# Patient Record
Sex: MALE | Race: WHITE | Employment: OTHER | URBAN - METROPOLITAN AREA
[De-identification: names, ages, dates, MRNs, and addresses within clinical notes are randomized per-mention and may not be internally consistent; named-entity substitution may affect disease eponyms.]

---

## 2017-02-14 ENCOUNTER — HOME CARE VISIT (OUTPATIENT)
Dept: HOME HEALTH SERVICES | Facility: HOME HEALTH | Age: 77
End: 2017-02-14

## 2017-07-06 ENCOUNTER — OFFICE VISIT (OUTPATIENT)
Dept: FAMILY MEDICINE CLINIC | Age: 77
End: 2017-07-06

## 2017-07-06 VITALS
DIASTOLIC BLOOD PRESSURE: 58 MMHG | BODY MASS INDEX: 34.88 KG/M2 | OXYGEN SATURATION: 97 % | HEART RATE: 80 BPM | TEMPERATURE: 98.2 F | SYSTOLIC BLOOD PRESSURE: 120 MMHG | RESPIRATION RATE: 16 BRPM | WEIGHT: 295.4 LBS | HEIGHT: 77 IN

## 2017-07-06 DIAGNOSIS — R29.6 RECURRENT FALLS: ICD-10-CM

## 2017-07-06 DIAGNOSIS — I48.0 PAROXYSMAL ATRIAL FIBRILLATION (HCC): ICD-10-CM

## 2017-07-06 DIAGNOSIS — I42.9 CARDIOMYOPATHY, UNSPECIFIED TYPE (HCC): ICD-10-CM

## 2017-07-06 DIAGNOSIS — G60.9 IDIOPATHIC PERIPHERAL NEUROPATHY: ICD-10-CM

## 2017-07-06 DIAGNOSIS — R26.89 IMPAIRED GAIT AND MOBILITY: ICD-10-CM

## 2017-07-06 DIAGNOSIS — I49.5 SICK SINUS SYNDROME (HCC): Primary | ICD-10-CM

## 2017-07-06 LAB
INR BLD: 2.5
PT POC: 29.8 SECONDS
VALID INTERNAL CONTROL?: YES

## 2017-07-06 RX ORDER — DIPHENHYDRAMINE HCL 25 MG
50 CAPSULE ORAL
COMMUNITY
End: 2018-11-26

## 2017-07-06 RX ORDER — METOPROLOL SUCCINATE 25 MG/1
25 TABLET, EXTENDED RELEASE ORAL DAILY
Qty: 90 TAB | Refills: 0 | Status: SHIPPED | OUTPATIENT
Start: 2017-07-06 | End: 2017-09-29 | Stop reason: SDUPTHER

## 2017-07-06 RX ORDER — RANITIDINE 150 MG/1
150 TABLET, FILM COATED ORAL DAILY
COMMUNITY
End: 2018-02-06

## 2017-07-06 RX ORDER — FUROSEMIDE 40 MG/1
40 TABLET ORAL 2 TIMES DAILY
COMMUNITY
End: 2018-02-06

## 2017-07-06 RX ORDER — TAMSULOSIN HYDROCHLORIDE 0.4 MG/1
0.4 CAPSULE ORAL DAILY
COMMUNITY
End: 2018-02-06

## 2017-07-06 RX ORDER — GLUCOSAMINE/CHONDR SU A SOD 167-133 MG
500 CAPSULE ORAL EVERY OTHER DAY
COMMUNITY
End: 2017-08-04 | Stop reason: ALTCHOICE

## 2017-07-06 RX ORDER — FUROSEMIDE 20 MG/1
20 TABLET ORAL
COMMUNITY
End: 2017-07-19 | Stop reason: SDUPTHER

## 2017-07-06 RX ORDER — WARFARIN 4 MG/1
4 TABLET ORAL DAILY
COMMUNITY
End: 2017-07-19 | Stop reason: SDUPTHER

## 2017-07-06 RX ORDER — LEVOTHYROXINE SODIUM 25 UG/1
TABLET ORAL
COMMUNITY
End: 2018-02-05

## 2017-07-06 NOTE — PROGRESS NOTES
1. Have you been to the ER, urgent care clinic since your last visit? Hospitalized since your last visit? No    2. Have you seen or consulted any other health care providers outside of the [address] [place] [address] since your last visit? Include any pap smears or colon screening.  Patient was going to [facility]

## 2017-07-06 NOTE — PROGRESS NOTES
History of Present Illness  Charles Novoa is a 64 y.o. male who presents today for management of    Chief Complaint   Patient presents with    Medication Refill    Cardiomyopathy    Allergic Rhinitis    Irregular Heart Beat       Patient was last seen by Dr. Leesa Crespo one year ago. He has bene going to [place], but he wants to go back to [facility]. He has history of sick sinus syndrome, s/p PPM 7/4/2011. He takes Coumadin 4mg daily for paroxysmal atrial fibrillation. His INR today is 2.5. No bleeding or dark stools. He has fallen 3 times in the last 2 weeks. His left knee often gives out or locks up causing him to lose his balance. He has pain on both feet. He takes gabapentin which provides partial relief. He reports of developing allergic rash while eating a bowl of ramen. The second time occurred while eating a bowl of cereal.    Past Medical History  Past Medical History:   Diagnosis Date    Asthma     BPH (benign prostatic hyperplasia)     Herniated disc, cervical     Knee pain     Pacemaker 2011    [facility]    PAF (paroxysmal atrial fibrillation) ([facility])     During Pacer interogation     SSS (sick sinus syndrome) ([facility])     S/P Dual chamber [facility] Pacemaker        Surgical History  Past Surgical History:   Procedure Laterality Date    HX HERNIA REPAIR Bilateral 1989    HX HIP REPLACEMENT  2006    right    HX KNEE REPLACEMENT Bilateral 1992/1993/2006/2008/2011/2012/2013    HX PACEMAKER  2011        Current Medications  Current Outpatient Prescriptions   Medication Sig    warfarin (COUMADIN) 4 mg tablet Take 4 mg by mouth daily.  levothyroxine (SYNTHROID) 25 mcg tablet Take  by mouth Daily (before breakfast).  raNITIdine (ZANTAC) 150 mg tablet Take 150 mg by mouth daily.  tamsulosin (FLOMAX) 0.4 mg capsule Take 0.4 mg by mouth daily.  furosemide (LASIX) 40 mg tablet Take  by mouth daily.  furosemide (LASIX) 20 mg tablet Take 20 mg by mouth nightly.     diphenhydrAMINE (BENADRYL) 25 mg capsule Take 50 mg by mouth nightly as needed.  nicotinic acid (NIACIN) 500 mg tablet Take 500 mg by mouth every other day.  zinc 50 mg tab tablet Take 25 mg by mouth daily.  metoprolol succinate (TOPROL-XL) 25 mg XL tablet Take 1 Tab by mouth daily.  cyanocobalamin 1,000 mcg tablet Take 1,000 mcg by mouth daily.  gabapentin (NEURONTIN) 600 mg tablet Take 1 Tab by mouth three (3) times daily.  loratadine (CLARITIN) 10 mg tablet Take 1 Tab by mouth daily. No current facility-administered medications for this visit. Allergies/Drug Reactions  Allergies   Allergen Reactions    Sulfa (Sulfonamide Antibiotics) Hives and Rash        Family History  No family history on file. Social History  Social History     Social History    Marital status:      Spouse name: N/A    Number of children: N/A    Years of education: N/A     Occupational History    Not on file. Social History Main Topics    Smoking status: Current Some Day Smoker     Types: Cigars    Smokeless tobacco: Never Used      Comment: rare cigar smoker    Alcohol use 0.0 oz/week     0 Standard drinks or equivalent per week      Comment: rarely drinks    Drug use: No    Sexual activity: Not Currently     Partners: Female     Other Topics Concern    Not on file     Social History Narrative       Health Maintenance   Topic Date Due    DTaP/Tdap/Td series (1 - Tdap) 08/28/1959    GLAUCOMA SCREENING Q2Y  08/28/2003    Pneumococcal 65+ High/Highest Risk (1 of 2 - PCV13) 08/28/2003    MEDICARE YEARLY EXAM  02/08/2017    INFLUENZA AGE 9 TO ADULT  08/01/2017    ZOSTER VACCINE AGE 60>  Completed       There is no immunization history on file for this patient.     Review of Systems  General ROS: negative for - chills, fatigue or fever  Respiratory ROS: no cough, shortness of breath, or wheezing  Cardiovascular ROS: no chest pain or dyspnea on exertion  Gastrointestinal ROS: no abdominal pain, change in bowel habits, or black or bloody stools  Genito-Urinary ROS: no dysuria, trouble voiding, or hematuria  Musculoskeletal ROS: positive for - foot pain  Neurological ROS: positive for - impaired coordination/balance    No exam data present    Physical Exam  Vital signs:   Vitals:    07/06/17 1523   BP: 120/58   Pulse: 80   Resp: 16   Temp: 98.2 °F (36.8 °C)   TempSrc: Oral   SpO2: 97%   Weight: 295 lb 6.4 oz (134 kg)   Height: 6' 5\" (1.956 m)       General: alert, oriented, not in distress, wheelchair-bound  Chest/Lungs: clear breath sounds, no wheezing or crackles  Heart: normal rate, regular rhythm, no murmur  Abdomen: soft, non-distended, non-tender, normal bowel sounds, no organomegaly, no masses  Extremities: no focal deformities, pitting bipedal edema gr 2  Skin: no active skin lesions    Assessment/Plan:    1. Sick sinus syndrome (HCC)  - AMB POC EKG ROUTINE W/ 12 LEADS, INTER & REP  - AMB POC PT/INR  - refilled metoprolol succinate (TOPROL-XL) 25 mg XL tablet; Take 1 Tab by mouth daily. Dispense: 90 Tab; Refill: 0    2. Cardiomyopathy, unspecified type - no evidence of fluid overload  - metoprolol succinate (TOPROL-XL) 25 mg XL tablet; Take 1 Tab by mouth daily. Dispense: 90 Tab; Refill: 0  - continue furosemide  - will need new referral for cardiology    3. Paroxysmal atrial fibrillation (HCC)  - INR therapeutic  - continue coumadin 4mg daily  - metoprolol succinate (TOPROL-XL) 25 mg XL tablet; Take 1 Tab by mouth daily. Dispense: 90 Tab; Refill: 0    4. Idiopathic peripheral neuropathy  - cont gabapentin    5. Recurrent falls  - REFERRAL TO PHYSICAL THERAPY    6. Impaired gait and mobility  - REFERRAL TO PHYSICAL THERAPY      Follow-up Disposition:  Return in about 4 weeks (around 8/3/2017) for Dr. Lisa Calero. I have discussed the diagnosis with the patient and the intended plan as seen in the above orders. The patient has received an after-visit summary and questions were answered concerning future plans. I have discussed medication side effects and warnings with the patient as well. I have reviewed the plan of care with the patient, accepted their input and they are in agreement with the treatment goals.        Gabyb Wang MD  July 6, 2017

## 2017-07-06 NOTE — MR AVS SNAPSHOT
Visit Information Date & Time Provider Department Dept. Phone Encounter #  
 7/6/2017  3:30 PM Ira Fajardo, [address] [facility] [phone] [id number] Follow-up Instructions Return in about 6 days (around 7/12/2017) for Dr. Rosendo Heimlich. Your Appointments 7/12/2017  1:45 PM  
Follow Up with Kalyn Camarena MD  
[facility] Appt Note: PHYTEL- CHOL  
 [address] [place]  
  
   
 [address] [facility] [address] Upcoming Health Maintenance Date Due DTaP/Tdap/Td series (1 - Tdap) 8/28/1959 GLAUCOMA SCREENING Q2Y 8/28/2003 Pneumococcal 65+ High/Highest Risk (1 of 2 - PCV13) 8/28/2003 MEDICARE YEARLY EXAM 2/8/2017 INFLUENZA AGE 9 TO ADULT 8/1/2017 Allergies as of 7/6/2017  Review Complete On: 7/6/2017 By: Ira Fajardo MD  
  
 Severity Noted Reaction Type Reactions Sulfa (Sulfonamide Antibiotics)  12/03/2015    Hives, Rash Current Immunizations  Never Reviewed No immunizations on file. Not reviewed this visit You Were Diagnosed With   
  
 Codes Comments [facility]    -  Primary ICD-10-CM: I49.5 ICD-9-CM: 427.81 Cardiomyopathy, unspecified type     ICD-10-CM: I42.9 ICD-9-CM: 425.4 Paroxysmal atrial fibrillation (HCC)     ICD-10-CM: I48.0 ICD-9-CM: 427.31 Idiopathic peripheral neuropathy     ICD-10-CM: G60.9 ICD-9-CM: 356.9 Recurrent falls     ICD-10-CM: R29.6 ICD-9-CM: V15.88 Impaired gait and mobility     ICD-10-CM: R26.89 
ICD-9-CM: 781. 2 Vitals BP Pulse Temp Resp Height(growth percentile) Weight(growth percentile) 120/58 (BP 1 Location: Left arm, BP Patient Position: Sitting) 80 98.2 °F (36.8 °C) (Oral) 16 6' 5\" (1.956 m) 295 lb 6.4 oz (134 kg) SpO2 BMI Smoking Status 97% 35.03 kg/m2 Current Some Day Smoker Vitals History BMI and BSA Data Body Mass Index Body Surface Area 35.03 kg/m 2 2.7 m 2 Preferred Pharmacy Pharmacy Name Phone [facility] [address], [place] [phone] Your Updated Medication List  
  
   
This list is accurate as of: 7/6/17  4:12 PM.  Always use your most recent med list.  
  
  
  
  
 BENADRYL 25 mg capsule Generic drug:  diphenhydrAMINE Take 50 mg by mouth nightly as needed. cyanocobalamin 1,000 mcg tablet Take 1,000 mcg by mouth daily. * furosemide 40 mg tablet Commonly known as:  LASIX Take  by mouth daily. * furosemide 20 mg tablet Commonly known as:  LASIX Take 20 mg by mouth nightly.  
  
 gabapentin 600 mg tablet Commonly known as:  NEURONTIN Take 1 Tab by mouth three (3) times daily. levothyroxine 25 mcg tablet Commonly known as:  SYNTHROID Take  by mouth Daily (before breakfast). loratadine 10 mg tablet Commonly known as:  Maryam Case Take 1 Tab by mouth daily. metoprolol succinate 25 mg XL tablet Commonly known as:  TOPROL-XL Take 1 Tab by mouth daily. nicotinic acid 500 mg tablet Commonly known as:  NIACIN Take 500 mg by mouth every other day. raNITIdine 150 mg tablet Commonly known as:  ZANTAC Take 150 mg by mouth daily. tamsulosin 0.4 mg capsule Commonly known as:  FLOMAX Take 0.4 mg by mouth daily. warfarin 4 mg tablet Commonly known as:  COUMADIN Take 4 mg by mouth daily. zinc 50 mg Tab tablet Take 25 mg by mouth daily. * Notice: This list has 2 medication(s) that are the same as other medications prescribed for you. Read the directions carefully, and ask your doctor or other care provider to review them with you. Prescriptions Sent to Pharmacy Refills  
 metoprolol succinate (TOPROL-XL) 25 mg XL tablet 0 Sig: Take 1 Tab by mouth daily.   
 Class: Normal  
 Pharmacy: [id number], [address] Ph #: [phone] Route: Oral  
  
We Performed the Following AMB POC EKG ROUTINE W/ 12 LEADS, INTER & REP [68338 CPT(R)] AMB POC PT/INR [39416 CPT(R)] REFERRAL TO CARDIOLOGY [AVI05 Custom] Comments:  
 76/M with history of cardiomyopathy, sick sinus syndrome, s/p PPM, paroxysmal atrial fibrillation on warfarin REFERRAL TO PHYSICAL THERAPY [TQE46 Custom] Comments:  
 76/M with impaired balance, recurrent falls, history of bilateral total knee replacements Follow-up Instructions Return in about 6 days (around 7/12/2017) for Dr. Meghna Davis. Referral Information Referral ID Referred By Referred To  
  
 [id number] Crys Otero Not Available Visits Status Start Date End Date 1 New Request 7/6/17 7/6/18 If your referral has a status of pending review or denied, additional information will be sent to support the outcome of this decision. Referral ID Referred By Referred To  
 [id number] Jackie Robles MD  
   [address] [place] [address] [address] [facility] Phone: [phone] Fax: [phone] Visits Status Start Date End Date 1 New Request 7/6/17 7/6/18 If your referral has a status of pending review or denied, additional information will be sent to support the outcome of this decision. Introducing [facility]! Shala Lehman introduces Fluxome patient portal. Now you can access parts of your medical record, email your doctor's office, and request medication refills online. 1. In your internet browser, go to [email or web address] 2. Click on the First Time User? Click Here link in the Sign In box. You will see the New Member Sign Up page. 3. Enter your Fluxome Access Code exactly as it appears below. You will not need to use this code after youve completed the sign-up process.  If you do not sign up before the expiration date, you must request a new code. · HeyLets Access Code: [email or web address] Expires: 10/4/2017  3:56 PM 
 
4. Enter the last four digits of your Social Security Number (xxxx) and Date of Birth (mm/dd/yyyy) as indicated and click Submit. You will be taken to the next sign-up page. 5. Create a HeyLets ID. This will be your HeyLets login ID and cannot be changed, so think of one that is secure and easy to remember. 6. Create a HeyLets password. You can change your password at any time. 7. Enter your Password Reset Question and Answer. This can be used at a later time if you forget your password. 8. Enter your e-mail address. You will receive e-mail notification when new information is available in [address] 9. Click Sign Up. You can now view and download portions of your medical record. 10. Click the Download Summary menu link to download a portable copy of your medical information. If you have questions, please visit the Frequently Asked Questions section of the HeyLets website. Remember, HeyLets is NOT to be used for urgent needs. For medical emergencies, dial 911. Now available from your iPhone and Android! Please provide this summary of care documentation to your next provider. Your primary care clinician is listed as Hebert Zapata. If you have any questions after today's visit, please call [phone].

## 2017-07-12 ENCOUNTER — OFFICE VISIT (OUTPATIENT)
Dept: FAMILY MEDICINE CLINIC | Age: 77
End: 2017-07-12

## 2017-07-12 ENCOUNTER — HOSPITAL ENCOUNTER (OUTPATIENT)
Dept: LAB | Age: 77
Discharge: HOME | End: 2017-07-12

## 2017-07-12 VITALS
RESPIRATION RATE: 18 BRPM | HEIGHT: 77 IN | WEIGHT: 296 LBS | HEART RATE: 76 BPM | OXYGEN SATURATION: 96 % | TEMPERATURE: 97.9 F | DIASTOLIC BLOOD PRESSURE: 56 MMHG | SYSTOLIC BLOOD PRESSURE: 112 MMHG | BODY MASS INDEX: 34.95 KG/M2

## 2017-07-12 DIAGNOSIS — N40.0 BENIGN PROSTATIC HYPERPLASIA WITHOUT LOWER URINARY TRACT SYMPTOMS, UNSPECIFIED MORPHOLOGY: ICD-10-CM

## 2017-07-12 DIAGNOSIS — I42.9 CARDIOMYOPATHY, UNSPECIFIED TYPE (HCC): ICD-10-CM

## 2017-07-12 DIAGNOSIS — G62.9 PERIPHERAL POLYNEUROPATHY: Primary | ICD-10-CM

## 2017-07-12 DIAGNOSIS — M79.89 LEG SWELLING: ICD-10-CM

## 2017-07-12 DIAGNOSIS — R73.01 IFG (IMPAIRED FASTING GLUCOSE): ICD-10-CM

## 2017-07-12 DIAGNOSIS — D47.2 MONOCLONAL GAMMOPATHY: ICD-10-CM

## 2017-07-12 DIAGNOSIS — E78.00 HYPERCHOLESTEREMIA: ICD-10-CM

## 2017-07-12 DIAGNOSIS — E03.4 HYPOTHYROIDISM DUE TO ACQUIRED ATROPHY OF THYROID: ICD-10-CM

## 2017-07-12 DIAGNOSIS — H25.013 CORTICAL AGE-RELATED CATARACT OF BOTH EYES: ICD-10-CM

## 2017-07-12 PROCEDURE — 99001 SPECIMEN HANDLING PT-LAB: CPT | Performed by: INTERNAL MEDICINE

## 2017-07-12 NOTE — PROGRESS NOTES
1. Have you been to the ER, urgent care clinic since your last visit? Hospitalized since your last visit? No    2. Have you seen or consulted any other health care providers outside of the [address] [place] [address] since your last visit? Include any pap smears or colon screening.  Dr. Antonio Haji

## 2017-07-12 NOTE — PROGRESS NOTES
Shayne Ward is a 64 y.o.  male and presents with     Chief Complaint   Patient presents with    Coagulation disorder     PT/INR - 29.2 / 2.4    Irregular Heart Beat    Knee Pain    Leg Swelling     Pt was going to [facility]. Pt decided to coem back. Pt has bilateral knee pain. Pt has gained wt. Pt uses a walker. Pt is taking Neurontin three  Times daily. Past Medical History:   Diagnosis Date    Asthma     BPH (benign prostatic hyperplasia)     Herniated disc, cervical     Knee pain     Pacemaker 2011    [facility]    PAF (paroxysmal atrial fibrillation) ([facility])     During Pacer interogation     SSS (sick sinus syndrome) ([facility])     S/P Dual chamber [facility] Pacemaker     Past Surgical History:   Procedure Laterality Date    HX HERNIA REPAIR Bilateral 1989    HX HIP REPLACEMENT  2006    right    HX KNEE REPLACEMENT Bilateral 1992/1993/2006/2008/2011/2012/2013    HX PACEMAKER  2011     Current Outpatient Prescriptions   Medication Sig    warfarin (COUMADIN) 4 mg tablet Take 4 mg by mouth daily.  levothyroxine (SYNTHROID) 25 mcg tablet Take  by mouth Daily (before breakfast).  raNITIdine (ZANTAC) 150 mg tablet Take 150 mg by mouth daily.  tamsulosin (FLOMAX) 0.4 mg capsule Take 0.4 mg by mouth daily.  furosemide (LASIX) 40 mg tablet Take  by mouth daily.  furosemide (LASIX) 20 mg tablet Take 20 mg by mouth nightly.  diphenhydrAMINE (BENADRYL) 25 mg capsule Take 50 mg by mouth nightly as needed.  nicotinic acid (NIACIN) 500 mg tablet Take 500 mg by mouth every other day.  zinc 50 mg tab tablet Take 25 mg by mouth daily.  metoprolol succinate (TOPROL-XL) 25 mg XL tablet Take 1 Tab by mouth daily.  cyanocobalamin 1,000 mcg tablet Take 1,000 mcg by mouth daily.  gabapentin (NEURONTIN) 600 mg tablet Take 1 Tab by mouth three (3) times daily.  loratadine (CLARITIN) 10 mg tablet Take 1 Tab by mouth daily.      No current facility-administered medications for this visit. Health Maintenance   Topic Date Due    GLAUCOMA SCREENING Q2Y  08/28/2003    Pneumococcal 65+ High/Highest Risk (2 of 2 - PPSV23) 12/07/2016    MEDICARE YEARLY EXAM  02/08/2017    INFLUENZA AGE 9 TO ADULT  08/01/2017    DTaP/Tdap/Td series (2 - Td) 07/16/2024    ZOSTER VACCINE AGE 60>  Completed       There is no immunization history on file for this patient. No LMP for male patient. Allergies and Intolerances: Allergies   Allergen Reactions    Sulfa (Sulfonamide Antibiotics) Hives and Rash       Family History:   History reviewed. No pertinent family history. Social History:   He  reports that he has been smoking Cigars. He has never used smokeless tobacco.  He  reports that he drinks alcohol.             Review of Systems:   General: negative for - chills, fatigue, fever, weight change  Psych: negative for - anxiety, depression, irritability or mood swings  ENT: negative for - headaches, hearing change, nasal congestion, oral lesions, sneezing or sore throat  Heme/ Lymph: negative for - bleeding problems, bruising, pallor or swollen lymph nodes  Endo: negative for - hot flashes, polydipsia/polyuria or temperature intolerance  Resp: negative for - cough, shortness of breath or wheezing  CV: negative for - chest pain, edema or palpitations  GI: negative for - abdominal pain, change in bowel habits, constipation, diarrhea or nausea/vomiting  : negative for - dysuria, hematuria, incontinence, pelvic pain or vulvar/vaginal symptoms  MSK: negative for - joint pain, joint swelling or muscle pain  Neuro: negative for - confusion, headaches, seizures or weakness  Derm: negative for - dry skin, hair changes, rash or skin lesion changes          Physical:   Vitals:   Vitals:    07/12/17 1342   BP: 112/56   Pulse: 76   Resp: 18   Temp: 97.9 °F (36.6 °C)   TempSrc: Oral   SpO2: 96%   Weight: 296 lb (134.3 kg)   Height: 6' 5\" (1.956 m)           Exam:   HEENT- atraumatic,normocephalic, awake, oriented, well nourished  Neck - supple,no enlarged lymph nodes, no JVD, no thyromegaly  Chest- CTA, no rhonchi, no crackles  Heart- rrr, no murmurs / gallop/rub  Abdomen- soft,BS+,NT, no hepatosplenomegaly  Ext - no c/c/edema , + 2 pitting swelling  Neuro- no focal deficits. Power 5/5 all extremities  Skin - warm,dry, no obvious rashes. Review of Data:   LABS:   Lab Results   Component Value Date/Time    WBC 4.8 05/24/2016 05:45 PM    HGB 12.0 05/24/2016 05:45 PM    HCT 36.4 05/24/2016 05:45 PM    PLATELET 717 25/24/8834 05:45 PM     Lab Results   Component Value Date/Time    Sodium 139 05/24/2016 05:45 PM    Potassium 4.3 05/24/2016 05:45 PM    Chloride 107 05/24/2016 05:45 PM    CO2 24 05/24/2016 05:45 PM    Glucose 129 05/24/2016 05:45 PM    BUN 25 05/24/2016 05:45 PM    Creatinine 1.44 05/24/2016 05:45 PM     No results found for: CHOL, CHOLX, CHLST, CHOLV, HDL, LDL, LDLC, DLDLP, TGLX, TRIGL, TRIGP  No results found for: GPT        Impression / Plan:        ICD-10-CM ICD-9-CM    1. Peripheral polyneuropathy (HCC) G62.9 356.9    2. Cardiomy[facility], unspecified [facility] ([facility] I42.9 425.4 LIPID PANEL      METABOLIC PANEL, COMPREHENSIVE      CBC WITH AUTOMATED DIFF      REFERRAL TO CARDIOLOGY   3. Monoclonal gammopathy D47.2 273.1    4. Benign prostatic hyperplasia without lower urinary tract symptoms, unspecified morphology N40.0 600.00    5. Leg swelling M79.89 729.81    6. IFG (impaired fasting glucose) R73.01 790.21 HEMOGLOBIN A1C WITH EAG      MICROALBUMIN, UR, RAND W/ MICROALBUMIN/CREA RATIO   7. Hypothyroidism due to acquired atrophy of thyroid E03.4 244.8 TSH 3RD GENERATION     246.8    8. Hypercholesteremia E78.00 272.0 LIPID PANEL   9. Cortical age-related cataract of both eyes H25.013 366.15 REFERRAL TO OPHTHALMOLOGY     Par Afib - INR therapeutic. Allergic  reaction , rash - asked pt to keep diary. Leg swelling - asked pt to reduce the dose of Neurontin to twice daily.     Explained to patient risk benefits of the medications. Advised patient to stop meds if having any side effects. Pt verbalized understanding of the instructions. I have discussed the diagnosis with the patient and the intended plan as seen in the above orders. The patient has received an after-visit summary and questions were answered concerning future plans. I have discussed medication side effects and warnings with the patient as well. I have reviewed the plan of care with the patient, accepted their input and they are in agreement with the treatment goals. Reviewed plan of care. Patient has provided input and agrees with goals.     Follow-up Disposition: Not on Sami Escalante MD

## 2017-07-12 NOTE — MR AVS SNAPSHOT
Visit Information Date & Time Provider Department Dept. Phone Encounter #  
 7/12/2017  1:45 PM Maurisio Lay, [address] [id number] Follow-up Instructions Return in about 1 month (around 8/12/2017). Upcoming Health Maintenance Date Due  
 GLAUCOMA SCREENING Q2Y 8/28/2003 Pneumococcal 65+ High/Highest Risk (2 of 2 - PPSV23) 12/7/2016 MEDICARE YEARLY EXAM 2/8/2017 INFLUENZA AGE 9 TO ADULT 8/1/2017 DTaP/Tdap/Td series (2 - Td) 7/16/2024 Allergies as of 7/12/2017  Review Complete On: 7/12/2017 By: Maurisio Lay MD  
  
 Severity Noted Reaction Type Reactions Sulfa (Sulfonamide Antibiotics)  12/03/2015    Hives, Rash Current Immunizations  Never Reviewed No immunizations on file. Not reviewed this visit You Were Diagnosed With   
  
 Codes Comments Peripheral polyneuropathy (HCC)    -  Primary ICD-10-CM: G62.9 ICD-9-CM: 356.9 [facility]     ICD-10-CM: I42.9 ICD-9-CM: 425.4 Monoclonal gammopathy     ICD-10-CM: D47.2 ICD-9-CM: 273.1 Benign prostatic hyperplasia without lower urinary tract symptoms, unspecified morphology     ICD-10-CM: N40.0 ICD-9-CM: 600.00 Leg swelling     ICD-10-CM: M79.89 ICD-9-CM: 729.81 IFG (impaired fasting glucose)     ICD-10-CM: R73.01 
ICD-9-CM: 790.21 Hypothyroidism due to acquired atrophy of thyroid     ICD-10-CM: E03.4 ICD-9-CM: 244.8, 246.8 Hypercholesteremia     ICD-10-CM: E78.00 ICD-9-CM: 272.0 Cortical age-related cataract of both eyes     ICD-10-CM: H25.013 
ICD-9-CM: 366.15 Vitals BP Pulse Temp Resp Height(growth percentile) Weight(growth percentile) 112/56 (BP 1 Location: Left arm, BP Patient Position: Sitting) 76 97.9 °F (36.6 °C) (Oral) 18 6' 5\" (1.956 m) 296 lb (134.3 kg) SpO2 BMI Smoking Status 96% 35.1 kg/m2 Current Some Day Smoker Vitals History BMI and BSA Data Body Mass Index Body Surface Area  
 35.1 kg/m 2 2.7 m 2 Preferred Pharmacy Pharmacy Name Phone Rex Rae [address] [place] [phone] [address] [phone] Your Updated Medication List  
  
   
This list is accurate as of: 7/12/17  2:13 PM.  Always use your most recent med list.  
  
  
  
  
 BENADRYL 25 mg capsule Generic drug:  diphenhydrAMINE Take 50 mg by mouth nightly as needed. cyanocobalamin 1,000 mcg tablet Take 1,000 mcg by mouth daily. * furosemide 40 mg tablet Commonly known as:  LASIX Take  by mouth daily. * furosemide 20 mg tablet Commonly known as:  LASIX Take 20 mg by mouth nightly.  
  
 gabapentin 600 mg tablet Commonly known as:  NEURONTIN Take 1 Tab by mouth three (3) times daily. levothyroxine 25 mcg tablet Commonly known as:  SYNTHROID Take  by mouth Daily (before breakfast). loratadine 10 mg tablet Commonly known as:  Carmelita Flint Take 1 Tab by mouth daily. metoprolol succinate 25 mg XL tablet Commonly known as:  TOPROL-XL Take 1 Tab by mouth daily. nicotinic acid 500 mg tablet Commonly known as:  NIACIN Take 500 mg by mouth every other day. raNITIdine 150 mg tablet Commonly known as:  ZANTAC Take 150 mg by mouth daily. tamsulosin 0.4 mg capsule Commonly known as:  FLOMAX Take 0.4 mg by mouth daily. warfarin 4 mg tablet Commonly known as:  COUMADIN Take 4 mg by mouth daily. zinc 50 mg Tab tablet Take 25 mg by mouth daily. * Notice: This list has 2 medication(s) that are the same as other medications prescribed for you. Read the directions carefully, and ask your doctor or other care provider to review them with you. We Performed the Following REFERRAL TO CARDIOLOGY [XIN65 Custom] REFERRAL TO OPHTHALMOLOGY [REF57 Custom] Comments:  
 Please evaluate patient for cataract Follow-up Instructions Return in about 1 month (around 8/12/2017). To-Do List   
 07/12/2017 Lab:  CBC WITH AUTOMATED DIFF   
  
 07/12/2017 Lab:  HEMOGLOBIN A1C WITH EAG   
  
 07/12/2017 Lab:  LIPID PANEL   
  
 07/12/2017 Lab:  METABOLIC PANEL, COMPREHENSIVE   
  
 07/12/2017 Lab:  MICROALBUMIN, UR, RAND W/ MICROALBUMIN/CREA RATIO   
  
 07/12/2017 Lab:  TSH 3RD GENERATION   
  
 07/18/2017 1:30 PM  
  Appointment with Walter Piedra PT at [facility] PT Reagan Kowalski [address] IM ([phone]) Referral Information Referral ID Referred By Referred To  
  
 [id number] [facility], [address] [place] [address] [address] [facility] [address] [address] Phone: [phone] Fax: [phone] Visits Status Start Date End Date 1 Closed 7/12/17 7/12/18 If your referral has a status of pending review or denied, additional information will be sent to support the outcome of this decision. Referral ID Referred By Referred To  
 [id number] Chase Abimael IRVIN Not Available Visits Status Start Date End Date 1 New Request 7/12/17 7/12/18 If your referral has a status of pending review or denied, additional information will be sent to support the outcome of this decision. Introducing [facility] [place] [facility]! Surya Bonilla introduces Silverado patient portal. Now you can access parts of your medical record, email your doctor's office, and request medication refills online. 1. In your internet browser, go to [email or web address] 2. Click on the First Time User? Click Here link in the Sign In box. You will see the New Member Sign Up page. 3. Enter your Silverado Access Code exactly as it appears below. You will not need to use this code after youve completed the sign-up process. If you do not sign up before the expiration date, you must request a new code. · Silverado Access Code: [email or web address] Expires: 10/4/2017  3:56 PM 
 
 4. Enter the last four digits of your Social Security Number (xxxx) and Date of Birth (mm/dd/yyyy) as indicated and click Submit. You will be taken to the next sign-up page. 5. Create a AquaBlok ID. This will be your AquaBlok login ID and cannot be changed, so think of one that is secure and easy to remember. 6. Create a AquaBlok password. You can change your password at any time. 7. Enter your Password Reset Question and Answer. This can be used at a later time if you forget your password. 8. Enter your e-mail address. You will receive e-mail notification when new information is available in [address] 9. Click Sign Up. You can now view and download portions of your medical record. 10. Click the Download Summary menu link to download a portable copy of your medical information. If you have questions, please visit the Frequently Asked Questions section of the AquaBlok website. Remember, AquaBlok is NOT to be used for urgent needs. For medical emergencies, dial 911. Now available from your iPhone and Android! Please provide this summary of care documentation to your next provider. Your primary care clinician is listed as [id number]. If you have any questions after today's visit, please call [phone].

## 2017-07-13 ENCOUNTER — TELEPHONE (OUTPATIENT)
Dept: CARDIOLOGY CLINIC | Age: 77
End: 2017-07-13

## 2017-07-13 LAB
ALBUMIN SERPL-MCNC: 4.1 G/DL (ref 3.5–4.8)
ALBUMIN/GLOB SERPL: 1.1 {RATIO} (ref 1.2–2.2)
ALP SERPL-CCNC: 58 IU/L (ref 39–117)
ALT SERPL-CCNC: 17 IU/L (ref 0–44)
AST SERPL-CCNC: 19 IU/L (ref 0–40)
BASOPHILS # BLD AUTO: 0 X10E3/UL (ref 0–0.2)
BASOPHILS NFR BLD AUTO: 0 %
BILIRUB SERPL-MCNC: 0.3 MG/DL (ref 0–1.2)
BUN SERPL-MCNC: 20 MG/DL (ref 8–27)
BUN/CREAT SERPL: 24 (ref 10–24)
CALCIUM SERPL-MCNC: 9.1 MG/DL (ref 8.6–10.2)
CHLORIDE SERPL-SCNC: 96 MMOL/L (ref 96–106)
CHOLEST SERPL-MCNC: 157 MG/DL (ref 100–199)
CO2 SERPL-SCNC: 27 MMOL/L (ref 18–29)
CREAT SERPL-MCNC: 0.82 MG/DL (ref 0.76–1.27)
CREAT UR-MCNC: NORMAL MG/DL
EOSINOPHIL # BLD AUTO: 0.2 X10E3/UL (ref 0–0.4)
EOSINOPHIL NFR BLD AUTO: 3 %
ERYTHROCYTE [DISTWIDTH] IN BLOOD BY AUTOMATED COUNT: 14.8 % (ref 12.3–15.4)
EST. AVERAGE GLUCOSE BLD GHB EST-MCNC: 166 MG/DL
GLOBULIN SER CALC-MCNC: 3.6 G/DL (ref 1.5–4.5)
GLUCOSE SERPL-MCNC: 140 MG/DL (ref 65–99)
HBA1C MFR BLD: 7.4 % (ref 4.8–5.6)
HCT VFR BLD AUTO: 37.9 % (ref 37.5–51)
HDLC SERPL-MCNC: 25 MG/DL
HGB BLD-MCNC: 12.4 G/DL (ref 12.6–17.7)
IMM GRANULOCYTES # BLD: 0 X10E3/UL (ref 0–0.1)
IMM GRANULOCYTES NFR BLD: 0 %
LDLC SERPL CALC-MCNC: 55 MG/DL (ref 0–99)
LYMPHOCYTES # BLD AUTO: 1.6 X10E3/UL (ref 0.7–3.1)
LYMPHOCYTES NFR BLD AUTO: 34 %
MCH RBC QN AUTO: 31.5 PG (ref 26.6–33)
MCHC RBC AUTO-ENTMCNC: 32.7 G/DL (ref 31.5–35.7)
MCV RBC AUTO: 96 FL (ref 79–97)
MICROALBUMIN UR-MCNC: NORMAL
MONOCYTES # BLD AUTO: 0.5 X10E3/UL (ref 0.1–0.9)
MONOCYTES NFR BLD AUTO: 11 %
NEUTROPHILS # BLD AUTO: 2.5 X10E3/UL (ref 1.4–7)
NEUTROPHILS NFR BLD AUTO: 52 %
PLATELET # BLD AUTO: 181 X10E3/UL (ref 150–379)
POTASSIUM SERPL-SCNC: 4.5 MMOL/L (ref 3.5–5.2)
PROT SERPL-MCNC: 7.7 G/DL (ref 6–8.5)
RBC # BLD AUTO: 3.94 X10E6/UL (ref 4.14–5.8)
SODIUM SERPL-SCNC: 138 MMOL/L (ref 134–144)
TRIGL SERPL-MCNC: 387 MG/DL (ref 0–149)
TSH SERPL DL<=0.005 MIU/L-ACNC: 0.82 UIU/ML (ref 0.45–4.5)
VLDLC SERPL CALC-MCNC: 77 MG/DL (ref 5–40)
WBC # BLD AUTO: 4.9 X10E3/UL (ref 3.4–10.8)

## 2017-07-15 LAB
ALBUMIN/CREAT UR: <4.5 MG/G CREAT (ref 0–30)
CREAT UR-MCNC: 67.4 MG/DL
MICROALBUMIN UR-MCNC: <3 UG/ML

## 2017-07-18 ENCOUNTER — APPOINTMENT (OUTPATIENT)
Dept: PHYSICAL THERAPY | Age: 77
End: 2017-07-18

## 2017-07-19 NOTE — TELEPHONE ENCOUNTER
Patient recently returned to [facility] after being with [facility]. List was given to [facility] when here at last appointment. Patient forgot to mention need for refills. He only has two(2) tablets left and needs them sent to [facility].

## 2017-07-21 RX ORDER — WARFARIN 4 MG/1
4 TABLET ORAL DAILY
Qty: 30 TAB | Refills: 3 | Status: SHIPPED | OUTPATIENT
Start: 2017-07-21 | End: 2017-09-22 | Stop reason: SDUPTHER

## 2017-07-21 RX ORDER — FUROSEMIDE 20 MG/1
20 TABLET ORAL
Qty: 30 TAB | Refills: 3 | Status: SHIPPED | OUTPATIENT
Start: 2017-07-21 | End: 2017-07-31 | Stop reason: DRUGHIGH

## 2017-07-27 ENCOUNTER — APPOINTMENT (OUTPATIENT)
Dept: PHYSICAL THERAPY | Age: 77
End: 2017-07-27

## 2017-07-31 ENCOUNTER — OFFICE VISIT (OUTPATIENT)
Dept: FAMILY MEDICINE CLINIC | Age: 77
End: 2017-07-31

## 2017-07-31 VITALS
HEART RATE: 69 BPM | DIASTOLIC BLOOD PRESSURE: 52 MMHG | HEIGHT: 77 IN | RESPIRATION RATE: 18 BRPM | TEMPERATURE: 96.9 F | WEIGHT: 294.4 LBS | SYSTOLIC BLOOD PRESSURE: 103 MMHG | BODY MASS INDEX: 34.76 KG/M2

## 2017-07-31 DIAGNOSIS — M79.89 LEG SWELLING: ICD-10-CM

## 2017-07-31 DIAGNOSIS — R06.09 DOE (DYSPNEA ON EXERTION): ICD-10-CM

## 2017-07-31 DIAGNOSIS — D68.9 COAGULATION DISORDER (HCC): Primary | ICD-10-CM

## 2017-07-31 LAB
INR BLD: 2.5
PT POC: 29.4 SECONDS
VALID INTERNAL CONTROL?: YES

## 2017-07-31 RX ORDER — GABAPENTIN 600 MG/1
TABLET ORAL 2 TIMES DAILY
COMMUNITY
End: 2017-09-07 | Stop reason: SDUPTHER

## 2017-07-31 RX ORDER — FUROSEMIDE 40 MG/1
TABLET ORAL
Qty: 180 TAB | Refills: 1 | Status: SHIPPED | OUTPATIENT
Start: 2017-07-31 | End: 2017-08-04 | Stop reason: ALTCHOICE

## 2017-07-31 NOTE — PROGRESS NOTES
1. Have you been to the ER, urgent care clinic since your last visit? Hospitalized since your last visit? No    2. Have you seen or consulted any other health care providers outside of the [address] [place] [address] since your last visit? Include any pap smears or colon screening.  No

## 2017-07-31 NOTE — MR AVS SNAPSHOT
Visit Information Date & Time Provider Department Dept. Phone Encounter #  
 7/31/2017  2:00 PM Audrey CONNORS Baldemar Gomes, [address] [id number] Follow-up Instructions Return for medicare wellness vusut,keep appt. Follow-up and Disposition History Your Appointments 8/4/2017  3:00 PM  
Follow Up with Jasmeet Allison MD  
Cardio Specialist at [address]) Appt Note: dx Cardiomyopathy, unspecified type ([facility] [address]  
  
   
 [address] 400 [facility] 8/10/2017  8:00 AM  
Medicare Physical with Audrey Gomes MD  
[facility] [address]) Appt Note: Return in about 1 month (around 8/12/2017). For Medicare Wellness; pt r/s 08/17/17 rb 07/27/17  
 [address]  
  
   
 [address] Upcoming Health Maintenance Date Due  
 GLAUCOMA SCREENING Q2Y 8/28/2003 Pneumococcal 65+ High/Highest Risk (2 of 2 - PPSV23) 12/7/2016 MEDICARE YEARLY EXAM 2/8/2017 INFLUENZA AGE 9 TO ADULT 8/1/2017 DTaP/Tdap/Td series (2 - Td) 7/16/2024 Allergies as of 7/31/2017  Review Complete On: 7/31/2017 By: [id number] MD  
  
 Severity Noted Reaction Type Reactions Sulfa (Sulfonamide Antibiotics)  12/03/2015    Hives, Rash Current Immunizations  Never Reviewed No immunizations on file. Not reviewed this visit You Were Diagnosed With   
  
 Codes Comments Coagulation disorder ([facility]    -  Primary ICD-10-CM: S06.4 ICD-9-CM: 286.9 CROWELL (dyspnea on exertion)     ICD-10-CM: R06.09 
ICD-9-CM: 786.09 Leg swelling     ICD-10-CM: M79.89 ICD-9-CM: 729.81 Vitals BP Pulse Temp Resp Height(growth percentile) Weight(growth percentile)  103/52 69 96.9 °F (36.1 °C) (Oral) 18 6' 5\" (1.956 m) 294 lb 6.4 oz (133.5 kg) BMI Smoking Status 34.91 kg/m2 Current Some Day Smoker Vitals History BMI and BSA Data Body Mass Index Body Surface Area 34.91 kg/m 2 2.69 m 2 Preferred Pharmacy Pharmacy Name Phone [facility] [address], Lazara Gomes [phone] Your Updated Medication List  
  
   
This list is accurate as of: 7/31/17  3:11 PM.  Always use your most recent med list.  
  
  
  
  
 BENADRYL 25 mg capsule Generic drug:  diphenhydrAMINE Take 50 mg by mouth nightly as needed. cyanocobalamin 1,000 mcg tablet Take 1,000 mcg by mouth daily. * furosemide 40 mg tablet Commonly known as:  LASIX Take  by mouth daily. * furosemide 40 mg tablet Commonly known as:  LASIX One po bid  
  
 levothyroxine 25 mcg tablet Commonly known as:  SYNTHROID Take  by mouth Daily (before breakfast). loratadine 10 mg tablet Commonly known as:  Maryam Case Take 1 Tab by mouth daily. metoprolol succinate 25 mg XL tablet Commonly known as:  TOPROL-XL Take 1 Tab by mouth daily. NEURONTIN 600 mg tablet Generic drug:  gabapentin Take  by mouth two (2) times a day. nicotinic acid 500 mg tablet Commonly known as:  NIACIN Take 500 mg by mouth every other day. raNITIdine 150 mg tablet Commonly known as:  ZANTAC Take 150 mg by mouth daily. tamsulosin 0.4 mg capsule Commonly known as:  FLOMAX Take 0.4 mg by mouth daily. warfarin 4 mg tablet Commonly known as:  COUMADIN Take 1 Tab by mouth daily. zinc 50 mg Tab tablet Take 25 mg by mouth daily. * Notice: This list has 2 medication(s) that are the same as other medications prescribed for you. Read the directions carefully, and ask your doctor or other care provider to review them with you. Prescriptions Sent to Pharmacy Refills  
 furosemide (LASIX) 40 mg tablet 1 Sig: One po bid Class: Normal  
 Pharmacy: [address], [address] Ph #: [phone] We Performed the Following AMB POC PT/INR [58677 CPT(R)] Follow-up Instructions Return for medicare wellness vusut,keep appt. To-Do List   
 07/31/2017 ECHO:  2D ECHO COMPLETE ADULT (TTE) W OR WO CONTR   
  
 08/07/2017 2:00 PM  
  Appointment with Boo Wilks PT at [facility] PT [address] ([phone]) Introducing [facility] SERVICES! Kyleigh Gomez introduces Resource Interactive patient portal. Now you can access parts of your medical record, email your doctor's office, and request medication refills online. 1. In your internet browser, go to [email or web address] 2. Click on the First Time User? Click Here link in the Sign In box. You will see the New Member Sign Up page. 3. Enter your Resource Interactive Access Code exactly as it appears below. You will not need to use this code after youve completed the sign-up process. If you do not sign up before the expiration date, you must request a new code. · Resource Interactive Access Code: [email or web address] Expires: 10/4/2017  3:56 PM 
 
4. Enter the last four digits of your Social Security Number (xxxx) and Date of Birth (mm/dd/yyyy) as indicated and click Submit. You will be taken to the next sign-up page. 5. Create a Resource Interactive ID. This will be your Resource Interactive login ID and cannot be changed, so think of one that is secure and easy to remember. 6. Create a Resource Interactive password. You can change your password at any time. 7. Enter your Password Reset Question and Answer. This can be used at a later time if you forget your password. 8. Enter your e-mail address. You will receive e-mail notification when new information is available in [address] 9. Click Sign Up. You can now view and download portions of your medical record. 10. Click the Download Summary menu link to download a portable copy of your medical information. If you have questions, please visit the Frequently Asked Questions section of the [employer] website. Remember, Bespoke is NOT to be used for urgent needs. For medical emergencies, dial 911. Now available from your iPhone and Android! Please provide this summary of care documentation to your next provider. Your primary care clinician is listed as Jj Hughes. If you have any questions after today's visit, please call [phone].

## 2017-08-04 ENCOUNTER — OFFICE VISIT (OUTPATIENT)
Dept: CARDIOLOGY CLINIC | Age: 77
End: 2017-08-04

## 2017-08-04 VITALS
WEIGHT: 292 LBS | SYSTOLIC BLOOD PRESSURE: 127 MMHG | DIASTOLIC BLOOD PRESSURE: 64 MMHG | OXYGEN SATURATION: 98 % | HEART RATE: 75 BPM | BODY MASS INDEX: 34.48 KG/M2 | HEIGHT: 77 IN

## 2017-08-04 DIAGNOSIS — E78.00 PURE HYPERCHOLESTEROLEMIA: ICD-10-CM

## 2017-08-04 DIAGNOSIS — I48.0 PAF (PAROXYSMAL ATRIAL FIBRILLATION) (HCC): Primary | ICD-10-CM

## 2017-08-04 DIAGNOSIS — I49.5 SICK SINUS SYNDROME (HCC): ICD-10-CM

## 2017-08-04 RX ORDER — TERBINAFINE HYDROCHLORIDE 250 MG/1
250 TABLET ORAL DAILY
COMMUNITY
Start: 2017-07-27 | End: 2018-02-06

## 2017-08-04 RX ORDER — FUROSEMIDE 20 MG/1
20 TABLET ORAL
COMMUNITY
Start: 2017-07-23 | End: 2018-02-06

## 2017-08-04 NOTE — MR AVS SNAPSHOT
Visit Information Date & Time Provider Department Dept. Phone Encounter #  
 8/4/2017  3:00 PM Jasmeet Chamorro MD [address] [place] [address] Specialist at [facility] [address] [phone] [id number] Follow-up Instructions Return in about 1 month (around 9/4/2017). Your Appointments 8/10/2017  8:00 AM  
Medicare Physical with Ryder Bowen MD  
[facility] Appt Note: Return in about 1 month (around 8/12/2017). For Medicare Wellness; pt r/s 08/17/17 rb 07/27/17  
 [address] [facility] [address] [facility]  
  
   
 [address] [facility] [address] [place] [address]  
  
    
 8/31/2017  1:30 PM  
Follow Up with Taj Dickerson MD  
Cardio Specialist at [facility]) Appt Note: follow up 1 month after testing [address] [address] [address] [place] [address] Upcoming Health Maintenance Date Due  
 GLAUCOMA SCREENING Q2Y 8/28/2003 Pneumococcal 65+ High/Highest Risk (2 of 2 - PPSV23) 12/7/2016 MEDICARE YEARLY EXAM 2/8/2017 INFLUENZA AGE 9 TO ADULT 8/1/2017 DTaP/Tdap/Td series (2 - Td) 7/16/2024 Allergies as of 8/4/2017  Review Complete On: 8/4/2017 By: Rosa Armendariz LPN Severity Noted Reaction Type Reactions Sulfa (Sulfonamide Antibiotics)  12/03/2015    Hives, Rash Current Immunizations  Never Reviewed No immunizations on file. Not reviewed this visit Vitals BP Pulse Height(growth percentile) Weight(growth percentile) SpO2 BMI  
 127/64 75 6' 5\" (1.956 m) 292 lb (132.5 kg) 98% 34.63 kg/m2 Smoking Status Current Some Day Smoker BMI and BSA Data Body Mass Index Body Surface Area  
 34.63 kg/m 2 2.68 m 2 Preferred Pharmacy Pharmacy Name Phone [facility] [address], [address] [place] [address] [phone] Your Updated Medication List  
  
   
This list is accurate as of: 8/4/17  3:36 PM.  Always use your most recent med list.  
  
  
  
  
 BENADRYL 25 mg capsule Generic drug:  diphenhydrAMINE Take 50 mg by mouth nightly as needed. cyanocobalamin 1,000 mcg tablet Take 1,000 mcg by mouth daily. * furosemide 40 mg tablet Commonly known as:  LASIX Take 40 mg by mouth daily. Along with 20mg every night * furosemide 20 mg tablet Commonly known as:  LASIX Take 20 mg by mouth nightly. Along with 40mg in the morning  
  
 levothyroxine 25 mcg tablet Commonly known as:  SYNTHROID Take  by mouth Daily (before breakfast). loratadine 10 mg tablet Commonly known as:  Gayla Buddle Take 1 Tab by mouth daily. metoprolol succinate 25 mg XL tablet Commonly known as:  TOPROL-XL Take 1 Tab by mouth daily. NEURONTIN 600 mg tablet Generic drug:  gabapentin Take  by mouth two (2) times a day. raNITIdine 150 mg tablet Commonly known as:  ZANTAC Take 150 mg by mouth daily. tamsulosin 0.4 mg capsule Commonly known as:  FLOMAX Take 0.4 mg by mouth daily. terbinafine HCl 250 mg tablet Commonly known as:  LAMISIL Take 250 mg by mouth daily. warfarin 4 mg tablet Commonly known as:  COUMADIN Take 1 Tab by mouth daily. zinc 50 mg Tab tablet Take 25 mg by mouth daily. * Notice: This list has 2 medication(s) that are the same as other medications prescribed for you. Read the directions carefully, and ask your doctor or other care provider to review them with you. Follow-up Instructions Return in about 1 month (around 9/4/2017). To-Do List   
 08/07/2017 2:00 PM  
  Appointment with Molly Duron PT at [facility] PT Reagan [address] IM ([phone])  
  
 08/15/2017 1:00 PM  
  Appointment with [facility] [address] [facility] CV SERV at [facility] NON-INVASIVE [address] [place] [address] ([phone]) Patient needs to bring a current list of all medications  No preparation is required for this study  This study requires patient to bring a written physicians order or the MD office may fax the order to Central Scheduling at [phone]. This exam is performed in the [address] at [facility] in the echo department. Please have the patient arrive 15 minutes prior to their schedule appointment time and report to Patient Registration at the main entrance of the hospital.     AGE LIMIT for this procedure at [facility] is 25years old. All patients under 25years of age should be referred to [facility]. Patient Instructions Increase lasix to 80mg every am and 40mg every pm for 3-4 days, then resume normal dose Introducing [facility] SERVICES! [facility] introduces InteRNA Technologies patient portal. Now you can access parts of your medical record, email your doctor's office, and request medication refills online. 1. In your internet browser, go to [email or web address] 2. Click on the First Time User? Click Here link in the Sign In box. You will see the New Member Sign Up page. 3. Enter your InteRNA Technologies Access Code exactly as it appears below. You will not need to use this code after youve completed the sign-up process. If you do not sign up before the expiration date, you must request a new code. · [employer] Access Code: [email or web address] Expires: 10/4/2017  3:56 PM 
 
4. Enter the last four digits of your Social Security Number (xxxx) and Date of Birth (mm/dd/yyyy) as indicated and click Submit. You will be taken to the next sign-up page. 5. Create a mPowat ID. This will be your Inte[employer] Technologies login ID and cannot be changed, so think of one that is secure and easy to remember. 6. Create a Inte[employer] Technologies password. You can change your password at any time. 7. Enter your Password Reset Question and Answer. This can be used at a later time if you forget your password. 8. Enter your e-mail address. You will receive e-mail notification when new information is available in [address] 9. Click Sign Up. You can now view and download portions of your medical record. 10. Click the Download Summary menu link to download a portable copy of your medical information. If you have questions, please visit the Frequently Asked Questions section of the [employer] website. Remember, [employer] is NOT to be used for urgent needs. For medical emergencies, dial 911. Now available from your iPhone and Android! Please provide this summary of care documentation to your next provider. Your primary care clinician is listed as Rayray Syed. If you have any questions after today's visit, please call [phone].

## 2017-08-04 NOTE — PROGRESS NOTES
1. Have you been to the ER, urgent care clinic since your last visit? Hospitalized since your last visit? No    2. Have you seen or consulted any other health care providers outside of the [address] [place] [address] since your last visit? Include any pap smears or colon screening.  No

## 2017-08-04 NOTE — PROGRESS NOTES
Cardiovascular Specialists    Mr. Shana Roche is a 64 y.o.male with a history of SSS s/p pacemaker placement in 2011, Paroxysmal a.fib, BPH, degenerative joint disease of multiple joints, status post multiple orthopedic surgeries. Mr. Shana Roche is here today for follow up appointment. I saw him last time one year ago. After that he actually saw a cardiologist at the local [facility] cardiology practice. He has decided to transfer his care again to me. Since last visit he does not remember having any echocardiogram or cardiac testing. He continues to be on medications. For the last couple weeks he's been having lower extremity swelling, saw he saw PCP and his Lasix dose was increased from 40 mg in the morning and 20 mg in the evening to 40 mg twice daily. He says it has increased his urine output, however he does not have any improvement in the swelling. He denies any PND. He denies any palpitations, presyncope or syncope, or chest pain or chest tightness. Denies any nausea, vomiting, abdominal pain, fever, chills, sputum production. No hematuria or other bleeding complaints    Past Medical History:   Diagnosis Date    Asthma     BPH (benign prostatic hyperplasia)     Herniated disc, cervical     Knee pain     Pacemaker 2011    [facility]    PAF [facility]oxysmal [facility] fibrillation[facility])     During Pacer interogation     SSS (sick sinus syndrome) (HCC)     S/P Dual chamber [employer] Pacemaker         Past Surgical History:   Procedure Laterality Date    HX HERNIA REPAIR Bilateral 1989    HX HIP REPLACEMENT  2006    right    HX KNEE REPLACEMENT Bilateral 1992/1993/2006/2008/2011/2012/2013    HX PACEMAKER  2011       Current Outpatient Prescriptions   Medication Sig    furosemide (LASIX) 20 mg tablet Take 20 mg by mouth nightly. Along with 40mg in the morning    terbinafine HCl (LAMISIL) 250 mg tablet Take 250 mg by mouth daily.     gabapentin (NEURONTIN) 600 mg tablet Take  by mouth two (2) times a day.  warfarin (COUMADIN) 4 mg tablet Take 1 Tab by mouth daily.  levothyroxine (SYNTHROID) 25 mcg tablet Take  by mouth Daily (before breakfast).  raNITIdine (ZANTAC) 150 mg tablet Take 150 mg by mouth daily.  tamsulosin (FLOMAX) 0.4 mg capsule Take 0.4 mg by mouth daily.  furosemide (LASIX) 40 mg tablet Take 40 mg by mouth daily. Along with 20mg every night    diphenhydrAMINE (BENADRYL) 25 mg capsule Take 50 mg by mouth nightly as needed.  zinc 50 mg tab tablet Take 25 mg by mouth daily.  metoprolol succinate (TOPROL-XL) 25 mg XL tablet Take 1 Tab by mouth daily.  cyanocobalamin 1,000 mcg tablet Take 1,000 mcg by mouth daily.  loratadine (CLARITIN) 10 mg tablet Take 1 Tab by mouth daily. No current facility-administered medications for this visit. Allergies and Sensitivities:  Allergies   Allergen Reactions    Sulfa (Sulfonamide Antibiotics) Hives and Rash       Family History:  No family history on file. Social History:  Social History   Substance Use Topics    Smoking status: Current Some Day Smoker     Types: Cigars    Smokeless tobacco: Never Used      Comment: rare cigar smoker    Alcohol use 0.0 oz/week     0 Standard drinks or equivalent per week      Comment: rarely drinks     He  reports that he has been smoking Cigars. He has never used smokeless tobacco.  He  reports that he drinks alcohol. Review of Systems:  Cardiac symptoms as noted above in HPI. All others negative. Denies blurring vision, photophobia, neck pain, hemoptysis, chronic cough, nausea, vomiting, hematuria, burning micturition, BRBPR, chronic headaches.     Physical Exam:  BP Readings from Last 3 Encounters:   08/04/17 127/64   07/31/17 103/52   07/12/17 112/56         Pulse Readings from Last 3 Encounters:   08/04/17 75   07/31/17 69   07/12/17 76          Wt Readings from Last 3 Encounters:   08/04/17 292 lb (132.5 kg)   07/31/17 294 lb 6.4 oz (133.5 kg)   07/12/17 296 lb (134.3 kg)       Constitutional: Oriented to person, place, and time. HENT: Head: Normocephalic and atraumatic. Neck: No JVD present. Carotid bruit is not appreciated. Cardiovascular: Regular rhythm. No murmur, gallop or rubs appreciated  Lung: Breath sounds normal. No respiratory distress. No ronchi or rales appreciated  Abdominal: No tenderness. No rebound and no guarding. Musculoskeletal: There is Trace/+1 ankle edema. No cynosis    Review of Data  LABS:   Lab Results   Component Value Date/Time    Sodium 138 07/12/2017 02:18 AM    Potassium 4.5 07/12/2017 02:18 AM    Chloride 96 07/12/2017 02:18 AM    CO2 27 07/12/2017 02:18 AM    Glucose 140 07/12/2017 02:18 AM    BUN 20 07/12/2017 02:18 AM    Creatinine 0.82 07/12/2017 02:18 AM     Lipids Latest Ref Rng & Units 7/12/2017   Chol, Total 100 - 199 mg/dL 157   HDL >39 mg/dL 25(L)   LDL 0 - 99 mg/dL 55   Trig 0 - 149 mg/dL 387(H)     Lab Results   Component Value Date/Time    ALT (SGPT) 17 07/12/2017 02:18 AM     Lab Results   Component Value Date/Time    Hemoglobin A1c 7.4 07/12/2017 02:18 AM       EKG  (12/15) Electronic paced rhythm. Underlying P weve can be seen  (05/16) Electronic V paced rhythm. Underlying a.flutter    ECHO (04/2015) at [facility]  Left ventricular ejection fraction is estimated at 55%. Grade 2 diastolic dysfunction. Severely dilated left atrium. No significant valvular stenosis or regurgitation by doppler analysis. IMPRESSION & PLAN:    Mr. Thom Richardson is a 64 y.o. male with SSS s/p PPM, PAF, HTN. Paroxysmal atrial fibrillation/atrial flutter:  He was originally diagnosed with paroxysmal atrial fibrillation in April, 2014. Appears sinus on exam.   Currently on coumadin. Goal INR 2-3. No bleeding problem. Continue Toprol XL. Sick sinus syndrome:  Mr. Thom Richardson had a pacemaker placement in 2011. This is a dual chamber pacemaker. Interrogation per EP clinic protocol.      HTN: Continue toprol XL.    Edema: On lasix 40 mg BID. Not improving much  Will increase lasix 80 in am and 40 in pm for 3 days and then reduce back to maintenance dose. He was advice to take banana. Repeat echo as he has a.fib and edema . Importance of diet and exercise was discussed with patient. This plan was discussed with patient who is in agreement. Thank you for allowing me to participate in patient care. Please feel free to call me if you have any question or concern. Janessa Mayer MD  Please note: This document has been produced using voice recognition software. Unrecognized errors in transcription may be present.

## 2017-08-17 ENCOUNTER — TELEPHONE (OUTPATIENT)
Dept: CARDIOLOGY CLINIC | Age: 77
End: 2017-08-17

## 2017-08-23 ENCOUNTER — HOSPITAL ENCOUNTER (OUTPATIENT)
Dept: NON INVASIVE DIAGNOSTICS | Age: 77
Discharge: HOME | End: 2017-08-23
Attending: INTERNAL MEDICINE
Payer: MEDICARE

## 2017-08-23 DIAGNOSIS — R06.09 DOE (DYSPNEA ON EXERTION): ICD-10-CM

## 2017-08-23 PROCEDURE — 93306 TTE W/DOPPLER COMPLETE: CPT

## 2017-08-31 ENCOUNTER — OFFICE VISIT (OUTPATIENT)
Dept: CARDIOLOGY CLINIC | Age: 77
End: 2017-08-31

## 2017-08-31 ENCOUNTER — ANTI-COAG VISIT (OUTPATIENT)
Dept: CARDIOLOGY CLINIC | Age: 77
End: 2017-08-31

## 2017-08-31 VITALS
DIASTOLIC BLOOD PRESSURE: 47 MMHG | HEART RATE: 67 BPM | SYSTOLIC BLOOD PRESSURE: 98 MMHG | OXYGEN SATURATION: 98 % | BODY MASS INDEX: 34.24 KG/M2 | HEIGHT: 77 IN | WEIGHT: 290 LBS

## 2017-08-31 DIAGNOSIS — Z95.0 PACEMAKER: ICD-10-CM

## 2017-08-31 DIAGNOSIS — I48.91 ATRIAL FIBRILLATION, UNSPECIFIED TYPE (HCC): Primary | ICD-10-CM

## 2017-08-31 DIAGNOSIS — R60.9 EDEMA, UNSPECIFIED TYPE: ICD-10-CM

## 2017-08-31 DIAGNOSIS — I10 ESSENTIAL HYPERTENSION WITH GOAL BLOOD PRESSURE LESS THAN 140/90: ICD-10-CM

## 2017-08-31 DIAGNOSIS — I49.5 SSS (SICK SINUS SYNDROME) (HCC): ICD-10-CM

## 2017-08-31 DIAGNOSIS — E66.01 MORBID OBESITY, UNSPECIFIED OBESITY TYPE (HCC): Primary | ICD-10-CM

## 2017-08-31 DIAGNOSIS — I48.0 PAF (PAROXYSMAL ATRIAL FIBRILLATION) (HCC): ICD-10-CM

## 2017-08-31 LAB
INR BLD: 1.9
PT POC: NORMAL SECONDS
VALID INTERNAL CONTROL?: YES

## 2017-08-31 NOTE — MR AVS SNAPSHOT
Visit Information Date & Time Provider Department Dept. Phone Encounter #  
 8/31/2017  2:50 PM Pt Inr [facility] Cardio Specialist at [place] [address] [id number] Your Appointments 8/31/2017  2:50 PM  
ANTICOAG NURSE with Pt Inr [facility] Card[facility]ist at [facility] Appt Note: inr  
 [address]  
  
   
 [address]  
  
    
 9/7/2017  8:00 AM  
Medicare Physical with Patricia Pa MD  
[facility] Appt Note: Return in about 1 month (around 8/12/2017). For Medicare Wellness; pt r/s 08/17/17 rb 07/27/17; pt r/s from 08/10/2017  
 [id number] [place] [address] [address] [place]  
  
   
 [address] Upcoming Health Maintenance Date Due  
 GLAUCOMA SCREENING Q2Y 8/28/2003 Pneumococcal 65+ High/Highest Risk (2 of 2 - PPSV23) 12/7/2016 MEDICARE YEARLY EXAM 2/8/2017 INFLUENZA AGE 9 TO ADULT 8/1/2017 DTaP/Tdap/Td series (2 - Td) 7/16/2024 Allergies as of 8/31/2017  Review Complete On: 8/31/2017 By: Mena Covarrubias RN Severity Noted Reaction Type Reactions Sulfa (Sulfonamide Antibiotics)  12/03/2015    Hives, Rash Current Immunizations  Never Reviewed No immunizations on file. Not reviewed this visit You Were Diagnosed With   
  
 Codes Comments At[facility], unspecified [facility]    -  Primary ICD-10-CM: I48.91 
ICD-9-CM: 427.31 Vitals Smoking Status Current Some Day Smoker Preferred Pharmacy Pharmacy Name Phone [facility] [address], [place] [phone] Your Updated Medication List  
  
   
This list is accurate as of: 8/31/17  2:29 PM.  Always use your most recent med list.  
  
  
  
  
 BENADRYL 25 mg capsule Generic drug:  diphenhydrAMINE Take 50 mg by mouth nightly as needed. cyanocobalamin 1,000 mcg tablet Take 1,000 mcg by mouth daily. * furosemide 40 mg tablet Commonly known as:  LASIX Take 40 mg by mouth daily. Along with 20mg every night * furosemide 20 mg tablet Commonly known as:  LASIX Take 20 mg by mouth nightly. Along with 40mg in the morning  
  
 levothyroxine 25 mcg tablet Commonly known as:  SYNTHROID Take  by mouth Daily (before breakfast). loratadine 10 mg tablet Commonly known as:  Edgar Judit Take 1 Tab by mouth daily. metoprolol succinate 25 mg XL tablet Commonly known as:  TOPROL-XL Take 1 Tab by mouth daily. NEURONTIN 600 mg tablet Generic drug:  gabapentin Take  by mouth two (2) times a day. raNITIdine 150 mg tablet Commonly known as:  ZANTAC Take 150 mg by mouth daily. tamsulosin 0.4 mg capsule Commonly known as:  FLOMAX Take 0.4 mg by mouth daily. terbinafine HCl 250 mg tablet Commonly known as:  LAMISIL Take 250 mg by mouth daily. warfarin 4 mg tablet Commonly known as:  COUMADIN Take 1 Tab by mouth daily. zinc 50 mg Tab tablet Take 25 mg by mouth daily. * Notice: This list has 2 medication(s) that are the same as other medications prescribed for you. Read the directions carefully, and ask your doctor or other care provider to review them with you. Description Verbal order and read back per Dr. Eloise Curiel Take 6mg x 1 then continue 4mg daily August 2017 Details Sun Mon Tue Wed Thu Fri Sat  
    1  
  
  
  
   2  
  
  
  
   3  
  
  
  
   4  
  
  
  
   5  
  
  
  
  
  6  
  
  
  
   7  
  
  
  
   8  
  
  
  
   9  
  
  
  
   10  
  
  
  
   11  
  
  
  
   12  
  
  
  
  
  13  
  
  
  
   14  
  
  
  
   15  
  
  
  
   16  
  
  
  
   17  
  
  
  
   18  
  
  
  
   19  
  
  
  
  
  20  
  
  
  
   21  
  
  
  
   22 23  
  
  
  
   24  
  
  
  
   25  
  
  
  
   26  
  
  
  
  
  27  
  
  
  
   28  
  
  
  
   29  
  
  
  
   30  
  
  
  
   31  
  
6 mg See details Date Details 08/31 This INR check INR: 1.9! How to take your warfarin dose To take:  6 mg Take one and a half of the 4 mg tablets. September 2017 Details Jonathan Nine Tue Wed Thu Fri Sat 1  
  
4 mg 2  
  
4 mg 3  
  
4 mg 4  
  
4 mg 5  
  
4 mg 6  
  
4 mg 7  
  
4 mg 8  
  
4 mg 9  
  
4 mg  
  
  
  10  
  
4 mg  
  
   11  
  
4 mg  
  
   12  
  
4 mg  
  
   13  
  
4 mg  
  
   14  
  
4 mg  
  
   15  
  
4 mg  
  
   16  
  
4 mg  
  
  
  17  
  
4 mg  
  
   18  
  
4 mg  
  
   19  
  
4 mg  
  
   20  
  
4 mg  
  
   21  
  
4 mg  
  
   22  
  
4 mg  
  
   23  
  
4 mg  
  
  
  24  
  
4 mg  
  
   25  
  
4 mg  
  
   26  
  
  
  
   27  
  
  
  
   28  
  
  
  
   29  
  
  
  
   30  
  
  
  
  
 Date Details No additional details Date of next INR:  9/25/2017 How to take your warfarin dose To take:  4 mg Take one of the 4 mg tablets. We Performed the Following AMB POC PT/INR [98319 CPT(R)] Introducing [facility] SERVICES! Sean Brand introduces [employer] patient portal. Now you can access parts of your medical record, email your doctor's office, and request medication refills online. 1. In your internet browser, go to [email or web address]"[employer][email or web address]/"[employer] 2. Click on the First Time User? Click Here link in the Sign In box. You will see the New Member Sign Up page. 3. Enter your [employer] Access Code exactly as it appears below. You will not need to use this code after youve completed the sign-up process. If you do not sign up before the expiration date, you must request a new code. · [employer] Access Code: [email or web address] Expires: 10/4/2017  3:56 PM 
 
 4. Enter the last four digits of your Social Security Number (xxxx) and Date of Birth (mm/dd/yyyy) as indicated and click Submit. You will be taken to the next sign-up page. 5. Create a [employer] ID. This will be your [employer] login ID and cannot be changed, so think of one that is secure and easy to remember. 6. Create a [employer] password. You can change your password at any time. 7. Enter your Password Reset Question and Answer. This can be used at a later time if you forget your password. 8. Enter your e-mail address. You will receive e-mail notification when new information is available in [address] 9. Click Sign Up. You can now view and download portions of your medical record. 10. Click the Download Summary menu link to download a portable copy of your medical information. If you have questions, please visit the Frequently Asked Questions section of the [employer] website. Remember, [employer] is NOT to be used for urgent needs. For medical emergencies, dial 911. Now available from your iPhone and Android! Please provide this summary of care documentation to your next provider. Your primary care clinician is listed as McGrann Pace. If you have any questions after today's visit, please call [phone].

## 2017-08-31 NOTE — PROGRESS NOTES
Cardiovascular Specialists    Mr. Reddy Stout is a 76 y.o.male with a history of SSS s/p pacemaker placement in 2011, Paroxysmal a.fib, BPH, degenerative joint disease of multiple joints, status post multiple orthopedic surgeries. Mr. Reddy Stout is here today for follow up appointment. Since last visit he has lost some weight. His swelling his better, however it's not gone. He denies any chest pain or chest tightness. He denies PND. He takes his medications regularly. Denies any nausea, vomiting, abdominal pain, fever, chills, sputum production. No hematuria or other bleeding complaints    Past Medical History:   Diagnosis Date    Asthma     BPH (benign prostatic hyperplasia)     Herniated disc, cervical     Knee pain     Pacemaker 2011    [facility]    PA[facility]oxysmal [facility] fibrillation[facility] ([facility])     During Pacer interogation     SSS (sick sinus syndrome) ([facility])     S/P Dual chamber [employer] Pacemaker         Past Surgical History:   Procedure Laterality Date    HX HERNIA REPAIR Bilateral 1989    HX HIP REPLACEMENT  2006    right    HX KNEE REPLACEMENT Bilateral 1992/1993/2006/2008/2011/2012/2013    HX PACEMAKER  2011       Current Outpatient Prescriptions   Medication Sig    terbinafine HCl (LAMISIL) 250 mg tablet Take 250 mg by mouth daily.  gabapentin (NEURONTIN) 600 mg tablet Take  by mouth two (2) times a day.  warfarin (COUMADIN) 4 mg tablet Take 1 Tab by mouth daily.  levothyroxine (SYNTHROID) 25 mcg tablet Take  by mouth Daily (before breakfast).  raNITIdine (ZANTAC) 150 mg tablet Take 150 mg by mouth daily.  tamsulosin (FLOMAX) 0.4 mg capsule Take 0.4 mg by mouth daily.  furosemide (LASIX) 40 mg tablet Take 40 mg by mouth daily. Along with 20mg every night    diphenhydrAMINE (BENADRYL) 25 mg capsule Take 50 mg by mouth nightly as needed.  zinc 50 mg tab tablet Take 25 mg by mouth daily.     metoprolol succinate (TOPROL-XL) 25 mg XL tablet Take 1 Tab by mouth daily.  cyanocobalamin 1,000 mcg tablet Take 1,000 mcg by mouth daily.  loratadine (CLARITIN) 10 mg tablet Take 1 Tab by mouth daily.  furosemide (LASIX) 20 mg tablet Take 20 mg by mouth nightly. Along with 40mg in the morning     No current facility-administered medications for this visit. Allergies and Sensitivities:  Allergies   Allergen Reactions    Sulfa (Sulfonamide Antibiotics) Hives and Rash       Family History:  No family history on file. Social History:  Social History   Substance Use Topics    Smoking status: Current Some Day Smoker     Types: Cigars    Smokeless tobacco: Never Used      Comment: rare cigar smoker    Alcohol use 0.0 oz/week     0 Standard drinks or equivalent per week      Comment: rarely drinks     He  reports that he has been smoking Cigars. He has never used smokeless tobacco.  He  reports that he drinks alcohol. Review of Systems:  Cardiac symptoms as noted above in HPI. All others negative. Denies blurring vision, photophobia, neck pain, hemoptysis, chronic cough, nausea, vomiting, hematuria, burning micturition, BRBPR, chronic headaches. Physical Exam:  BP Readings from Last 3 Encounters:   08/31/17 98/47   08/04/17 127/64   07/31/17 103/52         Pulse Readings from Last 3 Encounters:   08/31/17 67   08/04/17 75   07/31/17 69          Wt Readings from Last 3 Encounters:   08/31/17 290 lb (131.5 kg)   08/04/17 292 lb (132.5 kg)   07/31/17 294 lb 6.4 oz (133.5 kg)       Constitutional: Oriented to person, place, and time. HENT: Head: Normocephalic and atraumatic. Neck: No JVD present. Carotid bruit is not appreciated. Cardiovascular: Regular rhythm. No murmur, gallop or rubs appreciated  Lung: Breath sounds normal. No respiratory distress. No ronchi or rales appreciated  Abdominal: No tenderness. No rebound and no guarding. Musculoskeletal: There is Trace ankle edema.  No cynosis    Review of Data  LABS: Lab Results   Component Value Date/Time    Sodium 138 07/12/2017 02:18 AM    Potassium 4.5 07/12/2017 02:18 AM    Chloride 96 07/12/2017 02:18 AM    CO2 27 07/12/2017 02:18 AM    Glucose 140 07/12/2017 02:18 AM    BUN 20 07/12/2017 02:18 AM    Creatinine 0.82 07/12/2017 02:18 AM     Lipids Latest Ref Rng & Units 7/12/2017   Chol, Total 100 - 199 mg/dL 157   HDL >39 mg/dL 25(L)   LDL 0 - 99 mg/dL 55   Trig 0 - 149 mg/dL 387(H)     Lab Results   Component Value Date/Time    ALT (SGPT) 17 07/12/2017 02:18 AM     Lab Results   Component Value Date/Time    Hemoglobin A1c 7.4 07/12/2017 02:18 AM       EKG  (12/15) Electronic paced rhythm. Underlying P weve can be seen  (05/16) Electronic V paced rhythm. Underlying a.flutter    ECHO (08/17)  Left ventricle: Systolic function was normal. Ejection fraction was estimated   in the range of 55 % to 60 %. There was  hypokinesis of the apical wall(s). Doppler parameters were consistent with   abnormal left ventricular relaxation (grade  1 diastolic dysfunction). LAE 41 ml/m2 E/e' 16  Ventricular septum: There was \"bounce\" motion. Left atrium: The atrium was dilated. Right atrium: The atrium was dilated. Mitral valve: There was mild annular calcification. There was mild to   moderate regurgitation. Tricuspid valve: There was mild to moderate regurgitation. Pulmonic valve: There was mild regurgitation. IMPRESSION & PLAN:    Mr. Tomeka Hillman is a 76 y.o. male with SSS s/p PPM, PAF, HTN. Paroxysmal atrial fibrillation/atrial flutter:    He was originally diagnosed with paroxysmal atrial fibrillation in April, 2014. Appears sinus on exam.   Currently on coumadin. Goal INR 2-3. No bleeding problem. Continue Toprol XL. Sick sinus syndrome:  Mr. Tomeka Hillman had a pacemaker placement in 2011. This is a dual chamber pacemaker. Interrogation per EP clinic protocol. HTN: Continue toprol XL. Edema: On lasix 40 mg BID.  Edema better compare to last time  Will increase lasix 80 in am and 40 in pm for 2/3 days and then reduce back to maintenance dose. He was advice to take banana. EF normal on echo 08/17. Importance of diet and exercise was discussed with patient. This plan was discussed with patient who is in agreement. Thank you for allowing me to participate in patient care. Please feel free to call me if you have any question or concern. Miles Baumgarten, MD  Please note: This document has been produced using voice recognition software. Unrecognized errors in transcription may be present.

## 2017-08-31 NOTE — PROGRESS NOTES
The INR is below the therapeutic range. Please make the following adjustments to Coumadin dosing: Verbal order and read back per Dr. Emily Cates  Take 6mg x 1 then continue 4mg daily . Repeat the INR in 3-4 weeks if not able to get home machine.

## 2017-08-31 NOTE — MR AVS SNAPSHOT
Visit Information Date & Time Provider Department Dept. Phone Encounter #  
 8/31/2017  1:30 PM Jasmeet Gomez MD [facility] Specialist at [facility]/[phone] Follow-up Instructions Return in about 9 months (around 5/31/2018). Your Appointments 8/31/2017  2:50 PM  
ANTICOAG NURSE with Pt Inr Norf [facility] Specialist at [facility]/[address] [place] [address] Road) Appt Note: inr  
 [address]  
  
   
 [address]  
  
    
 9/7/2017  8:00 AM  
Medicare Physical with Derrek Holstein, MD  
[facility] [address]) Appt Note: Return in about 1 month (around 8/12/2017). For Medicare Wellness; pt r/s 08/17/17 rb 07/27/17; pt r/s from 08/10/2017  
 [address] Upcoming Health Maintenance Date Due  
 GLAUCOMA SCREENING Q2Y 8/28/2003 Pneumococcal 65+ High/Highest Risk (2 of 2 - PPSV23) 12/7/2016 MEDICARE YEARLY EXAM 2/8/2017 INFLUENZA AGE 9 TO ADULT 8/1/2017 DTaP/Tdap/Td series (2 - Td) 7/16/2024 Allergies as of 8/31/2017  Review Complete On: 8/31/2017 By: Jesi Camara RN Severity Noted Reaction Type Reactions Sulfa (Sulfonamide Antibiotics)  12/03/2015    Hives, Rash Current Immunizations  Never Reviewed No immunizations on file. Not reviewed this visit Vitals BP Pulse Height(growth percentile) Weight(growth percentile) SpO2 BMI  
 98/47 67 6' 5\" (1.956 m) 290 lb (131.5 kg) 98% 34.39 kg/m2 Smoking Status Current Some Day Smoker BMI and BSA Data Body Mass Index Body Surface Area  
 34.39 kg/m 2 2.67 m 2 Preferred Pharmacy Pharmacy Name Phone [facility] [address], [address] [place], [place] [address] [phone] Your Updated Medication List  
  
   
This list is accurate as of: 8/31/17  2:10 PM.  Always use your most recent med list.  
  
  
  
  
 BENADRYL 25 mg capsule Generic drug:  diphenhydrAMINE Take 50 mg by mouth nightly as needed. cyanocobalamin 1,000 mcg tablet Take 1,000 mcg by mouth daily. * furosemide 40 mg tablet Commonly known as:  LASIX Take 40 mg by mouth daily. Along with 20mg every night * furosemide 20 mg tablet Commonly known as:  LASIX Take 20 mg by mouth nightly. Along with 40mg in the morning  
  
 levothyroxine 25 mcg tablet Commonly known as:  SYNTHROID Take  by mouth Daily (before breakfast). loratadine 10 mg tablet Commonly known as:  Alveta Anne Take 1 Tab by mouth daily. metoprolol succinate 25 mg XL tablet Commonly known as:  TOPROL-XL Take 1 Tab by mouth daily. NEURONTIN 600 mg tablet Generic drug:  gabapentin Take  by mouth two (2) times a day. raNITIdine 150 mg tablet Commonly known as:  ZANTAC Take 150 mg by mouth daily. tamsulosin 0.4 mg capsule Commonly known as:  FLOMAX Take 0.4 mg by mouth daily. terbinafine HCl 250 mg tablet Commonly known as:  LAMISIL Take 250 mg by mouth daily. warfarin 4 mg tablet Commonly known as:  COUMADIN Take 1 Tab by mouth daily. zinc 50 mg Tab tablet Take 25 mg by mouth daily. * Notice: This list has 2 medication(s) that are the same as other medications prescribed for you. Read the directions carefully, and ask your doctor or other care provider to review them with you. Follow-up Instructions Return in about 9 months (around 5/31/2018). Patient Instructions Schedule Device Check Will try to get home monitor for INR Introducing [facility]! [facility] introduces [employer] patient portal. Now you can access parts of your medical record, email your doctor's office, and request medication refills online. 1. In your internet browser, go to [email or web address] 2. Click on the First Time User? Click Here link in the Sign In box. You will see the New Member Sign Up page. 3. Enter your NexImmune Access Code exactly as it appears below. You will not need to use this code after youve completed the sign-up process. If you do not sign up before the expiration date, you must request a new code. · [employer] Access Code: [email or web address] Expires: 10/4/2017  3:56 PM 
 
4. Enter the last four digits of your Social Security Number (xxxx) and Date of Birth (mm/dd/yyyy) as indicated and click Submit. You will be taken to the next sign-up page. 5. Create a Napera Networkst ID. This will be your NexImmune login ID and cannot be changed, so think of one that is secure and easy to remember. 6. Create a Nex[employer]e password. You can change your password at any time. 7. Enter your Password Reset Question and Answer. This can be used at a later time if you forget your password. 8. Enter your e-mail address. You will receive e-mail notification when new information is available in [address] 9. Click Sign Up. You can now view and download portions of your medical record. 10. Click the Download Summary menu link to download a portable copy of your medical information. If you have questions, please visit the Frequently Asked Questions section of the Nex[employer]e website. Remember, NexImmune is NOT to be used for urgent needs. For medical emergencies, dial 911. Now available from your iPhone and Android! Please provide this summary of care documentation to your next provider. Your primary care clinician is listed as Natividad Cabral. If you have any questions after today's visit, please call [phone].

## 2017-09-07 ENCOUNTER — OFFICE VISIT (OUTPATIENT)
Dept: FAMILY MEDICINE CLINIC | Age: 77
End: 2017-09-07

## 2017-09-07 ENCOUNTER — HOSPITAL ENCOUNTER (OUTPATIENT)
Dept: LAB | Age: 77
Discharge: HOME | End: 2017-09-07

## 2017-09-07 VITALS
HEIGHT: 77 IN | BODY MASS INDEX: 33.93 KG/M2 | RESPIRATION RATE: 97 BRPM | OXYGEN SATURATION: 97 % | SYSTOLIC BLOOD PRESSURE: 126 MMHG | DIASTOLIC BLOOD PRESSURE: 56 MMHG | HEART RATE: 60 BPM | WEIGHT: 287.4 LBS | TEMPERATURE: 96.2 F

## 2017-09-07 DIAGNOSIS — Z12.11 COLON CANCER SCREENING: ICD-10-CM

## 2017-09-07 DIAGNOSIS — Z13.6 ENCOUNTER FOR ABDOMINAL AORTIC ANEURYSM (AAA) SCREENING: ICD-10-CM

## 2017-09-07 DIAGNOSIS — D68.9 COAGULATION DISORDER (HCC): ICD-10-CM

## 2017-09-07 DIAGNOSIS — G62.9 NEUROPATHY: ICD-10-CM

## 2017-09-07 DIAGNOSIS — Z12.5 PROSTATE CANCER SCREENING: ICD-10-CM

## 2017-09-07 DIAGNOSIS — Z00.00 MEDICARE ANNUAL WELLNESS VISIT, SUBSEQUENT: Primary | ICD-10-CM

## 2017-09-07 DIAGNOSIS — Z71.89 ADVANCE CARE PLANNING: ICD-10-CM

## 2017-09-07 DIAGNOSIS — Z23 ENCOUNTER FOR IMMUNIZATION: ICD-10-CM

## 2017-09-07 LAB
INR BLD: 1.9
PT POC: 23 SECONDS
VALID INTERNAL CONTROL?: YES

## 2017-09-07 PROCEDURE — 99001 SPECIMEN HANDLING PT-LAB: CPT | Performed by: INTERNAL MEDICINE

## 2017-09-07 RX ORDER — GABAPENTIN 600 MG/1
TABLET ORAL
Qty: 120 TAB | Refills: 3 | Status: SHIPPED | OUTPATIENT
Start: 2017-09-07 | End: 2018-01-31 | Stop reason: SDUPTHER

## 2017-09-07 NOTE — PATIENT INSTRUCTIONS

## 2017-09-07 NOTE — PROGRESS NOTES
1. Have you been to the ER, urgent care clinic since your last visit? Hospitalized since your last visit? No    2. Have you seen or consulted any other health care providers outside of the [address] [place] [address] since your last visit? Include any pap smears or colon screening.  No

## 2017-09-07 NOTE — MR AVS SNAPSHOT
Visit Information Date & Time Provider Department Dept. Phone Encounter #  
 9/7/2017  8:00 AM [address], [address] [facility] [phone] [id number] Follow-up Instructions Return in about 6 weeks (around 10/19/2017), or if symptoms worsen or fail to improve. Upcoming Health Maintenance Date Due  
 GLAUCOMA SCREENING Q2Y 8/28/2003 MEDICARE YEARLY EXAM 9/8/2018 DTaP/Tdap/Td series (2 - Td) 7/16/2024 Allergies as of 9/7/2017  Review Complete On: 9/7/2017 By: Charo Duke LPN Severity Noted Reaction Type Reactions Sulfa (Sulfonamide Antibiotics)  12/03/2015    Hives, Rash Current Immunizations  Never Reviewed Name Date Influenza High Dose Vaccine PF  Incomplete Pneumococcal Polysaccharide (PPSV-23)  Incomplete Not reviewed this visit You Were Diagnosed With   
  
 Codes Comments Medicare annual wellness visit, subsequent    -  Primary ICD-10-CM: Z00.00 ICD-9-CM: V70.0 Encounter for immunization     ICD-10-CM: T49 ICD-9-CM: V03.89 Colon cancer screening     ICD-10-CM: Z12.11 ICD-9-CM: V76.51 Advance care planning     ICD-10-CM: Z71.89 ICD-9-CM: V65.49 Neuropathy ([facility] [address]     ICD-10-CM: G62.9 ICD-9-CM: 355.9 Encounter for abdominal aortic aneurysm (AAA) screening     ICD-10-CM: Z13.6 ICD-9-CM: V81.2 Vitals BP Pulse Temp Resp Height(growth percentile) Weight(growth percentile) 126/56 (BP 1 Location: Left arm, BP Patient Position: Sitting) 60 96.2 °F (35.7 °C) (Oral) (!) 97 6' 5\" (1.956 m) 287 lb 6.4 oz (130.4 kg) SpO2 BMI Smoking Status 97% 34.08 kg/m2 Current Some Day Smoker Vitals History BMI and BSA Data Body Mass Index Body Surface Area 34.08 kg/m 2 2.66 m 2 Preferred Pharmacy Pharmacy Name Phone [address] [phone] Your Updated Medication List  
  
   
 This list is accurate as of: 17  8:47 AM.  Always use your most recent med list.  
  
  
  
  
 BENADRYL 25 mg capsule Generic drug:  diphenhydrAMINE Take 50 mg by mouth nightly as needed. cyanocobalamin 1,000 mcg tablet Take 1,000 mcg by mouth daily. * furosemide 40 mg tablet Commonly known as:  LASIX Take 40 mg by mouth daily. Along with 20mg every night * furosemide 20 mg tablet Commonly known as:  LASIX Take 20 mg by mouth nightly. Along with 40mg in the morning  
  
 gabapentin 600 mg tablet Commonly known as:  NEURONTIN One po in am , one at noon and 2 at hs  
  
 levothyroxine 25 mcg tablet Commonly known as:  SYNTHROID Take  by mouth Daily (before breakfast). loratadine 10 mg tablet Commonly known as:  Ronold Yoakum Take 1 Tab by mouth daily. metoprolol succinate 25 mg XL tablet Commonly known as:  TOPROL-XL Take 1 Tab by mouth daily. raNITIdine 150 mg tablet Commonly known as:  ZANTAC Take 150 mg by mouth daily. tamsulosin 0.4 mg capsule Commonly known as:  FLOMAX Take 0.4 mg by mouth daily. terbinafine HCl 250 mg tablet Commonly known as:  LAMISIL Take 250 mg by mouth daily. varicella zoster vacine live 19,400 unit/0.65 mL Susr injection Commonly known as:  ZOSTAVAX  
1 Vial by SubCUTAneous route once for 1 dose. warfarin 4 mg tablet Commonly known as:  COUMADIN Take 1 Tab by mouth daily. zinc 50 mg Tab tablet Take 25 mg by mouth daily. * Notice: This list has 2 medication(s) that are the same as other medications prescribed for you. Read the directions carefully, and ask your doctor or other care provider to review them with you. Prescriptions Printed Refills  
 varicella zoster vacine live (ZOSTAVAX) 19,400 unit/0.65 mL susr injection 0 Si Vial by SubCUTAneous route once for 1 dose. Class: Print Route: SubCUTAneous Prescriptions Sent to Pharmacy Refills  
 gabapentin (NEURONTIN) 600 mg tablet 3 Sig: One po in am , one at noon and 2 at hs  
 Class: Normal  
 Pharmacy: [address] [place] [address], [address]  #: [phone] We Performed the Following ADMIN INFLUENZA VIRUS VAC [ [facility]] ADMIN PNEUMOCOCCAL VACCINE [ [facility]] ADVANCE CARE PLANNING FIRST 30 MINS [87114 CPT(R)] INFLUENZA VIRUS VACCINE, HIGH DOSE SEASONAL, PRESERVATIVE FREE [54662 CPT(R)] PNEUMOCOCCAL POLYSACCHARIDE VACCINE, 23-VALENT, ADULT OR IMMUNOSUPPRESSED PT DOSE, [48477 CPT(R)] REFERRAL TO GASTROENTEROLOGY [USI70 Custom] Follow-up Instructions Return in about 6 weeks (around 10/19/2017), or if symptoms worsen or fail to improve. To-Do List   
 09/10/2017 Imaging:  US EXAM SCREENING AAA Referral Information Referral ID Referred By Referred To  
  
 [id number] [facility] M Not Available Visits Status Start Date End Date 1 New Request 9/7/17 9/7/18 If your referral has a status of pending review or denied, additional information will be sent to support the outcome of this decision. Patient Instructions Medicare Wellness Visit, Male The best way to live healthy is to have a healthy lifestyle by eating a well-balanced diet, exercising regularly, limiting alcohol and stopping smoking. Regular physical exams and screening tests are another way to keep healthy. Preventive exams provided by your health care provider can find health problems before they become diseases or illnesses. Preventive services including immunizations, screening tests, monitoring and exams can help you take care of your own health. All people over age 72 should have a pneumovax  and and a prevnar shot to prevent pneumonia. These are once in a lifetime unless you and your provider decide differently.  
 
All people over 65 should have a yearly flu shot and a tetanus vaccine every 10 years. Screening for diabetes mellitus with a blood sugar test should be done every year. Glaucoma is a disease of the eye due to increased ocular pressure that can lead to blindness and it should be done every year by an eye professional. 
 
Cardiovascular screening tests that check for elevated lipids (fatty part of blood) which can lead to heart disease and strokes should be done every 5 years. Colorectal screening that evaluates for blood or polyps in your colon should be done yearly as a stool test or every five years as a flexible sigmoidoscope or every 10 years as a colonoscopy up to age 76. Men up to age 76 may need a screening blood test for prostate cancer at certain intervals, depending on their personal and family history. This decision is between the patient and his provider. If you have been a smoker or had family history of abdominal aortic aneurysms, you and your provider may decide to schedule an ultrasound test of your aorta. Hepatitis C screening is also recommended for anyone born between [birth year] through [address]. A shingles vaccine is also recommended once in a lifetime after age 61. Your Medicare Wellness Exam is recommended annually. Here is a list of your current Health Maintenance items with a due date: 
Health Maintenance Due Topic Date Due  Glaucoma Screening   08/28/2003 Introducing [facility]! Roxann Rubio introduces [employer] patient portal. Now you can access parts of your medical record, email your doctor's office, and request medication refills online. 1. In your internet browser, go to [email or web address] 2. Click on the First Time User? Click Here link in the Sign In box. You will see the New Member Sign Up page. 3. Enter your inDinero Access Code exactly as it appears below. You will not need to use this code after youve completed the sign-up process.  If you do not sign up before the expiration date, you must request a new code. · BubbleLife [employer] Access Code: [email or web address] Expires: 10/4/2017  3:56 PM 
 
4. Enter the last four digits of your Social Security Number (xxxx) and Date of Birth (mm/dd/yyyy) as indicated and click Submit. You will be taken to the next sign-up page. 5. Create a BubbleLife Media ID. This will be your [employer] login ID and cannot be changed, so think of one that is secure and easy to remember. 6. Create a Bubble[employer] Media password. You can change your password at any time. 7. Enter your Password Reset Question and Answer. This can be used at a later time if you forget your password. 8. Enter your e-mail address. You will receive e-mail notification when new information is available in [address] 9. Click Sign Up. You can now view and download portions of your medical record. 10. Click the Download Summary menu link to download a portable copy of your medical information. If you have questions, please visit the Frequently Asked Questions section of the [employer] website. Remember, [employer] is NOT to be used for urgent needs. For medical emergencies, dial 911. Now available from your iPhone and Android! Please provide this summary of care documentation to your next provider. Your primary care clinician is listed as [id number]. If you have any questions after today's visit, please call [phone].

## 2017-09-07 NOTE — ACP (ADVANCE CARE PLANNING)
Advance Care Planning (ACP) Provider Note - Comprehensive     Date of ACP Conversation: 09/07/17  Persons included in Conversation:  patient  Length of ACP Conversation in minutes:  16 minutes    Authorized Decision Maker (if patient is incapable of making informed decisions): This person is:  son          General ACP for ALL Patients with Decision Making Capacity:   Understanding of the healthcare agent role was assessed and information provided  Exploration of values, goals, and preferences if recovery is not expected, even with continued medical treatment in the event of: Imminent death  Severe, permanent brain injury    Review of Existing Advance Directive:  does not have one    For Serious or Chronic Illness:  Understanding of medical condition    Understanding of CPR, goals and expected outcomes, benefits and burdens discussed.     Interventions Provided:  Recommended completion of Advance Directive form after review of ACP materials and conversation with prospective healthcare agent

## 2017-09-07 NOTE — PROGRESS NOTES
This is a Subsequent Medicare Annual Wellness Exam (AWV) (Performed 12 months after IPPE or effective date of Medicare Part B enrollment, Once in a lifetime)    I have reviewed the patient's medical history in detail and updated the computerized patient record. History     Past Medical History:   Diagnosis Date    Asthma     BPH (benign prostatic hyperplasia)     Herniated disc, cervical     Knee pain     Pacemaker 2011    [employer]    PAF (paroxysmal atrial fibrillation) (HCC)     During Pacer interogation     SSS (sick sinus syndrome) (HCC)     S/P Dual chamber [employer] Pacemaker      Past Surgical History:   Procedure Laterality Date    HX HERNIA REPAIR Bilateral 1989    HX HIP REPLACEMENT  2006    right    HX KNEE REPLACEMENT Bilateral 1992/1993/2006/2008/2011/2012/2013    HX PACEMAKER  2011     Current Outpatient Prescriptions   Medication Sig Dispense Refill    gabapentin (NEURONTIN) 600 mg tablet One po in am , one at noon and 2 at hs 120 Tab 3    terbinafine HCl (LAMISIL) 250 mg tablet Take 250 mg by mouth daily.  warfarin (COUMADIN) 4 mg tablet Take 1 Tab by mouth daily. 30 Tab 3    raNITIdine (ZANTAC) 150 mg tablet Take 150 mg by mouth daily.  tamsulosin (FLOMAX) 0.4 mg capsule Take 0.4 mg by mouth daily.  furosemide (LASIX) 40 mg tablet Take 40 mg by mouth daily. Along with 20mg every night      diphenhydrAMINE (BENADRYL) 25 mg capsule Take 50 mg by mouth nightly as needed.  metoprolol succinate (TOPROL-XL) 25 mg XL tablet Take 1 Tab by mouth daily. 90 Tab 0    cyanocobalamin 1,000 mcg tablet Take 1,000 mcg by mouth daily.  loratadine (CLARITIN) 10 mg tablet Take 1 Tab by mouth daily. 90 Tab 1    furosemide (LASIX) 20 mg tablet Take 20 mg by mouth nightly. Along with 40mg in the morning      levothyroxine (SYNTHROID) 25 mcg tablet Take  by mouth Daily (before breakfast).  zinc 50 mg tab tablet Take 25 mg by mouth daily.        Allergies   Allergen Reactions    Sulfa (Sulfonamide Antibiotics) Hives and Rash     No family history on file. Social History   Substance Use Topics    Smoking status: Current Some Day Smoker     Types: Cigars    Smokeless tobacco: Never Used      Comment: rare cigar smoker    Alcohol use 0.0 oz/week     0 Standard drinks or equivalent per week      Comment: rarely drinks     Patient Active Problem List   Diagnosis Code    Cardiomyopathy ([facility] [address] I42.9    Asthma J45.909    Peripheral neuropathy ([facility] [address] G62.9    Other allergic rhinitis J30.89    Nocturia R35.1    Knee pain M25.569    Sick sinus syndrome (HCC) I49.5    Monoclonal gammopathy D47.2    Tinea corporis B35.4    Actinic keratosis L57.0    Idiopathic peripheral neuropathy G60.9       Depression Risk Factor Screening:     PHQ over the last two weeks 6/2/2016   Little interest or pleasure in doing things Not at all   Feeling down, depressed or hopeless Several days   Total Score PHQ 2 1     Alcohol Risk Factor Screening: You do not drink alcohol or very rarely. Functional Ability and Level of Safety:   Hearing Loss  Hearing is good. Activities of Daily Living  The home contains: no safety equipment  Patient does total self care    Fall RiskFall Risk Assessment, last 12 mths 9/7/2017   Able to walk? Yes   Fall in past 12 months? No   Fall with injury? -   Number of falls in past 12 months -   Fall Risk Score -       Abuse Screen  Patient is not abused    Cognitive Screening   Evaluation of Cognitive Function:  Has your family/caregiver stated any concerns about your memory: no  Normal    Patient Care Team   Patient Care Team:  Gilson Mcghee MD as PCP - General (Internal Medicine)  Frank Noble MD (Cardiology)    Assessment/Plan   Education and counseling provided:  Are appropriate based on today's review and evaluation    Diagnoses and all orders for this visit:    1. Medicare annual wellness visit, subsequent    2.  Encounter for immunization  - Pneumococcal Polysaccharide vaccine, 23-Valent, Adult or Immunocompromised  -     Administration fee () for Medicare insured patients  -     Influenza virus vaccine (FLUZONE HIGH-DOSE) 72 years and older (82249)  -     ADMIN PNEUMOCOCCAL VACCINE  Medicare Injection Admin Charge    3. Colon cancer screening  -     REFERRAL TO GASTROENTEROLOGY    4. Advance care planning  -     ADVANCE CARE PLANNING FIRST 30 MINS    5. Neuropathy (HCC)  -     gabapentin (NEURONTIN) 600 mg tablet; One po in am , one at noon and 2 at hs    6. Encounter for abdominal aortic aneurysm (AAA) screening        Health Maintenance Due   Topic Date Due    GLAUCOMA SCREENING Q2Y  08/28/2003     --------------------------------------      Afib - INR almost therapeutic    Difficulty swallowing - may need barium swallow    Snoring - ? PATY, pt declined sleep study.

## 2017-09-08 LAB — PSA SERPL-MCNC: 0.7 NG/ML (ref 0–4)

## 2017-09-22 ENCOUNTER — ANTI-COAG VISIT (OUTPATIENT)
Dept: CARDIOLOGY CLINIC | Age: 77
End: 2017-09-22

## 2017-09-22 DIAGNOSIS — I42.9 CARDIOMYOPATHY, UNSPECIFIED TYPE (HCC): Primary | ICD-10-CM

## 2017-09-22 LAB
INR BLD: 2
PT POC: NORMAL SECONDS
VALID INTERNAL CONTROL?: YES

## 2017-09-22 NOTE — MR AVS SNAPSHOT
Visit Information Date & Time Provider Department Dept. Phone Encounter #  
 9/22/2017  1:00 PM Pt Inr Norf Csi Cardio Specialist at [place] [address] [id number] Your Appointments 10/5/2017 10:15 AM  
PROCEDURE with Pacer Alberto Csi Cardio Specialist at [facility] [place] [address]) Appt Note: [facility] device check [address] [place] [address]  
  
   
 [address]  
  
    
 10/13/2017  2:00 PM  
ANTICOAG NURSE with Pt Inr Norf Csi Cardio Specialist at [facility] [place] [address]) [address]  
  
   
 [address]  
  
    
 10/19/2017 10:15 AM  
Follow Up with Natividad Cabral MD  
[address]) Appt Note: Return in about 6 weeks (around 10/19/2017), or if symptoms worsen or fail to improve [address] [place] [facility] [address]  
  
   
 [address] [facility] [address] Upcoming Health Maintenance Date Due  
 GLAUCOMA SCREENING Q2Y 8/28/2003 MEDICARE YEARLY EXAM 9/8/2018 DTaP/Tdap/Td series (2 - Td) 7/16/2024 Allergies as of 9/22/2017  Review Complete On: 9/7/2017 By: Natividad Cabral MD  
  
 Severity Noted Reaction Type Reactions Sulfa (Sulfonamide Antibiotics)  12/03/2015    Hives, Rash Current Immunizations  Never Reviewed Name Date Influenza High Dose Vaccine PF 9/7/2017  9:00 AM  
 Pneumococcal Polysaccharide (PPSV-23) 9/7/2017  9:00 AM  
  
 Not reviewed this visit You Were Diagnosed With   
  
 Codes Comments Cardiomyopathy, unspecified type ([facility]    -  Primary ICD-10-CM: I42.9 ICD-9-CM: 425.4 Vitals Smoking Status Current Some Day Smoker Preferred Pharmacy Pharmacy Name Phone [address] [phone] Your Updated Medication List  
  
   
This list is accurate as of: 9/22/17  1:19 PM.  Always use your most recent med list.  
  
  
  
  
 BENADRYL 25 mg capsule Generic drug:  diphenhydrAMINE Take 50 mg by mouth nightly as needed. cyanocobalamin 1,000 mcg tablet Take 1,000 mcg by mouth daily. * furosemide 40 mg tablet Commonly known as:  LASIX Take 40 mg by mouth daily. Along with 20mg every night * furosemide 20 mg tablet Commonly known as:  LASIX Take 20 mg by mouth nightly. Along with 40mg in the morning  
  
 gabapentin 600 mg tablet Commonly known as:  NEURONTIN One po in am , one at noon and 2 at hs  
  
 levothyroxine 25 mcg tablet Commonly known as:  SYNTHROID Take  by mouth Daily (before breakfast). loratadine 10 mg tablet Commonly known as:  Vanna Badder Take 1 Tab by mouth daily. metoprolol succinate 25 mg XL tablet Commonly known as:  TOPROL-XL Take 1 Tab by mouth daily. raNITIdine 150 mg tablet Commonly known as:  ZANTAC Take 150 mg by mouth daily. tamsulosin 0.4 mg capsule Commonly known as:  FLOMAX Take 0.4 mg by mouth daily. terbinafine HCl 250 mg tablet Commonly known as:  LAMISIL Take 250 mg by mouth daily. warfarin 4 mg tablet Commonly known as:  COUMADIN Take 1 Tab by mouth daily. zinc 50 mg Tab tablet Take 25 mg by mouth daily. * Notice: This list has 2 medication(s) that are the same as other medications prescribed for you. Read the directions carefully, and ask your doctor or other care provider to review them with you. Description Verbal order and read back per Dr. Siomara Ziegler 
continue 4mg daily September 2017 Details Sun Mon Tue Wed Thu Fri Sat  
       1  
  
  
  
   2  
  
  
  
  
  3  
  
  
  
   4  
  
  
  
   5  
  
  
  
   6  
  
  
  
   7  
  
  
  
   8  
  
 9  
  
  
  
  
  10  
  
  
  
   11  
  
  
  
   12  
  
  
  
   13  
  
  
  
   14  
  
  
  
   15  
  
  
  
   16  
  
  
  
  
  17  
  
  
  
   18  
  
  
  
   19  
  
  
  
   20  
  
  
  
   21  
  
  
  
   22  
  
4 mg See details 23  
  
4 mg  
  
  
  24  
  
4 mg  
  
   25  
  
4 mg  
  
   26  
  
4 mg  
  
   27  
  
4 mg  
  
   28  
  
4 mg  
  
   29  
  
4 mg  
  
   30  
  
4 mg Date Details 09/22 This INR check INR: 2.0 How to take your warfarin dose To take:  4 mg Take one of the 4 mg tablets. October 2017 Details Artie Bernabe Tue Wed Thu Fri Sat 1  
  
4 mg 2  
  
4 mg 3  
  
4 mg 4  
  
4 mg 5  
  
4 mg 6  
  
4 mg 7  
  
4 mg 8  
  
4 mg 9  
  
4 mg  
  
   10  
  
4 mg  
  
   11  
  
4 mg  
  
   12  
  
4 mg  
  
   13  
  
4 mg  
  
   14  
  
  
  
  
  15  
  
  
  
   16  
  
  
  
   17  
  
  
  
   18  
  
  
  
   19  
  
  
  
   20  
  
  
  
   21  
  
  
  
  
  22  
  
  
  
   23  
  
  
  
   24  
  
  
  
   25  
  
  
  
   26  
  
  
  
   27  
  
  
  
   28  
  
  
  
  
  29  
  
  
  
   30  
  
  
  
   31  
  
  
  
      
 Date Details No additional details Date of next INR:  10/13/2017 How to take your warfarin dose To take:  4 mg Take one of the 4 mg tablets. We Performed the Following AMB POC PT/INR [94601 CPT(R)] To-Do List   
 09/29/2017 1:00 PM  
  Appointment with [facility] RAD US RM 1 at [facility] ([phone]) OUTSIDE FILMS  - Any outside films related to the study being scheduled should be brought with you on the day of the exam.  If this cannot be done there may be a delay in the reading of the study.   NPO -Patient must be NPO (no food or drink) 8 hours prior to the exam.  MEDICATIONS  - Patient must bring a complete list of all medications currently taking to include prescriptions, over-the-counter meds, herbals, vitamins & any dietary supplements Introducing [facility] & [facility]! [employer] introduces [employer] patient portal. Now you can access parts of your medical record, email your doctor's office, and request medication refills online. 1. In your internet browser, go to [email or web address] 2. Click on the First Time User? Click Here link in the Sign In box. You will see the New Member Sign Up page. 3. Enter your [employer] Access Code exactly as it appears below. You will not need to use this code after youve completed the sign-up process. If you do not sign up before the expiration date, you must request a new code. · [employer] Access Code: [email or web address] Expires: 10/4/2017  3:56 PM 
 
4. Enter the last four digits of your Social Security Number (xxxx) and Date of Birth (mm/dd/yyyy) as indicated and click Submit. You will be taken to the next sign-up page. 5. Create a [employer] ID. This will be your [employer] login ID and cannot be changed, so think of one that is secure and easy to remember. 6. Create a [employer] password. You can change your password at any time. 7. Enter your Password Reset Question and Answer. This can be used at a later time if you forget your password. 8. Enter your e-mail address. You will receive e-mail notification when new information is available in [address] 9. Click Sign Up. You can now view and download portions of your medical record. 10. Click the Download Summary menu link to download a portable copy of your medical information. If you have questions, please visit the Frequently Asked Questions section of the [employer] website. Remember, [employer] is NOT to be used for urgent needs. For medical emergencies, dial 911. Now available from your iPhone and Android! Please provide this summary of care documentation to your next provider. Your primary care clinician is listed as Sylvie Young. If you have any questions after today's visit, please call [phone].

## 2017-09-25 RX ORDER — WARFARIN 4 MG/1
4 TABLET ORAL DAILY
Qty: 120 TAB | Refills: 3 | Status: SHIPPED | OUTPATIENT
Start: 2017-09-25 | End: 2018-12-24 | Stop reason: SDUPTHER

## 2017-09-29 DIAGNOSIS — I48.0 PAROXYSMAL ATRIAL FIBRILLATION (HCC): ICD-10-CM

## 2017-09-29 DIAGNOSIS — I42.9 CARDIOMYOPATHY, UNSPECIFIED TYPE (HCC): ICD-10-CM

## 2017-09-29 DIAGNOSIS — I49.5 SICK SINUS SYNDROME (HCC): ICD-10-CM

## 2017-09-30 RX ORDER — METOPROLOL SUCCINATE 25 MG/1
25 TABLET, EXTENDED RELEASE ORAL DAILY
Qty: 90 TAB | Refills: 2 | Status: SHIPPED | OUTPATIENT
Start: 2017-09-30 | End: 2018-05-07 | Stop reason: SDUPTHER

## 2017-10-05 ENCOUNTER — CLINICAL SUPPORT (OUTPATIENT)
Dept: CARDIOLOGY CLINIC | Age: 77
End: 2017-10-05

## 2017-10-05 ENCOUNTER — ANTI-COAG VISIT (OUTPATIENT)
Dept: CARDIOLOGY CLINIC | Age: 77
End: 2017-10-05

## 2017-10-05 DIAGNOSIS — I42.9 CARDIOMYOPATHY, UNSPECIFIED TYPE (HCC): ICD-10-CM

## 2017-10-05 DIAGNOSIS — I49.5 SICK SINUS SYNDROME (HCC): ICD-10-CM

## 2017-10-05 DIAGNOSIS — I42.9 CARDIOMYOPATHY, UNSPECIFIED TYPE (HCC): Primary | ICD-10-CM

## 2017-10-05 DIAGNOSIS — Z95.0 PACEMAKER: ICD-10-CM

## 2017-10-05 DIAGNOSIS — I48.0 PAF (PAROXYSMAL ATRIAL FIBRILLATION) (HCC): Primary | ICD-10-CM

## 2017-10-05 LAB
INR BLD: 2
PT POC: NORMAL SECONDS
VALID INTERNAL CONTROL?: YES

## 2017-10-05 NOTE — MR AVS SNAPSHOT
Visit Information Date & Time Provider Department Dept. Phone Encounter #  
 10/5/2017  9:40 AM Pt [address] Specialist at [facility] [phone] Your Appointments 10/19/2017 10:15 AM  
Follow Up with Albina Singh MD  
[facility] [address] [place] [address]) Appt Note: Return in about 6 weeks (around 10/19/2017), or if symptoms worsen or fail to improve [facility] [address] [facility] 83 4601 [facility] 133  
  
    
 10/26/2017  3:00 PM  
ANTICOAG NURSE with Pt Inr Norf Csi Cardio Specialist at [address]) Appt Note: 3 weeks [address] Upcoming Health Maintenance Date Due  
 GLAUCOMA SCREENING Q2Y 8/28/2003 MEDICARE YEARLY EXAM 9/8/2018 DTaP/Tdap/Td series (2 - Td) 7/16/2024 Allergies as of 10/5/2017  Review Complete On: 9/7/2017 By: Albina Singh MD  
  
 Severity Noted Reaction Type Reactions Sulfa (Sulfonamide Antibiotics)  12/03/2015    Hives, Rash Current Immunizations  Never Reviewed Name Date Influenza High Dose Vaccine PF 9/7/2017  9:00 AM  
 Pneumococcal Polysaccharide (PPSV-23) 9/7/2017  9:00 AM  
  
 Not reviewed this visit You Were Diagnosed With   
  
 Codes Comments PAF (paroxysmal atrial fibrillation) ([facility]    -  Primary ICD-10-CM: I48.0 ICD-9-CM: 427.31 Cardiomyopathy, unspecified type ([facility]     ICD-10-CM: I42.9 ICD-9-CM: 425.4 Vitals Smoking Status Current Some Day Smoker [facility] [address] [place] [address] [address] [place] [address] [phone] Your Updated Medication List  
  
   
This list is accurate as of: 10/5/17 10:48 AM.  Always use your most recent med list.  
 BENADRYL 25 mg capsule Generic drug:  diphenhydrAMINE Take 50 mg by mouth nightly as needed. cyanocobalamin 1,000 mcg tablet Take 1,000 mcg by mouth daily. * furosemide 40 mg tablet Commonly known as:  LASIX Take 40 mg by mouth daily. Along with 20mg every night * furosemide 20 mg tablet Commonly known as:  LASIX Take 20 mg by mouth nightly. Along with 40mg in the morning  
  
 gabapentin 600 mg tablet Commonly known as:  NEURONTIN One po in am , one at noon and 2 at hs  
  
 levothyroxine 25 mcg tablet Commonly known as:  SYNTHROID Take  by mouth Daily (before breakfast). loratadine 10 mg tablet Commonly known as:  Ojjo Dustman Take 1 Tab by mouth daily. metoprolol succinate 25 mg XL tablet Commonly known as:  TOPROL-XL Take 1 Tab by mouth daily. raNITIdine 150 mg tablet Commonly known as:  ZANTAC Take 150 mg by mouth daily. tamsulosin 0.4 mg capsule Commonly known as:  FLOMAX Take 0.4 mg by mouth daily. terbinafine HCl 250 mg tablet Commonly known as:  LAMISIL Take 250 mg by mouth daily. warfarin 4 mg tablet Commonly known as:  COUMADIN Take 1 Tab by mouth daily. zinc 50 mg Tab tablet Take 25 mg by mouth daily. * Notice: This list has 2 medication(s) that are the same as other medications prescribed for you. Read the directions carefully, and ask your doctor or other care provider to review them with you. Description   
 continue 4mg daily October 2017 Details Ariel Mejía Tue Wed Thu Fri Sat  
  1  
  
  
  
   2  
  
  
  
   3  
  
  
  
   4  
  
  
  
   5  
  
4 mg See details 6  
  
4 mg 7  
  
4 mg 8  
  
4 mg 9  
  
4 mg  
  
   10  
  
4 mg  
  
   11  
  
4 mg  
  
   12  
  
4 mg  
  
   13  
  
4 mg  
  
   14  
  
4 mg  
  
  
  15  
  
4 mg  
  
   16  
  
4 mg  
  
   17  
  
4 mg  
  
   18  
  
4 mg  
  
   19  
  
4 mg 20  
  
4 mg  
  
   21  
  
4 mg  
  
  
  22  
  
4 mg  
  
   23  
  
4 mg  
  
   24  
  
4 mg  
  
   25  
  
4 mg  
  
   26  
  
4 mg  
  
   27  
  
  
  
   28  
  
  
  
  
  29  
  
  
  
   30  
  
  
  
   31  
  
  
  
      
 Date Details 10/05 This INR check INR: 2.0 Date of next INR:  10/26/2017 How to take your warfarin dose To take:  4 mg Take one of the 4 mg tablets. We Performed the Following AMB POC PT/INR [37271 CPT(R)] To-Do List   
 10/26/2017 1:30 PM  
  Appointment with [facility] RAD US RM 1 at [facility] ([phone]) OUTSIDE FILMS  - Any outside films related to the study being scheduled should be brought with you on the day of the exam.  If this cannot be done there may be a delay in the reading of the study. NPO -Patient must be NPO (no food or drink) 8 hours prior to the exam.  MEDICATIONS  - Patient must bring a complete list of all medications currently taking to include prescriptions, over-the-counter meds, herbals, vitamins & any dietary supplements Introducing [facility] & HEALTH SERVICES! [facility] introduces [employer] patient portal. Now you can access parts of your medical record, email your doctor's office, and request medication refills online. 1. In your internet browser, go to [email or web address] 2. Click on the First Time User? Click Here link in the Sign In box. You will see the New Member Sign Up page. 3. Enter your [employer] Access Code exactly as it appears below. You will not need to use this code after youve completed the sign-up process. If you do not sign up before the expiration date, you must request a new code. · [employer] Access Code: [email or web address] Expires: 1/3/2018 10:46 AM 
 
4. Enter the last four digits of your Social Security Number (xxxx) and Date of Birth (mm/dd/yyyy) as indicated and click Submit. You will be taken to the next sign-up page. 5. Create a [employer] ID. This will be your [employer] login ID and cannot be changed, so think of one that is secure and easy to remember. 6. Create a [employer] password. You can change your password at any time. 7. Enter your Password Reset Question and Answer. This can be used at a later time if you forget your password. 8. Enter your e-mail address. You will receive e-mail notification when new information is available in [address] 9. Click Sign Up. You can now view and download portions of your medical record. 10. Click the Download Summary menu link to download a portable copy of your medical information. If you have questions, please visit the Frequently Asked Questions section of the [employer] website. Remember, [employer] is NOT to be used for urgent needs. For medical emergencies, dial 911. Now available from your iPhone and Android! Please provide this summary of care documentation to your next provider. Your primary care clinician is listed as Lidia Sherman. If you have any questions after today's visit, please call [phone].

## 2017-10-05 NOTE — PROGRESS NOTES
The INR is stable and therapeutic.  continue 4mg daily  and recheck in 3 weeks. Verbal order and read back per Dr Pablo Matos.

## 2017-10-19 ENCOUNTER — OFFICE VISIT (OUTPATIENT)
Dept: FAMILY MEDICINE CLINIC | Age: 77
End: 2017-10-19

## 2017-10-19 VITALS
RESPIRATION RATE: 16 BRPM | BODY MASS INDEX: 32.97 KG/M2 | DIASTOLIC BLOOD PRESSURE: 57 MMHG | SYSTOLIC BLOOD PRESSURE: 106 MMHG | TEMPERATURE: 97.1 F | OXYGEN SATURATION: 96 % | HEIGHT: 77 IN | WEIGHT: 279.2 LBS | HEART RATE: 67 BPM

## 2017-10-19 DIAGNOSIS — R13.10 DYSPHAGIA, UNSPECIFIED TYPE: ICD-10-CM

## 2017-10-19 DIAGNOSIS — R77.8 ABNORMAL SPEP: ICD-10-CM

## 2017-10-19 DIAGNOSIS — L89.95: Primary | ICD-10-CM

## 2017-10-19 DIAGNOSIS — L57.0 ACTINIC KERATOSIS: ICD-10-CM

## 2017-10-19 RX ORDER — DOXYCYCLINE 100 MG/1
100 TABLET ORAL 2 TIMES DAILY
Qty: 20 TAB | Refills: 0 | Status: SHIPPED | OUTPATIENT
Start: 2017-10-19 | End: 2017-10-29

## 2017-10-19 RX ORDER — MUPIROCIN 20 MG/G
OINTMENT TOPICAL DAILY
Qty: 22 G | Refills: 0 | Status: SHIPPED | OUTPATIENT
Start: 2017-10-19 | End: 2018-11-28 | Stop reason: SDUPTHER

## 2017-10-19 NOTE — PROGRESS NOTES
Tarik Donaldson is a 76 y.o.  male and presents with     Chief Complaint   Patient presents with    Irregular Heart Beat    Dysphagia    Diabetes    Neuropathy    Skin Problem     Pt is seeing cardiology for Afib and is being monitored at coumadin clinic. Pt  Has a lesion on his left buttock that he noticed 6 months ago. It seemd to improve but now is causing pain again. Pt does sleep on his back. Pt also has dysphagia to solids and liquids. Pt has neuropathy in his feet and takes Neuronitn that seems to help a little bit. Pt uses a walker. Pt does have h/o abnormal SPEP and had seen Oncology. Past Medical History:   Diagnosis Date    Asthma     BPH (benign prostatic hyperplasia)     Herniated disc, cervical     Knee pain     Pacemaker 2011    [facility]    PAF ([facility]oxysmal [facility] fibrillation) ([facility])     During Pacer interogation     SSS (sick sinus syndrome) ([facility])     S/P Dual chamber [facility] Pacemaker     Past Surgical History:   Procedure Laterality Date    HX HERNIA REPAIR Bilateral 1989    HX HIP REPLACEMENT  2006    right    HX KNEE REPLACEMENT Bilateral 1992/1993/2006/2008/2011/2012/2013    HX PACEMAKER  2011     Current Outpatient Prescriptions   Medication Sig    mupirocin (BACTROBAN) 2 % ointment Apply  to affected area daily.  doxycycline (ADOXA) 100 mg tablet Take 1 Tab by mouth two (2) times a day for 10 days.  metoprolol succinate (TOPROL-XL) 25 mg XL tablet Take 1 Tab by mouth daily.  warfarin (COUMADIN) 4 mg tablet Take 1 Tab by mouth daily.  gabapentin (NEURONTIN) 600 mg tablet One po in am , one at noon and 2 at hs    terbinafine HCl (LAMISIL) 250 mg tablet Take 250 mg by mouth daily.  levothyroxine (SYNTHROID) 25 mcg tablet Take  by mouth Daily (before breakfast).  raNITIdine (ZANTAC) 150 mg tablet Take 150 mg by mouth daily.  tamsulosin (FLOMAX) 0.4 mg capsule Take 0.4 mg by mouth daily.     furosemide (LASIX) 40 mg tablet Take 40 mg by mouth two (2) times a day. Along with 20mg every night    diphenhydrAMINE (BENADRYL) 25 mg capsule Take 50 mg by mouth nightly as needed.  cyanocobalamin 1,000 mcg tablet Take 1,000 mcg by mouth daily.  loratadine (CLARITIN) 10 mg tablet Take 1 Tab by mouth daily.  furosemide (LASIX) 20 mg tablet Take 20 mg by mouth nightly. Along with 40mg in the morning    zinc 50 mg tab tablet Take 25 mg by mouth daily. No current facility-administered medications for this visit. Health Maintenance   Topic Date Due    GLAUCOMA SCREENING Q2Y  08/28/2003    MEDICARE YEARLY EXAM  09/08/2018    DTaP/Tdap/Td series (2 - Td) 07/16/2024    ZOSTER VACCINE AGE 60>  Completed    Pneumococcal 65+ High/Highest Risk  Completed    INFLUENZA AGE 9 TO ADULT  Completed     Immunization History   Administered Date(s) Administered    Influenza High Dose Vaccine PF 09/07/2017    Pneumococcal Polysaccharide (PPSV-23) 09/07/2017     No LMP for male patient. Allergies and Intolerances: Allergies   Allergen Reactions    Sulfa (Sulfonamide Antibiotics) Hives and Rash       Family History:   No family history on file. Social History:   He  reports that he has been smoking Cigars. He has never used smokeless tobacco.  He  reports that he drinks alcohol.             Review of Systems:   General: negative for - chills, fatigue, fever, weight change  Psych: negative for - anxiety, depression, irritability or mood swings  ENT: negative for - headaches, hearing change, nasal congestion, oral lesions, sneezing or sore throat  Heme/ Lymph: negative for - bleeding problems, bruising, pallor or swollen lymph nodes  Endo: negative for - hot flashes, polydipsia/polyuria or temperature intolerance  Resp: negative for - cough, shortness of breath or wheezing  CV: negative for - chest pain, edema or palpitations  GI: negative for - abdominal pain, change in bowel habits, constipation, diarrhea or nausea/vomiting, pos for dysphagia  : negative for - dysuria, hematuria, incontinence, pelvic pain or vulvar/vaginal symptoms  MSK: negative for - joint pain, joint swelling or muscle pain  Neuro: negative for - confusion, headaches, seizures or weakness  Derm: negative for - dry skin, hair changes, rash or skin lesion changes, pos for lesion on lef tbuttock          Physical:   Vitals:   Vitals:    10/19/17 1033   BP: 106/57   Pulse: 67   Resp: 16   Temp: 97.1 °F (36.2 °C)   TempSrc: Oral   SpO2: 96%   Weight: 279 lb 3.2 oz (126.6 kg)   Height: 6' 5\" (1.956 m)           Exam:   HEENT- atraumatic,normocephalic, awake, oriented, well nourished  Neck - supple,no enlarged lymph nodes, no JVD, no thyromegaly  Chest- CTA, no rhonchi, no crackles  Heart- rrr, no murmurs / gallop/rub  Abdomen- soft,BS+,NT, no hepatosplenomegaly  Ext - no c/c/edema   Neuro- no focal deficits. Power 5/5 all extremities  Skin - warm,dry, no obvious rashes. , actinic keraotis on arms, face , eschar on left buttock slightly tender          Review of Data:   LABS:   Lab Results   Component Value Date/Time    WBC 4.9 07/12/2017 02:18 AM    HGB 12.4 07/12/2017 02:18 AM    HCT 37.9 07/12/2017 02:18 AM    PLATELET 281 90/05/0616 02:18 AM     Lab Results   Component Value Date/Time    Sodium 138 07/12/2017 02:18 AM    Potassium 4.5 07/12/2017 02:18 AM    Chloride 96 07/12/2017 02:18 AM    CO2 27 07/12/2017 02:18 AM    Glucose 140 07/12/2017 02:18 AM    BUN 20 07/12/2017 02:18 AM    Creatinine 0.82 07/12/2017 02:18 AM     Lab Results   Component Value Date/Time    Cholesterol, total 157 07/12/2017 02:18 AM    HDL Cholesterol 25 07/12/2017 02:18 AM    LDL, calculated 55 07/12/2017 02:18 AM    Triglyceride 387 07/12/2017 02:18 AM     No results found for: GPT        Impression / Plan:        ICD-10-CM ICD-9-CM    1.  Pressure ulcer, unstageable, with eschar (HCC) L89.95 707.00 mupirocin (BACTROBAN) 2 % ointment    R23.4 707.25 doxycycline (ADOXA) 100 mg tablet     782.8 REFERRAL TO DERMATOLOGY 2. Dysphagia, unspecified type R13.10 787.20 XR BA SWALLOW ESOPHOGRAM   3. Abnormal SPEP R77.8 790.99    4. Actinic keratosis L57.0 702.0 REFERRAL TO DERMATOLOGY     Afib - INR therapeutic at [facility]. Abnormal SPEP - may need to see [facility]    Neuropathy - ? Related to elevated SPEP. Explained to patient risk benefits of the medications. Advised patient to stop meds if having any side effects. Pt verbalized understanding of the instructions. I have discussed the diagnosis with the patient and the intended plan as seen in the above orders. The patient has received an after-visit summary and questions were answered concerning future plans. I have discussed medication side effects and warnings with the patient as well. I have reviewed the plan of care with the patient, accepted their input and they are in agreement with the treatment goals. Reviewed plan of care. Patient has provided input and agrees with goals.     Follow-up Disposition: Not on Israel Aschoff, MD

## 2017-10-19 NOTE — MR AVS SNAPSHOT
Visit Information Date & Time Provider Department Dept. Phone Encounter #  
 10/19/2017 10:15 AM [id number] WAYLON Gomes, [facility]5 [facility] [phone] [id number] Follow-up Instructions Return in about 5 weeks (around 11/23/2017). Your Appointments 10/26/2017  3:00 PM  
ANTICOAG NURSE with Pt Inr Norf Csi Cardio Specialist at [facility]) Appt Note: 3 weeks [address] [facility] [address]  
  
   
 [address] [facility] [address] Upcoming Health Maintenance Date Due  
 GLAUCOMA SCREENING Q2Y 8/28/2003 MEDICARE YEARLY EXAM 9/8/2018 DTaP/Tdap/Td series (2 - Td) 7/16/2024 Allergies as of 10/19/2017  Review Complete On: 10/19/2017 By: [id number] WAYLON Gomes MD  
  
 Severity Noted Reaction Type Reactions Sulfa (Sulfonamide Antibiotics)  12/03/2015    Hives, Rash Current Immunizations  Never Reviewed Name Date Influenza High Dose Vaccine PF 9/7/2017  9:00 AM  
 Pneumococcal Polysaccharide (PPSV-23) 9/7/2017  9:00 AM  
  
 Not reviewed this visit You Were Diagnosed With   
  
 Codes Comments Pressure ulcer, unstageable, with eschar ([facility]    -  Primary ICD-10-CM: L89.95, R23.4 ICD-9-CM: 707.00, 707.25, 782.8 Dysphagia, unspecified type     ICD-10-CM: R13.10 ICD-9-CM: 787.20 Abnormal SPEP     ICD-10-CM: R77.8 ICD-9-CM: 790.99 Actinic keratosis     ICD-10-CM: L57.0 ICD-9-CM: 702.0 Vitals BP Pulse Temp Resp Height(growth percentile) Weight(growth percentile) 106/57 67 97.1 °F (36.2 °C) (Oral) 16 6' 5\" (1.956 m) 279 lb 3.2 oz (126.6 kg) SpO2 BMI Smoking Status 96% 33.11 kg/m2 Current Some Day Smoker Vitals History BMI and BSA Data Body Mass Index Body Surface Area  
 33.11 kg/m 2 2.62 m 2 Preferred Pharmacy Pharmacy Name Phone  [address], [address] [facility] 0950 [facility] [phone] Your Updated Medication List  
  
   
This list is accurate as of: 10/19/17 11:14 AM.  Always use your most recent med list.  
  
  
  
  
 BENADRYL 25 mg capsule Generic drug:  diphenhydrAMINE Take 50 mg by mouth nightly as needed. cyanocobalamin 1,000 mcg tablet Take 1,000 mcg by mouth daily. doxycycline 100 mg tablet Commonly known as:  ADOXA Take 1 Tab by mouth two (2) times a day for 10 days. * furosemide 40 mg tablet Commonly known as:  LASIX Take 40 mg by mouth two (2) times a day. Along with 20mg every night * furosemide 20 mg tablet Commonly known as:  LASIX Take 20 mg by mouth nightly. Along with 40mg in the morning  
  
 gabapentin 600 mg tablet Commonly known as:  NEURONTIN One po in am , one at noon and 2 at hs  
  
 levothyroxine 25 mcg tablet Commonly known as:  SYNTHROID Take  by mouth Daily (before breakfast). loratadine 10 mg tablet Commonly known as:  Reyes Ishmael Take 1 Tab by mouth daily. metoprolol succinate 25 mg XL tablet Commonly known as:  TOPROL-XL Take 1 Tab by mouth daily. mupirocin 2 % ointment Commonly known as:  [employer] Apply  to affected area daily. raNITIdine 150 mg tablet Commonly known as:  ZANTAC Take 150 mg by mouth daily. tamsulosin 0.4 mg capsule Commonly known as:  FLOMAX Take 0.4 mg by mouth daily. terbinafine HCl 250 mg tablet Commonly known as:  LAMISIL Take 250 mg by mouth daily. warfarin 4 mg tablet Commonly known as:  COUMADIN Take 1 Tab by mouth daily. zinc 50 mg Tab tablet Take 25 mg by mouth daily. * Notice: This list has 2 medication(s) that are the same as other medications prescribed for you. Read the directions carefully, and ask your doctor or other care provider to review them with you. Prescriptions Sent to Pharmacy  Refills  
 mupirocin (BACTROBAN) 2 % ointment 0  
 Sig: Apply  to affected area daily. Class: Normal  
 Pharmacy: [address] [place] [address], [address] [place] [address] Ph #: [phone] Route: Topical  
 doxycycline (ADOXA) 100 mg tablet 0 Sig: Take 1 Tab by mouth two (2) times a day for 10 days. Class: Normal  
 Pharmacy: [address] [place] [address], [address] [place] [address] Ph #: [phone] Route: Oral  
  
We Performed the Following REFERRAL TO DERMATOLOGY [REF19 Custom] REFERRAL TO HEMATOLOGY [JGF15 Custom] Follow-up Instructions Return in about 5 weeks (around 11/23/2017). To-Do List   
 10/19/2017 Imaging:  XR BA SWALLOW ESOPHOGRAM   
  
 10/26/2017 1:30 PM  
  Appointment with [facility] US RM 1 at [facility] ([phone]) OUTSIDE FILMS  - Any outside films related to the study being scheduled should be brought with you on the day of the exam.  If this cannot be done there may be a delay in the reading of the study. NPO -Patient must be NPO (no food or drink) 8 hours prior to the exam.  MEDICATIONS  - Patient must bring a complete list of all medications currently taking to include prescriptions, over-the-counter meds, herbals, vitamins & any dietary supplements Referral Information Referral ID Referred By Referred To  
  
 [id number] [facility] [address] Phone: [phone] Fax: [phone] Visits Status Start Date End Date 1 New Request 10/19/17 10/19/18 If your referral has a status of pending review or denied, additional information will be sent to support the outcome of this decision. Referral ID Referred By Referred To  
 [id number] [facility], [address] [place] [address] Fran Arredondo MD  
   [address] [facility] [address] [facility] Phone: [phone] Fax: [phone] Visits Status Start Date End Date 1 New Request 10/19/17 10/19/18 If your referral has a status of pending review or denied, additional information will be sent to support the outcome of this decision. Introducing [facility]! Michael Retana introduces [employer] patient portal. Now you can access parts of your medical record, email your doctor's office, and request medication refills online. 1. In your internet browser, go to [email or web address] 2. Click on the First Time User? Click Here link in the Sign In box. You will see the New Member Sign Up page. 3. Enter your [employer] Access Code exactly as it appears below. You will not need to use this code after youve completed the sign-up process. If you do not sign up before the expiration date, you must request a new code. · [employer] Access Code: [email or web address] Expires: 1/3/2018 10:46 AM 
 
4. Enter the last four digits of your Social Security Number (xxxx) and Date of Birth (mm/dd/yyyy) as indicated and click Submit. You will be taken to the next sign-up page. 5. Create a [employer] ID. This will be your [employer] login ID and cannot be changed, so think of one that is secure and easy to remember. 6. Create a [employer] password. You can change your password at any time. 7. Enter your Password Reset Question and Answer. This can be used at a later time if you forget your password. 8. Enter your e-mail address. You will receive e-mail notification when new information is available in [address] 9. Click Sign Up. You can now view and download portions of your medical record. 10. Click the Download Summary menu link to download a portable copy of your medical information. If you have questions, please visit the Frequently Asked Questions section of the [employer] website. Remember, [employer] is NOT to be used for urgent needs. For medical emergencies, dial 911. Now available from your iPhone and Android! Please provide this summary of care documentation to your next provider. Your primary care clinician is listed as Elana Seo. If you have any questions after today's visit, please call [phone].

## 2017-10-19 NOTE — PROGRESS NOTES
1. Have you been to the ER, urgent care clinic since your last visit? Hospitalized since your last visit? No    2. Have you seen or consulted any other health care providers outside of the [address] [place] [address] since your last visit? Include any pap smears or colon screening.  No

## 2017-10-26 ENCOUNTER — ANTI-COAG VISIT (OUTPATIENT)
Dept: CARDIOLOGY CLINIC | Age: 77
End: 2017-10-26

## 2017-10-26 DIAGNOSIS — I42.9 CARDIOMYOPATHY, UNSPECIFIED TYPE (HCC): ICD-10-CM

## 2017-10-26 DIAGNOSIS — Z79.01 LONG-TERM (CURRENT) USE OF ANTICOAGULANTS: Primary | ICD-10-CM

## 2017-10-26 LAB
INR BLD: 2.3
PT POC: NORMAL SECONDS
VALID INTERNAL CONTROL?: YES

## 2017-10-26 NOTE — PROGRESS NOTES
The INR is stable and therapeutic. Continue 4mg daily and recheck in 4 weeks. Verbal order and read back per Dr Mary Alice Fonseca.

## 2017-10-31 ENCOUNTER — HOSPITAL ENCOUNTER (OUTPATIENT)
Dept: INFUSION THERAPY | Age: 77
Discharge: HOME | End: 2017-10-31
Payer: MEDICARE

## 2017-10-31 ENCOUNTER — OFFICE VISIT (OUTPATIENT)
Dept: ONCOLOGY | Age: 77
End: 2017-10-31

## 2017-10-31 VITALS
SYSTOLIC BLOOD PRESSURE: 87 MMHG | HEART RATE: 63 BPM | HEIGHT: 77 IN | OXYGEN SATURATION: 100 % | TEMPERATURE: 98.7 F | BODY MASS INDEX: 32.59 KG/M2 | WEIGHT: 276 LBS | DIASTOLIC BLOOD PRESSURE: 46 MMHG | RESPIRATION RATE: 17 BRPM

## 2017-10-31 VITALS
TEMPERATURE: 98.7 F | HEART RATE: 63 BPM | SYSTOLIC BLOOD PRESSURE: 104 MMHG | DIASTOLIC BLOOD PRESSURE: 59 MMHG | OXYGEN SATURATION: 100 % | RESPIRATION RATE: 17 BRPM

## 2017-10-31 DIAGNOSIS — D47.2 MONOCLONAL GAMMOPATHY: Primary | ICD-10-CM

## 2017-10-31 LAB
ALBUMIN SERPL-MCNC: 3.9 G/DL (ref 3.4–5)
ALBUMIN/GLOB SERPL: 0.9 {RATIO} (ref 0.8–1.7)
ALP SERPL-CCNC: 55 U/L (ref 45–117)
ALT SERPL-CCNC: 25 U/L (ref 16–61)
ANION GAP SERPL CALC-SCNC: 8 MMOL/L (ref 3–18)
AST SERPL-CCNC: 20 U/L (ref 15–37)
BASO+EOS+MONOS # BLD AUTO: 0.5 K/UL (ref 0–2.3)
BASO+EOS+MONOS # BLD AUTO: 9 % (ref 0.1–17)
BILIRUB SERPL-MCNC: 0.3 MG/DL (ref 0.2–1)
BUN SERPL-MCNC: 16 MG/DL (ref 7–18)
BUN/CREAT SERPL: 19 (ref 12–20)
CALCIUM SERPL-MCNC: 9 MG/DL (ref 8.5–10.1)
CHLORIDE SERPL-SCNC: 102 MMOL/L (ref 100–108)
CO2 SERPL-SCNC: 28 MMOL/L (ref 21–32)
CREAT SERPL-MCNC: 0.83 MG/DL (ref 0.6–1.3)
DIFFERENTIAL METHOD BLD: NORMAL
ERYTHROCYTE [DISTWIDTH] IN BLOOD BY AUTOMATED COUNT: 13.4 % (ref 11.5–14.5)
GLOBULIN SER CALC-MCNC: 4.2 G/DL (ref 2–4)
GLUCOSE SERPL-MCNC: 103 MG/DL (ref 74–99)
HCT VFR BLD AUTO: 39.3 % (ref 36–48)
HGB BLD-MCNC: 13 G/DL (ref 12–16)
LDH SERPL L TO P-CCNC: 148 U/L (ref 81–234)
LYMPHOCYTES # BLD: 1.7 K/UL (ref 1.1–5.9)
LYMPHOCYTES NFR BLD: 30 % (ref 14–44)
MCH RBC QN AUTO: 31.7 PG (ref 25–35)
MCHC RBC AUTO-ENTMCNC: 33.1 G/DL (ref 31–37)
MCV RBC AUTO: 95.9 FL (ref 78–102)
NEUTS SEG # BLD: 3.4 K/UL (ref 1.8–9.5)
NEUTS SEG NFR BLD: 62 % (ref 40–70)
PLATELET # BLD AUTO: 152 K/UL (ref 140–440)
POTASSIUM SERPL-SCNC: 4 MMOL/L (ref 3.5–5.5)
PROT SERPL-MCNC: 8.1 G/DL (ref 6.4–8.2)
RBC # BLD AUTO: 4.1 M/UL (ref 4.1–5.1)
SODIUM SERPL-SCNC: 138 MMOL/L (ref 136–145)
WBC # BLD AUTO: 5.6 K/UL (ref 4.5–13)

## 2017-10-31 PROCEDURE — 36415 COLL VENOUS BLD VENIPUNCTURE: CPT | Performed by: INTERNAL MEDICINE

## 2017-10-31 PROCEDURE — 83615 LACTATE (LD) (LDH) ENZYME: CPT | Performed by: INTERNAL MEDICINE

## 2017-10-31 PROCEDURE — 85025 COMPLETE CBC W/AUTO DIFF WBC: CPT | Performed by: INTERNAL MEDICINE

## 2017-10-31 PROCEDURE — 82784 ASSAY IGA/IGD/IGG/IGM EACH: CPT | Performed by: INTERNAL MEDICINE

## 2017-10-31 PROCEDURE — 80053 COMPREHEN METABOLIC PANEL: CPT | Performed by: INTERNAL MEDICINE

## 2017-10-31 PROCEDURE — 36415 COLL VENOUS BLD VENIPUNCTURE: CPT

## 2017-10-31 PROCEDURE — 82232 ASSAY OF BETA-2 PROTEIN: CPT | Performed by: INTERNAL MEDICINE

## 2017-10-31 NOTE — PROGRESS NOTES
Cheyanne Higgins is a 76 y.o. male who presents today to establish care. Aox3, ambulates with walker  Pt educated on fall prevention, pt verbalized understanding. 1. Have you been to the ER, urgent care clinic since your last visit? Hospitalized since your last visit? NO    2. Have you seen or consulted any other health care providers outside of the [address] [place] [address] since your last visit? Include any pap smears or colon screening. NO    Health Maintenance reviewed.     Health Maintenance Due   Topic Date Due    GLAUCOMA SCREENING Q2Y  08/28/2003

## 2017-10-31 NOTE — PROGRESS NOTES
[facility] Progress Note    Date: 2017    Name: Jesus Talamantes    MRN: [id number]         : 1940      Mr. Bre Leach arrived to [facility] at [phone] for peripheral lab draw for Dr. Leola Butler. Mr. Maxx Schmitz vitals were reviewed. Visit Vitals    /59 (BP 1 Location: Right arm, BP Patient Position: Sitting)    Pulse 63    Temp 98.7 °F (37.1 °C)    Resp 17    SpO2 100%     Recent Results (from the past 12 hour(s))   CBC WITH 3 PART DIFF    Collection Time: 10/31/17  2:16 PM   Result Value Ref Range    WBC 5.6 4.5 - 13.0 K/uL    RBC 4.10 4. 10 - 5.10 M/uL    HGB 13.0 12.0 - 16.0 g/dL    HCT 39.3 36 - 48 %    MCV 95.9 78 - 102 FL    MCH 31.7 25.0 - 35.0 PG    MCHC 33.1 31 - 37 g/dL    RDW 13.4 11.5 - 14.5 %    PLATELET 079 857 - 814 K/uL    NEUTROPHILS 62 40 - 70 %    MIXED CELLS 9 0.1 - 17 %    LYMPHOCYTES 30 14 - 44 %    ABS. NEUTROPHILS 3.4 1.8 - 9.5 K/UL    ABS. MIXED CELLS 0.5 0.0 - 2.3 K/uL    ABS. LYMPHOCYTES 1.7 1.1 - 5.9 K/UL    DF AUTOMATED         Blood drawn for labs via Left antecubital using 21g butterfly collection set venipuncture x1 attempt. Gauze and coban applied to site. Specimen sent to lab for cbc w/diff, cmp, ld, Gammopathy eval and beta 2 micrglobulin. Mr. Lianna Shields tolerated well without complaints. Mr. Lianna Shields was discharged from [place] [address] in stable condition at [phone].     Alexandra Harrington RN  2017

## 2017-10-31 NOTE — PROGRESS NOTES
[facility] HEMATOLOGY-MEDICAL ONCOLOGY:    Patient: Vannessa Moses Age: 76 y.o. 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 Roger Box at [facility]  -Patient here to establish care today for continued monitoring of his MGUS  -Repeat MGUS/plasma cell dyscrasia biomarkers today including SPEP with [place] as well as serum free light chains and quantitative immunoglobulins  -Additionally, also obtain a skeletal survey. -Return to clinic in 2-3 weeks to review results and for further medical decision-making    I have discussed the diagnosis with the patient and the intended plan as seen in the above orders. I have reviewed the plan of care with the patient, accepted their input and they are in agreement with the treatment goals. Patient has provided input and agrees with goals. History:   Phylicia Carver is a 76 y.o. 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 Roger Box at [facility]  -Patient here to establish care for continued monitoring of his MGUS  -Repeat MGUS/plasma cell dyscrasia biomarkers today including SPEP with -[place] as well as serum free light chains and quantitative immunoglobulins    He does complain of chronic arthritis pain. He also complains of difficulty ambulating due to chronic arthritis and joint pain. .  Denies fever, chills, sweats, chest pain, short of breath hemoptysis, hematochezia, melena, hematuria, dysuria, paresthesia, numbness, or loss consciousness. Current Outpatient Prescriptions   Medication Sig Dispense Refill    metoprolol succinate (TOPROL-XL) 25 mg XL tablet Take 1 Tab by mouth daily. 90 Tab 2    warfarin (COUMADIN) 4 mg tablet Take 1 Tab by mouth daily. 120 Tab 3    gabapentin (NEURONTIN) 600 mg tablet One po in am , one at noon and 2 at hs 120 Tab 3    mupirocin (BACTROBAN) 2 % ointment Apply  to affected area daily. 22 g 0    furosemide (LASIX) 20 mg tablet Take 20 mg by mouth nightly. Along with 40mg in the morning      terbinafine HCl (LAMISIL) 250 mg tablet Take 250 mg by mouth daily.  levothyroxine (SYNTHROID) 25 mcg tablet Take  by mouth Daily (before breakfast).  raNITIdine (ZANTAC) 150 mg tablet Take 150 mg by mouth daily.  tamsulosin (FLOMAX) 0.4 mg capsule Take 0.4 mg by mouth daily.  furosemide (LASIX) 40 mg tablet Take 40 mg by mouth two (2) times a day. Along with 20mg every night      diphenhydrAMINE (BENADRYL) 25 mg capsule Take 50 mg by mouth nightly as needed.  zinc 50 mg tab tablet Take 25 mg by mouth daily.  cyanocobalamin 1,000 mcg tablet Take 1,000 mcg by mouth daily.  loratadine (CLARITIN) 10 mg tablet Take 1 Tab by mouth daily.  90 Tab 1       Objective:   VS:    Visit Vitals    BP (!) 87/46 (BP 1 Location: Right arm, BP Patient Position: Sitting)    Pulse 63    Temp 98.7 °F (37.1 °C) (Oral)    Resp 17    Ht 6' 5\" (1.956 m)    Wt 125.2 kg (276 lb)    SpO2 100%    BMI 32.73 kg/m2        Physical Exam:     Constitutional:  no acute distress and alert and oriented x 3, elderly-appearing    HENT:  atraumatic   Eyes:  EOMI, PERRLA   Neck:  No palpable masses   Cardiovascular:  heart sounds normal, S1, S2   Pulmonary/Chest Wall:  breath sounds are clear   Abdominal:  Non tender, no palpable masses       Musculoskeletal:   Healed surgical scars at knees   Skin:  Normal and no rashes or lesions    Peripheral Vascular:  Pulses palpable       Review of Systems: The remainder of 12 point ROS was otherwise negative except for what was reported in the CC/HPI:      Recent Results (from the past 168 hour(s))   AMB POC PT/INR    Collection Time: 10/26/17  3:01 PM   Result Value Ref Range    VALID INTERNAL CONTROL POC Yes     Prothrombin time (POC)  seconds    INR POC 2.3        Past Medical History:   Diagnosis Date    Asthma     BPH (benign prostatic hyperplasia)     Herniated disc, cervical     Knee pain     Pacemaker 2011    [employer]    PAF (paroxysmal atrial fibrillation) (HCC)     During Pacer interogation     SSS (sick sinus syndrome) (HCC)     S/P Dual chamber [employer] Pacemaker       Past Surgical History:   Procedure Laterality Date    HX HERNIA REPAIR Bilateral 1989    HX HIP REPLACEMENT  2006    right    HX KNEE REPLACEMENT Bilateral 1992/1993/2006/2008/2011/2012/2013    HX PACEMAKER  2011       Social History     Social History    Marital status:      Spouse name: N/A    Number of children: N/A    Years of education: N/A     Occupational History    Not on file. Social History Main Topics    Smoking status: Current Some Day Smoker     Types: Cigars    Smokeless tobacco: Never Used      Comment: rare cigar smoker    Alcohol use 0.0 oz/week     0 Standard drinks or equivalent per week      Comment: rarely drinks    Drug use: No    Sexual activity: Not Currently     Partners: Female     Other Topics Concern    Not on file     Social History Narrative       No family history on file. Allergies   Allergen Reactions    Sulfa (Sulfonamide Antibiotics) Hives and Rash       Follow-up Disposition: Not on File    Praveen Conklin MD, MPH Hematology-Medical Oncology  October 31, 2017 1:38 PM

## 2017-11-01 LAB — B2 MICROGLOB SERPL-MCNC: 2.1 MG/L (ref 0.6–2.4)

## 2017-11-02 LAB
ALBUMIN SERPL ELPH-MCNC: 3.9 G/DL (ref 2.9–4.4)
ALBUMIN/GLOB SERPL: 1.1 {RATIO} (ref 0.7–1.7)
ALPHA1 GLOB SERPL ELPH-MCNC: 0.2 G/DL (ref 0–0.4)
ALPHA2 GLOB SERPL ELPH-MCNC: 0.8 G/DL (ref 0.4–1)
B-GLOBULIN SERPL ELPH-MCNC: 0.9 G/DL (ref 0.7–1.3)
GAMMA GLOB SERPL ELPH-MCNC: 1.8 G/DL (ref 0.4–1.8)
GLOBULIN SER-MCNC: 3.6 G/DL (ref 2.2–3.9)
IGA SERPL-MCNC: 122 MG/DL (ref 61–437)
IGG SERPL-MCNC: 1928 MG/DL (ref 700–1600)
IGM SERPL-MCNC: 27 MG/DL (ref 15–143)
INTERPRETATION SERPL IEP-IMP: ABNORMAL
KAPPA LC FREE SER-MCNC: 60.5 MG/L (ref 3.3–19.4)
KAPPA LC FREE/LAMBDA FREE SER: 5.08 {RATIO} (ref 0.26–1.65)
LAMBDA LC FREE SERPL-MCNC: 11.9 MG/L (ref 5.7–26.3)
M PROTEIN SERPL ELPH-MCNC: 1.4 G/DL
PROT SERPL-MCNC: 7.5 G/DL (ref 6–8.5)

## 2017-11-20 ENCOUNTER — ANTI-COAG VISIT (OUTPATIENT)
Dept: CARDIOLOGY CLINIC | Age: 77
End: 2017-11-20

## 2017-11-20 DIAGNOSIS — I48.91 ATRIAL FIBRILLATION, UNSPECIFIED TYPE (HCC): Primary | ICD-10-CM

## 2017-11-20 LAB
INR BLD: 1.5
PT POC: NORMAL SECONDS
VALID INTERNAL CONTROL?: YES

## 2017-11-20 NOTE — PROGRESS NOTES
The INR is below the therapeutic range. Please make the following adjustments to Coumadin dosing: Verbal order and read back per Dr. Lizette Haddad   Take 8mg x 1 then continue 4mg daily . Repeat the INR in 2 weeks.

## 2017-11-20 NOTE — MR AVS SNAPSHOT
Visit Information Date & Time Provider Department Dept. Phone Encounter #  
 11/20/2017  2:00 PM Pt Inr Norf Csi Cardio Specialist at [place] [address] [id number] Your Appointments 11/27/2017  2:45 PM  
Follow Up with Estuardo Schmidt MD  
[facility] Oncology [address]) Appt Note: 2 week follow up post labs; 2 week follow post labs (Monoclonal gammopathy) [address] [place] [address]  
  
    
 12/5/2017  2:50 PM  
ANTICOAG NURSE with Pt Inr Norf Csi Cardio Specialist at [facility]) Appt Note: 2 weeks [facility] [address]  
  
    
 12/7/2017  1:45 PM  
ROUTINE CARE with Albina Singh MD  
[facility] [address] [facility]) Appt Note: PRE-OP CLEARANCE,  
 [address]  
  
   
 [address] [facility] [address] Upcoming Health Maintenance Date Due  
 GLAUCOMA SCREENING Q2Y 8/28/2003 MEDICARE YEARLY EXAM 9/8/2018 DTaP/Tdap/Td series (2 - Td) 7/16/2024 Allergies as of 11/20/2017  Review Complete On: 10/31/2017 By: Estuardo Schmidt MD  
  
 Severity Noted Reaction Type Reactions Sulfa (Sulfonamide Antibiotics)  12/03/2015    Hives, Rash Current Immunizations  Never Reviewed Name Date Influenza High Dose Vaccine PF 9/7/2017  9:00 AM  
 Pneumococcal Polysaccharide (PPSV-23) 9/7/2017  9:00 AM  
  
 Not reviewed this visit You Were Diagnosed With   
  
 Codes Comments Atrial fibrillation, unspecified type ([facility] [address]    -  Primary ICD-10-CM: I48.91 
ICD-9-CM: 427.31 Vitals Smoking Status Current Some Day Smoker Preferred Pharmacy Pharmacy Name Phone [facility] - [address] [place] [address] - [address] [place] [address] [address] [place] [address] [phone] Your Updated Medication List  
  
   
This list is accurate as of: 11/20/17  2:11 PM.  Always use your most recent med list.  
  
  
  
  
 BENADRYL 25 mg capsule Generic drug:  diphenhydrAMINE Take 50 mg by mouth nightly as needed. cyanocobalamin 1,000 mcg tablet Take 1,000 mcg by mouth daily. * furosemide 40 mg tablet Commonly known as:  LASIX Take 40 mg by mouth two (2) times a day. Along with 20mg every night * furosemide 20 mg tablet Commonly known as:  LASIX Take 20 mg by mouth nightly. Along with 40mg in the morning  
  
 gabapentin 600 mg tablet Commonly known as:  NEURONTIN One po in am , one at noon and 2 at hs  
  
 levothyroxine 25 mcg tablet Commonly known as:  SYNTHROID Take  by mouth Daily (before breakfast). loratadine 10 mg tablet Commonly known as:  Sarita Nay Take 1 Tab by mouth daily. metoprolol succinate 25 mg XL tablet Commonly known as:  TOPROL-XL Take 1 Tab by mouth daily. mupirocin 2 % ointment Commonly known as:  [employer] Apply  to affected area daily. raNITIdine 150 mg tablet Commonly known as:  ZANTAC Take 150 mg by mouth daily. tamsulosin 0.4 mg capsule Commonly known as:  FLOMAX Take 0.4 mg by mouth daily. terbinafine HCl 250 mg tablet Commonly known as:  LAMISIL Take 250 mg by mouth daily. warfarin 4 mg tablet Commonly known as:  COUMADIN Take 1 Tab by mouth daily. zinc 50 mg Tab tablet Take 25 mg by mouth daily. * Notice: This list has 2 medication(s) that are the same as other medications prescribed for you. Read the directions carefully, and ask your doctor or other care provider to review them with you. Description Verbal order and read back per Dr. Lizette Haddad Take 8mg x 1 then continue 4mg daily November 2017 Details Sun Mon Tue Wed Thu Fri Sat  
     1  
  
  
  
   2  
  
  
  
   3  
  
  
  
   4  
  
  
  
  
  5  
  
  
  
   6  
  
  
  
   7  
  
  
  
   8  
  
  
  
   9  
  
  
  
   10  
  
  
  
   11  
  
  
  
  
  12  
  
  
  
   13  
  
  
  
   14  
  
  
  
   15  
  
  
  
   16  
  
  
  
   17  
  
  
  
   18  
  
  
  
  
  19  
  
  
  
   20  
  
8 mg See details 21  
  
4 mg  
  
   22  
  
4 mg  
  
   23  
  
4 mg  
  
   24  
  
4 mg  
  
   25  
  
4 mg  
  
  
  26  
  
4 mg  
  
   27  
  
4 mg  
  
   28  
  
4 mg  
  
   29  
  
4 mg  
  
   30  
  
4 mg Date Details 11/20 This INR check INR: 1.5! How to take your warfarin dose To take:  4 mg Take one of the 4 mg tablets. To take:  8 mg Take two of the 4 mg tablets. December 2017 Details Misty Franco Tue Wed Thu Fri Sat 1  
  
4 mg 2  
  
4 mg 3  
  
4 mg 4  
  
4 mg  
  
   5  
  
  
  
   6  
  
  
  
   7  
  
  
  
   8  
  
  
  
   9  
  
  
  
  
  10  
  
  
  
   11  
  
  
  
   12  
  
  
  
   13  
  
  
  
   14  
  
  
  
   15  
  
  
  
   16  
  
  
  
  
  17  
  
  
  
   18  
  
  
  
   19  
  
  
  
   20  
  
  
  
   21  
  
  
  
   22  
  
  
  
   23  
  
  
  
  
  24  
  
  
  
   25  
  
  
  
   26  
  
  
  
   27  
  
  
  
   28  
  
  
  
   29  
  
  
  
   30  
  
  
  
  
  31  
  
  
  
        
 Date Details No additional details Date of next INR:  12/4/2017 How to take your warfarin dose To take:  4 mg Take one of the 4 mg tablets. We Performed the Following AMB POC PT/INR [26239 CPT(R)] To-Do List   
 11/30/2017 9:00 AM  
  Appointment with [facility] DX RM 8 at [address] ([phone]) OUTSIDE FILMS  - Any outside films related to the study being scheduled should be brought with you on the day of the exam.  If this cannot be done there may be a delay in the reading of the study. MEDICATIONS  - Patient must bring a complete list of all medications currently taking to include prescriptions, over-the-counter meds, herbals, vitamins & any dietary supplements  NPO  - Patient must be NPO (Nothing to eat/drink) after Midnight day prior to exam except water sips with meds. ORAL CONTRAST/PREP  - Oral contrast is administered at the time of service 11/30/2017 9:45 AM  
  Appointment with [facility] RAD US RM 2 at [facility] ([phone]) OUTSIDE FILMS  - Any outside films related to the study being scheduled should be brought with you on the day of the exam.  If this cannot be done there may be a delay in the reading of the study. NPO -Patient must be NPO (no food or drink) 8 hours prior to the exam.  MEDICATIONS  - Patient must bring a complete list of all medications currently taking to include prescriptions, over-the-counter meds, herbals, vitamins & any dietary supplements Please provide this summary of care documentation to your next provider. Your primary care clinician is listed as [id number]. If you have any questions after today's visit, please call [phone].

## 2017-11-27 ENCOUNTER — OFFICE VISIT (OUTPATIENT)
Dept: ONCOLOGY | Age: 77
End: 2017-11-27

## 2017-11-27 VITALS
RESPIRATION RATE: 17 BRPM | HEART RATE: 75 BPM | SYSTOLIC BLOOD PRESSURE: 107 MMHG | HEIGHT: 77 IN | WEIGHT: 281 LBS | DIASTOLIC BLOOD PRESSURE: 51 MMHG | TEMPERATURE: 98.5 F | BODY MASS INDEX: 33.18 KG/M2 | OXYGEN SATURATION: 100 %

## 2017-11-27 DIAGNOSIS — D47.2 MONOCLONAL GAMMOPATHY: Primary | ICD-10-CM

## 2017-11-27 NOTE — PROGRESS NOTES
[facility]    Patient: Annie Bridgesch Bre Jo Age: 76 y.o. Sex: male    YOB: 1940 Admit Date: (Not on file) PCP: [id number] Gomes MD   MRN: [id number]  CSN: [id number]          Patient Active Problem List   Diagnosis Code    Cardiomyopathy ([facility] [address] I42.9    Asthma J45.909    Peripheral neuropathy G62.9    Other allergic rhinitis J30.89    Nocturia R35.1    Knee pain M25.569    Sick sinus syndrome (HCC) I49.5    Monoclonal gammopathy D47.2    Tinea corporis B35.4    Actinic keratosis L57.0    Idiopathic peripheral neuropathy G60.9    Abnormal SPEP R77.8         Assessment/ Plan:     1.)  Abormal SPEP/ Hx of MGUS/ Smoldering myeloma  -Patient has been followed by Meliton Bradshaw and Dr. Anila Alves at [facility]  -Repeat MGUS/plasma cell dyscrasia biomarkers today including SPEP with [facility] as well as serum free light chains and quantitative immunoglobulins 10/31/2017-no progression to multiple myeloma  -skeletal survey to be performed in February 2018  -Repeat MGUS biomarkers in February 2018  -Return to clinic in 3 months to review results and for further medical decision-making    I have discussed the diagnosis with the patient and the intended plan as seen in the above orders. I have reviewed the plan of care with the patient, accepted their input and they are in agreement with the treatment goals. Patient has provided input and agrees with goals. History:   Kenneth Salmeron is a 76 y.o. male presenting today for:    Abormal SPEP/ Hx of MGUS/ Smoldering myeloma  -Patient has been followed by Meliton Bradshaw and Dr. Anila Alves at [facility]    He does complain of chronic arthritis pain. He also complains of difficulty ambulating due to chronic arthritis and joint pain. .  Denies fever, chills, sweats, chest pain, short of breath hemoptysis, hematochezia, melena, hematuria, dysuria, paresthesia, numbness, or loss consciousness.     Current Outpatient Prescriptions   Medication Sig Dispense Refill    mupirocin (BACTROBAN) 2 % ointment Apply  to affected area daily. 22 g 0    metoprolol succinate (TOPROL-XL) 25 mg XL tablet Take 1 Tab by mouth daily. 90 Tab 2    warfarin (COUMADIN) 4 mg tablet Take 1 Tab by mouth daily. 120 Tab 3    gabapentin (NEURONTIN) 600 mg tablet One po in am , one at noon and 2 at hs 120 Tab 3    furosemide (LASIX) 20 mg tablet Take 20 mg by mouth nightly. Along with 40mg in the morning      terbinafine HCl (LAMISIL) 250 mg tablet Take 250 mg by mouth daily.  levothyroxine (SYNTHROID) 25 mcg tablet Take  by mouth Daily (before breakfast).  raNITIdine (ZANTAC) 150 mg tablet Take 150 mg by mouth daily.  tamsulosin (FLOMAX) 0.4 mg capsule Take 0.4 mg by mouth daily.  furosemide (LASIX) 40 mg tablet Take 40 mg by mouth two (2) times a day. Along with 20mg every night      diphenhydrAMINE (BENADRYL) 25 mg capsule Take 50 mg by mouth nightly as needed.  zinc 50 mg tab tablet Take 25 mg by mouth daily.  cyanocobalamin 1,000 mcg tablet Take 1,000 mcg by mouth daily.  loratadine (CLARITIN) 10 mg tablet Take 1 Tab by mouth daily. 90 Tab 1       Objective:   VS:    Visit Vitals    /51 (BP 1 Location: Left arm, BP Patient Position: Sitting)    Pulse 75    Temp 98.5 °F (36.9 °C) (Oral)    Resp 17    Ht 6' 5\" (1.956 m)    Wt 127.5 kg (281 lb)    SpO2 100%    BMI 33.32 kg/m2        Physical Exam:     Constitutional:  no acute distress and alert and oriented x 3, elderly-appearing    HENT:  atraumatic   Eyes:  EOMI, PERRLA   Neck:  No palpable masses   Cardiovascular:  S1, S2   Pulmonary/Chest Wall:   clear   Abdominal:  Non tender, no palpable masses       Musculoskeletal:   Healed surgical scars at knees   Skin:  Normal and no rashes or lesions    Peripheral Vascular:  Pulses palpable       Review of Systems:   The remainder of 12 point ROS was otherwise negative except for what was reported in the CC/HPI:      No results found for this or any previous visit (from the past 168 hour(s)). Past Medical History:   Diagnosis Date    Asthma     BPH (benign prostatic hyperplasia)     Herniated disc, cervical     Knee pain     Pacemaker 2011    [facility] [employer]    PAF (paroxysmal atrial fibrillation) (HCC)     During Pacer interogation     SSS (sick sinus syndrome) (HCC)     S/P Dual chamber [facility] Pacemaker       Past Surgical History:   Procedure Laterality Date    HX HERNIA REPAIR Bilateral 1989    HX HIP REPLACEMENT  2006    right    HX KNEE REPLACEMENT Bilateral 1992/1993/2006/2008/2011/2012/2013    HX PACEMAKER  2011       Social History     Social History    Marital status:      Spouse name: N/A    Number of children: N/A    Years of education: N/A     Occupational History    Not on file. Social History Main Topics    Smoking status: Current Some Day Smoker     Types: Cigars    Smokeless tobacco: Never Used      Comment: rare cigar smoker    Alcohol use 0.0 oz/week     0 Standard drinks or equivalent per week      Comment: rarely drinks    Drug use: No    Sexual activity: Not Currently     Partners: Female     Other Topics Concern    Not on file     Social History Narrative       No family history on file. Allergies   Allergen Reactions    Sulfa (Sulfonamide Antibiotics) Hives and Rash       Follow-up Disposition: Not on File    Praveen Adams MD, MPH Hematology-Medical Oncology  November 27, 2017 1:38 PM

## 2017-11-27 NOTE — MR AVS SNAPSHOT
Visit Information Date & Time Provider Department Dept. Phone Encounter #  
 11/27/2017  2:45 PM Soumya Ceballos MD [facility] Oncology [phone] [id number] Your Appointments 11/28/2017  2:45 PM  
ROUTINE CARE with Shubham Vidal MD  
[facility] [address] [place] [address]) Appt Note: leg swelling rb 11/27/17  
 [id number] [place] [address] [address] [facility] [address]  
  
    
 12/7/2017  2:45 PM  
ANTICOAG NURSE with Pt Inr Norf Csi Cardio Specialist at [facility] [place] [address]) Appt Note: 2 weeks; after appt with PCP  
 [facility] [address]  
  
    
 2/26/2018  2:30 PM  
Follow Up with Soumya Ceballos MD  
[facility] On[facility] [address] [place] [address]) [address] 47[address] [facility]  
  
   
 [id number] [place] [address] [address] [place] [address] 95 Upcoming Health Maintenance Date Due  
 GLAUCOMA SCREENING Q2Y 8/28/2003 MEDICARE YEARLY EXAM 9/8/2018 DTaP/Tdap/Td series (2 - Td) 7/16/2024 Allergies as of 11/27/2017  Review Complete On: 11/27/2017 By: Mendel Branch, LPN Severity Noted Reaction Type Reactions Sulfa (Sulfonamide Antibiotics)  12/03/2015    Hives, Rash Current Immunizations  Never Reviewed Name Date Influenza High Dose Vaccine PF 9/7/2017  9:00 AM  
 Pneumococcal Polysaccharide (PPSV-23) 9/7/2017  9:00 AM  
  
 Not reviewed this visit Vitals BP Pulse Temp Resp Height(growth percentile) Weight(growth percentile) 107/51 (BP 1 Location: Left arm, BP Patient Position: Sitting) 75 98.5 °F (36.9 °C) (Oral) 17 6' 5\" (1.956 m) 281 lb (127.5 kg) SpO2 BMI Smoking Status 100% 33.32 kg/m2 Current Some Day Smoker BMI and BSA Data Body Mass Index Body Surface Area 33.32 kg/m 2 2.63 m 2 Preferred Pharmacy Pharmacy Name Phone [facility] [address] [place], [place] [id number] [facility] [address] [phone] Your Updated Medication List  
  
   
This list is accurate as of: 11/27/17  3:19 PM.  Always use your most recent med list.  
  
  
  
  
 BENADRYL 25 mg capsule Generic drug:  diphenhydrAMINE Take 50 mg by mouth nightly as needed. cyanocobalamin 1,000 mcg tablet Take 1,000 mcg by mouth daily. * furosemide 40 mg tablet Commonly known as:  LASIX Take 40 mg by mouth two (2) times a day. Along with 20mg every night * furosemide 20 mg tablet Commonly known as:  LASIX Take 20 mg by mouth nightly. Along with 40mg in the morning  
  
 gabapentin 600 mg tablet Commonly known as:  NEURONTIN One po in am , one at noon and 2 at hs  
  
 levothyroxine 25 mcg tablet Commonly known as:  SYNTHROID Take  by mouth Daily (before breakfast). loratadine 10 mg tablet Commonly known as:  Rosas Blowers Take 1 Tab by mouth daily. metoprolol succinate 25 mg XL tablet Commonly known as:  TOPROL-XL Take 1 Tab by mouth daily. mupirocin 2 % ointment Commonly known as:  [employer] Apply  to affected area daily. raNITIdine 150 mg tablet Commonly known as:  ZANTAC Take 150 mg by mouth daily. tamsulosin 0.4 mg capsule Commonly known as:  FLOMAX Take 0.4 mg by mouth daily. terbinafine HCl 250 mg tablet Commonly known as:  LAMISIL Take 250 mg by mouth daily. warfarin 4 mg tablet Commonly known as:  COUMADIN Take 1 Tab by mouth daily. zinc 50 mg Tab tablet Take 25 mg by mouth daily. * Notice: This list has 2 medication(s) that are the same as other medications prescribed for you. Read the directions carefully, and ask your doctor or other care provider to review them with you. To-Do List   
 11/27/2017 3:30 PM  
 Appointment with [facility] INFUSION PHLEBOTOMIST at [address] [facility] ([phone])  
  
 11/30/2017 9:00 AM  
  Appointment with [facility] DX RM 8 at [address] ([phone]) OUTSIDE FILMS  - Any outside films related to the study being scheduled should be brought with you on the day of the exam.  If this cannot be done there may be a delay in the reading of the study. MEDICATIONS  - Patient must bring a complete list of all medications currently taking to include prescriptions, over-the-counter meds, herbals, vitamins & any dietary supplements  NPO  - Patient must be NPO (Nothing to eat/drink) after Midnight day prior to exam except water sips with meds. ORAL CONTRAST/PREP  - Oral contrast is administered at the time of service 11/30/2017 9:45 AM  
  Appointment with [facility] RAD US RM 2 at [facility] ([phone]) OUTSIDE FILMS  - Any outside films related to the study being scheduled should be brought with you on the day of the exam.  If this cannot be done there may be a delay in the reading of the study. NPO -Patient must be NPO (no food or drink) 8 hours prior to the exam.  MEDICATIONS  - Patient must bring a complete list of all medications currently taking to include prescriptions, over-the-counter meds, herbals, vitamins & any dietary supplements Please provide this summary of care documentation to your next provider. Your primary care clinician is listed as [id number]. If you have any questions after today's visit, please call [phone].

## 2017-11-28 ENCOUNTER — OFFICE VISIT (OUTPATIENT)
Dept: FAMILY MEDICINE CLINIC | Age: 77
End: 2017-11-28

## 2017-11-28 VITALS
TEMPERATURE: 97.2 F | WEIGHT: 281.2 LBS | RESPIRATION RATE: 18 BRPM | HEIGHT: 77 IN | OXYGEN SATURATION: 92 % | HEART RATE: 67 BPM | SYSTOLIC BLOOD PRESSURE: 99 MMHG | DIASTOLIC BLOOD PRESSURE: 50 MMHG | BODY MASS INDEX: 33.2 KG/M2

## 2017-11-28 DIAGNOSIS — I48.0 PAROXYSMAL ATRIAL FIBRILLATION (HCC): ICD-10-CM

## 2017-11-28 DIAGNOSIS — I49.5 SICK SINUS SYNDROME (HCC): ICD-10-CM

## 2017-11-28 DIAGNOSIS — I42.9 CARDIOMYOPATHY, UNSPECIFIED TYPE (HCC): ICD-10-CM

## 2017-11-28 RX ORDER — CEPHALEXIN 500 MG/1
500 CAPSULE ORAL 4 TIMES DAILY
Qty: 40 CAP | Refills: 0 | Status: SHIPPED | OUTPATIENT
Start: 2017-11-28 | End: 2017-12-08

## 2017-11-28 RX ORDER — METOPROLOL SUCCINATE 25 MG/1
25 TABLET, EXTENDED RELEASE ORAL DAILY
Qty: 90 TAB | Refills: 2 | Status: CANCELLED | OUTPATIENT
Start: 2017-11-28

## 2017-11-28 NOTE — PATIENT INSTRUCTIONS
Preventing Outdoor Falls: Care Instructions  Your Care Instructions    Worries about falls don't need to keep you indoors. Outdoor activities like walking have big benefits for your health. You will need to watch your step and learn a few safety measures. If you are worried about having a fall outdoors, ask your doctor about exercises, classes, or physical therapy that may help. You can learn ways to gain strength, flexibility, and balance. Ask if it might help to use a cane or walker. You can make your time outdoors safer with a few simple measures. Follow-up care is a key part of your treatment and safety. Be sure to make and go to all appointments, and call your doctor if you are having problems. It's also a good idea to know your test results and keep a list of the medicines you take. How can you prevent falls outdoors? · Wear shoes with firm soles and low heels. If you have to walk on an icy surface, use grippers that can be worn over your shoes in bad weather. · Be extra careful if weather is bad. Walk on the grass when the sidewalks are slick. If you live in a place that gets snow and ice in the winter, sprinkle salt on slippery stairs and sidewalks. · Be careful getting on or off buses and trains or getting in and out of cars. If handrails are available, use them. · Be careful when you cross the street. Look for crosswalks or places where curb cuts or ramps are present. · Try not to hurry, especially if you are carrying something. · Be cautious in parking lots or garages. There may be curbs or changes in pavement, or the height of the pavement may vary. · Make sure to wear the correct eyeglasses, if you need them. Reading glasses or bifocals can make it harder to see hazards that might be in your way. · If you are walking outdoors for exercise, try to:  ¨ Walk in well-lighted, well-maintained areas. These include high school or college tracks, shopping malls, and public spaces.   ¨ Walk with a partner. ¨ Watch out for cracked sidewalks, curbs, changes in the height of the pavement, exposed tree roots, and debris such as fallen leaves or branches. Where can you learn more? Go to http://tristan-shaye.info/. Enter A272 in the search box to learn more about \"Preventing Outdoor Falls: Care Instructions. \"  Current as of: May 12, 2017  Content Version: 11.4  © 7320-1838 [employer]. Care instructions adapted under license by [employer] (which disclaims liability or warranty for this information). If you have questions about a medical condition or this instruction, always ask your healthcare professional. [place] [address] any warranty or liability for your use of this information. Preventing Outdoor Falls: Care Instructions  Your Care Instructions    Worries about falls don't need to keep you indoors. Outdoor activities like walking have big benefits for your health. You will need to watch your step and learn a few safety measures. If you are worried about having a fall outdoors, ask your doctor about exercises, classes, or physical therapy that may help. You can learn ways to gain strength, flexibility, and balance. Ask if it might help to use a cane or walker. You can make your time outdoors safer with a few simple measures. Follow-up care is a key part of your treatment and safety. Be sure to make and go to all appointments, and call your doctor if you are having problems. It's also a good idea to know your test results and keep a list of the medicines you take. How can you prevent falls outdoors? · Wear shoes with firm soles and low heels. If you have to walk on an icy surface, use grippers that can be worn over your shoes in bad weather. · Be extra careful if weather is bad. Walk on the grass when the sidewalks are slick.  If you live in a place that gets snow and ice in the winter, sprinkle salt on slippery stairs and sidewalks. · Be careful getting on or off buses and trains or getting in and out of cars. If handrails are available, use them. · Be careful when you cross the street. Look for crosswalks or places where curb cuts or ramps are present. · Try not to hurry, especially if you are carrying something. · Be cautious in parking lots or garages. There may be curbs or changes in pavement, or the height of the pavement may vary. · Make sure to wear the correct eyeglasses, if you need them. Reading glasses or bifocals can make it harder to see hazards that might be in your way. · If you are walking outdoors for exercise, try to:  ¨ Walk in well-lighted, well-maintained areas. These include high school or college tracks, shopping malls, and public spaces. ¨ Walk with a partner. ¨ Watch out for cracked sidewalks, curbs, changes in the height of the pavement, exposed tree roots, and debris such as fallen leaves or branches. Where can you learn more? Go to http://tristan-shaye.info/. Enter F832 in the search box to learn more about \"Preventing Outdoor Falls: Care Instructions. \"  Current as of: May 12, 2017  Content Version: 11.4  © 6288-5837 [employer]. Care instructions adapted under license by [employer] (which disclaims liability or warranty for this information). If you have questions about a medical condition or this instruction, always ask your healthcare professional. [place] [address] any warranty or liability for your use of this information.

## 2017-11-28 NOTE — PROGRESS NOTES
1. Have you been to the ER, urgent care clinic since your last visit? Hospitalized since your last visit? No    2. Have you seen or consulted any other health care providers outside of the [address] [place] [address] since your last visit? Include any pap smears or colon screening.  No

## 2017-11-28 NOTE — PROGRESS NOTES
Sadia Gillespie is a 76 y.o.  male and presents with     Chief Complaint   Patient presents with   Shala Lizet Fall    Leg Injury       Pt saw Dr Abril Chou for MGUS and was felt to be stable    Pt tripped and fell recently. Pt scraped his skin on left leg and has infection and mild swelling in left leg. No calf pain. Pt does not think PT will help him. Pt  Has had mutiple surgeries on his left knee in the past.  He bruised his left hip region. Past Medical History:   Diagnosis Date    Asthma     BPH (benign prostatic hyperplasia)     Herniated disc, cervical     Knee pain     Pacemaker 2011    [facility]    PAF (paroxysmal atrial fibrillation) ([facility])     During Pacer interogation     SSS (sick sinus syndrome) ([facility])     S/P Dual chamber [facility] Pacemaker     Past Surgical History:   Procedure Laterality Date    HX HERNIA REPAIR Bilateral 1989    HX HIP REPLACEMENT  2006    right    HX KNEE REPLACEMENT Bilateral 1992/1993/2006/2008/2011/2012/2013    HX PACEMAKER  2011     Current Outpatient Prescriptions   Medication Sig    cephALEXin (KEFLEX) 500 mg capsule Take 1 Cap by mouth four (4) times daily for 10 days.  mupirocin (BACTROBAN) 2 % ointment Apply  to affected area daily.  metoprolol succinate (TOPROL-XL) 25 mg XL tablet Take 1 Tab by mouth daily.  warfarin (COUMADIN) 4 mg tablet Take 1 Tab by mouth daily.  gabapentin (NEURONTIN) 600 mg tablet One po in am , one at noon and 2 at hs    furosemide (LASIX) 20 mg tablet Take 20 mg by mouth nightly. Along with 40mg in the morning    terbinafine HCl (LAMISIL) 250 mg tablet Take 250 mg by mouth daily.  levothyroxine (SYNTHROID) 25 mcg tablet Take  by mouth Daily (before breakfast).  raNITIdine (ZANTAC) 150 mg tablet Take 150 mg by mouth daily.  tamsulosin (FLOMAX) 0.4 mg capsule Take 0.4 mg by mouth daily.  furosemide (LASIX) 40 mg tablet Take 40 mg by mouth two (2) times a day.  Along with 20mg every night    diphenhydrAMINE (BENADRYL) 25 mg capsule Take 50 mg by mouth nightly as needed.  zinc 50 mg tab tablet Take 25 mg by mouth daily.  cyanocobalamin 1,000 mcg tablet Take 1,000 mcg by mouth daily.  loratadine (CLARITIN) 10 mg tablet Take 1 Tab by mouth daily. No current facility-administered medications for this visit. Health Maintenance   Topic Date Due    GLAUCOMA SCREENING Q2Y  08/28/2003    MEDICARE YEARLY EXAM  09/08/2018    DTaP/Tdap/Td series (2 - Td) 07/16/2024    ZOSTER VACCINE AGE 60>  Completed    Pneumococcal 65+ High/Highest Risk  Completed    Influenza Age 5 to Adult  Completed     Immunization History   Administered Date(s) Administered    Influenza High Dose Vaccine PF 09/07/2017    Pneumococcal Polysaccharide (PPSV-23) 09/07/2017     No LMP for male patient. Allergies and Intolerances: Allergies   Allergen Reactions    Sulfa (Sulfonamide Antibiotics) Hives and Rash       Family History:   No family history on file. Social History:   He  reports that he has been smoking Cigars. He has never used smokeless tobacco.  He  reports that he drinks alcohol.             Review of Systems:   General: negative for - chills, fatigue, fever, weight change  Psych: negative for - anxiety, depression, irritability or mood swings  ENT: negative for - headaches, hearing change, nasal congestion, oral lesions, sneezing or sore throat  Heme/ Lymph: negative for - bleeding problems, bruising, pallor or swollen lymph nodes  Endo: negative for - hot flashes, polydipsia/polyuria or temperature intolerance  Resp: negative for - cough, shortness of breath or wheezing  CV: negative for - chest pain, edema or palpitations  GI: negative for - abdominal pain, change in bowel habits, constipation, diarrhea or nausea/vomiting  : negative for - dysuria, hematuria, incontinence, pelvic pain or vulvar/vaginal symptoms  MSK: negative for - joint pain, joint swelling or muscle pain  Neuro: negative for - confusion, headaches, seizures or weakness  Derm: negative for - dry skin, hair changes, rash or skin lesion changes, pos for infection of left leg          Physical:   Vitals:   Vitals:    11/28/17 1509   BP: 99/50   Pulse: 67   Resp: 18   Temp: 97.2 °F (36.2 °C)   TempSrc: Oral   SpO2: 92%   Weight: 281 lb 3.2 oz (127.6 kg)   Height: 6' 5\" (1.956 m)           Exam:   HEENT- atraumatic,normocephalic, awake, oriented, well nourished  Neck - supple,no enlarged lymph nodes, no JVD, no thyromegaly  Chest- CTA, no rhonchi, no crackles  Heart- rrr, no murmurs / gallop/rub  Abdomen- soft,BS+,NT, no hepatosplenomegaly  Ext - left leg mild edema, superfical ulcer on the back of left leg, lower portion  Neuro- no focal deficits. Power 5/5 all extremities  Skin - warm,dry, no obvious rashes. Review of Data:   LABS:   Lab Results   Component Value Date/Time    WBC 5.6 10/31/2017 02:16 PM    HGB 13.0 10/31/2017 02:16 PM    HCT 39.3 10/31/2017 02:16 PM    PLATELET 354 31/64/2165 02:16 PM     Lab Results   Component Value Date/Time    Sodium 138 10/31/2017 02:16 PM    Potassium 4.0 10/31/2017 02:16 PM    Chloride 102 10/31/2017 02:16 PM    CO2 28 10/31/2017 02:16 PM    Glucose 103 10/31/2017 02:16 PM    BUN 16 10/31/2017 02:16 PM    Creatinine 0.83 10/31/2017 02:16 PM     Lab Results   Component Value Date/Time    Cholesterol, total 157 07/12/2017 02:18 AM    HDL Cholesterol 25 07/12/2017 02:18 AM    LDL, calculated 55 07/12/2017 02:18 AM    Triglyceride 387 07/12/2017 02:18 AM     No results found for: GPT        Impression / Plan:        ICD-10-CM ICD-9-CM    1. Sick sinus syndrome (HCC) I49.5 427.81    2. Cardiomyopathy, unspecified type ([facility] [address] I42.9 425.4    3. Paroxysmal atrial fibrillation (HCC) I48.0 427.31      Keep leg elevation. Keflex qid for 10 days. Explained to patient risk benefits of the medications. Advised patient to stop meds if having any side effects. Pt verbalized understanding of the instructions. I have discussed the diagnosis with the patient and the intended plan as seen in the above orders. The patient has received an after-visit summary and questions were answered concerning future plans. I have discussed medication side effects and warnings with the patient as well. I have reviewed the plan of care with the patient, accepted their input and they are in agreement with the treatment goals. Reviewed plan of care. Patient has provided input and agrees with goals.     Follow-up Disposition: Not on Eva Coulter MD

## 2017-11-28 NOTE — MR AVS SNAPSHOT
Visit Information Date & Time Provider Department Dept. Phone Encounter #  
 11/28/2017  2:45 PM [address], 1845 [facility] [phone] [id number] Follow-up Instructions Return in about 2 weeks (around 12/12/2017). Your Appointments 12/7/2017  2:45 PM  
ANTICOAG NURSE with Pt Inr Norf Csi Cardio Specialist at [facility] [address] [facility]) Appt Note: 2 weeks; after appt with PCP  
 [facility] [address] [place] [address]  
  
    
 2/26/2018  2:30 PM  
Follow Up with Debi Pollock MD  
130 [facility] 365[address] [facility]) [address]00 [facility] [address] 4750 [facility]  
  
   
 [address] [facility] [address] [facility] [address] Upcoming Health Maintenance Date Due  
 GLAUCOMA SCREENING Q2Y 8/28/2003 MEDICARE YEARLY EXAM 9/8/2018 DTaP/Tdap/Td series (2 - Td) 7/16/2024 Allergies as of 11/28/2017  Review Complete On: 11/28/2017 By: [id number] WAYLON Gomes MD  
  
 Severity Noted Reaction Type Reactions Sulfa (Sulfonamide Antibiotics)  12/03/2015    Hives, Rash Current Immunizations  Never Reviewed Name Date Influenza High Dose Vaccine PF 9/7/2017  9:00 AM  
 Pneumococcal Polysaccharide (PPSV-23) 9/7/2017  9:00 AM  
  
 Not reviewed this visit You Were Diagnosed With   
  
 Codes Comments Sick sinus syndrome (HCC)     ICD-10-CM: I49.5 ICD-9-CM: 427.81 Cardiomyopathy, unspecified type ([facility]     ICD-10-CM: I42.9 ICD-9-CM: 425.4 Paroxysmal atrial fibrillation (HCC)     ICD-10-CM: I48.0 ICD-9-CM: 427.31 Vitals BP Pulse Temp Resp Height(growth percentile) Weight(growth percentile) 99/50 67 97.2 °F (36.2 °C) (Oral) 18 6' 5\" (1.956 m) 281 lb 3.2 oz (127.6 kg) SpO2 BMI Smoking Status 92% 33.35 kg/m2 Current Some Day Smoker Vitals History BMI and BSA Data Body Mass Index Body Surface Area  
 33.35 kg/m 2 2.63 m 2 Preferred Pharmacy Pharmacy Name Phone [facility] [address], [place] Marlys Gomes [phone] Your Updated Medication List  
  
   
This list is accurate as of: 11/28/17  4:03 PM.  Always use your most recent med list.  
  
  
  
  
 BENADRYL 25 mg capsule Generic drug:  diphenhydrAMINE Take 50 mg by mouth nightly as needed. cephALEXin 500 mg capsule Commonly known as:  Delwyn Pay Take 1 Cap by mouth four (4) times daily for 10 days. cyanocobalamin 1,000 mcg tablet Take 1,000 mcg by mouth daily. * furosemide 40 mg tablet Commonly known as:  LASIX Take 40 mg by mouth two (2) times a day. Along with 20mg every night * furosemide 20 mg tablet Commonly known as:  LASIX Take 20 mg by mouth nightly. Along with 40mg in the morning  
  
 gabapentin 600 mg tablet Commonly known as:  NEURONTIN One po in am , one at noon and 2 at hs  
  
 levothyroxine 25 mcg tablet Commonly known as:  SYNTHROID Take  by mouth Daily (before breakfast). loratadine 10 mg tablet Commonly known as:  Garlan Baas Take 1 Tab by mouth daily. metoprolol succinate 25 mg XL tablet Commonly known as:  TOPROL-XL Take 1 Tab by mouth daily. mupirocin 2 % ointment Commonly known as:  [employer] Apply  to affected area daily. raNITIdine 150 mg tablet Commonly known as:  ZANTAC Take 150 mg by mouth daily. tamsulosin 0.4 mg capsule Commonly known as:  FLOMAX Take 0.4 mg by mouth daily. terbinafine HCl 250 mg tablet Commonly known as:  LAMISIL Take 250 mg by mouth daily. warfarin 4 mg tablet Commonly known as:  COUMADIN Take 1 Tab by mouth daily. zinc 50 mg Tab tablet Take 25 mg by mouth daily. * Notice: This list has 2 medication(s) that are the same as other medications prescribed for you.  Read the directions carefully, and ask your doctor or other care provider to review them with you. Prescriptions Sent to Pharmacy Refills  
 cephALEXin (KEFLEX) 500 mg capsule 0 Sig: Take 1 Cap by mouth four (4) times daily for 10 days. Class: Normal  
 Pharmacy: [address], [address] Ph #: [phone] Route: Oral  
  
Follow-up Instructions Return in about 2 weeks (around 12/12/2017). To-Do List   
 11/30/2017 9:00 AM  
  Appointment with [facility] DX RM 8 at [address] ([phone]) OUTSIDE FILMS  - Any outside films related to the study being scheduled should be brought with you on the day of the exam.  If this cannot be done there may be a delay in the reading of the study. MEDICATIONS  - Patient must bring a complete list of all medications currently taking to include prescriptions, over-the-counter meds, herbals, vitamins & any dietary supplements  NPO  - Patient must be NPO (Nothing to eat/drink) after Midnight day prior to exam except water sips with meds. ORAL CONTRAST/PREP  - Oral contrast is administered at the time of service 11/30/2017 9:45 AM  
  Appointment with [facility] RAD US RM 2 at [facility] ([phone]) OUTSIDE FILMS  - Any outside films related to the study being scheduled should be brought with you on the day of the exam.  If this cannot be done there may be a delay in the reading of the study. NPO -Patient must be NPO (no food or drink) 8 hours prior to the exam.  MEDICATIONS  - Patient must bring a complete list of all medications currently taking to include prescriptions, over-the-counter meds, herbals, vitamins & any dietary supplements Patient Instructions Preventing Outdoor Falls: Care Instructions Your Care Instructions Worries about falls don't need to keep you indoors. Outdoor activities like walking have big benefits for your health. You will need to watch your step and learn a few safety measures. If you are worried about having a fall outdoors, ask your doctor about exercises, classes, or physical therapy that may help. You can learn ways to gain strength, flexibility, and balance. Ask if it might help to use a cane or walker. You can make your time outdoors safer with a few simple measures. Follow-up care is a key part of your treatment and safety. Be sure to make and go to all appointments, and call your doctor if you are having problems. It's also a good idea to know your test results and keep a list of the medicines you take. How can you prevent falls outdoors? · Wear shoes with firm soles and low heels. If you have to walk on an icy surface, use grippers that can be worn over your shoes in bad weather. · Be extra careful if weather is bad. Walk on the grass when the sidewalks are slick. If you live in a place that gets snow and ice in the winter, sprinkle salt on slippery stairs and sidewalks. · Be careful getting on or off buses and trains or getting in and out of cars. If handrails are available, use them. · Be careful when you cross the street. Look for crosswalks or places where curb cuts or ramps are present. · Try not to hurry, especially if you are carrying something. · Be cautious in parking lots or garages. There may be curbs or changes in pavement, or the height of the pavement may vary. · Make sure to wear the correct eyeglasses, if you need them. Reading glasses or bifocals can make it harder to see hazards that might be in your way. · If you are walking outdoors for exercise, try to: 
¨ Walk in well-lighted, well-maintained areas. These include high school or college tracks, shopping malls, and public spaces. ¨ Walk with a partner. ¨ Watch out for cracked sidewalks, curbs, changes in the height of the pavement, exposed tree roots, and debris such as fallen leaves or branches. Where can you learn more? Go to http://iban.info/. Enter Q450 in the search box to learn more about \"Preventing Outdoor Falls: Care Instructions. \" Current as of: May 12, 2017 Content Version: 11.4 © 9589-6021 [employer]. Care instructions adapted under license by [employer] (which disclaims liability or warranty for this information). If you have questions about a medical condition or this instruction, always ask your healthcare professional. [address] any warranty or liability for your use of this information. Preventing Outdoor Falls: Care Instructions Your Care Instructions Worries about falls don't need to keep you indoors. Outdoor activities like walking have big benefits for your health. You will need to watch your step and learn a few safety measures. If you are worried about having a fall outdoors, ask your doctor about exercises, classes, or physical therapy that may help. You can learn ways to gain strength, flexibility, and balance. Ask if it might help to use a cane or walker. You can make your time outdoors safer with a few simple measures. Follow-up care is a key part of your treatment and safety. Be sure to make and go to all appointments, and call your doctor if you are having problems. It's also a good idea to know your test results and keep a list of the medicines you take. How can you prevent falls outdoors? · Wear shoes with firm soles and low heels. If you have to walk on an icy surface, use grippers that can be worn over your shoes in bad weather. · Be extra careful if weather is bad. Walk on the grass when the sidewalks are slick. If you live in a place that gets snow and ice in the winter, sprinkle salt on slippery stairs and sidewalks. · Be careful getting on or off buses and trains or getting in and out of cars. If handrails are available, use them. · Be careful when you cross the street. Look for crosswalks or places where curb cuts or ramps are present. · Try not to hurry, especially if you are carrying something. · Be cautious in parking lots or garages. There may be curbs or changes in pavement, or the height of the pavement may vary. · Make sure to wear the correct eyeglasses, if you need them. Reading glasses or bifocals can make it harder to see hazards that might be in your way. · If you are walking outdoors for exercise, try to: 
¨ Walk in well-lighted, well-maintained areas. These include high school or college tracks, shopping malls, and public spaces. ¨ Walk with a partner. ¨ Watch out for cracked sidewalks, curbs, changes in the height of the pavement, exposed tree roots, and debris such as fallen leaves or branches. Where can you learn more? Go to http://tristan-shaye.info/. Enter Z530 in the search box to learn more about \"Preventing Outdoor Falls: Care Instructions. \" Current as of: May 12, 2017 Content Version: 11.4 © 3794-1093 [employer]. Care instructions adapted under license by [employer] (which disclaims liability or warranty for this information). If you have questions about a medical condition or this instruction, always ask your healthcare professional. [address] any warranty or liability for your use of this information. Please provide this summary of care documentation to your next provider. Your primary care clinician is listed as Josefa Denis. If you have any questions after today's visit, please call [phone].

## 2017-12-05 ENCOUNTER — ANTI-COAG VISIT (OUTPATIENT)
Dept: CARDIOLOGY CLINIC | Age: 77
End: 2017-12-05

## 2017-12-05 DIAGNOSIS — Z79.01 LONG-TERM (CURRENT) USE OF ANTICOAGULANTS: Primary | ICD-10-CM

## 2017-12-05 DIAGNOSIS — I48.0 PAF (PAROXYSMAL ATRIAL FIBRILLATION) (HCC): ICD-10-CM

## 2017-12-05 LAB
INR BLD: 1.7
PT POC: ABNORMAL SECONDS
VALID INTERNAL CONTROL?: YES

## 2017-12-05 NOTE — PROGRESS NOTES
The INR is below the therapeutic range. Please make the following adjustments to Coumadin dosinmg x 1, then continue 4mg daily . Repeat the INR in 1 week. Verbal order and read back per Dr Meli Sebastian.

## 2017-12-05 NOTE — MR AVS SNAPSHOT
Visit Information Date & Time Provider Department Dept. Phone Encounter #  
 12/5/2017  2:50 PM Pt Inr Norf Csi Cardio Specialist at [facility] [phone] Your Appointments 12/12/2017  2:50 PM  
ANTICOAG NURSE with Pt Inr Norf Csi Cardio Specialist at [facility] 365[address] [facility]) Appt Note: 1 week [facility] [address] [place] [address]  
  
    
 2/26/2018  2:30 PM  
Follow Up with Fran Arredondo MD  
130 [facility] 3651 [facility]) [address] 4750 [facility]  
  
   
 [address] [facility] [address] [facility] [address] Upcoming Health Maintenance Date Due  
 GLAUCOMA SCREENING Q2Y 8/28/2003 MEDICARE YEARLY EXAM 9/8/2018 DTaP/Tdap/Td series (2 - Td) 7/16/2024 Allergies as of 12/5/2017  Review Complete On: 11/28/2017 By: Deborah Brand MD  
  
 Severity Noted Reaction Type Reactions Sulfa (Sulfonamide Antibiotics)  12/03/2015    Hives, Rash Current Immunizations  Never Reviewed Name Date Influenza High Dose Vaccine PF 9/7/2017  9:00 AM  
 Pneumococcal Polysaccharide (PPSV-23) 9/7/2017  9:00 AM  
  
 Not reviewed this visit You Were Diagnosed With   
  
 Codes Comments Long-term (current) use of anticoagulants    -  Primary ICD-10-CM: Z79.01 
ICD-9-CM: V58.61   
 PAF (paroxysmal atrial fibrillation) (HCC)     ICD-10-CM: I48.0 ICD-9-CM: 427.31 Vitals Smoking Status Current Some Day Smoker Preferred Pharmacy Pharmacy Name Phone [facility] [address], [address] [place] [phone] Your Updated Medication List  
  
   
This list is accurate as of: 12/5/17  2:57 PM.  Always use your most recent med list.  
  
  
  
  
 BENADRYL 25 mg capsule Generic drug:  diphenhydrAMINE Take 50 mg by mouth nightly as needed. cephALEXin 500 mg capsule Commonly known as:  Syed Drum Take 1 Cap by mouth four (4) times daily for 10 days. cyanocobalamin 1,000 mcg tablet Take 1,000 mcg by mouth daily. * furosemide 40 mg tablet Commonly known as:  LASIX Take 40 mg by mouth two (2) times a day. Along with 20mg every night * furosemide 20 mg tablet Commonly known as:  LASIX Take 20 mg by mouth nightly. Along with 40mg in the morning  
  
 gabapentin 600 mg tablet Commonly known as:  NEURONTIN One po in am , one at noon and 2 at hs  
  
 levothyroxine 25 mcg tablet Commonly known as:  SYNTHROID Take  by mouth Daily (before breakfast). loratadine 10 mg tablet Commonly known as:  Michaell Organ Take 1 Tab by mouth daily. metoprolol succinate 25 mg XL tablet Commonly known as:  TOPROL-XL Take 1 Tab by mouth daily. mupirocin 2 % ointment Commonly known as:  [employer] Apply  to affected area daily. raNITIdine 150 mg tablet Commonly known as:  ZANTAC Take 150 mg by mouth daily. tamsulosin 0.4 mg capsule Commonly known as:  FLOMAX Take 0.4 mg by mouth daily. terbinafine HCl 250 mg tablet Commonly known as:  LAMISIL Take 250 mg by mouth daily. warfarin 4 mg tablet Commonly known as:  COUMADIN Take 1 Tab by mouth daily. zinc 50 mg Tab tablet Take 25 mg by mouth daily. * Notice: This list has 2 medication(s) that are the same as other medications prescribed for you. Read the directions carefully, and ask your doctor or other care provider to review them with you. Description 8mg x 1, then continue 4mg daily December 2017 Details Josué Bernal Tue Wed Thu Fri Sat  
       1  
  
  
  
   2  
  
  
  
  
  3  
  
  
  
   4  
  
  
  
   5  
  
8 mg See details 6  
  
4 mg 7  
  
4 mg 8  
  
4 mg    9  
  
4 mg  
  
  
  10  
  
4 mg  
  
   11  
  
 4 mg  
  
   12  
  
4 mg  
  
   13  
  
  
  
   14  
  
  
  
   15  
  
  
  
   16  
  
  
  
  
  17  
  
  
  
   18  
  
  
  
   19  
  
  
  
   20  
  
  
  
   21  
  
  
  
   22  
  
  
  
   23  
  
  
  
  
  24  
  
  
  
   25  
  
  
  
   26  
  
  
  
   27  
  
  
  
   28  
  
  
  
   29  
  
  
  
   30  
  
  
  
  
  31  
  
  
  
        
 Date Details 12/05 This INR check INR: 1.7! Date of next INR:  12/12/2017 How to take your warfarin dose To take:  4 mg Take one of the 4 mg tablets. To take:  8 mg Take two of the 4 mg tablets. We Performed the Following AMB POC PT/INR [64371 CPT(R)] To-Do List   
 01/11/2018 9:00 AM  
  Appointment with [facility] DX RM 8 at [facility] [address] ([phone]) OUTSIDE FILMS  - Any outside films related to the study being scheduled should be brought with you on the day of the exam.  If this cannot be done there may be a delay in the reading of the study. MEDICATIONS  - Patient must bring a complete list of all medications currently taking to include prescriptions, over-the-counter meds, herbals, vitamins & any dietary supplements  NPO  - Patient must be NPO (Nothing to eat/drink) after Midnight day prior to exam except water sips with meds. ORAL CONTRAST/PREP  - Oral contrast is administered at the time of service 01/11/2018 9:45 AM  
  Appointment with [facility] RAD US RM 2 at [facility] ([phone]) OUTSIDE FILMS  - Any outside films related to the study being scheduled should be brought with you on the day of the exam.  If this cannot be done there may be a delay in the reading of the study. NPO -Patient must be NPO (no food or drink) 8 hours prior to the exam.  MEDICATIONS  - Patient must bring a complete list of all medications currently taking to include prescriptions, over-the-counter meds, herbals, vitamins & any dietary supplements Please provide this summary of care documentation to your next provider. Your primary care clinician is listed as Myesha Watters. If you have any questions after today's visit, please call [phone].

## 2017-12-12 ENCOUNTER — ANTI-COAG VISIT (OUTPATIENT)
Dept: CARDIOLOGY CLINIC | Age: 77
End: 2017-12-12

## 2017-12-12 DIAGNOSIS — I42.9 CARDIOMYOPATHY, UNSPECIFIED TYPE (HCC): ICD-10-CM

## 2017-12-12 DIAGNOSIS — Z79.01 LONG-TERM (CURRENT) USE OF ANTICOAGULANTS: Primary | ICD-10-CM

## 2017-12-12 LAB
INR BLD: 2.6
PT POC: NORMAL SECONDS
VALID INTERNAL CONTROL?: YES

## 2017-12-12 NOTE — PROGRESS NOTES
The INR is stable and therapeutic. Continue 4mg daily  and recheck in 3 weeks. Verbal order and read back per Dr Eugene Royal.

## 2017-12-12 NOTE — MR AVS SNAPSHOT
Visit Information Date & Time Provider Department Dept. Phone Encounter #  
 12/12/2017  2:50 PM Pt Inr Norf Csi Cardio Specialist at [place] [address] [id number] Your Appointments 2/26/2018  2:30 PM  
Follow Up with July Hendricks MD  
130 [facility] 3651 [facility]) [address] [facility] [address] [facility]  
  
   
 [address] [place] [address] [address] Upcoming Health Maintenance Date Due  
 GLAUCOMA SCREENING Q2Y 8/28/2003 MEDICARE YEARLY EXAM 9/8/2018 DTaP/Tdap/Td series (2 - Td) 7/16/2024 Allergies as of 12/12/2017  Review Complete On: 11/28/2017 By: Shana Kaye MD  
  
 Severity Noted Reaction Type Reactions Sulfa (Sulfonamide Antibiotics)  12/03/2015    Hives, Rash Current Immunizations  Never Reviewed Name Date Influenza High Dose Vaccine PF 9/7/2017  9:00 AM  
 Pneumococcal Polysaccharide (PPSV-23) 9/7/2017  9:00 AM  
  
 Not reviewed this visit You Were Diagnosed With   
  
 Codes Comments Long-term (current) use of anticoagulants    -  Primary ICD-10-CM: Z79.01 
ICD-9-CM: V58.61 Cardiomyopathy, unspecified type ([facility]     ICD-10-CM: I42.9 ICD-9-CM: 425.4 Vitals Smoking Status Current Some Day Smoker Preferred Pharmacy Pharmacy Name Phone [facility] [address], [address] [place] [address] [phone] Your Updated Medication List  
  
   
This list is accurate as of: 12/12/17  3:01 PM.  Always use your most recent med list.  
  
  
  
  
 BENADRYL 25 mg capsule Generic drug:  diphenhydrAMINE Take 50 mg by mouth nightly as needed. cyanocobalamin 1,000 mcg tablet Take 1,000 mcg by mouth daily. * furosemide 40 mg tablet Commonly known as:  LASIX Take 40 mg by mouth two (2) times a day. Along with 20mg every night * furosemide 20 mg tablet Commonly known as:  LASIX Take 20 mg by mouth nightly. Along with 40mg in the morning  
  
 gabapentin 600 mg tablet Commonly known as:  NEURONTIN One po in am , one at noon and 2 at hs  
  
 levothyroxine 25 mcg tablet Commonly known as:  SYNTHROID Take  by mouth Daily (before breakfast). loratadine 10 mg tablet Commonly known as:  Jearline Shazia Take 1 Tab by mouth daily. metoprolol succinate 25 mg XL tablet Commonly known as:  TOPROL-XL Take 1 Tab by mouth daily. mupirocin 2 % ointment Commonly known as:  [employer] Apply  to affected area daily. raNITIdine 150 mg tablet Commonly known as:  ZANTAC Take 150 mg by mouth daily. tamsulosin 0.4 mg capsule Commonly known as:  FLOMAX Take 0.4 mg by mouth daily. terbinafine HCl 250 mg tablet Commonly known as:  LAMISIL Take 250 mg by mouth daily. warfarin 4 mg tablet Commonly known as:  COUMADIN Take 1 Tab by mouth daily. zinc 50 mg Tab tablet Take 25 mg by mouth daily. * Notice: This list has 2 medication(s) that are the same as other medications prescribed for you. Read the directions carefully, and ask your doctor or other care provider to review them with you. Description Continue 4mg daily December 2017 Details Sun Mon Tue Wed Thu Fri Sat  
       1  
  
  
  
   2  
  
  
  
  
  3  
  
  
  
   4  
  
  
  
   5  
  
  
  
   6  
  
  
  
   7  
  
  
  
   8  
  
  
  
   9  
  
  
  
  
  10  
  
  
  
   11  
  
  
  
   12  
  
4 mg See details 13  
  
4 mg  
  
   14  
  
4 mg  
  
   15  
  
4 mg  
  
   16  
  
4 mg  
  
  
  17  
  
4 mg  
  
   18  
  
4 mg  
  
   19  
  
4 mg  
  
   20  
  
4 mg  
  
   21  
  
4 mg  
  
   22  
  
4 mg  
  
   23  
  
4 mg  
  
  
  24  
  
4 mg  
  
   25  
  
4 mg  
  
   26  
  
4 mg  
  
   27  
  
4 mg  
  
   28  
  
4 mg  
  
   29  
  
4 mg  
  
   30  
  
4 mg  
  
  
  31  
  
4 mg Date Details 12/12 This INR check INR: 2.6 How to take your warfarin dose To take:  4 mg Take one of the 4 mg tablets. January 2018 Details [place] Tue Wed Thu Fri Sat 1  
  
4 mg 2  
  
4 mg  
  
   3  
  
  
  
   4  
  
  
  
   5  
  
  
  
   6  
  
  
  
  
  7  
  
  
  
   8  
  
  
  
   9  
  
  
  
   10  
  
  
  
   11  
  
  
  
   12  
  
  
  
   13  
  
  
  
  
  14  
  
  
  
   15  
  
  
  
   16  
  
  
  
   17  
  
  
  
   18  
  
  
  
   19  
  
  
  
   20  
  
  
  
  
  21  
  
  
  
   22  
  
  
  
   23  
  
  
  
   24  
  
  
  
   25  
  
  
  
   26  
  
  
  
   27  
  
  
  
  
  28  
  
  
  
   29  
  
  
  
   30  
  
  
  
   31  
  
  
  
     
 Date Details No additional details Date of next INR:  1/2/2018 How to take your warfarin dose To take:  4 mg Take one of the 4 mg tablets. We Performed the Following AMB POC PT/INR [98045 CPT(R)] To-Do List   
 01/11/2018 9:00 AM  
  Appointment with [facility] DX RM 8 at [address] ([phone]) OUTSIDE FILMS  - Any outside films related to the study being scheduled should be brought with you on the day of the exam.  If this cannot be done there may be a delay in the reading of the study. MEDICATIONS  - Patient must bring a complete list of all medications currently taking to include prescriptions, over-the-counter meds, herbals, vitamins & any dietary supplements  NPO  - Patient must be NPO (Nothing to eat/drink) after Midnight day prior to exam except water sips with meds. ORAL CONTRAST/PREP  - Oral contrast is administered at the time of service 01/11/2018 9:45 AM  
  Appointment with [facility] RAD US RM 2 at [facility] ([phone]) OUTSIDE FILMS  - Any outside films related to the study being scheduled should be brought with you on the day of the exam.  If this cannot be done there may be a delay in the reading of the study. NPO -Patient must be NPO (no food or drink) 8 hours prior to the exam.  MEDICATIONS  - Patient must bring a complete list of all medications currently taking to include prescriptions, over-the-counter meds, herbals, vitamins & any dietary supplements Please provide this summary of care documentation to your next provider. Your primary care clinician is listed as Shana Kaye. If you have any questions after today's visit, please call [phone].

## 2018-01-29 ENCOUNTER — TELEPHONE (OUTPATIENT)
Dept: FAMILY MEDICINE CLINIC | Age: 78
End: 2018-01-29

## 2018-01-29 DIAGNOSIS — G62.9 NEUROPATHY: ICD-10-CM

## 2018-01-29 RX ORDER — GABAPENTIN 600 MG/1
TABLET ORAL
Qty: 120 TAB | Refills: 3 | Status: CANCELLED | OUTPATIENT
Start: 2018-01-29

## 2018-01-29 NOTE — TELEPHONE ENCOUNTER
Patient stated that he is now in wheelchair and no one will drive him to see Dr. Chao Jones. Patient explained that he is out of gabapentin and would like to see if he could get a refill for gabapentin as soon as possible. Patient also has some health concern regarding his legs that he prefer to discuss with the nurse or the doctor. Asking to be called back.

## 2018-01-31 DIAGNOSIS — G62.9 NEUROPATHY: ICD-10-CM

## 2018-01-31 RX ORDER — GABAPENTIN 600 MG/1
TABLET ORAL
Qty: 120 TAB | Refills: 3 | Status: SHIPPED | OUTPATIENT
Start: 2018-01-31 | End: 2018-02-01 | Stop reason: SDUPTHER

## 2018-01-31 NOTE — TELEPHONE ENCOUNTER
Mr. Isidra Carson called stating he will run out of his gabaptentin Thursday night (tomorrow) and is asking for a medication refill sent to [facility]. He is asking if we can call the insurance to have the medication rushed as when he is out he is in a lot of pain. He also apologized for not checking his medication sooner. He states he is wheelchair bound and son has a big house with 8 steps to get out of the house and cannot leave and has to find a safe way to see Dr. Gabby Gan. Patient states he will try to see dr John Grimes as soon as possible and would like a referral for home health care as it is hard for him to do ADLs now that hes in a wheelchair.      Requested Prescriptions     Pending Prescriptions Disp Refills    gabapentin (NEURONTIN) 600 mg tablet 120 Tab 3     Sig: One po in am , one at noon and 2 at hs

## 2018-02-01 ENCOUNTER — HOME HEALTH ADMISSION (OUTPATIENT)
Dept: HOME HEALTH SERVICES | Facility: HOME HEALTH | Age: 78
End: 2018-02-01
Payer: MEDICARE

## 2018-02-01 DIAGNOSIS — R26.81 GAIT INSTABILITY: Primary | ICD-10-CM

## 2018-02-01 DIAGNOSIS — G62.9 NEUROPATHY: ICD-10-CM

## 2018-02-01 RX ORDER — GABAPENTIN 600 MG/1
TABLET ORAL
Qty: 120 TAB | Refills: 3 | Status: SHIPPED | OUTPATIENT
Start: 2018-02-01 | End: 2018-04-16 | Stop reason: SDUPTHER

## 2018-02-01 NOTE — TELEPHONE ENCOUNTER
Please place home health referral, please include in [phone]:  Personal care related to ADLS

## 2018-02-01 NOTE — TELEPHONE ENCOUNTER
Spoke with patient and advised patient that Home health referral will be completed. Also advised patient that gabapentin sent to the pharmacy. This encounter will be closed.

## 2018-02-04 ENCOUNTER — HOME CARE VISIT (OUTPATIENT)
Dept: HOME HEALTH SERVICES | Facility: HOME HEALTH | Age: 78
End: 2018-02-04

## 2018-02-06 ENCOUNTER — HOME CARE VISIT (OUTPATIENT)
Dept: SCHEDULING | Facility: HOME HEALTH | Age: 78
End: 2018-02-06
Payer: MEDICARE

## 2018-02-06 ENCOUNTER — TELEPHONE (OUTPATIENT)
Dept: FAMILY MEDICINE CLINIC | Age: 78
End: 2018-02-06

## 2018-02-06 VITALS
TEMPERATURE: 97.5 F | OXYGEN SATURATION: 98 % | SYSTOLIC BLOOD PRESSURE: 110 MMHG | HEART RATE: 76 BPM | DIASTOLIC BLOOD PRESSURE: 62 MMHG

## 2018-02-06 VITALS
SYSTOLIC BLOOD PRESSURE: 120 MMHG | TEMPERATURE: 98 F | DIASTOLIC BLOOD PRESSURE: 74 MMHG | RESPIRATION RATE: 16 BRPM | HEART RATE: 70 BPM | OXYGEN SATURATION: 98 %

## 2018-02-06 PROCEDURE — 3331090002 HH PPS REVENUE DEBIT

## 2018-02-06 PROCEDURE — G0299 HHS/HOSPICE OF RN EA 15 MIN: HCPCS

## 2018-02-06 PROCEDURE — 3331090001 HH PPS REVENUE CREDIT

## 2018-02-06 PROCEDURE — 400013 HH SOC

## 2018-02-06 NOTE — TELEPHONE ENCOUNTER
Jose Herndon physical therapist with [facility] called stating there needs an order for a medical social worker as Mr. Heide Salinas will not be able to get in and out of the house with 8 steps. - Kimberley Gibbosn stated that patient feels as if the hardware in left knee has moved, which he can stand, but cannot make any steps as pain shoots up to a 10/10. Kimberley Gibbons requested that I call patient to schedule an appnt for next week and she will work with him on getting out of the house for the appnt. - I called and spoke with Mr. Heide Salinas and scheduled him to come in on 2/13/18 @ 2:45pm. Kimberley Gibbons aware, and stated she will work with him.

## 2018-02-07 ENCOUNTER — HOSPITAL ENCOUNTER (INPATIENT)
Age: 78
LOS: 2 days | Discharge: HOME HEALTH | DRG: 074 | End: 2018-02-09
Attending: EMERGENCY MEDICINE | Admitting: INTERNAL MEDICINE
Payer: MEDICARE

## 2018-02-07 ENCOUNTER — APPOINTMENT (OUTPATIENT)
Dept: CT IMAGING | Age: 78
DRG: 074 | End: 2018-02-07
Attending: EMERGENCY MEDICINE
Payer: MEDICARE

## 2018-02-07 ENCOUNTER — HOME CARE VISIT (OUTPATIENT)
Dept: HOME HEALTH SERVICES | Facility: HOME HEALTH | Age: 78
End: 2018-02-07
Payer: MEDICARE

## 2018-02-07 ENCOUNTER — APPOINTMENT (OUTPATIENT)
Dept: GENERAL RADIOLOGY | Age: 78
DRG: 074 | End: 2018-02-07
Attending: EMERGENCY MEDICINE
Payer: MEDICARE

## 2018-02-07 DIAGNOSIS — R47.1 DYSARTHRIA: Primary | ICD-10-CM

## 2018-02-07 PROBLEM — R29.898 WEAKNESS OF LEFT UPPER EXTREMITY: Status: ACTIVE | Noted: 2018-02-07

## 2018-02-07 PROBLEM — N40.0 BPH (BENIGN PROSTATIC HYPERPLASIA): Status: ACTIVE | Noted: 2018-02-07

## 2018-02-07 PROBLEM — I48.0 PAF (PAROXYSMAL ATRIAL FIBRILLATION) (HCC): Status: ACTIVE | Noted: 2018-02-07

## 2018-02-07 PROBLEM — I63.9 STROKE (HCC): Status: ACTIVE | Noted: 2018-02-07

## 2018-02-07 LAB
ABO + RH BLD: NORMAL
ALBUMIN SERPL-MCNC: 3.9 G/DL (ref 3.4–5)
ALBUMIN/GLOB SERPL: 1 {RATIO} (ref 0.8–1.7)
ALP SERPL-CCNC: 64 U/L (ref 45–117)
ALT SERPL-CCNC: 22 U/L (ref 16–61)
AMPHET UR QL SCN: NEGATIVE
ANION GAP SERPL CALC-SCNC: 7 MMOL/L (ref 3–18)
APPEARANCE UR: CLEAR
ASPIRIN TEST, ASPIRN: 591 ARU (ref 620–672)
AST SERPL-CCNC: 19 U/L (ref 15–37)
ATRIAL RATE: 61 BPM
BARBITURATES UR QL SCN: NEGATIVE
BENZODIAZ UR QL: NEGATIVE
BILIRUB SERPL-MCNC: 0.4 MG/DL (ref 0.2–1)
BILIRUB UR QL: NEGATIVE
BLOOD GROUP ANTIBODIES SERPL: NORMAL
BUN SERPL-MCNC: 19 MG/DL (ref 7–18)
BUN/CREAT SERPL: 25 (ref 12–20)
CALCIUM SERPL-MCNC: 8.8 MG/DL (ref 8.5–10.1)
CALCULATED P AXIS, ECG09: 38 DEGREES
CALCULATED R AXIS, ECG10: -78 DEGREES
CALCULATED T AXIS, ECG11: 80 DEGREES
CANNABINOIDS UR QL SCN: NEGATIVE
CHLORIDE SERPL-SCNC: 103 MMOL/L (ref 100–108)
CHOLEST SERPL-MCNC: 129 MG/DL
CO2 SERPL-SCNC: 27 MMOL/L (ref 21–32)
COCAINE UR QL SCN: NEGATIVE
COLOR UR: YELLOW
CREAT SERPL-MCNC: 0.77 MG/DL (ref 0.6–1.3)
DIAGNOSIS, 93000: NORMAL
ERYTHROCYTE [DISTWIDTH] IN BLOOD BY AUTOMATED COUNT: 13.7 % (ref 11.6–14.5)
ERYTHROCYTE [SEDIMENTATION RATE] IN BLOOD: 28 MM/HR (ref 0–20)
EST. AVERAGE GLUCOSE BLD GHB EST-MCNC: 148 MG/DL
FIBRINOGEN PPP-MCNC: 434 MG/DL (ref 210–451)
GLOBULIN SER CALC-MCNC: 4 G/DL (ref 2–4)
GLUCOSE BLD STRIP.AUTO-MCNC: 136 MG/DL (ref 70–110)
GLUCOSE SERPL-MCNC: 150 MG/DL (ref 74–99)
GLUCOSE UR STRIP.AUTO-MCNC: NEGATIVE MG/DL
HBA1C MFR BLD: 6.8 % (ref 4.2–5.6)
HCT VFR BLD AUTO: 41.3 % (ref 36–48)
HDLC SERPL-MCNC: 30 MG/DL (ref 40–60)
HDLC SERPL: 4.3 {RATIO} (ref 0–5)
HDSCOM,HDSCOM: NORMAL
HGB BLD-MCNC: 13.8 G/DL (ref 13–16)
HGB UR QL STRIP: NEGATIVE
INR PPP: 2.1 (ref 0.8–1.2)
KETONES UR QL STRIP.AUTO: NEGATIVE MG/DL
LDLC SERPL CALC-MCNC: 71.4 MG/DL (ref 0–100)
LEUKOCYTE ESTERASE UR QL STRIP.AUTO: NEGATIVE
LIPID PROFILE,FLP: ABNORMAL
MCH RBC QN AUTO: 32 PG (ref 24–34)
MCHC RBC AUTO-ENTMCNC: 33.4 G/DL (ref 31–37)
MCV RBC AUTO: 95.8 FL (ref 74–97)
METHADONE UR QL: NEGATIVE
NITRITE UR QL STRIP.AUTO: NEGATIVE
OPIATES UR QL: NEGATIVE
P-R INTERVAL, ECG05: 186 MS
PCP UR QL: NEGATIVE
PH UR STRIP: 6 [PH] (ref 5–8)
PLATELET # BLD AUTO: 161 K/UL (ref 135–420)
PMV BLD AUTO: 8.8 FL (ref 9.2–11.8)
POTASSIUM SERPL-SCNC: 4.1 MMOL/L (ref 3.5–5.5)
PROT SERPL-MCNC: 7.9 G/DL (ref 6.4–8.2)
PROT UR STRIP-MCNC: NEGATIVE MG/DL
PROTHROMBIN TIME: 22.6 SEC (ref 11.5–15.2)
Q-T INTERVAL, ECG07: 456 MS
QRS DURATION, ECG06: 164 MS
QTC CALCULATION (BEZET), ECG08: 459 MS
RBC # BLD AUTO: 4.31 M/UL (ref 4.7–5.5)
SODIUM SERPL-SCNC: 137 MMOL/L (ref 136–145)
SP GR UR REFRACTOMETRY: 1.02 (ref 1–1.03)
SPECIMEN EXP DATE BLD: NORMAL
T3FREE SERPL-MCNC: 3.4 PG/ML (ref 2.3–4.2)
T4 FREE SERPL-MCNC: 1.1 NG/DL (ref 0.7–1.5)
THROMBIN TIME: 16.2 SECS (ref 13.8–18.2)
TRIGL SERPL-MCNC: 138 MG/DL (ref ?–150)
TSH SERPL DL<=0.05 MIU/L-ACNC: 1.67 UIU/ML (ref 0.36–3.74)
UROBILINOGEN UR QL STRIP.AUTO: 0.2 EU/DL (ref 0.2–1)
VENTRICULAR RATE, ECG03: 61 BPM
VLDLC SERPL CALC-MCNC: 27.6 MG/DL
WBC # BLD AUTO: 4.9 K/UL (ref 4.6–13.2)

## 2018-02-07 PROCEDURE — 73564 X-RAY EXAM KNEE 4 OR MORE: CPT

## 2018-02-07 PROCEDURE — 74011250636 HC RX REV CODE- 250/636: Performed by: EMERGENCY MEDICINE

## 2018-02-07 PROCEDURE — 74011636320 HC RX REV CODE- 636/320: Performed by: EMERGENCY MEDICINE

## 2018-02-07 PROCEDURE — 85670 THROMBIN TIME PLASMA: CPT | Performed by: EMERGENCY MEDICINE

## 2018-02-07 PROCEDURE — 94762 N-INVAS EAR/PLS OXIMTRY CONT: CPT

## 2018-02-07 PROCEDURE — 85652 RBC SED RATE AUTOMATED: CPT | Performed by: NURSE PRACTITIONER

## 2018-02-07 PROCEDURE — 3331090002 HH PPS REVENUE DEBIT

## 2018-02-07 PROCEDURE — 99285 EMERGENCY DEPT VISIT HI MDM: CPT

## 2018-02-07 PROCEDURE — 74011000258 HC RX REV CODE- 258: Performed by: EMERGENCY MEDICINE

## 2018-02-07 PROCEDURE — 83036 HEMOGLOBIN GLYCOSYLATED A1C: CPT | Performed by: NURSE PRACTITIONER

## 2018-02-07 PROCEDURE — 85610 PROTHROMBIN TIME: CPT | Performed by: EMERGENCY MEDICINE

## 2018-02-07 PROCEDURE — 80053 COMPREHEN METABOLIC PANEL: CPT | Performed by: EMERGENCY MEDICINE

## 2018-02-07 PROCEDURE — 70498 CT ANGIOGRAPHY NECK: CPT

## 2018-02-07 PROCEDURE — 85576 BLOOD PLATELET AGGREGATION: CPT | Performed by: EMERGENCY MEDICINE

## 2018-02-07 PROCEDURE — 86141 C-REACTIVE PROTEIN HS: CPT | Performed by: NURSE PRACTITIONER

## 2018-02-07 PROCEDURE — 82962 GLUCOSE BLOOD TEST: CPT

## 2018-02-07 PROCEDURE — 84443 ASSAY THYROID STIM HORMONE: CPT | Performed by: NURSE PRACTITIONER

## 2018-02-07 PROCEDURE — 81003 URINALYSIS AUTO W/O SCOPE: CPT | Performed by: EMERGENCY MEDICINE

## 2018-02-07 PROCEDURE — 74011250637 HC RX REV CODE- 250/637: Performed by: EMERGENCY MEDICINE

## 2018-02-07 PROCEDURE — 74011250637 HC RX REV CODE- 250/637: Performed by: INTERNAL MEDICINE

## 2018-02-07 PROCEDURE — 80307 DRUG TEST PRSMV CHEM ANLYZR: CPT | Performed by: EMERGENCY MEDICINE

## 2018-02-07 PROCEDURE — 3331090001 HH PPS REVENUE CREDIT

## 2018-02-07 PROCEDURE — 84439 ASSAY OF FREE THYROXINE: CPT | Performed by: NURSE PRACTITIONER

## 2018-02-07 PROCEDURE — 77030020263 HC SOL INJ SOD CL0.9% LFCR 1000ML

## 2018-02-07 PROCEDURE — 85384 FIBRINOGEN ACTIVITY: CPT | Performed by: EMERGENCY MEDICINE

## 2018-02-07 PROCEDURE — 85027 COMPLETE CBC AUTOMATED: CPT | Performed by: EMERGENCY MEDICINE

## 2018-02-07 PROCEDURE — 65610000009 HC RM ICU NEURO

## 2018-02-07 PROCEDURE — 70450 CT HEAD/BRAIN W/O DYE: CPT

## 2018-02-07 PROCEDURE — 86900 BLOOD TYPING SEROLOGIC ABO: CPT | Performed by: EMERGENCY MEDICINE

## 2018-02-07 PROCEDURE — 93005 ELECTROCARDIOGRAM TRACING: CPT

## 2018-02-07 PROCEDURE — 71045 X-RAY EXAM CHEST 1 VIEW: CPT

## 2018-02-07 PROCEDURE — 84481 FREE ASSAY (FT-3): CPT | Performed by: NURSE PRACTITIONER

## 2018-02-07 PROCEDURE — 74011250637 HC RX REV CODE- 250/637: Performed by: NURSE PRACTITIONER

## 2018-02-07 PROCEDURE — 80061 LIPID PANEL: CPT | Performed by: NURSE PRACTITIONER

## 2018-02-07 RX ORDER — GABAPENTIN 300 MG/1
600 CAPSULE ORAL 2 TIMES DAILY
Status: DISCONTINUED | OUTPATIENT
Start: 2018-02-08 | End: 2018-02-09 | Stop reason: HOSPADM

## 2018-02-07 RX ORDER — ATORVASTATIN CALCIUM 40 MG/1
80 TABLET, FILM COATED ORAL
Status: DISCONTINUED | OUTPATIENT
Start: 2018-02-07 | End: 2018-02-09 | Stop reason: HOSPADM

## 2018-02-07 RX ORDER — WARFARIN 4 MG/1
4 TABLET ORAL DAILY
Status: DISCONTINUED | OUTPATIENT
Start: 2018-02-08 | End: 2018-02-07 | Stop reason: SDUPTHER

## 2018-02-07 RX ORDER — AMOXICILLIN 250 MG
2 CAPSULE ORAL
Status: DISCONTINUED | OUTPATIENT
Start: 2018-02-07 | End: 2018-02-09 | Stop reason: HOSPADM

## 2018-02-07 RX ORDER — TAMSULOSIN HYDROCHLORIDE 0.4 MG/1
0.4 CAPSULE ORAL DAILY
Status: DISCONTINUED | OUTPATIENT
Start: 2018-02-08 | End: 2018-02-09 | Stop reason: HOSPADM

## 2018-02-07 RX ORDER — ASPIRIN 325 MG
325 TABLET ORAL
Status: COMPLETED | OUTPATIENT
Start: 2018-02-07 | End: 2018-02-07

## 2018-02-07 RX ORDER — SODIUM CHLORIDE 9 MG/ML
100 INJECTION, SOLUTION INTRAVENOUS
Status: COMPLETED | OUTPATIENT
Start: 2018-02-07 | End: 2018-02-07

## 2018-02-07 RX ORDER — ACETAMINOPHEN 325 MG/1
650 TABLET ORAL
Status: DISCONTINUED | OUTPATIENT
Start: 2018-02-07 | End: 2018-02-09 | Stop reason: HOSPADM

## 2018-02-07 RX ORDER — SODIUM CHLORIDE 9 MG/ML
100 INJECTION, SOLUTION INTRAVENOUS CONTINUOUS
Status: DISCONTINUED | OUTPATIENT
Start: 2018-02-07 | End: 2018-02-08

## 2018-02-07 RX ORDER — ONDANSETRON 2 MG/ML
2 INJECTION INTRAMUSCULAR; INTRAVENOUS
Status: DISCONTINUED | OUTPATIENT
Start: 2018-02-07 | End: 2018-02-09 | Stop reason: HOSPADM

## 2018-02-07 RX ORDER — LEVOTHYROXINE SODIUM 50 UG/1
25 TABLET ORAL
Status: DISCONTINUED | OUTPATIENT
Start: 2018-02-08 | End: 2018-02-09 | Stop reason: HOSPADM

## 2018-02-07 RX ORDER — ASPIRIN 325 MG
325 TABLET ORAL DAILY
Status: DISCONTINUED | OUTPATIENT
Start: 2018-02-08 | End: 2018-02-09 | Stop reason: HOSPADM

## 2018-02-07 RX ORDER — WARFARIN 1 MG/1
4 TABLET ORAL DAILY
Status: DISCONTINUED | OUTPATIENT
Start: 2018-02-08 | End: 2018-02-09 | Stop reason: HOSPADM

## 2018-02-07 RX ORDER — ACETAMINOPHEN 650 MG/1
650 SUPPOSITORY RECTAL
Status: DISCONTINUED | OUTPATIENT
Start: 2018-02-07 | End: 2018-02-09 | Stop reason: HOSPADM

## 2018-02-07 RX ORDER — RANITIDINE 150 MG/1
150 TABLET, FILM COATED ORAL DAILY
Status: DISCONTINUED | OUTPATIENT
Start: 2018-02-08 | End: 2018-02-07 | Stop reason: ALTCHOICE

## 2018-02-07 RX ORDER — ALBUTEROL SULFATE 0.83 MG/ML
2.5 SOLUTION RESPIRATORY (INHALATION)
Status: DISCONTINUED | OUTPATIENT
Start: 2018-02-07 | End: 2018-02-09 | Stop reason: HOSPADM

## 2018-02-07 RX ORDER — HEPARIN SODIUM 5000 [USP'U]/ML
5000 INJECTION, SOLUTION INTRAVENOUS; SUBCUTANEOUS EVERY 8 HOURS
Status: CANCELLED | OUTPATIENT
Start: 2018-02-07

## 2018-02-07 RX ORDER — WARFARIN 2 MG/1
4 TABLET ORAL EVERY EVENING
Status: DISCONTINUED | OUTPATIENT
Start: 2018-02-07 | End: 2018-02-07 | Stop reason: SDUPTHER

## 2018-02-07 RX ORDER — FAMOTIDINE 20 MG/1
20 TABLET, FILM COATED ORAL 2 TIMES DAILY
Status: DISCONTINUED | OUTPATIENT
Start: 2018-02-07 | End: 2018-02-09 | Stop reason: HOSPADM

## 2018-02-07 RX ORDER — GABAPENTIN 400 MG/1
1200 CAPSULE ORAL
Status: DISCONTINUED | OUTPATIENT
Start: 2018-02-07 | End: 2018-02-09 | Stop reason: HOSPADM

## 2018-02-07 RX ADMIN — FAMOTIDINE 20 MG: 20 TABLET, FILM COATED ORAL at 22:32

## 2018-02-07 RX ADMIN — ATORVASTATIN CALCIUM 80 MG: 40 TABLET, FILM COATED ORAL at 22:31

## 2018-02-07 RX ADMIN — ASPIRIN 325 MG ORAL TABLET 325 MG: 325 PILL ORAL at 10:26

## 2018-02-07 RX ADMIN — GABAPENTIN 1200 MG: 400 CAPSULE ORAL at 22:31

## 2018-02-07 RX ADMIN — DOCUSATE SODIUM AND SENNOSIDES 2 TABLET: 8.6; 5 TABLET, FILM COATED ORAL at 22:32

## 2018-02-07 RX ADMIN — SODIUM CHLORIDE 100 ML/HR: 900 INJECTION, SOLUTION INTRAVENOUS at 19:44

## 2018-02-07 RX ADMIN — SODIUM CHLORIDE 100 ML/HR: 900 INJECTION, SOLUTION INTRAVENOUS at 10:26

## 2018-02-07 RX ADMIN — SODIUM CHLORIDE 96 ML: 900 INJECTION, SOLUTION INTRAVENOUS at 08:49

## 2018-02-07 RX ADMIN — IOPAMIDOL 71 ML: 755 INJECTION, SOLUTION INTRAVENOUS at 08:49

## 2018-02-07 NOTE — ED TRIAGE NOTES
Pt came in via EMS for L-sided weakness and slurred speech. Pt last felt well at 2300 2/6/18. Also reported losing his mother last night.  Has h/o 5- left knee replacements, neuropathy of LLE, 2 R-knee replacement, R hip replacement, R shoulder dislocation, and neck/back limited movement

## 2018-02-07 NOTE — IP AVS SNAPSHOT
[address] [place] [address] 
[phone] Patient: Sayra Mann MRN: [id number] About your hospitalization You were admitted on:  February 7, 2018 You last received care in the:  [address] NEURO MED You were discharged on:  February 9, 2018 Why you were hospitalized Your primary diagnosis was:  Stroke (Hcc) Your diagnoses also included:  Asthma, Cardiomyopathy (Hcc), Knee Pain, Sick Sinus Syndrome (Hcc), Paf (Paroxysmal Atrial Fibrillation) (Hcc), Weakness Of Left Upper Extremity, Bph (Benign Prostatic Hyperplasia) Follow-up Information Follow up With Details Comments Contact Info Maxime Arenas MD On 2/13/2018 245pm [address] [place] [address] [address] [id number] 
[phone] Schedule an appointment as soon as possible for a visit follow up with nuerology Your Scheduled Appointments Tuesday February 13, 2018  2:45 PM EST Follow Up with Maxime Arenas MD  
[facility] [address] [facility] [address] [id number]  
[phone] Monday February 26, 2018  2:30 PM EST Follow Up with Rachael Polanco MD  
[facility] [address] [id number]  
[phone] Thursday March 08, 2018 COLONOSCOPY with Danielito Tripp MD  
[facility] ENDOSCOPY [address] [facility]) [address]  
[phone] Discharge Orders None A check jessica indicates which time of day the medication should be taken. My Medications CONTINUE taking these medications Instructions Each Dose to Equal  
 Morning Noon Evening Bedtime BENADRYL 25 mg capsule Generic drug:  diphenhydrAMINE Your last dose was: Your next dose is: Take 50 mg by mouth nightly as needed.   
 50 mg  
    
   
   
   
  
 cyanocobalamin 1,000 mcg tablet Your last dose was: Your next dose is: Take 1,000 mcg by mouth daily. 1000 mcg * furosemide 40 mg tablet Commonly known as:  LASIX Your last dose was: Your next dose is: Take 40 mg by mouth two (2) times a day. Along with 20mg every night 40 mg  
    
   
   
   
  
 * furosemide 20 mg tablet Commonly known as:  LASIX Your last dose was: Your next dose is: Take 20 mg by mouth nightly. Along with 40mg in the morning 20 mg  
    
   
   
   
  
 gabapentin 600 mg tablet Commonly known as:  NEURONTIN Your last dose was: Your next dose is: One po in am , one at noon and 2 at hs  
     
   
   
   
  
 levothyroxine 25 mcg tablet Commonly known as:  SYNTHROID Your last dose was: Your next dose is: Take  by mouth Daily (before breakfast). loratadine 10 mg tablet Commonly known as:  Kip Manual Your last dose was: Your next dose is: Take 1 Tab by mouth daily. 10 mg  
    
   
   
   
  
 metoprolol succinate 25 mg XL tablet Commonly known as:  TOPROL-XL Your last dose was: Your next dose is: Take 1 Tab by mouth daily. 25 mg  
    
   
   
   
  
 mupirocin 2 % ointment Commonly known as:  [facility] Your last dose was: Your next dose is:    
   
   
 Apply  to affected area daily. raNITIdine 150 mg tablet Commonly known as:  ZANTAC Your last dose was: Your next dose is: Take 150 mg by mouth daily. 150 mg  
    
   
   
   
  
 tamsulosin 0.4 mg capsule Commonly known as:  FLOMAX Your last dose was: Your next dose is: Take 0.4 mg by mouth daily. 0.4 mg  
    
   
   
   
  
 terbinafine HCl 250 mg tablet Commonly known as:  LAMISIL Your last dose was: Your next dose is: Take 250 mg by mouth daily. 250 mg  
    
   
   
   
  
 warfarin 4 mg tablet Commonly known as:  COUMADIN Your last dose was: Your next dose is: Take 1 Tab by mouth daily. 4 mg  
    
   
   
   
  
 zinc 50 mg Tab tablet Your last dose was: Your next dose is: Take 25 mg by mouth daily. 25 mg  
    
   
   
   
  
 * Notice: This list has 2 medication(s) that are the same as other medications prescribed for you. Read the directions carefully, and ask your doctor or other care provider to review them with you. Discharge Instructions DISCHARGE SUMMARY from Nurse PATIENT INSTRUCTIONS: 
 
After general anesthesia or intravenous sedation, for 24 hours or while taking prescription Narcotics: · Limit your activities · Do not drive and operate hazardous machinery · Do not make important personal or business decisions · Do  not drink alcoholic beverages · If you have not urinated within 8 hours after discharge, please contact your surgeon on call. Report the following to your surgeon: 
· Excessive pain, swelling, redness or odor of or around the surgical area · Temperature over 100.5 · Nausea and vomiting lasting longer than 4 hours or if unable to take medications · Any signs of decreased circulation or nerve impairment to extremity: change in color, persistent  numbness, tingling, coldness or increase pain · Any questions What to do at Home: 
Recommended activity: Activity as tolerated, If you experience any of the following symptoms as listed under \" When should I call for help? \" in your discharge teaching  , please follow up with your Doctor and/ or call 911. *  Please give a list of your current medications to your Primary Care Provider.  
 
*  Please update this list whenever your medications are discontinued, doses are 
 changed, or new medications (including over-the-counter products) are added. *  Please carry medication information at all times in case of emergency situations. These are general instructions for a healthy lifestyle: No smoking/ No tobacco products/ Avoid exposure to second hand smoke Surgeon General's Warning:  Quitting smoking now greatly reduces serious risk to your health. Obesity, smoking, and sedentary lifestyle greatly increases your risk for illness A healthy diet, regular physical exercise & weight monitoring are important for maintaining a healthy lifestyle You may be retaining fluid if you have a history of heart failure or if you experience any of the following symptoms:  Weight gain of 3 pounds or more overnight or 5 pounds in a week, increased swelling in our hands or feet or shortness of breath while lying flat in bed. Please call your doctor as soon as you notice any of these symptoms; do not wait until your next office visit. Recognize signs and symptoms of STROKE: 
 
F-face looks uneven A-arms unable to move or move unevenly S-speech slurred or non-existent T-time-call 911 as soon as signs and symptoms begin-DO NOT go Back to bed or wait to see if you get better-TIME IS BRAIN. Warning Signs of HEART ATTACK Call 911 if you have these symptoms: 
? Chest discomfort. Most heart attacks involve discomfort in the center of the chest that lasts more than a few minutes, or that goes away and comes back. It can feel like uncomfortable pressure, squeezing, fullness, or pain. ? Discomfort in other areas of the upper body. Symptoms can include pain or discomfort in one or both arms, the back, neck, jaw, or stomach. ? Shortness of breath with or without chest discomfort. ? Other signs may include breaking out in a cold sweat, nausea, or lightheadedness. Don't wait more than five minutes to call [address]!  Fast action can save your life. Calling 911 is almost always the fastest way to get lifesaving treatment. Emergency Medical Services staff can begin treatment when they arrive  up to an hour sooner than if someone gets to the hospital by car. The discharge information has been reviewed with the patient. The patient verbalized understanding. Discharge medications reviewed with the patient and appropriate educational materials and side effects teaching were provided. Patient armband removed and shredded Patient given [facility] Stroke Education Binder, Stroke Handouts or Verbal Education. Atrial Fibrillation: Care Instructions Your Care Instructions Atrial fibrillation is an irregular and often fast heartbeat. Treating this condition is important for several reasons. It can cause blood clots, which can travel from your heart to your brain and cause a stroke. If you have a fast heartbeat, you may feel lightheaded, dizzy, and weak. An irregular heartbeat can also increase your risk for heart failure. Atrial fibrillation is often the result of another heart condition, such as high blood pressure or coronary artery disease. Making changes to improve your heart condition will help you stay healthy and active. Follow-up care is a key part of your treatment and safety. Be sure to make and go to all appointments, and call your doctor if you are having problems. It's also a good idea to know your test results and keep a list of the medicines you take. How can you care for yourself at home? Medicines ? · Take your medicines exactly as prescribed. Call your doctor if you think you are having a problem with your medicine. You will get more details on the specific medicines your doctor prescribes. ? · If your doctor has given you a blood thinner to prevent a stroke, be sure you get instructions about how to take your medicine safely. Blood thinners can cause serious bleeding problems. ? · Do not take any vitamins, over-the-counter drugs, or herbal products without talking to your doctor first. ? Lifestyle changes ? · Do not smoke. Smoking can increase your chance of a stroke and heart attack. If you need help quitting, talk to your doctor about stop-smoking programs and medicines. These can increase your chances of quitting for good. ? · Eat a heart-healthy diet. ? · Stay at a healthy weight. Lose weight if you need to.  
? · Limit alcohol to 2 drinks a day for men and 1 drink a day for women. Too much alcohol can cause health problems. ? · Avoid colds and flu. Get a pneumococcal vaccine shot. If you have had one before, ask your doctor whether you need another dose. Get a flu shot every year. If you must be around people with colds or flu, wash your hands often. Activity ? · If your doctor recommends it, get more exercise. Walking is a good choice. Bit by bit, increase the amount you walk every day. Try for at least 30 minutes on most days of the week. You also may want to swim, bike, or do other activities. Your doctor may suggest that you join a cardiac rehabilitation program so that you can have help increasing your physical activity safely. ? · Start light exercise if your doctor says it is okay. Even a small amount will help you get stronger, have more energy, and manage stress. Walking is an easy way to get exercise. Start out by walking a little more than you did in the hospital. Gradually increase the amount you walk. ? · When you exercise, watch for signs that your heart is working too hard. You are pushing too hard if you cannot talk while you are exercising. If you become short of breath or dizzy or have chest pain, sit down and rest immediately. ? · Check your pulse regularly. Place two fingers on the artery at the palm side of your wrist, in line with your thumb. If your heartbeat seems uneven or fast, talk to your doctor. When should you call for help? Call 911 anytime you think you may need emergency care. For example, call if: 
? · You have symptoms of a heart attack. These may include: ¨ Chest pain or pressure, or a strange feeling in the chest. 
¨ Sweating. ¨ Shortness of breath. ¨ Nausea or vomiting. ¨ Pain, pressure, or a strange feeling in the back, neck, jaw, or upper belly or in one or both shoulders or arms. ¨ Lightheadedness or sudden weakness. ¨ A fast or irregular heartbeat. After you call 911, the  may tell you to chew 1 adult-strength or 2 to 4 low-dose aspirin. Wait for an ambulance. Do not try to drive yourself. ? · You have symptoms of a stroke. These may include: 
¨ Sudden numbness, tingling, weakness, or loss of movement in your face, arm, or leg, especially on only one side of your body. ¨ Sudden vision changes. ¨ Sudden trouble speaking. ¨ Sudden confusion or trouble understanding simple statements. ¨ Sudden problems with walking or balance. ¨ A sudden, severe headache that is different from past headaches. ? · You passed out (lost consciousness). ?Call your doctor now or seek immediate medical care if: 
? · You have new or increased shortness of breath. ? · You feel dizzy or lightheaded, or you feel like you may faint. ? · Your heart rate becomes irregular. ? · You can feel your heart flutter in your chest or skip heartbeats. Tell your doctor if these symptoms are new or worse. ? Watch closely for changes in your health, and be sure to contact your doctor if you have any problems. Where can you learn more? Go to http://tristan-shaye.info/. Enter U020 in the search box to learn more about \"Atrial Fibrillation: Care Instructions. \" Current as of: September 21, 2016 Content Version: 11.4 © 3019-1808 [employer].  Care instructions adapted under license by [employer] (which disclaims liability or warranty for this information). If you have questions about a medical condition or this instruction, always ask your healthcare professional. [address] any warranty or liability for your use of this information. ___________________________________________________________________________________________________________________________________ MyChart Announcement We are excited to announce that we are making your provider's discharge notes available to you in [employer]. You will see these notes when they are completed and signed by the physician that discharged you from your recent hospital stay. If you have any questions or concerns about any information you see in [employer], please call the Health Information Department where you were seen or reach out to your Primary Care Provider for more information about your plan of care. Providers Seen During Your Hospitalization Provider Specialty Primary office phone Lucy Zacarias DO Emergency Medicine [phone] Frank Colin MD Internal Medicine [phone] Your Primary Care Physician (PCP) Primary Care Physician Office Phone Office Fax [facility] [phone] You are allergic to the following Allergen Reactions Sulfa (Sulfonamide Antibiotics) Hives Rash Recent Documentation Height Weight BMI Smoking Status 1.956 m 120.4 kg 31.48 kg/m2 Current Some Day Smoker Emergency Contacts Name Discharge Info Relation Home Work Mobile [facility] HOSPITAL DISCHARGE CAREGIVER [3] Son [22] [phone] Tripp Díaz  Child [2] [phone] Patient Belongings The following personal items are in your possession at time of discharge: 
  Dental Appliances: None         Home Medications: None   Jewelry: None  Clothing: Pants, Shirt, Undergarments    Other Valuables: Cell Phone, [employer] Discharge Instructions Attachments/References TIA (TRANSIENT ISCHEMIC ATTACK) (ENGLISH) STROKE: PRIMARY PREVENTION: GENERAL INFO (ENGLISH) STROKE: SECONDARY PREVENTION: GENERAL INFO (ENGLISH) Patient Handouts Transient Ischemic Attack: Care Instructions Your Care Instructions A transient ischemic attack (TIA) is when blood flow to a part of your brain is blocked for a short time. A TIA is like a stroke but usually lasts only a few minutes. A TIA does not cause lasting brain damage. Any vision problems, slurred speech, or other symptoms usually go away in 10 to 20 minutes. But they may last for up to 24 hours. TIAs are often warning signs of a stroke. Some people who have a TIA may have a stroke in the future. A stroke can cause symptoms like those of a TIA. But a stroke causes lasting damage to your brain. You can take steps to help prevent a stroke. One thing you can do is get early treatment. If you have other new symptoms, or if your symptoms do not get better, go back to the emergency room or call your doctor right away. Getting treatment right away may prevent long-term brain damage caused by a stroke. The doctor has checked you carefully, but problems can develop later. If you notice any problems or new symptoms, get medical treatment right away. Follow-up care is a key part of your treatment and safety. Be sure to make and go to all appointments, and call your doctor if you are having problems. It's also a good idea to know your test results and keep a list of the medicines you take. How can you care for yourself at home? Medicines ? · Be safe with medicines. Take your medicines exactly as prescribed. Call your doctor if you think you are having a problem with your medicine. ? · If you take a blood thinner, such as aspirin, be sure you get instructions about how to take your medicine safely. Blood thinners can cause serious bleeding problems. ? · Call your doctor if you are not able to take your medicines for any reason. ? · Do not take any over-the-counter medicines or herbal products without talking to your doctor first.  
? · If you take birth control pills or hormone therapy, talk to your doctor. Ask if these treatments are right for you. ? Lifestyle changes ? · Do not smoke. If you need help quitting, talk to your doctor about stop-smoking programs and medicines. ? · Be active. If your doctor recommends it, get more exercise. Walking is a good choice. Bit by bit, increase the amount you walk every day. Try for at least 30 minutes on most days of the week. You also may want to swim, bike, or do other activities. ? · Eat heart-healthy foods. These include fruits, vegetables, high-fiber foods, fish, and foods that are low in sodium, saturated fat, and trans fat. ? · Stay at a healthy weight. Lose weight if you need to.  
? · Limit alcohol to 2 drinks a day for men and 1 drink a day for women. ?Staying healthy ? · Manage other health problems such as diabetes, high blood pressure, and high cholesterol. ? · Get the flu vaccine every year. When should you call for help? Call 911 anytime you think you may need emergency care. For example, call if: 
? · You have new or worse symptoms of a stroke. These may include: 
¨ Sudden numbness, tingling, weakness, or loss of movement in your face, arm, or leg, especially on only one side of your body. ¨ Sudden vision changes. ¨ Sudden trouble speaking. ¨ Sudden confusion or trouble understanding simple statements. ¨ Sudden problems with walking or balance. ¨ A sudden, severe headache that is different from past headaches. Call 911 even if these symptoms go away in a few minutes. ? · You feel like you are having another TIA. ? Watch closely for changes in your health, and be sure to contact your doctor if you have any problems. Where can you learn more? Go to http://iban.info/. Enter [phone] [id number] in the search box to learn more about \"Transient Ischemic Attack: Care Instructions. \" Current as of: March 20, 2017 Content Version: 11.4 © 2376-8361 [employer]. Care instructions adapted under license by [employer] (which disclaims liability or warranty for this information). If you have questions about a medical condition or this instruction, always ask your healthcare professional. [place] [address] any warranty or liability for your use of this information. Learning About How to Prevent a Stroke What is a stroke? A stroke occurs when a blood vessel in the brain bursts or is blocked by a blood clot. Without blood and the oxygen it carries, part of the brain starts to die. The part of the body controlled by the damaged area of the brain can't work properly. But there are many things you can do to help lower your stroke risk. What increases your risk for stroke? A risk factor is anything that makes you more likely to have a particular health problem. Risk factors for stroke that you can treat or change include: 
· Health problems like high blood pressure, atrial fibrillation, diabetes, and high cholesterol. · Smoking. · Heavy use of alcohol. · Being overweight. · Not getting enough physical activity. Risk factors you can't change include: · Age. The risk of stroke goes up as you get older. · Race.  Americans, Native Americans, and [place] Natives have a higher risk than those of other races. · Being female. Women have a higher risk of stroke than men. · Family history of stroke. Your doctor can help you know your risk. Then you and your can doctor talk about whether you need to lower it. What can you do to prevent a stroke? · Treat any health problems you have that raise your risk. · Adopt a heart-healthy lifestyle: ¨ Don't smoke. If you need help quitting, talk to your doctor about stop-smoking programs and medicines. These can increase your chances of quitting for good. ¨ Limit alcohol to 2 drinks a day for men and 1 drink a day for women. ¨ Stay at a healthy weight. Lose weight if you need to. ¨ If your doctor recommends it, get more exercise. Walking is a good choice. Bit by bit, increase the amount you walk every day. Try for at least 30 minutes on most days of the week. ¨ Eat heart-healthy foods. These include fruits, vegetables, high-fiber foods, and fish. Choose foods that are low in sodium, saturated fat, and trans fat. · Decide with your doctor whether you will also take medicines to help lower your risk. For example, you and your doctor may decide you will take a medicine that prevents blood clots. What are the symptoms of a stroke? The brain damage from a stroke starts within minutes. That's why it's so important to know the symptoms of stroke and to act fast. Quick treatment can help limit damage to the brain so that you have fewer problems after the stroke. FAST is a simple way to remember the main symptoms of stroke. Recognizing these symptoms helps you know when to call for medical help. FAST stands for: 
· Face drooping. · Arm weakness. · Speech difficulty. · Time to call 911. Follow-up care is a key part of your treatment and safety. Be sure to make and go to all appointments, and call your doctor if you are having problems. It's also a good idea to know your test results and keep a list of the medicines you take. Where can you learn more? Go to http://tristan-shaye.info/. Enter G757 in the search box to learn more about \"Learning About How to Prevent a Stroke. \" Current as of: March 20, 2017 Content Version: 11.4 © 2337-6810 [employer]wise[employer].  Care instructions adapted under license by [employer] (which disclaims liability or warranty for this information). If you have questions about a medical condition or this instruction, always ask your healthcare professional. [address] any warranty or liability for your use of this information. Learning About How to Prevent Another Stroke What can you do to prevent another stroke? After a stroke, people feel lots of different emotions. Some people are worried that they could have another stroke. Or they may feel overwhelmed by how much there is to learn and do. Some people feel sad or depressed. No matter what emotions you are feeling, you can give yourself some control and peace of mind by making a plan to lower your risk of having another stroke. Take your medicines You'll need to take medicines to help prevent another stroke. Be sure to take your medicines exactly as prescribed. And don't stop taking them unless your doctor tells you to. If you stop taking your medicines, you can increase your risk of having another stroke. Some of the medicines your doctor may prescribe include: · Aspirin or some other blood thinner to prevent blood clots. · Statins to lower cholesterol. · Blood pressure medicines to lower blood pressure. Manage other health problems You can help lower your chance of having another stroke by managing certain other health problems. Problems that increase your risk of having another stroke include: · High blood pressure. · High cholesterol. · Atrial fibrillation. · Diabetes. If you have any of these health problems, you can manage them with healthy lifestyle changes along with medicine. Adopt a healthy lifestyle · Do not smoke or allow others to smoke around you. If you need help quitting, talk to your doctor about stop-smoking programs and medicines. These can increase your chances of quitting for good. Smoking makes a stroke more likely. · Limit alcohol to 2 drinks a day for men and 1 drink a day for women. · Lose weight if you need to. Controlling your weight will help you keep your heart and body healthy. · Be active. Ask your doctor what type and level of activity is safe for you. · Eat heart-healthy foods, like fruits, vegetables, and high-fiber foods. It's also important to: · Get a flu shot every year. · Ask for help if you think you are depressed. Do stroke rehab Taking part in a stroke rehabilitation (rehab) program will help you to regain skills you lost or make the most of your abilities after a stroke. It also helps you take steps to prevent another stroke. Your rehab team will give you education and support to help you build new, healthy habits. You'll learn how to manage any other health problems that you might have. [place] also learn how to exercise safely, eat a healthy diet, and quit smoking if you smoke. [place] work with your team to decide what lifestyle choices are best for you. If your doctor hasn't already suggested it, ask him or her if stroke rehab is right for you. Know stroke symptoms Make sure you know the symptoms of stroke. FAST is a simple way to remember. Recognizing these symptoms helps you know when to call for medical help. FAST stands for: 
· Face drooping. · Arm weakness. · Speech difficulty. · Time to call 911. Follow-up care is a key part of your treatment and safety. Be sure to make and go to all appointments, and call your doctor if you are having problems. It's also a good idea to know your test results and keep a list of the medicines you take. Where can you learn more? Go to http://tristan-shaye.info/. Enter D429 in the search box to learn more about \"Learning About How to Prevent Another Stroke. \" Current as of: March 20, 2017 Content Version: 11.4 © 1617-5874 [employer].  Care instructions adapted under license by [employer] (which disclaims liability or warranty for this information). If you have questions about a medical condition or this instruction, always ask your healthcare professional. [address] any warranty or liability for your use of this information. Please provide this summary of care documentation to your next provider. Signatures-by signing, you are acknowledging that this After Visit Summary has been reviewed with you and you have received a copy. Patient Signature:  ____________________________________________________________ Date:  ____________________________________________________________  
  
Gracie Warrenton Provider Signature:  ____________________________________________________________ Date:  ____________________________________________________________

## 2018-02-07 NOTE — IP AVS SNAPSHOT
[address] [address] 
[phone] Patient: Pippa Staley MRN: [id number] GDC:1/15/1959 A check jessica indicates which time of day the medication should be taken. My Medications CONTINUE taking these medications Instructions Each Dose to Equal  
 Morning Noon Evening Bedtime BENADRYL 25 mg capsule Generic drug:  diphenhydrAMINE Your last dose was: Your next dose is: Take 50 mg by mouth nightly as needed. 50 mg  
    
   
   
   
  
 cyanocobalamin 1,000 mcg tablet Your last dose was: Your next dose is: Take 1,000 mcg by mouth daily. 1000 mcg * furosemide 40 mg tablet Commonly known as:  LASIX Your last dose was: Your next dose is: Take 40 mg by mouth two (2) times a day. Along with 20mg every night 40 mg  
    
   
   
   
  
 * furosemide 20 mg tablet Commonly known as:  LASIX Your last dose was: Your next dose is: Take 20 mg by mouth nightly. Along with 40mg in the morning 20 mg  
    
   
   
   
  
 gabapentin 600 mg tablet Commonly known as:  NEURONTIN Your last dose was: Your next dose is: One po in am , one at noon and 2 at hs  
     
   
   
   
  
 levothyroxine 25 mcg tablet Commonly known as:  SYNTHROID Your last dose was: Your next dose is: Take  by mouth Daily (before breakfast). loratadine 10 mg tablet Commonly known as:  Laura Pérez Your last dose was: Your next dose is: Take 1 Tab by mouth daily. 10 mg  
    
   
   
   
  
 metoprolol succinate 25 mg XL tablet Commonly known as:  TOPROL-XL Your last dose was: Your next dose is: Take 1 Tab by mouth daily. 25 mg  
    
   
   
   
  
 mupirocin 2 % ointment Commonly known as:  [facility] Your last dose was: Your next dose is:    
   
   
 Apply  to affected area daily. raNITIdine 150 mg tablet Commonly known as:  ZANTAC Your last dose was: Your next dose is: Take 150 mg by mouth daily. 150 mg  
    
   
   
   
  
 tamsulosin 0.4 mg capsule Commonly known as:  FLOMAX Your last dose was: Your next dose is: Take 0.4 mg by mouth daily. 0.4 mg  
    
   
   
   
  
 terbinafine HCl 250 mg tablet Commonly known as:  LAMISIL Your last dose was: Your next dose is: Take 250 mg by mouth daily. 250 mg  
    
   
   
   
  
 warfarin 4 mg tablet Commonly known as:  COUMADIN Your last dose was: Your next dose is: Take 1 Tab by mouth daily. 4 mg  
    
   
   
   
  
 zinc 50 mg Tab tablet Your last dose was: Your next dose is: Take 25 mg by mouth daily. 25 mg  
    
   
   
   
  
 * Notice: This list has 2 medication(s) that are the same as other medications prescribed for you. Read the directions carefully, and ask your doctor or other care provider to review them with you.

## 2018-02-07 NOTE — H&P
GENERAL GENERIC H&P/CONSULT    Ivonne Marie is a 76 y.o. male with a history of BPH, PAF, SSS s/p PPM, herniated disc cervical, pacemaker, asthma, and knee pain s/p bilateral knee replacement who presents to the ED for complaints of left arm weakness and apparently slurred speech. Per patient last time normal was around 11pm when he went to bed however whenever he woke up he had these symptoms. Patient noted on arrival that he was having some slurred speech along with having difficulty to grab things along with some left arm parasthesia. Patient also notes some left knee pain onset three weeks ago as well and has noted multiple knee replacments/surgies on bilateral knees. Code S was called. On y exam patient states his symptoms have improved since this morning. He admits to be being compliant with his medications. Patient denies any diplopia, blurred vision, sob, cp, n/v, headache, dizziness, or any other acute complaints at this time. Past Medical History:   Diagnosis Date    Asthma     BPH (benign prostatic hyperplasia)     Herniated disc, cervical     Knee pain     Pacemaker 2011    [employer]    PAF (paroxysmal atrial fibrillation) (HCC)     During Pacer interogation     SSS (sick sinus syndrome) (HCC)     S/P Dual chamber [employer] Pacemaker      Past Surgical History:   Procedure Laterality Date    HX HERNIA REPAIR Bilateral 1989    HX HIP REPLACEMENT  2006    right    HX KNEE REPLACEMENT Bilateral 1992/1993/2006/2008/2011/2012/2013    HX PACEMAKER  2011      Prior to Admission medications    Medication Sig Start Date End Date Taking? Authorizing Provider   gabapentin (NEURONTIN) 600 mg tablet One po in am , one at noon and 2 at hs 2/1/18   [address], MD   mupirocin (BACTROBAN) 2 % ointment Apply  to affected area daily. 10/19/17   [address], MD   metoprolol succinate (TOPROL-XL) 25 mg XL tablet Take 1 Tab by mouth daily.  9/30/17   Harmeet Su MD   warfarin (COUMADIN) 4 mg tablet Take 1 Tab by mouth daily. 9/25/17   Maira Iyer MD   furosemide (LASIX) 20 mg tablet Take 20 mg by mouth nightly. Along with 40mg in the morning 7/23/17   Historical Provider   terbinafine HCl (LAMISIL) 250 mg tablet Take 250 mg by mouth daily. 7/27/17   Historical Provider   levothyroxine (SYNTHROID) 25 mcg tablet Take  by mouth Daily (before breakfast). Historical Provider   raNITIdine (ZANTAC) 150 mg tablet Take 150 mg by mouth daily. Historical Provider   tamsulosin (FLOMAX) 0.4 mg capsule Take 0.4 mg by mouth daily. Historical Provider   furosemide (LASIX) 40 mg tablet Take 40 mg by mouth two (2) times a day. Along with 20mg every night    Historical Provider   diphenhydrAMINE (BENADRYL) 25 mg capsule Take 50 mg by mouth nightly as needed. Historical Provider   zinc 50 mg tab tablet Take 25 mg by mouth daily. Historical Provider   cyanocobalamin 1,000 mcg tablet Take 1,000 mcg by mouth daily. Historical Provider   loratadine (CLARITIN) 10 mg tablet Take 1 Tab by mouth daily. 4/7/16   Sindy Devi MD     Allergies   Allergen Reactions    Sulfa (Sulfonamide Antibiotics) Hives and Rash      Social History   Substance Use Topics    Smoking status: Current Some Day Smoker     Types: Cigars    Smokeless tobacco: Never Used      Comment: rare cigar smoker    Alcohol use 0.0 oz/week     0 Standard drinks or equivalent per week      Comment: rarely drinks      No family history on file. Review of Systems   Constitutional: Negative for activity change, diaphoresis and fatigue. HENT: Negative for congestion and drooling. Respiratory: Negative. Negative for apnea, cough, choking and chest tightness. Cardiovascular: Negative. Negative for chest pain, palpitations and leg swelling. Gastrointestinal: Negative. Endocrine: Negative. Genitourinary: Negative. Musculoskeletal: Positive for joint swelling (left knee pain).  Negative for arthralgias, back pain, gait problem, myalgias and neck pain. Skin: Negative. Neurological: Positive for weakness. Negative for dizziness, tremors, seizures, syncope, facial asymmetry, speech difficulty, light-headedness, numbness and headaches. Objective:    02/07 0701 - 02/07 1900  In: -   Out: 400 [Urine:400]     Patient Vitals for the past 8 hrs:   BP Pulse Resp SpO2 Height Weight   02/07/18 1130 122/58 93 16 - - -   02/07/18 1100 120/50 66 16 99 % - -   02/07/18 1030 136/52 65 15 100 % - -   02/07/18 0930 132/55 60 16 100 % - -   02/07/18 0901 131/69 - - - - -   02/07/18 0900 131/69 77 18 100 % - -   02/07/18 0830 - 63 16 100 % - -   02/07/18 0825 - 62 11 100 % - -   02/07/18 0820 - 63 15 100 % - -   02/07/18 0815 - 65 20 100 % - -   02/07/18 0810 123/74 - - - - -   02/07/18 0806 143/63 66 17 100 % 6' 5\" (1.956 m) 126.6 kg (279 lb)     Physical Exam   Constitutional: He is oriented to person, place, and time. No distress. HENT:   Head: Normocephalic. Eyes: Pupils are equal, round, and reactive to light. Neck: Normal range of motion. No JVD present. Cardiovascular: Normal rate. Pulmonary/Chest: Effort normal and breath sounds normal. No stridor. No respiratory distress. Abdominal: Soft. Bowel sounds are normal.   Musculoskeletal: He exhibits edema (swelling to left knee) and tenderness. 5/5 to right upper/lower ext    4/5 to left upper and lower ext   Neurological: He is alert and oriented to person, place, and time. Skin: He is not diaphoretic.    PPM to chest wall        Labs:    Recent Results (from the past 24 hour(s))   CBC W/O DIFF    Collection Time: 02/07/18  7:58 AM   Result Value Ref Range    WBC 4.9 4.6 - 13.2 K/uL    RBC 4.31 (L) 4.70 - 5.50 M/uL    HGB 13.8 13.0 - 16.0 g/dL    HCT 41.3 36.0 - 48.0 %    MCV 95.8 74.0 - 97.0 FL    MCH 32.0 24.0 - 34.0 PG    MCHC 33.4 31.0 - 37.0 g/dL    RDW 13.7 11.6 - 14.5 %    PLATELET 650 631 - 955 K/uL    MPV 8.8 (L) 9.2 - 54.9 FL   METABOLIC PANEL, COMPREHENSIVE Collection Time: 02/07/18  7:58 AM   Result Value Ref Range    Sodium 137 136 - 145 mmol/L    Potassium 4.1 3.5 - 5.5 mmol/L    Chloride 103 100 - 108 mmol/L    CO2 27 21 - 32 mmol/L    Anion gap 7 3.0 - 18 mmol/L    Glucose 150 (H) 74 - 99 mg/dL    BUN 19 (H) 7.0 - 18 MG/DL    Creatinine 0.77 0.6 - 1.3 MG/DL    BUN/Creatinine ratio 25 (H) 12 - 20      GFR est AA >60 >60 ml/min/1.73m2    GFR est non-AA >60 >60 ml/min/1.73m2    Calcium 8.8 8.5 - 10.1 MG/DL    Bilirubin, total 0.4 0.2 - 1.0 MG/DL    ALT (SGPT) 22 16 - 61 U/L    AST (SGOT) 19 15 - 37 U/L    Alk. phosphatase 64 45 - 117 U/L    Protein, total 7.9 6.4 - 8.2 g/dL    Albumin 3.9 3.4 - 5.0 g/dL    Globulin 4.0 2.0 - 4.0 g/dL    A-G Ratio 1.0 0.8 - 1.7     PROTHROMBIN TIME + INR    Collection Time: 02/07/18  7:58 AM   Result Value Ref Range    Prothrombin time 22.6 (H) 11.5 - 15.2 sec    INR 2.1 (H) 0.8 - 1.2     THROMBIN TIME    Collection Time: 02/07/18  7:58 AM   Result Value Ref Range    Thrombin time 16.2 13.8 - 18.2 SECS   FIBRINOGEN    Collection Time: 02/07/18  7:58 AM   Result Value Ref Range    Fibrinogen 434 210 - 451 mg/dL   TYPE & SCREEN    Collection Time: 02/07/18  7:58 AM   Result Value Ref Range    Crossmatch Expiration 02/10/2018     ABO/Rh(D) Orlene Austin POSITIVE     Antibody screen NEG    ASPIRIN TEST    Collection Time: 02/07/18  7:58 AM   Result Value Ref Range    Aspirin test 591 (L) 620 - 672 ARU   GLUCOSE, POC    Collection Time: 02/07/18  8:13 AM   Result Value Ref Range    Glucose (POC) 136 (H) 70 - 110 mg/dL   EKG, 12 LEAD, INITIAL    Collection Time: 02/07/18  8:19 AM   Result Value Ref Range    Ventricular Rate 61 BPM    Atrial Rate 61 BPM    P-R Interval 186 ms    QRS Duration 164 ms    Q-T Interval 456 ms    QTC Calculation (Bezet) 459 ms    Calculated P Axis 38 degrees    Calculated R Axis -78 degrees    Calculated T Axis 80 degrees    Diagnosis       Electronic ventricular pacemaker  Probable underlying sinus rhythm.    Confirmed by Reginald Nichols ([id number]) on 2/7/2018 10:45:31 AM     URINALYSIS W/ RFLX MICROSCOPIC    Collection Time: 02/07/18  9:00 AM   Result Value Ref Range    Color YELLOW      Appearance CLEAR      Specific gravity 1.017 1.005 - 1.030      pH (UA) 6.0 5.0 - 8.0      Protein NEGATIVE  NEG mg/dL    Glucose NEGATIVE  NEG mg/dL    Ketone NEGATIVE  NEG mg/dL    Bilirubin NEGATIVE  NEG      Blood NEGATIVE  NEG      Urobilinogen 0.2 0.2 - 1.0 EU/dL    Nitrites NEGATIVE  NEG      Leukocyte Esterase NEGATIVE  NEG     DRUG SCREEN, URINE    Collection Time: 02/07/18  9:00 AM   Result Value Ref Range    BENZODIAZEPINES NEGATIVE  NEG      BARBITURATES NEGATIVE  NEG      THC (TH-CANNABINOL) NEGATIVE  NEG      OPIATES NEGATIVE  NEG      PCP(PHENCYCLIDINE) NEGATIVE  NEG      COCAINE NEGATIVE  NEG      AMPHETAMINES NEGATIVE  NEG      METHADONE NEGATIVE  NEG      HDSCOM (NOTE)        Assessment:  Principal Problem:    Stroke ([facility] [address] (2/7/2018)    Active Problems:    Cardiomyopathy ([facility] [address] (12/3/2015)      Asthma (12/3/2015)      Knee pain ()      Sick sinus syndrome (HCC) (2/25/2016)      PAF (paroxysmal atrial fibrillation) ([facility] [address] (2/7/2018)      Weakness of left upper extremity (2/7/2018)      BPH (benign prostatic hyperplasia) (2/7/2018)        Plan:    Left Upper ext weakness/slurred speech/CVA? -code S called  -CTA head/neck pending as ordered by tele neurology  -CT head showing no acute intracranial abnormalities  -Unable to do MRI 2/2 PPM apparently no MRI safe and knee replacements thus CTA head/neck pending.  Noting if Large vessel occlusion will notify them  -Neuro consulted  -asa, statin  -Swallow eval  -PT/OT  -allow for permissive HTN to today  -neurochecks as per protocol  -TSH, LFT, lipid panel  -on my exam near resolution of symptoms per patient     Chronic Left Knee pain  -previous hx of multiple arthrosplastys to both knees  -x-ray noting no effusion   -pt/ot  -Ortho as outpatient    PAF  -on coumadin  -INR therapeautic 2.1  -recheck INR in am  -hold on bb for today    SSS  -s/p PPM    BPH  -flowmax    Hx of Asthma  -nebs  -stable    DVT prophylaxis  -scd/teds  -on coumadin      Signed:  Lorraine Wahl NP 2/7/2018

## 2018-02-07 NOTE — PROGRESS NOTES
attempted to visit with patient in bed 14 but found him  unable to communicate at this time as he was resting peacefully now. No family were seen at time of this visit.  offered prayer at doorway of room. Chaplains will continue to follow and will provide pastoral care on an as needed/requested basis.     Laura Baum   Spiritual Care   [phone]

## 2018-02-07 NOTE — CONSULTS
[address]  MR#: [id number]  : 1940  ACCOUNT #: [de-identified]   DATE OF SERVICE: 2018    CHIEF COMPLAINT:  Possible stroke. HISTORY OF PRESENT ILLNESS:  This is a 68-year-old male with multiple medical problems who woke up this morning with left hand weakness and slurred speech. He is on Coumadin for atrial fibrillation, has a pacemaker in place. He is brought to the hospital.  CT scan of the head showed nothing acute. He had a CTA of the head and neck, which did not show any large vessel stenosis. He is feeling a little bit better. Speech is back to normal.  Has no headaches. He thinks his left leg may have been a little weaker this morning, but right now he is not really convinced whether that is truly the case or not. It seem to be primarily in his left hand. He says it feels floppy. The left leg has chronically been a problem secondary to multiple failed knee surgeries. He has been using a walker since  and over the last month or so has been using a wheelchair as the left knee just will not support him. He apparently had problems back in  and was in the hospital for several months with infected knee problems and had swelling in the hands bilaterally. Ultimately it went down, but he was left with nerve damage in the left hand, which he elected not to have any procedures done. He says the hand has just been numb feeling, but does not have any weakness. He also has cervical spine disease and apparently was told to get surgery for this, which he has been reluctant to do and is primarily neck pain, which has not changed any. He has not had any headaches. No problems on the right side. No problems with speech or swallowing or vision. PAST MEDICAL HISTORY:  Notable for asthma, BPH, multiple knee surgeries, paroxysmal atrial fibrillation with sick sinus syndrome and a pacemaker.     PAST SURGICAL HISTORY:  Hernia repair, right hip replacement, multiple knee surgeries. MEDICATIONS:  On the chart. He is on gabapentin. He is on Coumadin for atrial fibrillation. SOCIAL HISTORY:  Does not smoke. Does not drink alcohol. Lives with his son. REVIEW OF SYSTEMS:  Notable for those complaints mentioned above. PHYSICAL EXAMINATION:  GENERAL:  Well-developed, well-nourished, no acute distress, pleasant, cooperative. NEUROLOGIC:  Cranial nerves are unremarkable. Reflexes are 1+ in the arms, absent in the legs. Toes are downgoing bilaterally. He is able to lift each leg off the bed with good strength. Has a large disfigured left knee, smaller than the right knee. Strength in general leg seems reasonable. In the arms, he has no finger extension. He has good finger . Wrist extension is pretty good at 4/5 at least, the same for wrist flexion. Deltoid, biceps, and triceps are all 5/5. Pinprick sensation and fine in both hands. Decreased pinprick to above the ankles bilaterally. Reports he is having neuropathy and used to have diabetes. IMPRESSION:  Left hand weakness. Apparently presentation was more of some speech problems in addition to the left arm problems as well as question left leg, which in the setting of multiple risk factors for stroke, he was admitted for stroke protocol. 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 He did wake up with it this morning. He noted he slept funny on it last night and had blankets and sheets wedged under the arm. For now, would continue with his further evaluation for possible stroke and see how this evolves. We will follow with you.       MD ZBIGNIEW Owens / BHARATH  D: 02/07/2018 17:35     T: 02/07/2018 18:51  JOB #: [id number]

## 2018-02-07 NOTE — ED PROVIDER NOTES
EMERGENCY DEPARTMENT HISTORY AND PHYSICAL EXAM    8:15 AM      Date: 2/7/2018  Patient Name: Nu Díaz Jr    History of Presenting Illness     Chief Complaint   Patient presents with    Numbness         History Provided By: Patient    Chief Complaint: Left arm weakness  Duration: 10 Hours  Timing:  Constant  Location: Left arm  Quality: \"Can't move\"  Severity: Moderate  Modifying Factors: NA  Associated Symptoms: Patient denies any other associated symptoms or complaints. Additional History (Context): Kailyn Cruz is a 76 y.o. male with a history of BPH, PAF, SSS, herniated disc cervical, pacemaker, asthma, and knee pain who presents with c/o left arm weakness onset unknown, last normal at 11pm. Patient describes his symptoms as constant, located on the left arm, and he notes that he \"can't move it. \" Patient reports that he woke up this morning with the left arm weakness. Patient reports that he has never had this left arm weakness before. Although he notes that he has had left arm paresthesia in the past. He notes that he has a history of a-fib and has a pacemaker. He also notes that he is taking 4 mg Coumadin, 600 mg Gabapentin, and 25 mg Metoprolol. He also notes that he has had bilateral knee replacements. Patient denies any other associated symptoms or complaints. PCP: [address], [place]    Current Outpatient Prescriptions   Medication Sig Dispense Refill    gabapentin (NEURONTIN) 600 mg tablet One po in am , one at noon and 2 at hs 120 Tab 3    mupirocin (BACTROBAN) 2 % ointment Apply  to affected area daily. 22 g 0    metoprolol succinate (TOPROL-XL) 25 mg XL tablet Take 1 Tab by mouth daily. 90 Tab 2    warfarin (COUMADIN) 4 mg tablet Take 1 Tab by mouth daily. 120 Tab 3    furosemide (LASIX) 20 mg tablet Take 20 mg by mouth nightly. Along with 40mg in the morning      terbinafine HCl (LAMISIL) 250 mg tablet Take 250 mg by mouth daily.       levothyroxine (SYNTHROID) 25 mcg tablet Take  by mouth Daily (before breakfast).  raNITIdine (ZANTAC) 150 mg tablet Take 150 mg by mouth daily.  tamsulosin (FLOMAX) 0.4 mg capsule Take 0.4 mg by mouth daily.  furosemide (LASIX) 40 mg tablet Take 40 mg by mouth two (2) times a day. Along with 20mg every night      diphenhydrAMINE (BENADRYL) 25 mg capsule Take 50 mg by mouth nightly as needed.  zinc 50 mg tab tablet Take 25 mg by mouth daily.  cyanocobalamin 1,000 mcg tablet Take 1,000 mcg by mouth daily.  loratadine (CLARITIN) 10 mg tablet Take 1 Tab by mouth daily. 90 Tab 1       Past History     Past Medical History:  Past Medical History:   Diagnosis Date    Asthma     BPH (benign prostatic hyperplasia)     Herniated disc, cervical     Knee pain     Pacemaker 2011    [employer]    PAF (paroxysmal atrial fibrillation) (HCC)     During Pacer interogation     SSS (sick sinus syndrome) (HCC)     S/P Dual chamber [employer] Pacemaker       Past Surgical History:  Past Surgical History:   Procedure Laterality Date    HX HERNIA REPAIR Bilateral 1989    HX HIP REPLACEMENT  2006    right    HX KNEE REPLACEMENT Bilateral 1992/1993/2006/2008/2011/2012/2013    HX PACEMAKER  2011       Family History:  No family history on file. Social History:  Social History   Substance Use Topics    Smoking status: Current Some Day Smoker     Types: Cigars    Smokeless tobacco: Never Used      Comment: rare cigar smoker    Alcohol use 0.0 oz/week     0 Standard drinks or equivalent per week      Comment: rarely drinks       Allergies: Allergies   Allergen Reactions    Sulfa (Sulfonamide Antibiotics) Hives and Rash         Review of Systems       Review of Systems   Constitutional: Negative for fever. Neurological: Positive for weakness. All other systems reviewed and are negative.         Physical Exam     Visit Vitals    /69    Pulse 63    Resp 16    Ht 6' 5\" (1.956 m)    Wt 126.6 kg (279 lb)    SpO2 100%    BMI 33.08 kg/m2         Physical Exam   Constitutional: He is oriented to person, place, and time. He appears well-developed and well-nourished. HENT:   Head: Normocephalic and atraumatic. Neck: Neck supple. No JVD present. Cardiovascular: Normal rate and regular rhythm. Pulmonary/Chest: Effort normal and breath sounds normal. No respiratory distress. Abdominal: Soft. He exhibits no distension. There is no tenderness. There is no rebound and no guarding. Musculoskeletal: He exhibits no edema. Neurological: He is alert and oriented to person, place, and time. L facial droop  Word finding difficulty  Strength 4/5 LUE  5/5 LLE  Strength 5/5 RUE and RLE  Gross sensation intact x4 extremities   Skin: Skin is warm and dry. No erythema. Psychiatric: Judgment normal.         Diagnostic Study Results     Labs -  Recent Results (from the past 12 hour(s))   CBC W/O DIFF    Collection Time: 02/07/18  7:58 AM   Result Value Ref Range    WBC 4.9 4.6 - 13.2 K/uL    RBC 4.31 (L) 4.70 - 5.50 M/uL    HGB 13.8 13.0 - 16.0 g/dL    HCT 41.3 36.0 - 48.0 %    MCV 95.8 74.0 - 97.0 FL    MCH 32.0 24.0 - 34.0 PG    MCHC 33.4 31.0 - 37.0 g/dL    RDW 13.7 11.6 - 14.5 %    PLATELET 354 520 - 217 K/uL    MPV 8.8 (L) 9.2 - 86.2 FL   METABOLIC PANEL, COMPREHENSIVE    Collection Time: 02/07/18  7:58 AM   Result Value Ref Range    Sodium 137 136 - 145 mmol/L    Potassium 4.1 3.5 - 5.5 mmol/L    Chloride 103 100 - 108 mmol/L    CO2 27 21 - 32 mmol/L    Anion gap 7 3.0 - 18 mmol/L    Glucose 150 (H) 74 - 99 mg/dL    BUN 19 (H) 7.0 - 18 MG/DL    Creatinine 0.77 0.6 - 1.3 MG/DL    BUN/Creatinine ratio 25 (H) 12 - 20      GFR est AA >60 >60 ml/min/1.73m2    GFR est non-AA >60 >60 ml/min/1.73m2    Calcium 8.8 8.5 - 10.1 MG/DL    Bilirubin, total 0.4 0.2 - 1.0 MG/DL    ALT (SGPT) 22 16 - 61 U/L    AST (SGOT) 19 15 - 37 U/L    Alk.  phosphatase 64 45 - 117 U/L    Protein, total 7.9 6.4 - 8.2 g/dL    Albumin 3.9 3.4 - 5.0 g/dL    Globulin 4.0 2.0 - 4.0 g/dL    A-G Ratio 1.0 0.8 - 1.7     PROTHROMBIN TIME + INR    Collection Time: 02/07/18  7:58 AM   Result Value Ref Range    Prothrombin time 22.6 (H) 11.5 - 15.2 sec    INR 2.1 (H) 0.8 - 1.2     THROMBIN TIME    Collection Time: 02/07/18  7:58 AM   Result Value Ref Range    Thrombin time 16.2 13.8 - 18.2 SECS   FIBRINOGEN    Collection Time: 02/07/18  7:58 AM   Result Value Ref Range    Fibrinogen 434 210 - 451 mg/dL   TYPE & SCREEN    Collection Time: 02/07/18  8:00 AM   Result Value Ref Range    Crossmatch Expiration 02/10/2018     ABO/Rh(D) PENDING     Antibody screen PENDING    GLUCOSE, POC    Collection Time: 02/07/18  8:13 AM   Result Value Ref Range    Glucose (POC) 136 (H) 70 - 110 mg/dL   EKG, 12 LEAD, INITIAL    Collection Time: 02/07/18  8:19 AM   Result Value Ref Range    Ventricular Rate 61 BPM    Atrial Rate 61 BPM    P-R Interval 186 ms    QRS Duration 164 ms    Q-T Interval 456 ms    QTC Calculation (Bezet) 459 ms    Calculated P Axis 38 degrees    Calculated R Axis -78 degrees    Calculated T Axis 80 degrees    Diagnosis       Normal sinus rhythm  Left axis deviation  Nonspecific intraventricular block  Lateral infarct (cited on or before 07-FEB-2018)  Inferior infarct (cited on or before 07-FEB-2018)  Abnormal ECG  When compared with ECG of 24-MAY-2016 17:41,  Sinus rhythm has replaced Atrial flutter  Serial changes of Lateral infarct present         Radiologic Studies -   XR CHEST PORT   Final Result      CT HEAD WO CONT   Final Result      CTA HEAD NECK W WO CONT    (Results Pending)   XR KNEE LT MIN 4 V    (Results Pending)   Ct Head Wo Cont    Result Date: 2/7/2018  CT Head History: Left-sided arm weakness, dysarthria Comparison: CT head 5/24/2016 Technique: Multiple axial CT images of the head were obtained extending from the skull base to the vertex. Additional coronal and sagittal reformations were performed.  One or more dose reduction techniques were used on this CT: automated exposure control, adjustment of the mAs and/or kVp according to patient's size, and iterative reconstruction techniques. The specific techniques utilized on this CT exam have been documented in the patient's electronic medical record. Findings: The ventricles and sulci are normal in their size and configuration. There is no evidence of acute intracranial hemorrhage, mass effect, midline shift, or herniation. No definite CT evidence of acute cortical infarct is seen. The gray-white matter differentiation is within normal limits. The visualized orbits are unremarkable. Mild mucoperiosteal thickening is scattered in the paranasal sinuses. No air-fluid levels are present. Mucoperiosteal thickening is greatest in the right frontal sinus. Atherosclerotic calcifications are present along the bilateral carotid siphons. Mastoid air cells are unremarkable. No fracture is seen. IMPRESSION: 1. No acute intracranial hemorrhage, mass effect, midline shift, or herniation. No definite CT evidence of acute cortical infarct is seen. Please note that noncontrast head CT may be normal in early acute infarct. 2. Mild/moderate paranasal sinus disease increased since prior study, without air-fluid level. These critical results on this CODE S patient were directly communicated to Dr. Sirisha Carver at 8:13 AM on 2/7/2018. Read back was performed. Xr Chest Port    Result Date: 2/7/2018  Chest, single view Indication: Suspected stroke. Left arm weakness. Comparison: 8/24/2016 Findings:  Portable upright AP view of the chest was obtained. The lungs are mildly underexpanded, without evidence of focal pneumonic consolidation, pneumothorax, or pleural effusion. Left subclavian approach pacemaker present with lead tips in stable position. Cardiac size and mediastinal contours are stable, with mild accentuation of the cardiac silhouette by hypoventilatory effort and AP technique. No acute osseous abnormality.      Impression: Mildly underexpanded lungs without superimposed acute radiographic abnormality. Medical Decision Making   I am the first provider for this patient. I reviewed the vital signs, available nursing notes, past medical history, past surgical history, family history and social history. Vital Signs-Reviewed the patient's vital signs. Pulse Oximetry Analysis -  100% on room air (normal)    EKG: Interpreted by the EP. Time Interpreted: 826   Rate: 61   Rhythm: Normal Sinus Rhythm    Interpretation: left axis, LBBB, no scarbossa criteria   Comparison: unchanged from prior 5/24/16    Records Reviewed: Nursing Notes (Time of Review: 8:15 AM)    ED Course: Progress Notes, Reevaluation, and Consults:  8:15 AM Consult: I discussed care with Dr. Torin Garcia (teleneurology). It was a standard discussion including patient history, chief complaint, available diagnostic results, and predicted treatment course. Dr. Torin Garcia recommends CTA head and neck, and patient is not a PTA candidate. 9:57 AM Consult: I discussed care with Dr. Kirk Banuelos (hospitalist). It was a standard discussion including patient history, chief complaint, available diagnostic results, and predicted treatment course. Dr. Kirk Banuelos accepts patient for admission. Provider Notes (Medical Decision Making): 77 y/o male presents with dysarthria and LUE weakness, last normal last night. Pt code S level 1 from EMS report, upon arrival to ED was upgraded to code S level 2. Screen for infection, ct head, not tpa candidate as on coumadin. Pt now c/o chronic L knee pain, hx of replacement, will obtain xr to eval.     Diagnosis     Clinical Impression:   1.  Dysarthria        Disposition: admitted      Attestations:  Soraya Sue Dr acting as a scribe for and in the presence of Mane Zambrano DO      February 07, 2018 at 8:15 AM       Provider Attestation:      I personally performed the services described in the documentation, reviewed the documentation, as recorded by the scribe in my presence, and it accurately and completely records my words and actions.  February 07, 2018 at [address] [place] [address], DO    _______________________________

## 2018-02-08 ENCOUNTER — HOME CARE VISIT (OUTPATIENT)
Dept: SCHEDULING | Facility: HOME HEALTH | Age: 78
End: 2018-02-08
Payer: MEDICARE

## 2018-02-08 LAB
CRP SERPL HS-MCNC: 2.74 MG/L (ref 0–3)
GLUCOSE BLD STRIP.AUTO-MCNC: 129 MG/DL (ref 70–110)

## 2018-02-08 PROCEDURE — 97535 SELF CARE MNGMENT TRAINING: CPT

## 2018-02-08 PROCEDURE — 97530 THERAPEUTIC ACTIVITIES: CPT

## 2018-02-08 PROCEDURE — 82962 GLUCOSE BLOOD TEST: CPT

## 2018-02-08 PROCEDURE — 96374 THER/PROPH/DIAG INJ IV PUSH: CPT

## 2018-02-08 PROCEDURE — 65660000000 HC RM CCU STEPDOWN

## 2018-02-08 PROCEDURE — 97110 THERAPEUTIC EXERCISES: CPT

## 2018-02-08 PROCEDURE — 97162 PT EVAL MOD COMPLEX 30 MIN: CPT

## 2018-02-08 PROCEDURE — 74011250637 HC RX REV CODE- 250/637: Performed by: INTERNAL MEDICINE

## 2018-02-08 PROCEDURE — 92610 EVALUATE SWALLOWING FUNCTION: CPT

## 2018-02-08 PROCEDURE — 74011000258 HC RX REV CODE- 258: Performed by: NURSE PRACTITIONER

## 2018-02-08 PROCEDURE — 74011250636 HC RX REV CODE- 250/636: Performed by: NURSE PRACTITIONER

## 2018-02-08 PROCEDURE — 77030032490 HC SLV COMPR SCD KNE COVD -B

## 2018-02-08 PROCEDURE — 74011000250 HC RX REV CODE- 250: Performed by: NURSE PRACTITIONER

## 2018-02-08 PROCEDURE — 74011250636 HC RX REV CODE- 250/636: Performed by: INTERNAL MEDICINE

## 2018-02-08 PROCEDURE — 3331090001 HH PPS REVENUE CREDIT

## 2018-02-08 PROCEDURE — 3331090002 HH PPS REVENUE DEBIT

## 2018-02-08 PROCEDURE — 74011250637 HC RX REV CODE- 250/637: Performed by: NURSE PRACTITIONER

## 2018-02-08 PROCEDURE — 97166 OT EVAL MOD COMPLEX 45 MIN: CPT

## 2018-02-08 RX ORDER — SODIUM CHLORIDE AND POTASSIUM CHLORIDE .9; .15 G/100ML; G/100ML
SOLUTION INTRAVENOUS CONTINUOUS
Status: DISCONTINUED | OUTPATIENT
Start: 2018-02-08 | End: 2018-02-09 | Stop reason: HOSPADM

## 2018-02-08 RX ADMIN — SODIUM CHLORIDE AND POTASSIUM CHLORIDE: 9; 1.49 INJECTION, SOLUTION INTRAVENOUS at 05:46

## 2018-02-08 RX ADMIN — GABAPENTIN 600 MG: 300 CAPSULE ORAL at 09:18

## 2018-02-08 RX ADMIN — ATORVASTATIN CALCIUM 80 MG: 40 TABLET, FILM COATED ORAL at 21:02

## 2018-02-08 RX ADMIN — FAMOTIDINE 20 MG: 20 TABLET, FILM COATED ORAL at 09:18

## 2018-02-08 RX ADMIN — GABAPENTIN 1200 MG: 400 CAPSULE ORAL at 21:02

## 2018-02-08 RX ADMIN — ASPIRIN 325 MG ORAL TABLET 325 MG: 325 PILL ORAL at 09:18

## 2018-02-08 RX ADMIN — TAMSULOSIN HYDROCHLORIDE 0.4 MG: 0.4 CAPSULE ORAL at 09:18

## 2018-02-08 RX ADMIN — FAMOTIDINE 20 MG: 20 TABLET, FILM COATED ORAL at 18:40

## 2018-02-08 RX ADMIN — GABAPENTIN 600 MG: 300 CAPSULE ORAL at 12:13

## 2018-02-08 RX ADMIN — DOCUSATE SODIUM AND SENNOSIDES 2 TABLET: 8.6; 5 TABLET, FILM COATED ORAL at 21:02

## 2018-02-08 RX ADMIN — FOLIC ACID: 5 INJECTION, SOLUTION INTRAMUSCULAR; INTRAVENOUS; SUBCUTANEOUS at 18:41

## 2018-02-08 RX ADMIN — WARFARIN SODIUM 4 MG: 2 TABLET ORAL at 12:13

## 2018-02-08 NOTE — PHYSICIAN ADVISORY
.  Letter of Admission Status Determination    Shanti Vallecillo   Age: 76 y.o. MRN: [id number]    Date of admission: 2/7/2018    I have reviewed this case as it involves a patient admitted as Inpatient that does not meet criteria for Inpatient admission. The patient had medical necessity for hospitalization based on his presenting condition, concern for stroke/TIA, with need for further evaluation and clinical monitoring. On day 2, the patient has improving symptoms, and the current documented care plan does not support continued medically necessary hospitalization at the inpatient level. Therefore, unless continued medically necessary hospitalization at the inpatient level of care is anticipated, I recommend that this patient be downgraded to OBSERVATION status. The final decision regarding the patient's hospitalization status depends on the attending physician's judgment.       Livia Monroy MD, YESENIA, MEHRDAD [facility]  Physician [place].  [phone]    February 8, 2018   9:19 AM

## 2018-02-08 NOTE — PROGRESS NOTES
Hospitalist Progress Note  Graham Glasgow MD  Internal medicine/ Hospitalist    Daily Progress Note: 2/8/2018 3:05 PM      Interval history / Subjective:   Pippa Díaz Jr is a 76 y. o. male with a history of BPH, PAF, SSS s/p PPM, herniated disc cervical, pacemaker, asthma, and knee pain s/p bilateral knee replacement who presented to the ED for complaints of left arm weakness and apparently slurred speech. In ER,CT head with no acute intracranial hemorrhage, mass effect, midline shift, or herniation. Tele-neurology recommended admission for stroke work-up. 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 CTA head/neck no occlusion not,instead showing moderate degree C6-C7 central stenosis due to disc osteophyte complex with  probable cord flattening. Today patient feeling better despite still some tingling left arm,saying that he has tolerated PT well.     Current Facility-Administered Medications   Medication Dose Route Frequency    0.9% sodium chloride with KCl 20 mEq/L infusion   IntraVENous CONTINUOUS    levothyroxine (SYNTHROID) tablet 25 mcg  25 mcg Oral ACB    gabapentin (NEURONTIN) capsule 600 mg  600 mg Oral BID    tamsulosin (FLOMAX) capsule 0.4 mg  0.4 mg Oral DAILY    ondansetron (ZOFRAN) injection 2 mg  2 mg IntraVENous Q6H PRN    aspirin (ASPIRIN) tablet 325 mg  325 mg Oral DAILY    atorvastatin (LIPITOR) tablet 80 mg  80 mg Oral QHS    acetaminophen (TYLENOL) tablet 650 mg  650 mg Oral Q4H PRN    acetaminophen (TYLENOL) suppository 650 mg  650 mg Rectal Q4H PRN    senna-docusate (PERICOLACE) 8.6-50 mg per tablet 2 Tab  2 Tab Oral QHS    albuterol (PROVENTIL VENTOLIN) nebulizer solution 2.5 mg  2.5 mg Nebulization B5I PRN    folic acid (FOLVITE) 1 mg, thiamine (B-1) 100 mg in 0.9% sodium chloride 50 mL ivpb   IntraVENous ACD    gabapentin (NEURONTIN) capsule 1,200 mg  1,200 mg Oral QHS    famotidine (PEPCID) tablet 20 mg  20 mg Oral BID    warfarin (COUMADIN) tablet 4 mg  4 mg Oral DAILY       Objective:     Visit Vitals    /55    Pulse (!) 105    Temp 98.5 °F (36.9 °C)    Resp 16    Ht 6' 5\" (1.956 m)    Wt 120.4 kg (265 lb 6.9 oz)    SpO2 100%    BMI 31.48 kg/m2      O2 Device: Room air    Temp (24hrs), Av.1 °F (36.7 °C), Min:97.8 °F (36.6 °C), Max:98.5 °F (36.9 °C)      701 - 1900  In: 823.3 [I.V.:823.3]  Out: 200 [Urine:200]  1901 -  0700  In: 926.7 [I.V.:926.7]  Out: 1150 [Urine:1150]  P/E  NAD   HENT:   Head: Normocephalic. Eyes: Pupils are equal, round, and reactive to light. Neck: Normal range of motion. No JVD present. Cardiovascular: Normal rate. Pulmonary/Chest: Effort normal and breath sounds normal. No stridor. No respiratory distress. Abdominal: Soft. Bowel sounds are normal.   Musculoskeletal:no edema  Neuo:aaox3,mild decreased power left arm. Data Review    No results found for this or any previous visit (from the past 12 hour(s)). Assessment/Plan:     Principal Problem:    Stroke ([facility] [address] (2018)    Active Problems:    Cardiomyopathy ([address] (12/3/2015)      Asthma (12/3/2015)      Knee pain ()      Sick sinus syndrome ([facility] [address] (2016)      PAF (paroxysmal atrial fibrillation) ([facility] [address] (2018)      Weakness of left upper extremity (2018)      BPH (benign prostatic hyperplasia) (2018)      Care Plan   1. Left Upper ext weakness/slurred speech/CVA? 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     2. Chronic Left Knee pain  -previous hx of multiple arthrosplastys to both knees  -x-ray noting no effusion -pt/ot  -Ortho as outpatient     3. PAF  -on coumadin  -INR therapeautic 2.1  -recheck INR in am  -hold on bb for today     3. SSS  -s/p PPM     4. BPH  -flowmax     5. Hx of Asthma  -nebs  -stable     DVT prophylaxis  -scd/teds  -on coumadin

## 2018-02-08 NOTE — PROGRESS NOTES
Problem: Self Care Deficits Care Plan (Adult)  Goal: *Acute Goals and Plan of Care (Insert Text)  Occupational Therapy Goals  Initiated 2/8/2018 within 7 day(s). 1.  Patient will perform grooming tasks at EOB with supervision/set-up and minimal verbal cues to utilized LUE as gross assist.  2.  Patient will perform upper body/lower body dressing with minimal assistance utilizing adaptive strategies for LUE, prn.  3.  Patient will perform functional task in standing for 5 minutes with minimal assistance for balance to increase activity tolerance for ADLs. 4.  Patient will perform toilet transfers with minimal assistance. 5.  Patient will perform all aspects of toileting with minimal assistance. 6.  Patient will participate in upper extremity therapeutic exercise/activities with minimal assistance for 8 minutes to maintain/increase ROM of LUE for ADLs. 7.  Patient will utilize energy conservation techniques during functional activities with minimal cues. Outcome: Progressing Towards Goal  Occupational Therapy EVALUATION    Patient: Aundrea Mccormick (35 y.o. male)  Date: 2/8/2018  Primary Diagnosis: Stroke [facility])        Precautions:  Fall  PLOF: Pt requires additional time for all ADLs. Transfers self from wheelchair to wheeled office chair to maneuver around house PTA. ASSESSMENT :  Based on the objective data described below, the patient presents with impairments with regard to LUE function and independence in ADLs. Pt agreeable to therapy; no c/o pain. Supervision for bed mobility; impaired sitting balance (minimal cervical ext.). Good AROM & strength of RUE. Decreased coordination of L hand due to nerve damage. Unable to actively extend digits 3-5. Performs weak lateral pinch in prep for ADLs. Min A for container management during self care.  Educated on nerve glides for LUE & to avoid forcing & holding digits in extension as this will over stretch & weaken anterior aspect of hand; pt verbalized understanding. Standing not assessed due to 1 person assist. Pt reports very weak L knee; utilizes wheelchair for functional mobility PTA. Home is not accessible. Recommend rehab & outpatient (pt will benefit from fabricated splint for jt integrity of LUE). Pt left at EOB with needs within reach. Recommend continued therapy. Lupillo Lazo RN aware. Patient will benefit from skilled intervention to address the above impairments. Patients rehabilitation potential is considered to be Fair  Factors which may influence rehabilitation potential include:   []             None noted  []             Mental ability/status  [x]             Medical condition  []             Home/family situation and support systems  []             Safety awareness  []             Pain tolerance/management  []             Other:     Recommendations for nursing: EOB with assist x1 for meals       PLAN :  Recommendations and Planned Interventions:  [x]               Self Care Training                  [x]        Therapeutic Activities  [x]               Functional Mobility Training    []        Cognitive Retraining  [x]               Therapeutic Exercises           [x]        Endurance Activities  [x]               Balance Training                   []        Neuromuscular Re-Education  []               Visual/Perceptual Training     [x]   Home Safety Training  [x]               Patient Education                 [x]        Family Training/Education  []               Other (comment):    Frequency/Duration: Patient will be followed by occupational therapy 3-5 times a week to address goals.   Discharge Recommendations: Rehab and Outpatient (for nerve damage; will need splint)  Further Equipment Recommendations for Discharge: Anticipate none     SUBJECTIVE:   Patient stated I have a lot going on.    OBJECTIVE DATA SUMMARY:     Past Medical History:   Diagnosis Date    Asthma     BPH (benign prostatic hyperplasia)     Herniated disc, cervical     Knee pain     Pacemaker 2011    [facility]    PAF (paroxysmal atrial fibrillation) ([facility] [address]     During Pacer interogation     SSS (sick sinus syndrome) ([facility])     S/P Dual chamber [employer] Pacemaker     Past Surgical History:   Procedure Laterality Date    HX HERNIA REPAIR Bilateral 1989    HX HIP REPLACEMENT  2006    right    HX KNEE REPLACEMENT Bilateral 1992/1993/2006/2008/2011/2012/2013    HX PACEMAKER  2011     Barriers to Learning/Limitations: None  Compensate with: visual, verbal, tactile, kinesthetic cues/model    GCODES:  Self Care  Current  CL= 60-79%   Goal  CJ= 20-39%. The severity rating is based on the Other Functional Assessment, MMT< ROM    Eval Complexity: History: MEDIUM Complexity : Expanded review of history including physical, cognitive and psychosocial  history ; Examination: MEDIUM Complexity : 3-5 performance deficits relating to physical, cognitive , or psychosocial skils that result in activity limitations and / or participation restrictions; Decision Making:MEDIUM Complexity : Patient may present with comorbidities that affect occupational performnce. Miniml to moderate modification of tasks or assistance (eg, physical or verbal ) with assesment(s) is necessary to enable patient to complete evaluation     Prior Level of Function/Home Situation:  Pt requires additional time for all ADLs. Transfers self from wheelchair to wheeled office chair to maneuver around house PTA.   Home Situation  Home Environment: Private residence  # Steps to Enter: 7  Rails to Enter: Yes  Hand Rails : Bilateral (unable to reach both rails simultaneously)  One/Two Story Residence: Two story, live on 1st floor  Living Alone: No  Support Systems: Child(ezekiel)  Patient Expects to be Discharged to[de-identified] Private residence  Current DME Used/Available at Home: Grab bars, Walker, rolling, Wheelchair, Shower chair  Tub or Shower Type: Shower (unable to transfer in/out; washes up on shower chair at sink)  [x]  Right hand dominant   []  Left hand dominant    Cognitive/Behavioral Status:  Neurologic State: Alert  Orientation Level: Oriented X4  Cognition: Appropriate decision making; Appropriate for age attention/concentration; Appropriate safety awareness; Follows commands  Safety/Judgement: Awareness of environment; Fall prevention     Skin: intact (BUEs)  Edema: none noted (BUEs)    Vision/Perceptual:    Acuity: Within Defined Limits      Coordination:  Coordination: Generally decreased, functional (RUE WFL; LUE generally/grossly decreased finger coordination)  Fine Motor Skills-Upper: Right Intact; Left Impaired    Gross Motor Skills-Upper: Right Intact; Left Intact (L grossly intact)     Balance:  Sitting: Impaired  Sitting - Static: Good (unsupported)  Sitting - Dynamic: Fair (occasional)  Standing:  (not assessed due to 1 person assist)     Strength:  Strength: Generally decreased, functional (RUE 5/5; LUE: 4/5; decreased hand strength d/t nerve damage)    Tone & Sensation:  Tone: Normal (BUEs)  Sensation: Impaired (LUE)    Range of Motion:  AROM: Generally decreased, functional (RUE WFL; LUE decreased active finger extension)  PROM: Within functional limits (BUEs)    Functional Mobility and Transfers for ADLs:  Bed Mobility:  Supine to Sit: Supervision  Sit to Supine:  (pt left sitting at EOB)     Transfers:  Sit to Stand:  (not assessed secondary to 1 person assist)    ADL Assessment:  Feeding: Minimum assistance (for container management)  Oral Facial Hygiene/Grooming: Minimum assistance (for container management)  Bathing: Minimum assistance  Upper Body Dressing: Minimum assistance  Lower Body Dressing: Maximum assistance  Toileting: Minimum assistance    ADL Intervention:  Grooming  Grooming Assistance: Minimum assistance  Brushing Teeth: Minimum assistance  Cues: Physical assistance;Verbal cues provided    Lower Body Dressing Assistance  Dressing Assistance: Maximum assistance  Socks: Maximum assistance  Leg Crossed Method Used: No  Position Performed: Seated edge of bed  Cues: Mirror for visual feedback;Don;Verbal cues provided    At EOB with supervision for balance, min A for oral care due to decreased coordination & function of L hand due to nerve damage. Max A for LB dressing. Educated on adaptive strategies for LB dressing (avoid buttons; slip on or velcro shoes). Cognitive Retraining  Safety/Judgement: Awareness of environment; Fall prevention    Therapeutic Exercise:  At EOB, pt educated on nerve glides to perform 3x daily to maintain/increase ROM digits of LUE. Pt demo'd understanding. Reinforced gentle ROM vs. Pt forcing digits into extension & holding position as this will elongate & overstretch tendons on anterior aspect of hand. Pain:  Pre treatment pain level: 0/10  Post treatment pain level: 0/10  Pain Scale 1: Numeric (0 - 10)  Pain Intensity 1: 0    Activity Tolerance:  Fair-  Please refer to the flowsheet for vital signs taken during this treatment. After treatment:   [] Patient left in no apparent distress sitting up in chair  [x] Patient left in no apparent distress at EOB  [x] Call bell left within reach  [x] Nursing notified  [] Caregiver present  [] Bed alarm activated    COMMUNICATION/EDUCATION: Pt educated on role of OT, POC, and home safety. He verbalized understanding. [x] Home safety education was provided and the patient/caregiver indicated understanding. [x] Patient/family have participated as able in goal setting and plan of care. [x] Patient/family agree to work toward stated goals and plan of care. [] Patient understands intent and goals of therapy, but is neutral about his/her participation. [] Patient is unable to participate in goal setting and plan of care.     Thank you for this referral.    Margarita Cutler MS OTR/L  Time Calculation: 37 mins

## 2018-02-08 NOTE — PROGRESS NOTES
[facility]   Discharge Planning/ Assessment    Reasons for Intervention: Chart reviewed and pt verified demographics. He has [employer] and [employer] secondary. Dr Elaine Ulloa is his PCP, last seen in December. He lives with his son Kristofer Parents [phone], his NOK. His son helps him into the shower, and cooks for him. Pt says he has had 5 left knee operations and currently is unable to walk, he uses a wheel chair. He has [address], a nurse and physical therapy. He says he would not need a snf' since he already has the care at home with [facility]. He says the MD told him he has nerve damage to his left hand, not a stroke. He says he can not get into or out of his house right now due to his bad knee, and will need transportation home and be taken up the 7 steps to his home. Plan is home, continue with home services.     High Risk Criteria  [x] Yes  []No   Physician Referral  [x] Yes  []No        Date    Nursing Referral  [] Yes  []No        Date    Patient/Family Request  [] Yes  []No        Date       Resources:    Medicare  [x] Yes  []No   Medicaid  [] Yes  []No   No Resources  [] Yes  []No   Private Insurance  [x] Yes  []No    Name/Phone Number    Other  [] Yes  []No        (i.e. Workman's Comp)         Prior Services:    Prior Services  [x] Yes  []No   Home Health  [x] Yes  []No   [facility]  [] Yes  []No        Number of [address]  [] Yes  []No       Meals on Wheels  [] Yes  []No   Office on Aging  [] Yes  []No   Transportation Services  [] Yes  []No   [address]  [] Yes  []No        Nursing Home Name    [address]  [] Yes  []No        [address] Name    Other       Information Source:      Information obtained from  [x] Patient  [] Parent   [] [address]  [] Child  [] Spouse   [] Significant Other/Partner   [] Friend      [] EMS    [] Nursing Home Chart          [] Other:   Chart Review  [x] Yes  []No Family/Support System:    Patient lives with  [] Alone    [] Spouse   [] Significant Other  [x] Children  [] Caretaker   [] Parent  [] Sibling     [] Other       Other Support System:    Is the patient responsible for care of others  [] Yes  [x]No   Information of person caring for patient on  discharge    Managers financial affairs independently  [x] Yes  []No   If no, explain:      Status Prior to Admission:    Mental Status  [x] Awake  [] Alert  [] Oriented  [] Quiet/Calm [] Lethargic/Sedated   [] Disoriented  [] Restless/Anxious  [] Combative   Personal Care  [x] Dependent  [] [address]  [] Requires Assistance   Meal Preparation Ability  [] Independent   [] Standby Assistance   [] Minimal Assistance   [] Moderate Assistance  [x] Maximum Assistance     [] Total Assistance   Chores  [] Independent with Chores   [] N/A Nursing Home Resident   [x] Requires Assistance   Bowel/Bladder  [x] Continent  [] Catheter  [] Incontinent  [] Ostomy Self-Care    [] Urine Diversion Self-Care  [] Maximum Assistance     [] Total Assistance   Number of Persons needed for assistance    DME at home  [] Ike Marcial  [] Janie Marcial   [] Commode    [] Bathroom/Grab Bars  [] Hospital Bed  [] Nebulizer  [] Oxygen           [] Raised Toilet Seat  [] Shower Chair  [] Side Rails for Bed   [] Tub Transfer Bench   [] Zina Puller  [] Cathleen Goldberg, Standard      [] Other:   Vendor      Treatment Presently Receiving:    Current Treatments  [] Chemotherapy  [] Dialysis  [] Insulin  [] IVAB [x] IVF   [] O2  [] PCA   [] PT   [] RT   [] Tube Feedings   [] Wound Care     Psychosocial Evaluation:    Verbalized Knowledge of Disease Process  [] Patient  []Family   Coping with Disease Process  [] Patient  []Family   Requires Further Counseling Coping with Disease Process  [] Patient  []Family     Identified Projected Needs:    Home Health Aid  [x] Yes  []No   Transportation  [x] Yes  []No   Education  [] Yes  []No        Specific Education     Financial Counseling  [] Yes  []No   Inability to Care for Self/Will Require 24 hour care  [] Yes  []No   Pain Management  [] Yes  []No   Home Infusion Therapy  [] Yes  []No   Oxygen Therapy  [] Yes  []No   DME  [] Yes  []No   Long Term Care Placement  [] Yes  []No   Rehab  [] Yes  []No   Physical Therapy  [x] Yes  []No   Needs Anticipated At This Time  [x] Yes  []No     Intra-Hospital Referral:    [facility]  [x] Yes  []No     [] Yes  []No   Patient Representative  [] Yes  []No   Staff for Teaching Needs  [] Yes  []No   Specialty Teaching Needs     Diabetic Educator  [] Yes  []No   Referral for Diabetic Educator Needed  [] Yes  []No  If Yes, place order for Nutritionist or Diabetic Consult     Tentative Discharge Plan:    Home with No Services  [] Yes  [x]No   Home with Home Health Follow-up  [x] Yes  []No        If Yes, specify type    Home Care Program  [] Yes  []No        If Yes, specify type    Meals on Wheels  [] Yes  []No   Office of Aging  [] Yes  []No   NHP  [] Yes  []No   Return to the Nursing Home  [] Yes  []No   Rehab Therapy  [] Yes  []No   Acute Rehab  [] Yes  []No   Subacute Rehab  [] Yes  []No   Private Care  [] Yes  []No   Substance Abuse Referral  [] Yes  []No   Transportation  [] Yes  []No   Chore Service  [] Yes  []No   Inpatient Hospice  [] Yes  []No   OP RT  [] Yes  [] No   OP Hemo  [] Yes  [] No   OP PT  [] Yes  []No   Support Group  [] Yes  []No   Reach to Recovery  [] Yes  []No   OP Oncology Clinic  [] Yes  []No   Clinic Appointment  [] Yes  []No   DME  [] Yes  []No   Comments    Name of D/C Planner or  Given to Patient or Family Shlomo Rascon. Enma Fischer   Phone Number         Extension [phone]   Date 02/08/18   Time    If you are discharged home, whom do you designate to participate in your discharge plan and receive any information needed?      Enter name of designee son        Phone # of designee         Address of designee         Updated         Patient refused to designate any           individual

## 2018-02-08 NOTE — ACP (ADVANCE CARE PLANNING)
Patient has designated ____son____________________ to participate in his/her discharge plan and to receive any needed information.      Name: Erica General  Address:  Phone [phone]

## 2018-02-08 NOTE — PROGRESS NOTES
Problem: Mobility Impaired (Adult and Pediatric)  Goal: *Acute Goals and Plan of Care (Insert Text)  Physical Therapy Goals  Initiated 2/8/2018 and to be accomplished within 7 day(s)  1. Patient will move from supine to sit and sit to supine , scoot up and down and roll side to side in bed with modified independence. 2.  Patient will transfer from bed to chair and chair to bed with modified independence using the least restrictive device. 3.  Patient will perform sit to stand with modified independence. 4.  Patient will ambulate with modified independence for 5 feet with the least restrictive device. Outcome: Progressing Towards Goal  physical Therapy EVALUATION    Patient: Casi Grewal (76 y.o. male)  Date: 2/8/2018  Primary Diagnosis: Stroke [facility])        Precautions:   Fall    PROBLEM LIST:  Patient presents with the following problems:   Bed Mobility, Transfers, Gait, Strength, Balance and Precautions  ASSESSMENT :   Patient requires between supervision/set-up and minimal assistance/contact guard assist for bed mobility, transfers   Unable to complete ambulation per patient due to  Unstable left knee. patient only able to stand today. Pain reported in left leg like cramp starting at toes and going up to hip no number given . . Education of exercises to complete while in bed, plan of care, safety and increase sitting at eob  Due to chairs too low in ICU. Verbalized understanding needs reinforcement. .     patient reports  7 steps to egress home that are steep patient unable to ascend steps will need transportation to go home. Patient will benefit from skilled intervention to address the above impairments.   Patients rehabilitation potential is considered to be Fair  Factors which may influence rehabilitation potential include:   []         None noted  []         Mental ability/status  [x]         Medical condition  []         Home/family situation and support systems  []         Safety awareness  []         Pain tolerance/management  []         Other:      PLAN :  Recommendations and Planned Interventions:  [x]           Bed Mobility Training             [x]    Neuromuscular Re-Education  [x]           Transfer Training                   []    Orthotic/Prosthetic Training  [x]           Gait Training                          []    Modalities  [x]           Therapeutic Exercises          []    Edema Management/Control  [x]           Therapeutic Activities            [x]    Patient and Family Training/Education  []           Other (comment):    Frequency/Duration: Patient will be followed by physical therapy 3-5 times a week to address goals. Discharge Recommendations: Home Health  Further Equipment Recommendations for Discharge: rollator? For seat in home      SUBJECTIVE:   Patient stated .    OBJECTIVE DATA SUMMARY:     Past Medical History:   Diagnosis Date    Asthma     BPH (benign prostatic hyperplasia)     Herniated disc, cervical     Knee pain     Pacemaker 2011    [facility]    PAF ([facility]oxysmal [facility]) [facility] [address]     During Pacer interogation     SSS (sick sinus syndrome) ([facility] [address]     S/P Dual chamber [employer] Pacemaker     Past Surgical History:   Procedure Laterality Date    HX HERNIA REPAIR Bilateral 1989    HX HIP REPLACEMENT  2006    right    HX KNEE REPLACEMENT Bilateral 1992/1993/2006/2008/2011/2012/2013    HX PACEMAKER  2011     Barriers to Learning/Limitations: yes;  physical  Compensate with: visual, verbal, tactile, kinesthetic cues/model    G CODES:Mobility  Current  CL= 60-79%   Goal  CJ= 20-39%. The severity rating is based on the Other [facility] [facility] Balance Scale2/5   [facility] Standing Balance Scale2/5  0: Pt performs 25% or less of standing activity (Max assist) CN, 100% impaired. 1: Pt supports self with upper extremities but requires therapist assistance.  Pt performs 25-50% of effort (Mod assist) CM, 80% to <100% impaired. 1+: Pt supports self with upper extremities but requires therapist assistance. Pt performs >50% effort. (Min assist). CL, 60% to <80% impaired. 2: Pt supports self independently with both upper extremities (walker, crutches, parallel bars). CL, 60% to <80% impaired. 2+: Pt support self independently with 1 upper extremity (cane, crutch, 1 parallel bar). CK, 40% to <60% impaired. 3: Pt stands without upper extremity support for up to 30 seconds. CK, 40% to <60% impaired. 3+: Pt stands without upper extremity support for 30 seconds or greater. CJ, 20% to <40% impaired. 4: Pt independently moves and returns center of gravity 1-2 inches in one plane. CJ, 20% to <40% impaired. 4+: Pt independently moves and returns center of gravity 1-2 inches in multiple planes. CI, 1% to <20% impaired. 5: Pt independently moves and returns center of gravity in all planes greater than 2 inches. CH, 0% impaired.     Eval Complexity: History: HIGH Complexity :3+ comorbidities / personal factors will impact the outcome/ POC Exam:MEDIUM Complexity : 3 Standardized tests and measures addressing body structure, function, activity limitation and / or participation in recreation  Presentation: MEDIUM Complexity : Evolving with changing characteristics  Clinical Decision Making:Medium Complexity [facility] Standing Balance Scale2/5 Overall Complexity:MEDIUM    Prior Level of Function/Home Situation:  Transfers wheel chair to bed and to office chair for moving in home   [address]: Private residence  # Steps to Enter: 7  Rails to Enter: Yes  Hand Rails : Bilateral (unable to reach both rails simultaneously)  One/Two Story Residence: Two story, live on 1st floor  Living Alone: No  Support Systems: Child(ezekiel)  Patient Expects to be Discharged to[de-identified] Private residence  Current DME Used/Available at Home: Grab bars, Walker, rolling, Wheelchair, Shower chair  Tub or Shower Type: Shower (unable to transfer in/out; washes up on shower chair at sink)  Critical Behavior:  Neurologic State: Alert  Orientation Level: Oriented X4  Cognition: Appropriate decision making; Appropriate for age attention/concentration; Appropriate safety awareness; Follows commands  Safety/Judgement: Awareness of environment; Fall prevention  Psychosocial  Patient Behaviors: Calm; Cooperative  Purposeful Interaction: Yes  Pt Identified Daily Priority: Clinical issues (comment)  Car[employer] Process: Nurture loving kindness;Establish trust;Teaching/learning; Attend basic human needs;Create healing environment  Caring Interventions: Reassure  Reassure: Therapeutic listening; Informing;Caring rounds  Skin Condition/Temp: Warm;Dry  Skin Integrity: Scars (comment) (left hip, bilateral knees)  Skin Integumentary  Skin Color: Appropriate for ethnicity  Skin Condition/Temp: Warm;Dry  Skin Integrity: Scars (comment) (left hip, bilateral knees)  Turgor: Non-tenting  Hair Growth: Present  Varicosities: Absent  Strength:    Strength: Generally decreased, functional (3+/5 left<right )  Tone & Sensation:   Tone: Normal  Sensation: Impaired (left foot )  Range Of Motion:  AROM: Generally decreased, functional (decreased knee ext)  PROM: Within functional limits  Functional Mobility:  Bed Mobility:  Supine to Sit: Supervision  Sit to Supine: Supervision  Transfers:  Sit to Stand: Contact guard assistance;Minimum assistance; Additional time;Assist x1  Stand to Sit: Contact guard assistance;Minimum assistance; Additional time;Assist x1  Balance:   Sitting: Impaired  Sitting - Static: Good (unsupported)  Sitting - Dynamic: Fair (occasional)  Standing: Impaired  Standing - Static: Fair  Standing - Dynamic : Poor  Ambulation/Gait Training:  Gait Description (WDL): Exceptions to WDL (unable to test )  Therapeutic Exercises:   ankle pumps glut sets  Quad set   Pain:  Pre treatment pain level:0  Post treatment pain level:0  Pain Scale 1: Numeric (0 - 10)  Pain Intensity 1: 0  Activity Tolerance:   Fair   Please refer to the flowsheet for vital signs taken during this treatment. After treatment:   []         Patient left in no apparent distress sitting up in chair  [x]         Patient left in no apparent distress in bed  [x]         Call bell left within reach  [x]         Nursing notified  []         Caregiver present  []         Bed alarm activated    COMMUNICATION/EDUCATION:   [x]         Fall prevention education was provided and the patient/caregiver indicated understanding. [x]         Patient/family have participated as able in goal setting and plan of care. [x]         Patient/family agree to work toward stated goals and plan of care. []         Patient understands intent and goals of therapy, but is neutral about his/her participation. []         Patient is unable to participate in goal setting and plan of care. Patient educated on the role of physical therapy during the acute stay  and the importance of mobility. VU.needs reinforcement.      Thank you for this referral.  Armen Perez, PT   Time Calculation: 20 mins

## 2018-02-08 NOTE — ROUTINE PROCESS
1900- Patient arrived from ED. Monitor applied and patient in bed with side rails up. Bed in lowest position and locked.  Patient resting comfortably at this time

## 2018-02-08 NOTE — PROGRESS NOTES
patient is alert and oriented. L hand weakness and L leg weakness but no drift in any extremities. decreased sensation/numbness in bilateral lower extremities below the knee due to neuropathy. NSR paced on the monitor. lung sounds diminished. will continue to monitor    message left for son EL Díaz per patient request    patient mentioned scheduled outpatient colonoscopy and bone density scan that he is concerned he will miss while hospitalized. passed bedside swallow eval and tolerated PO medications without difficulty. 0600  synthroid held patient states he does not take it    Bedside and Verbal shift change report given to Antoni Pitt RN (oncoming nurse) by Toño Balbuena RN   (offgoing nurse). Report included the following information SBAR, Kardex, Intake/Output, MAR and Alarm Parameters .

## 2018-02-08 NOTE — PROGRESS NOTES
Neurology Progress Note    Admit Date: 2/7/2018  Primary Care: Jessica Gonzalez MD     Subjective:   Principle Problem: Stroke [facility])   Awake and alert. Still with left hand weakness. Denies change in chronic paresthesias in the left hand. Reviewing history, he doesn't think his left leg was any different yesterday, but speech was different when called 911 worrying about stroke. Speech ok when got to ER    Objective:  Patient Vitals for the past 8 hrs:   BP Temp Pulse Resp SpO2   02/08/18 1400 118/55 - (!) 105 16 100 %   02/08/18 1300 124/69 - 76 17 100 %   02/08/18 1200 (!) 118/103 98.5 °F (36.9 °C) 79 16 99 %   02/08/18 1100 126/57 - 96 19 100 %   02/08/18 1000 123/62 - 82 13 100 %   02/08/18 0900 120/56 - 69 12 100 %   02/08/18 0800 114/53 98 °F (36.7 °C) 79 17 99 %       Physical Exam:  Awake, alert, NAD  Speech ok. No facial weakness, strength in left deltoid, biceps, triceps, wrist ext and flexors and finger flexors 5/5. 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 Speech complaints raise the possibility of an unusual stroke, but I think this is less likely. On coumadin with INR 2.1  Control vascular risk factors and follow up with neurology in 3 weeks.   Knee evaluation per medicine/ortho      Medications:      Current Facility-Administered Medications   Medication Dose Route Frequency    0.9% sodium chloride with KCl 20 mEq/L infusion   IntraVENous CONTINUOUS    levothyroxine (SYNTHROID) tablet 25 mcg  25 mcg Oral ACB    gabapentin (NEURONTIN) capsule 600 mg  600 mg Oral BID    tamsulosin (FLOMAX) capsule 0.4 mg  0.4 mg Oral DAILY    ondansetron (ZOFRAN) injection 2 mg  2 mg IntraVENous Q6H PRN    aspirin (ASPIRIN) tablet 325 mg  325 mg Oral DAILY    atorvastatin (LIPITOR) tablet 80 mg  80 mg Oral QHS    acetaminophen (TYLENOL) tablet 650 mg  650 mg Oral Q4H PRN    acetaminophen (TYLENOL) suppository 650 mg  650 mg Rectal Q4H PRN    senna-docusate (PERICOLACE) 8.6-50 mg per tablet 2 Tab  2 Tab Oral QHS    albuterol (PROVENTIL VENTOLIN) nebulizer solution 2.5 mg  2.5 mg Nebulization S3S PRN    folic acid (FOLVITE) 1 mg, thiamine (B-1) 100 mg in 0.9% sodium chloride 50 mL ivpb   IntraVENous ACD    gabapentin (NEURONTIN) capsule 1,200 mg  1,200 mg Oral QHS    famotidine (PEPCID) tablet 20 mg  20 mg Oral BID    warfarin (COUMADIN) tablet 4 mg  4 mg Oral DAILY      Data:     Recent Results (from the past 24 hour(s))   GLUCOSE, POC    Collection Time: 02/08/18 12:20 AM   Result Value Ref Range    Glucose (POC) 129 (H) 70 - 110 mg/dL           Carrie Thompson MD  2/8/2018  3:48 PM

## 2018-02-08 NOTE — PROGRESS NOTES
Problem: Falls - Risk of  Goal: *Absence of Falls  Document Anne Fall Risk and appropriate interventions in the flowsheet.    Outcome: Progressing Towards Goal  Fall Risk Interventions:  Mobility Interventions: Bed/chair exit alarm         Medication Interventions: Bed/chair exit alarm    Elimination Interventions: Call light in reach, Bed/chair exit alarm, Patient to call for help with toileting needs, Toileting schedule/hourly rounds, Urinal in reach

## 2018-02-08 NOTE — PROGRESS NOTES
Problem: Dysphagia (Adult)  Goal: *Acute Goals and Plan of Care (Insert Text)  Recommendations:  Diet: regular/thin  Meds: per pt preference  Aspiration Precautions  Other: eval only      Patient will:  1. Participate in training and education related to continued aspiration risk, diet recs and compensatory strategies (goal met). Outcome: Resolved/Met Date Met: 02/08/18  Speech LAnguage Pathology bedside swallow evaluation AND DISCHARGE    Patient: Nickolas Parrish (1 y.o. male)  Date: 2/8/2018  Primary Diagnosis: Stroke [facility])        Precautions:      ASSESSMENT :  Clinical beside swallow eval completed per MD orders. Pt A&Ox4. Functional communication. Intelligibility >90%. Cognitive-linguistic function appears intact. OM examination revealed oral motor structures functional for mastication and deglutition. Presented with thin liquid, puree, and solid trials. Exhibited adequate bolus cohesion, manipulation and A-P transit. Further exhibited adequate swallow timing/reflex and hyolaryngeal excursion. Pt able to manipulate and clear with 0 clinical s/s aspiration and/or oropharyngeal dysphagia. Pt safe for regular solid, thin liquid diet. 0 formal ST needs for dysphagia indicated at this time. SLP educated pt on role of speech therapist in current setting with re: speech/swallow; verbalized comprehension. SLP available for re-evaluation if indicated by MD. Results d/w RN, Jennifer Yates. Thank you for this referral.   Jennifer Forte, SLP     PLAN :  Recommendations and Planned Interventions:  No formal ST needs ID'd for dysphagia. Eval only. Discharge Recommendations: None     SUBJECTIVE:   Patient stated \"Can I have coffee? .     OBJECTIVE:     Past Medical History:   Diagnosis Date    Asthma     BPH (benign prostatic hyperplasia)     Herniated disc, cervical     Knee pain     Pacemaker 2011    [facility]    PAF [facility]paroxysmal [facility] fibrillation) ([facility])     During Pacer interogation     SSS (sick sinus syndrome) ([facility]     S/P Dual chamber [employer] Pacemaker     Past Surgical History:   Procedure Laterality Date    HX HERNIA REPAIR Bilateral 1989    HX HIP REPLACEMENT  2006    right    HX KNEE REPLACEMENT Bilateral 1992/1993/2006/2008/2011/2012/2013    HX PACEMAKER  2011     Prior Level of Function/Home Situation: lives with family  Home Situation  Home Environment: Private residence  # Steps to Enter: 7  One/Two Story Residence: Two story, live on 1st floor  Living Alone: No  Support Systems: Family member(s)  Patient Expects to be Discharged to[de-identified] Private residence  Current DME Used/Available at Home: Amedeo Grave, Wheelchair  Diet prior to admission: regular/thin  Current Diet:  Regular/thin   Cognitive and Communication Status:  Neurologic State: Alert  Orientation Level: Oriented X4  Cognition: Appropriate for age attention/concentration  Perception: Appears intact  Perseveration: No perseveration noted  Safety/Judgement: Awareness of environment  Oral Assessment:  Oral Assessment  Labial: No impairment  Dentition: Intact  Oral Hygiene: good  Lingual: No impairment  Velum: No impairment  Mandible: No impairment  P.O. Trials:  Patient Position: HOB 60  Vocal quality prior to P.O.: No impairment  Consistency Presented: Thin liquid; Solid  How Presented: Self-fed/presented;Straw;Successive swallows     Bolus Acceptance: No impairment  Bolus Formation/Control: No impairment     Propulsion: No impairment  Oral Residue: None  Initiation of Swallow: No impairment  Laryngeal Elevation: Functional  Aspiration Signs/Symptoms: None  Pharyngeal Phase Characteristics: No impairment, issues, or problems      Cues for Modifications: None       Oral Phase Severity: No impairment  Pharyngeal Phase Severity : No impairment    GCODESwallowing:  Swallow Current Status CH= 0%   Swallow Goal Status CH= 0%   Swallow D/C Status CH= 0%    The severity rating is based on the following outcomes:  RACHEL Noms Swallow Level 7 Clinical Judgement      PAIN:  Start of Eval: 0  End of Eval: 0     After evaluation:   []            Patient left in no apparent distress sitting up in chair  [x]            Patient left in no apparent distress in bed  [x]            Call bell left within reach  [x]            Nursing notified  []            Family present  []            Caregiver present  []            Bed alarm activated      COMMUNICATION/EDUCATION:   [x]            Aspiration precautions; swallow safety; compensatory techniques. []            Patient/family have participated as able in goal setting and plan of care. []            Patient/family agree to work toward stated goals and plan of care. []            Patient understands intent and goals of therapy; neutral about participation. []            Patient unable to participate in goal setting/plan of care; educ ongoing with interdisciplinary staff  []         Posted safety precautions in patient's room.     Thank you for this referral.  SUSAN Alfaro  Time Calculation: 15 mins

## 2018-02-09 VITALS
OXYGEN SATURATION: 97 % | HEIGHT: 77 IN | WEIGHT: 265.43 LBS | TEMPERATURE: 98 F | BODY MASS INDEX: 31.34 KG/M2 | HEART RATE: 82 BPM | SYSTOLIC BLOOD PRESSURE: 126 MMHG | RESPIRATION RATE: 15 BRPM | DIASTOLIC BLOOD PRESSURE: 65 MMHG

## 2018-02-09 LAB
INR PPP: 1.9 (ref 0.8–1.2)
PROTHROMBIN TIME: 21.5 SEC (ref 11.5–15.2)

## 2018-02-09 PROCEDURE — 3331090002 HH PPS REVENUE DEBIT

## 2018-02-09 PROCEDURE — 3331090001 HH PPS REVENUE CREDIT

## 2018-02-09 PROCEDURE — 36415 COLL VENOUS BLD VENIPUNCTURE: CPT | Performed by: INTERNAL MEDICINE

## 2018-02-09 PROCEDURE — 85610 PROTHROMBIN TIME: CPT | Performed by: INTERNAL MEDICINE

## 2018-02-09 PROCEDURE — 97530 THERAPEUTIC ACTIVITIES: CPT

## 2018-02-09 PROCEDURE — 74011250637 HC RX REV CODE- 250/637: Performed by: INTERNAL MEDICINE

## 2018-02-09 PROCEDURE — 74011250637 HC RX REV CODE- 250/637: Performed by: NURSE PRACTITIONER

## 2018-02-09 PROCEDURE — 97535 SELF CARE MNGMENT TRAINING: CPT

## 2018-02-09 RX ADMIN — TAMSULOSIN HYDROCHLORIDE 0.4 MG: 0.4 CAPSULE ORAL at 09:19

## 2018-02-09 RX ADMIN — LEVOTHYROXINE SODIUM 25 MCG: 50 TABLET ORAL at 06:42

## 2018-02-09 RX ADMIN — ASPIRIN 325 MG ORAL TABLET 325 MG: 325 PILL ORAL at 09:18

## 2018-02-09 RX ADMIN — GABAPENTIN 600 MG: 300 CAPSULE ORAL at 09:19

## 2018-02-09 RX ADMIN — GABAPENTIN 600 MG: 300 CAPSULE ORAL at 12:18

## 2018-02-09 RX ADMIN — FAMOTIDINE 20 MG: 20 TABLET, FILM COATED ORAL at 09:19

## 2018-02-09 RX ADMIN — WARFARIN SODIUM 4 MG: 2 TABLET ORAL at 09:18

## 2018-02-09 NOTE — DISCHARGE INSTRUCTIONS
DISCHARGE SUMMARY from Nurse    PATIENT INSTRUCTIONS:    After general anesthesia or intravenous sedation, for 24 hours or while taking prescription Narcotics:  · Limit your activities  · Do not drive and operate hazardous machinery  · Do not make important personal or business decisions  · Do  not drink alcoholic beverages  · If you have not urinated within 8 hours after discharge, please contact your surgeon on call. Report the following to your surgeon:  · Excessive pain, swelling, redness or odor of or around the surgical area  · Temperature over 100.5  · Nausea and vomiting lasting longer than 4 hours or if unable to take medications  · Any signs of decreased circulation or nerve impairment to extremity: change in color, persistent  numbness, tingling, coldness or increase pain  · Any questions    What to do at Home:  Recommended activity: Activity as tolerated,     If you experience any of the following symptoms as listed under \" When should I call for help? \" in your discharge teaching  , please follow up with your Doctor and/ or call 911. *  Please give a list of your current medications to your Primary Care Provider. *  Please update this list whenever your medications are discontinued, doses are      changed, or new medications (including over-the-counter products) are added. *  Please carry medication information at all times in case of emergency situations. These are general instructions for a healthy lifestyle:    No smoking/ No tobacco products/ Avoid exposure to second hand smoke  Surgeon General's Warning:  Quitting smoking now greatly reduces serious risk to your health.     Obesity, smoking, and sedentary lifestyle greatly increases your risk for illness    A healthy diet, regular physical exercise & weight monitoring are important for maintaining a healthy lifestyle    You may be retaining fluid if you have a history of heart failure or if you experience any of the following symptoms:  Weight gain of 3 pounds or more overnight or 5 pounds in a week, increased swelling in our hands or feet or shortness of breath while lying flat in bed. Please call your doctor as soon as you notice any of these symptoms; do not wait until your next office visit. Recognize signs and symptoms of STROKE:    F-face looks uneven    A-arms unable to move or move unevenly    S-speech slurred or non-existent    T-time-call 911 as soon as signs and symptoms begin-DO NOT go       Back to bed or wait to see if you get better-TIME IS BRAIN. Warning Signs of HEART ATTACK     Call 911 if you have these symptoms:   Chest discomfort. Most heart attacks involve discomfort in the center of the chest that lasts more than a few minutes, or that goes away and comes back. It can feel like uncomfortable pressure, squeezing, fullness, or pain.  Discomfort in other areas of the upper body. Symptoms can include pain or discomfort in one or both arms, the back, neck, jaw, or stomach.  Shortness of breath with or without chest discomfort.  Other signs may include breaking out in a cold sweat, nausea, or lightheadedness. Don't wait more than five minutes to call 911 - MINUTES MATTER! Fast action can save your life. Calling 911 is almost always the fastest way to get lifesaving treatment. Emergency Medical Services staff can begin treatment when they arrive -- up to an hour sooner than if someone gets to the hospital by car. The discharge information has been reviewed with the patient. The patient verbalized understanding. Discharge medications reviewed with the patient and appropriate educational materials and side effects teaching were provided. Patient armband removed and shredded    Patient given [facility] Stroke Education Binder, Stroke Handouts or Verbal Education. Atrial Fibrillation: Care Instructions  Your Care Instructions    Atrial fibrillation is an irregular and often fast heartbeat.  Treating this condition is important for several reasons. It can cause blood clots, which can travel from your heart to your brain and cause a stroke. If you have a fast heartbeat, you may feel lightheaded, dizzy, and weak. An irregular heartbeat can also increase your risk for heart failure. Atrial fibrillation is often the result of another heart condition, such as high blood pressure or coronary artery disease. Making changes to improve your heart condition will help you stay healthy and active. Follow-up care is a key part of your treatment and safety. Be sure to make and go to all appointments, and call your doctor if you are having problems. It's also a good idea to know your test results and keep a list of the medicines you take. How can you care for yourself at home? Medicines  ? · Take your medicines exactly as prescribed. Call your doctor if you think you are having a problem with your medicine. You will get more details on the specific medicines your doctor prescribes. ? · If your doctor has given you a blood thinner to prevent a stroke, be sure you get instructions about how to take your medicine safely. Blood thinners can cause serious bleeding problems. ? · Do not take any vitamins, over-the-counter drugs, or herbal products without talking to your doctor first.   ? Lifestyle changes  ? · Do not smoke. Smoking can increase your chance of a stroke and heart attack. If you need help quitting, talk to your doctor about stop-smoking programs and medicines. These can increase your chances of quitting for good. ? · Eat a heart-healthy diet. ? · Stay at a healthy weight. Lose weight if you need to.   ? · Limit alcohol to 2 drinks a day for men and 1 drink a day for women. Too much alcohol can cause health problems. ? · Avoid colds and flu. Get a pneumococcal vaccine shot. If you have had one before, ask your doctor whether you need another dose. Get a flu shot every year.  If you must be around people with colds or flu, wash your hands often. Activity  ? · If your doctor recommends it, get more exercise. Walking is a good choice. Bit by bit, increase the amount you walk every day. Try for at least 30 minutes on most days of the week. You also may want to swim, bike, or do other activities. Your doctor may suggest that you join a cardiac rehabilitation program so that you can have help increasing your physical activity safely. ? · Start light exercise if your doctor says it is okay. Even a small amount will help you get stronger, have more energy, and manage stress. Walking is an easy way to get exercise. Start out by walking a little more than you did in the hospital. Gradually increase the amount you walk. ? · When you exercise, watch for signs that your heart is working too hard. You are pushing too hard if you cannot talk while you are exercising. If you become short of breath or dizzy or have chest pain, sit down and rest immediately. ? · Check your pulse regularly. Place two fingers on the artery at the palm side of your wrist, in line with your thumb. If your heartbeat seems uneven or fast, talk to your doctor. When should you call for help? Call 911 anytime you think you may need emergency care. For example, call if:  ? · You have symptoms of a heart attack. These may include:  ¨ Chest pain or pressure, or a strange feeling in the chest.  ¨ Sweating. ¨ Shortness of breath. ¨ Nausea or vomiting. ¨ Pain, pressure, or a strange feeling in the back, neck, jaw, or upper belly or in one or both shoulders or arms. ¨ Lightheadedness or sudden weakness. ¨ A fast or irregular heartbeat. After you call 911, the  may tell you to chew 1 adult-strength or 2 to 4 low-dose aspirin. Wait for an ambulance. Do not try to drive yourself. ? · You have symptoms of a stroke.  These may include:  ¨ Sudden numbness, tingling, weakness, or loss of movement in your face, arm, or leg, especially on only one side of your body. ¨ Sudden vision changes. ¨ Sudden trouble speaking. ¨ Sudden confusion or trouble understanding simple statements. ¨ Sudden problems with walking or balance. ¨ A sudden, severe headache that is different from past headaches. ? · You passed out (lost consciousness). ?Call your doctor now or seek immediate medical care if:  ? · You have new or increased shortness of breath. ? · You feel dizzy or lightheaded, or you feel like you may faint. ? · Your heart rate becomes irregular. ? · You can feel your heart flutter in your chest or skip heartbeats. Tell your doctor if these symptoms are new or worse. ? Watch closely for changes in your health, and be sure to contact your doctor if you have any problems. Where can you learn more? Go to http://tristan-shaye.info/. Enter U020 in the search box to learn more about \"Atrial Fibrillation: Care Instructions. \"  Current as of: September 21, 2016  Content Version: 11.4  © 0466-8204 [employer]. Care instructions adapted under license by [employer] (which disclaims liability or warranty for this information). If you have questions about a medical condition or this instruction, always ask your healthcare professional. [address] any warranty or liability for your use of this information.     ___________________________________________________________________________________________________________________________________

## 2018-02-09 NOTE — PROGRESS NOTES
Problem: Self Care Deficits Care Plan (Adult)  Goal: *Acute Goals and Plan of Care (Insert Text)  Occupational Therapy Goals  Initiated 2/8/2018 within 7 day(s). 1.  Patient will perform grooming tasks at EOB with supervision/set-up and minimal verbal cues to utilized LUE as gross assist.  2.  Patient will perform upper body/lower body dressing with minimal assistance utilizing adaptive strategies for LUE, prn.  3.  Patient will perform functional task in standing for 5 minutes with minimal assistance for balance to increase activity tolerance for ADLs. 4.  Patient will perform toilet transfers with minimal assistance. 5.  Patient will perform all aspects of toileting with minimal assistance. 6.  Patient will participate in upper extremity therapeutic exercise/activities with minimal assistance for 8 minutes to maintain/increase ROM of LUE for ADLs. 7.  Patient will utilize energy conservation techniques during functional activities with minimal cues. Outcome: Progressing Towards Goal  Occupational Therapy TREATMENT    Patient: Prema Garcia (58 y.o. male)  Date: 2/9/2018  Diagnosis: Stroke [facility]) Stroke [facility])       Precautions: Fall  Chart, occupational therapy assessment, plan of care, and goals were reviewed. PLOF: Modified Independent    ASSESSMENT:  Pt seated on BSC upon entry. Requires Min Assist for thoroughness w/bre-care 2/2 decrease dynamic standing balance. Pt states L knee could go out at anytime. Pt demonstrates stand pivot transfer w/good hand placement to EOB/chair, requiring CGA. Simple ADL grooming tasks performed w/set-up. Pt left in chair w/all needs within reach. Pt anxious for d/c home.   EDUCATION Pt educated on benefits of drop arm BSC  Progression toward goals:  [x]          Improving appropriately and progressing toward goals  []          Improving slowly and progressing toward goals  []          Not making progress toward goals and plan of care will be adjusted PLAN:  Patient continues to benefit from skilled intervention to address the above impairments. Continue treatment per established plan of care. Discharge Recommendations:  Home Health  Further Equipment Recommendations for Discharge:  Drop arm bedside commode     SUBJECTIVE:   Patient stated I'm ready to go home. I can tell I've already lost so much from being in this bed for three days.     OBJECTIVE DATA SUMMARY:       Cognitive/Behavioral Status:  Neurologic State: Alert  Orientation Level: Oriented X4  Cognition: Follows commands  Safety/Judgement: Awareness of environment, Fall prevention  Functional Mobility and Transfers for ADLs:   Transfers:  Sit to Stand: Contact guard assistance  Bed to Chair: Contact guard assistance (EOB --> chair xfer w/SPT)   Toilet Transfer : Contact guard assistance (BSC --> EOB xfer w/SPT)   Balance:  Sitting: Intact  Sitting - Static: Good (unsupported)  Sitting - Dynamic: Fair (occasional) (fair/fair plus)  Standing: Impaired  Standing - Static: Fair  Standing - Dynamic : Fair  ADL Intervention:  Grooming  Washing Hands: Supervision/set-up    Toileting  Toileting Assistance: Minimum assistance  Bowel Hygiene: Minimum assistance  Clothing Management: Contact guard assistance    Pain:  Pre Treatment:0  Post Treatment:0  Pain Scale 1: Numeric (0 - 10)  Pain Intensity 1: 0    Activity Tolerance:    Good    Please refer to the flowsheet for vital signs taken during this treatment.   After treatment:   [x]  Patient left in no apparent distress sitting up in chair  []  Patient left in no apparent distress in bed  [x]  Call bell left within reach  [x]  Nursing notified, PO Green  []  Caregiver present  []  Bed alarm activated    MICHAEL Fonseca  Time Calculation: 27 mins

## 2018-02-09 NOTE — PROGRESS NOTES
Nutrition initial assessment/Plan of care      RECOMMENDATIONS:   1. Cardiac Diet  2. Monitor weight, labs and PO intake  3. RD to follow     GOALS:   1. PO intake meets >75% of protein/calorie needs by 2/16  2. Weight Maintenance/gradual loss (1-2 lb/week) by 2/16      ASSESSMENT:   Wt status is classified as obese per BMI of 31.5. Adequate PO intake. A1C (6.8). Lipid panel WNL except HDL (30). Labs noted. Nutrition recommendations listed. RD to follow. Nutrition Diagnoses:   Obesity related to excessive energy intake as evidenced by BMI of 31.5 and 127% IBW    Nutrition Risk:  [] High  [] Moderate [x]  Low    SUBJECTIVE/OBJECTIVE:    Pt admitted for stroke. PMHx including BPH, PAF, SSS s/p PPM, herniated disc cervical, pacemaker, asthma, and knee pain s/p bilateral knee replacement. Pt seen in room sitting on edge of bed. Denies having any food allergies or problems chewing/swallowing. Reports having a good appetite and no recent wt fluctuation. Discussed some nutrition recommendations and provided a heart healthy diet handout. Pt to be D/C today. Information Obtained from:    [x] Chart Review   [x] Patient   [] Family/Caregiver   [] Nurse/Physician   [] Interdisciplinary Meeting/Rounds      Diet: Regular Diet  Medications: [x] Reviewed  IV: NS w/ KCl 20 mEq/L @100 mL/hr    Allergies: [x] Reviewed   Encounter Diagnoses     ICD-10-CM ICD-9-CM   1.  Dysarthria R47.1 784.51     Past Medical History:   Diagnosis Date    Asthma     BPH (benign prostatic hyperplasia)     Herniated disc, cervical     Knee pain     Pacemaker 2011    [employer]    PAF (paroxysmal atrial fibrillation) (HCC)     During Pacer interogation     SSS (sick sinus syndrome) (HCC)     S/P Dual chamber [employer] Pacemaker      Labs:  Lab Results   Component Value Date/Time    Sodium 137 02/07/2018 07:58 AM    Potassium 4.1 02/07/2018 07:58 AM    Chloride 103 02/07/2018 07:58 AM    CO2 27 02/07/2018 07:58 AM    Anion gap 7 02/07/2018 07:58 AM    Glucose 150 (H) 02/07/2018 07:58 AM    BUN 19 (H) 02/07/2018 07:58 AM    Creatinine 0.77 02/07/2018 07:58 AM    Calcium 8.8 02/07/2018 07:58 AM    Albumin 3.9 02/07/2018 07:58 AM     Anthropometrics: BMI (calculated): 31.5  Last 3 Recorded Weights in this Encounter    02/07/18 0806 02/08/18 0000   Weight: 126.6 kg (279 lb) 120.4 kg (265 lb 6.9 oz)    Ht Readings from Last 1 Encounters:   02/07/18 6' 5\" (1.956 m)     Patient Vitals for the past 100 hrs:   % Diet Eaten   02/09/18 0948 100 %   02/08/18 2000 100 %       IBW: 208 lb %IBW: 127%   [] Weight Loss [] Weight Gain [x] Weight Stable    Estimated Nutrition Needs: [x] MSJ RMR: 2037 Kcal  Calories: 2400 kcal Based on:   [x] Actual BW    Protein:   100-120 g Based on:   [x] Actual BW    Fluid:        3000 ml Based on:   [x] Actual BW      [x] No Cultural, Latter-day or ethnic dietary need identified.     [] Cultural, Latter-day and ethnic food preferences identified and addressed     Wt Status:  [] Normal (18.6 - 24.9) [] Underweight (<18.5) [] Overweight (25 - 29.9) [x] Mild Obesity (30 - 34.9)  [] Moderate Obesity (35 - 39.9) [] Morbid Obesity (40+)       Nutrition Problems Identified:   [] Suboptimal PO intake   [] Food Allergies  [] Difficulty chewing/swallowing/poor dentition  [] Constipation/Diarrhea   [] Nausea/Vomiting   [x] None  [] Other:     Plan:   [x] Therapeutic Diet  []  Obtained/adjusted food preferences/tolerances and/or snacks options   []  Supplements added   [] Occupational therapy following for feeding techniques  []  HS snack added   []  Modify diet texture   []  Modify diet for food allergies   [x]  Educate patient   []  Assist with menu selection   [x]  Monitor PO intake on meal rounds   [x]  Continue inpatient monitoring and intervention   []  Participated in discharge planning/Interdisciplinary rounds/Team meetings   []  Other:     Education Needs:   [] Not appropriate for teaching at this time due to:   [x] Identified and addressed    Nutrition Monitoring and Evaluation:  [x] Continue ongoing monitoring and intervention  [] Other    Caitlin Hutchinson

## 2018-02-09 NOTE — PROGRESS NOTES
Pt states he needs transport home, his son cannot get him up steps, called son, Nahum Jamison. He states he needs strtecher transport he cannot get up steps. called his sec ins they are closed. called [facility] spoke with neptali, pt has 265.00 copay and no auth given due to did not have cpt code. Ref # [id number]. Notified son  he will be responsible  For copay or if Kit Jacobs denies pt will be responsible for  total cost, he states that is fine.

## 2018-02-09 NOTE — PROGRESS NOTES
0700 Bedside and Verbal shift change report received from  [facility]. Report included the following information SBAR, Kardex, ED Summary, Intake/Output, MAR, Recent Results and Med Rec Status. Pt was calm in bed with no distress noted. He was bale to follow command and dual NIH was done. pt worked with PT. Sitting by bedside and tolerated the same well. SLP saw pt. New orders for a regular diet. 1900 Bedside and Verbal shift change report given to [address] [place] [address]  (oncoming nurse) by Adalid Lima   (offgoing nurse). Report included the following information SBAR, Kardex, ED Summary, Intake/Output, MAR and Recent Results.

## 2018-02-09 NOTE — PROGRESS NOTES
Pt is active with [facility]. Referral sent to them via [address] and called to their intake nurse, [place] LPN, for f/u. Care Management Interventions  PCP Verified by CM: Yes  Palliative Care Criteria Met (RRAT>21 & CHF Dx)?: No  Mode of Transport at Discharge: Other (see comment) (unable to walk and get up stairs, needs transport)  Transition of Care Consult (CM Consult): [address]: Yes  MyChart Signup: No  Discharge Durable Medical Equipment: No  Physical Therapy Consult: Yes  Occupational Therapy Consult: Yes  Speech Therapy Consult: Yes  Current Support Network:  Other (lives with son Tian Christine)  Confirm Follow Up Transport: Other (see comment)  Plan discussed with Pt/Family/Caregiver: Yes  Freedom of Choice Offered: Yes  Discharge Location  Discharge Placement: Home with home health

## 2018-02-09 NOTE — PROGRESS NOTES
TRANSFER - IN REPORT:    Verbal report received from Nacho Paez RN(name) on [employer]  being received from 2600(unit) for routine progression of care      Report consisted of patients Situation, Background, Assessment and   Recommendations(SBAR). Information from the following report(s) SBAR, Kardex and MAR was reviewed with the receiving nurse. Opportunity for questions and clarification was provided. 2100 - Assessment completed upon patients arrival to unit and care assumed. 0800 - Bedside and Verbal shift change report given to SIL Avery (oncoming nurse) by Jj Pro RN (offgoing nurse). Report included the following information SBAR, Kardex and MAR.

## 2018-02-09 NOTE — PROGRESS NOTES
Pt discharged home with no distress noted, accompanied by Medical transport via  stretcher to front entrance to vehicle. Pt has recieved discharge instructions, along with belongings. Voiced understanding of discharge instructions.

## 2018-02-09 NOTE — ROUTINE PROCESS
1922:  Rec'd Anish Skaggs  from Porsche Carreon RN. SBAR reviewed with two-nurse check-offs done of meds, dressings, wounds, LDA's & revelant assessment findings. Tonignt Bright, No pain, no complaints. Transfer order received, transfer effected without problem. TRANSFER - OUT REPORT:    Telephone Verbal report given to Tristan Cole (name) with pt-side follow up  on Anish Skaggs  being transferred to [facility] (unit) for routine progression of care       Report consisted of patients Situation, Background, Assessment and   Recommendations(SBAR). Information from the following report(s) SBAR, Kardex, ED Summary, Intake/Output, MAR, Accordion, Recent Results, Med Rec Status, Cardiac Rhythm V-paced. and Alarm Parameters  was reviewed with the receiving nurse. Lines:   Peripheral IV 02/07/18 Right Antecubital (Active)   Site Assessment Clean, dry, & intact 2/8/2018  8:00 PM   Phlebitis Assessment 0 2/8/2018  8:00 PM   Infiltration Assessment 0 2/8/2018  8:00 PM   Dressing Status Clean, dry, & intact 2/8/2018  8:00 PM   Dressing Type Transparent;Tape 2/8/2018  8:00 PM   Hub Color/Line Status Green;Flushed 2/8/2018  8:00 PM   Action Taken Open ports on tubing capped 2/8/2018  8:00 PM   Alcohol Cap Used Yes 2/8/2018  8:00 PM       Peripheral IV 02/07/18 Left Antecubital (Active)   Site Assessment Clean, dry, & intact 2/8/2018  8:00 PM   Phlebitis Assessment 0 2/8/2018  8:00 PM   Infiltration Assessment 0 2/8/2018  8:00 PM   Dressing Status Clean, dry, & intact 2/8/2018  8:00 PM   Dressing Type Transparent;Tape 2/8/2018  8:00 PM   Hub Color/Line Status Pink; Infusing 2/8/2018  8:00 PM   Action Taken Open ports on tubing capped 2/8/2018  8:00 PM   Alcohol Cap Used Yes 2/8/2018  8:00 PM        Opportunity for questions and clarification was provided.       Patient transported with:   Monitor  Patient-specific medications from Pharmacy  Registered Nurse

## 2018-02-09 NOTE — DISCHARGE SUMMARY
Discharge Summary    Patient: Sayra Mann               Sex: male          DOA: 2/7/2018         YOB: 1940      Age:  76 y.o.        LOS:  LOS: 2 days                Admit Date: 2/7/2018    Discharge Date: 2/9/2018    Admission Diagnoses: Stroke [facility])    Discharge Diagnoses:      Left radial radiculopathy  Slurred speech  PAF  Weakness left upper extremity  Cardiomyopathy  DJD of neck    Problem List as of 2/9/2018  Date Reviewed: 11/28/2017          Codes Class Noted - Resolved        PAF (paroxysmal atrial fibrillation) ([facility] ICD-10-CM: I48.0  ICD-9-CM: 427.31  2/7/2018 - Present        Weakness of left upper extremity ICD-10-CM: R29.898  ICD-9-CM: 729.89  2/7/2018 - Present        BPH (benign prostatic hyperplasia) ICD-10-CM: N40.0  ICD-9-CM: 600.00  2/7/2018 - Present        Abnormal SPEP ICD-10-CM: R77.8  ICD-9-CM: 790.99  10/19/2017 - Present        Idiopathic peripheral neuropathy ICD-10-CM: G60.9  ICD-9-CM: 356.9  5/12/2016 - Present        Monoclonal gammopathy ICD-10-CM: D47.2  ICD-9-CM: 273.1  3/7/2016 - Present        Tinea corporis ICD-10-CM: B35.4  ICD-9-CM: 110.5  3/7/2016 - Present        Actinic keratosis ICD-10-CM: L57.0  ICD-9-CM: 702.0  3/7/2016 - Present        Sick sinus syndrome ([facility] ICD-10-CM: I49.5  ICD-9-CM: 427.81  2/25/2016 - Present        Knee pain ICD-10-CM: M25.569  ICD-9-CM: 719.46  Unknown - Present        Nocturia ICD-10-CM: R35.1  ICD-9-CM: 788.43  12/10/2015 - Present        Cardiomyopathy ([facility] ICD-10-CM: I42.9  ICD-9-CM: 425.4  12/3/2015 - Present        Asthma ICD-10-CM: J45.909  ICD-9-CM: 493.90  12/3/2015 - Present        Peripheral neuropathy ICD-10-CM: G62.9  ICD-9-CM: 356.9  12/3/2015 - Present        Other allergic rhinitis ICD-10-CM: J30.89  ICD-9-CM: 477.8  12/3/2015 - Present        RESOLVED: Type 2 diabetes mellitus without complication ([facility] BVB-33-CE: E11.9  ICD-9-CM: 250.00  12/3/2015 - 12/10/2015              Discharge Medications: Current Discharge Medication List      CONTINUE these medications which have NOT CHANGED    Details   gabapentin (NEURONTIN) 600 mg tablet One po in am , one at noon and 2 at hs  Qty: 120 Tab, Refills: 3    Associated Diagnoses: Neuropathy; Gait instability      metoprolol succinate (TOPROL-XL) 25 mg XL tablet Take 1 Tab by mouth daily. Qty: 90 Tab, Refills: 2    Associated Diagnoses: Sick sinus syndrome ([address] Cardiomyopathy, unspecified type ([address] Paroxysmal atrial fibrillation (HCC)      warfarin (COUMADIN) 4 mg tablet Take 1 Tab by mouth daily. Qty: 120 Tab, Refills: 3      mupirocin (BACTROBAN) 2 % ointment Apply  to affected area daily. Qty: 22 g, Refills: 0    Associated Diagnoses: Pressure ulcer, unstageable, with eschar ([facility]      ! ! furosemide (LASIX) 20 mg tablet Take 20 mg by mouth nightly. Along with 40mg in the morning      terbinafine HCl (LAMISIL) 250 mg tablet Take 250 mg by mouth daily. levothyroxine (SYNTHROID) 25 mcg tablet Take  by mouth Daily (before breakfast). raNITIdine (ZANTAC) 150 mg tablet Take 150 mg by mouth daily. tamsulosin (FLOMAX) 0.4 mg capsule Take 0.4 mg by mouth daily. !! furosemide (LASIX) 40 mg tablet Take 40 mg by mouth two (2) times a day. Along with 20mg every night      diphenhydrAMINE (BENADRYL) 25 mg capsule Take 50 mg by mouth nightly as needed. zinc 50 mg tab tablet Take 25 mg by mouth daily. cyanocobalamin 1,000 mcg tablet Take 1,000 mcg by mouth daily. loratadine (CLARITIN) 10 mg tablet Take 1 Tab by mouth daily. Qty: 90 Tab, Refills: 1       !! - Potential duplicate medications found. Please discuss with provider.           Follow-up:pcp and neurology    Discharge Condition:good    Activity:as tolerated    Diet:heart healthy    Disposition:home    Labs:  Labs: Results:       Chemistry Recent Labs      02/07/18   0758   GLU  150*   NA  137   K  4.1   CL  103   CO2  27   BUN  19*   CREA  0.77   CA  8.8   AGAP  7   BUCR  25* AP  64   TP  7.9   ALB  3.9   GLOB  4.0   AGRAT  1.0      CBC w/Diff Recent Labs      02/07/18   0758   WBC  4.9   RBC  4.31*   HGB  13.8   HCT  41.3   PLT  161      Cardiac Enzymes No results for input(s): CPK, CKND1, JOSE in the last 72 hours. No lab exists for component: CKRMB, TROIP   Coagulation Recent Labs      02/07/18   0758   PTP  22.6*   INR  2.1*       Lipid Panel Lab Results   Component Value Date/Time    Cholesterol, total 129 02/07/2018 07:58 AM    HDL Cholesterol 30 (L) 02/07/2018 07:58 AM    LDL, calculated 71.4 02/07/2018 07:58 AM    VLDL, calculated 27.6 02/07/2018 07:58 AM    Triglyceride 138 02/07/2018 07:58 AM    CHOL/HDL Ratio 4.3 02/07/2018 07:58 AM      BNP No results for input(s): BNPP in the last 72 hours. Liver Enzymes Recent Labs      02/07/18   0758   TP  7.9   ALB  3.9   AP  64   SGOT  19      Thyroid Studies Lab Results   Component Value Date/Time    TSH 1.67 02/07/2018 07:58 AM          Imaging:  CT head   1. No acute intracranial hemorrhage, mass effect, midline shift, or herniation. No definite CT evidence of acute cortical infarct is seen. Please note that  noncontrast head CT may be normal in early acute infarct.      2. Mild/moderate paranasal sinus disease increased since prior study, without  air-fluid level. CTA head 2/7/2018:  1. No definite large vessel occlusion.     2. No hemodynamically significant ICA stenosis, based on NASCET criteria.      3. No high-grade vertebral artery stenosis is seen.     4. C2-C3 anterior listhesis present perhaps due to facet degeneration at this  level.     5. Moderate degree C6-C7 central stenosis due to disc osteophyte complex with  probable cord flattening.       Consults:   neurology    Treatment Team: Treatment Team: Attending Provider: Nilson Carrillo MD; Consulting Provider: Viridiana Babb MD; Consulting Provider: Nilson Carrillo MD; Consulting Provider: Jim Gore MD; Utilization Review: Radha Malhotra RN; Care Manager: Frankie Wolff RN; Physical Therapy Assistant: Adriel Nuno; Occupational Therapy Assistant: MICHAEL Oconnell    Significant Diagnostic Studies:    ECHO on 2/8/2018:  SUMMARY:  Left ventricle: Systolic function was normal. Ejection fraction was estimated   to be 60 %. There were no regional wall  motion abnormalities. Wall thickness was moderately increased. Atrial septum: There was no evidence of intracardiac shunt by   peripherally-administered agitated saline contrast    Hospital Course:  Elliotluisa Díaz Jr is a 76 y. o. male with a history of BPH, PAF, SSS s/p PPM, herniated disc cervical, pacemaker, asthma, and knee pain s/p bilateral knee replacement who presented to the ED for complaints of left arm weakness and apparently slurred speech. In ER,CT head with no acute intracranial hemorrhage, mass effect, midline shift, or herniation. Tele-neurology recommended admission for stroke work-up. In house neurology,Dr Daphnie Whitten saw patient and diagnosed patient's left arm weakness as radial neuropathy. For the associated slurred speech,he thinks that slurred speech raises the possibility of an unusual stroke. CTA head/neck no occlusion not,instead showing moderate degree C6-C7 central stenosis due to disc osteophyte complex with  probable cord flattening. Neurology has cleared patient for discharge and will follow up with him in 3 weeks. Pt is clinically stable for discharge. Time for discharge:40 minutes.     Dao Thayer MD  February 9, 2018

## 2018-02-09 NOTE — PROGRESS NOTES
Problem: Mobility Impaired (Adult and Pediatric)  Goal: *Acute Goals and Plan of Care (Insert Text)  Physical Therapy Goals  Initiated 2/8/2018 and to be accomplished within 7 day(s)  1. Patient will move from supine to sit and sit to supine , scoot up and down and roll side to side in bed with modified independence. 2.  Patient will transfer from bed to chair and chair to bed with modified independence using the least restrictive device. 3.  Patient will perform sit to stand with modified independence. 4.  Patient will ambulate with modified independence for 5 feet with the least restrictive device. Outcome: Not Met        SUBJECTIVE:   Patient stated I'm going home today    OBJECTIVE DATA SUMMARY:   Critical Behavior:  Neurologic State: Alert  Orientation Level: Oriented X4  Cognition: Follows commands  Safety/Judgement: Awareness of environment, Fall prevention  Functional Mobility Training:   Balance:  Sitting: Intact  Sitting - Static: Good (unsupported)  Sitting - Dynamic: Fair (occasional) (fair/fair plus)  Standing: Impaired  Standing - Static: Fair  Standing - Dynamic : Fair  Neuro Re-Education:  Pt unsupported sitting is challenged while he is asked to perform dynamic reaching from all planes with no LOB with 9 minute session of balance activities. Therapeutic Exercises:   Pt was educated and demonstration was done so that pt knows what to do once home and ready for mobility. Pt acknowledges need to mobilize and reports doing the exercises that the therapist has demonstration all day long during his knee rehab. Pain:  Pt reports pain or discomfort prior to treatment.    Pt reports 0/10 pain or discomfort post treatment. Activity Tolerance: Pt was not motivated to perform out of bed activities. Pt to be DC home with stretcher transport as he can not negotiate steps at son's home. Please refer to the flowsheet for vital signs taken during this treatment.   After treatment:   [x] Patient left in no apparent distress sitting up in chair  [] Patient left in no apparent distress in bed  [x] Call bell left within reach  [] Nursing notified  [] Caregiver present  [] Bed alarm activated      Nathan Major   Time Calculation: 15 mins

## 2018-02-09 NOTE — PROGRESS NOTES
Problem: Falls - Risk of  Goal: *Absence of Falls  Document Anne Fall Risk and appropriate interventions in the flowsheet.    Outcome: Progressing Towards Goal  Fall Risk Interventions:  Mobility Interventions: Utilize walker, cane, or other assitive device    Mentation Interventions: Door open when patient unattended    Medication Interventions: Assess postural VS orthostatic hypotension    Elimination Interventions: Urinal in reach    History of Falls Interventions: Evaluate medications/consider consulting pharmacy

## 2018-02-09 NOTE — PROGRESS NOTES
NUTRITION  Patient/Family Education Record    FACTORS THAT MAY INFLUENCE PATIENTS ABILITY TO LEARN:   []   Language barrier    []   Cultural needs   [x]   Motivation    []   Cognitive limitation    []   Physical   []   Education   []   Physiological factors   []   Hearing/vision/speaking impairment   []   Christian beliefs    []   Financial limitations    []  Other:   []   No barriers limiting ability to learn     Person Instructed:   [x]   Patient   []   Family   []  Other     Preference for Learning:   [x]   Verbal   [x]   Written   []  Demonstration     Patient educated on:   [x] Cardiac/heart healthy diet  [] 2gm Sodium diet  [] Vitamin K regulated diet (coumadin)  [x] Weight loss/portion control  [] High protein  [] Other:    Goal:  Patient will demonstrate understanding of modified diet by   Implementing 1-2 recommendations upon D/C  Outcome:   []  Patient verbalized understanding of education and willing to comply with recommendations.   []  Patient declined education  []  Patient needs follow up education; scheduled date for follow up:  [x]  Written information provided  []  RD contact information provided    Lázaro Brush

## 2018-02-09 NOTE — PROGRESS NOTES
Assumed care of pt from [place] , [address] [place] [address] pt awake in bed A&O x 4 , no distress noted and denies pain. Frequently used items and call bell within reach. Patient verbalized understanding to use call bell for any needs or assistance. Bed locked in lowest position. Dual NIH performed. 1411 was informed that pt transport was set up for [place] [id number] care called to inform that their running late and will be here at 1730.    [facility] was called and said they are in route.

## 2018-02-09 NOTE — PROGRESS NOTES
Problem: Falls - Risk of  Goal: *Absence of Falls  Document Anne Fall Risk and appropriate interventions in the flowsheet.    Fall Risk Interventions:  Mobility Interventions: Utilize walker, cane, or other assitive device    Mentation Interventions: Door open when patient unattended    Medication Interventions: Assess postural VS orthostatic hypotension    Elimination Interventions: Urinal in reach    History of Falls Interventions: Evaluate medications/consider consulting pharmacy

## 2018-02-09 NOTE — PROGRESS NOTES
Stroke Education provided to patient and the following topics were discussed    1. Patients personal risk factors for stroke are atrial fibrillation and family history    2. Warning signs of Stroke:        * Sudden numbness or weakness of the face, arm or leg, especially on one side of          The body            * Sudden confusion, trouble speaking or understanding        * Sudden trouble seeing in one or both eyes        * Sudden trouble walking, dizziness, loss of balance or coordination        * Sudden severe headache with no known cause      3. Importance of activation Emergency Medical Services ( 9-1-1 ) immediately if experience any warning signs of stroke. 4. Be sure and schedule a follow-up appointment with your primary care doctor or any specialists as instructed. 5. You must take medicine every day to treat your risk factors for stroke. Be sure to take your medicines exactly as your doctor tells you: no more, no less. Know what your medicines are for , what they do. Anti-thrombotics /anticoagulants can help prevent strokes. You are taking the following medicine(s)  Aspirin, Atorvastatin, Warfarin    6. Smoking and second-hand smoke greatly increase your risk of stroke, cardiovascular disease and death. Smoking history cigarettes, 1 per month    7. Information provided was [facility] Stroke Education [facility] or Verbal Education    8. Documentation of teaching completed in Patient Education Activity and on Care Plan with teaching response noted?   yes

## 2018-02-10 ENCOUNTER — HOME CARE VISIT (OUTPATIENT)
Dept: HOME HEALTH SERVICES | Facility: HOME HEALTH | Age: 78
End: 2018-02-10
Payer: MEDICARE

## 2018-02-10 PROCEDURE — 3331090001 HH PPS REVENUE CREDIT

## 2018-02-10 PROCEDURE — G0155 HHCP-SVS OF CSW,EA 15 MIN: HCPCS

## 2018-02-10 PROCEDURE — 3331090002 HH PPS REVENUE DEBIT

## 2018-02-11 ENCOUNTER — HOME CARE VISIT (OUTPATIENT)
Dept: SCHEDULING | Facility: HOME HEALTH | Age: 78
End: 2018-02-11
Payer: MEDICARE

## 2018-02-11 PROCEDURE — 3331090001 HH PPS REVENUE CREDIT

## 2018-02-11 PROCEDURE — G0299 HHS/HOSPICE OF RN EA 15 MIN: HCPCS

## 2018-02-11 PROCEDURE — 3331090002 HH PPS REVENUE DEBIT

## 2018-02-12 ENCOUNTER — HOME CARE VISIT (OUTPATIENT)
Dept: HOME HEALTH SERVICES | Facility: HOME HEALTH | Age: 78
End: 2018-02-12
Payer: MEDICARE

## 2018-02-12 ENCOUNTER — PATIENT OUTREACH (OUTPATIENT)
Dept: FAMILY MEDICINE CLINIC | Age: 78
End: 2018-02-12

## 2018-02-12 ENCOUNTER — TELEPHONE (OUTPATIENT)
Dept: FAMILY MEDICINE CLINIC | Age: 78
End: 2018-02-12

## 2018-02-12 PROCEDURE — 3331090002 HH PPS REVENUE DEBIT

## 2018-02-12 PROCEDURE — 3331090001 HH PPS REVENUE CREDIT

## 2018-02-12 NOTE — TELEPHONE ENCOUNTER
Patient is not mobile and cannot get out of the house since his stroke. Patient is in a wheelchair and cannot walk or stand. He does not think he can make his appointment because of this. Patient is requesting an orthopedic surgery so they can fix him. Please advise.

## 2018-02-12 NOTE — PROGRESS NOTES
Nurse Santiago 10 Follow Up Note  -02/07/18 Admitted at [facility] for weakness, slurred speech, DJD of the neck  -02/09/18 Discharged to home with [address]           Attempted to reach patient for post hosp f/u. Unable to reach. Left message on voicemail with contact info. Noted patient has appt scheduled with PCP tomorrow 02/13/18 at 2:45pm    Will try to reach patient again for post hosp f/u              This note will not be viewable in [address]

## 2018-02-13 ENCOUNTER — PATIENT OUTREACH (OUTPATIENT)
Dept: FAMILY MEDICINE CLINIC | Age: 78
End: 2018-02-13

## 2018-02-13 ENCOUNTER — HOME CARE VISIT (OUTPATIENT)
Dept: SCHEDULING | Facility: HOME HEALTH | Age: 78
End: 2018-02-13
Payer: MEDICARE

## 2018-02-13 VITALS
SYSTOLIC BLOOD PRESSURE: 120 MMHG | TEMPERATURE: 98.7 F | DIASTOLIC BLOOD PRESSURE: 70 MMHG | HEART RATE: 73 BPM | OXYGEN SATURATION: 98 %

## 2018-02-13 PROCEDURE — 3331090002 HH PPS REVENUE DEBIT

## 2018-02-13 PROCEDURE — 3331090001 HH PPS REVENUE CREDIT

## 2018-02-13 PROCEDURE — G0151 HHCP-SERV OF PT,EA 15 MIN: HCPCS

## 2018-02-13 NOTE — TELEPHONE ENCOUNTER
Patient is calling back and stated that he can no longer walk and will not make it to his appointment. Patient also stated that someone needs to carry him in order to see the doctor. Patient would like to know if Dr. Christopher Wallace can read the X-ray results and discuss what would be the best plan of care for him. Asking to be called back by Dr. Christopher Wallace.

## 2018-02-14 ENCOUNTER — TELEPHONE ANTICOAG (OUTPATIENT)
Dept: CARDIOLOGY CLINIC | Age: 78
End: 2018-02-14

## 2018-02-14 ENCOUNTER — HOME CARE VISIT (OUTPATIENT)
Dept: HOME HEALTH SERVICES | Facility: HOME HEALTH | Age: 78
End: 2018-02-14
Payer: MEDICARE

## 2018-02-14 ENCOUNTER — HOME CARE VISIT (OUTPATIENT)
Dept: SCHEDULING | Facility: HOME HEALTH | Age: 78
End: 2018-02-14
Payer: MEDICARE

## 2018-02-14 VITALS
DIASTOLIC BLOOD PRESSURE: 62 MMHG | HEART RATE: 80 BPM | OXYGEN SATURATION: 99 % | SYSTOLIC BLOOD PRESSURE: 115 MMHG | TEMPERATURE: 98.8 F | RESPIRATION RATE: 18 BRPM

## 2018-02-14 LAB
INR BLD: 1.7 (ref 0.9–1.1)
INR, EXTERNAL: 1.7
PT POC: 20.1 SECONDS (ref 11.8–14.9)

## 2018-02-14 PROCEDURE — G0496 LPN CARE TRAIN/EDU IN HH: HCPCS

## 2018-02-14 PROCEDURE — 3331090002 HH PPS REVENUE DEBIT

## 2018-02-14 PROCEDURE — 3331090001 HH PPS REVENUE CREDIT

## 2018-02-14 NOTE — PROGRESS NOTES
The INR is below the therapeutic range. Please make the following adjustments to Coumadin dosing: Verbal order and read back per Dr. Campoverde Falling   Take 8 mg x 1 Continue 4mg daily . Repeat the INR in one week.

## 2018-02-15 PROCEDURE — 3331090002 HH PPS REVENUE DEBIT

## 2018-02-15 PROCEDURE — 3331090001 HH PPS REVENUE CREDIT

## 2018-02-16 ENCOUNTER — HOME CARE VISIT (OUTPATIENT)
Dept: SCHEDULING | Facility: HOME HEALTH | Age: 78
End: 2018-02-16
Payer: MEDICARE

## 2018-02-16 VITALS
SYSTOLIC BLOOD PRESSURE: 138 MMHG | DIASTOLIC BLOOD PRESSURE: 68 MMHG | HEART RATE: 68 BPM | TEMPERATURE: 98.4 F | OXYGEN SATURATION: 98 %

## 2018-02-16 PROCEDURE — G0152 HHCP-SERV OF OT,EA 15 MIN: HCPCS

## 2018-02-16 PROCEDURE — 3331090002 HH PPS REVENUE DEBIT

## 2018-02-16 PROCEDURE — 3331090001 HH PPS REVENUE CREDIT

## 2018-02-16 PROCEDURE — G0151 HHCP-SERV OF PT,EA 15 MIN: HCPCS

## 2018-02-17 PROCEDURE — 3331090002 HH PPS REVENUE DEBIT

## 2018-02-17 PROCEDURE — 3331090001 HH PPS REVENUE CREDIT

## 2018-02-18 DIAGNOSIS — G89.29 CHRONIC PAIN OF BOTH KNEES: Primary | ICD-10-CM

## 2018-02-18 DIAGNOSIS — M25.561 CHRONIC PAIN OF BOTH KNEES: Primary | ICD-10-CM

## 2018-02-18 DIAGNOSIS — M25.562 CHRONIC PAIN OF BOTH KNEES: Primary | ICD-10-CM

## 2018-02-18 PROCEDURE — 3331090002 HH PPS REVENUE DEBIT

## 2018-02-18 PROCEDURE — 3331090001 HH PPS REVENUE CREDIT

## 2018-02-19 ENCOUNTER — HOME CARE VISIT (OUTPATIENT)
Dept: SCHEDULING | Facility: HOME HEALTH | Age: 78
End: 2018-02-19
Payer: MEDICARE

## 2018-02-19 VITALS
SYSTOLIC BLOOD PRESSURE: 128 MMHG | HEART RATE: 68 BPM | TEMPERATURE: 98.4 F | DIASTOLIC BLOOD PRESSURE: 68 MMHG | OXYGEN SATURATION: 96 %

## 2018-02-19 VITALS
SYSTOLIC BLOOD PRESSURE: 138 MMHG | HEART RATE: 68 BPM | TEMPERATURE: 98.4 F | OXYGEN SATURATION: 98 % | DIASTOLIC BLOOD PRESSURE: 68 MMHG

## 2018-02-19 PROCEDURE — G0151 HHCP-SERV OF PT,EA 15 MIN: HCPCS

## 2018-02-19 PROCEDURE — 3331090001 HH PPS REVENUE CREDIT

## 2018-02-19 PROCEDURE — 3331090002 HH PPS REVENUE DEBIT

## 2018-02-19 NOTE — TELEPHONE ENCOUNTER
Spoke with patient concerning Ortho referral. Pt states he will be waiting for call concerning appt. This encounter will be closed.

## 2018-02-19 NOTE — TELEPHONE ENCOUNTER
I will refer pt to ortho. Xray shows some remodeling of the bone surface. Bad connection, patient will call back.

## 2018-02-20 PROCEDURE — 3331090002 HH PPS REVENUE DEBIT

## 2018-02-20 PROCEDURE — 3331090001 HH PPS REVENUE CREDIT

## 2018-02-21 ENCOUNTER — HOME CARE VISIT (OUTPATIENT)
Dept: SCHEDULING | Facility: HOME HEALTH | Age: 78
End: 2018-02-21
Payer: MEDICARE

## 2018-02-21 ENCOUNTER — HOME CARE VISIT (OUTPATIENT)
Dept: HOME HEALTH SERVICES | Facility: HOME HEALTH | Age: 78
End: 2018-02-21
Payer: MEDICARE

## 2018-02-21 ENCOUNTER — TELEPHONE ANTICOAG (OUTPATIENT)
Dept: CARDIOLOGY CLINIC | Age: 78
End: 2018-02-21

## 2018-02-21 VITALS
DIASTOLIC BLOOD PRESSURE: 72 MMHG | HEART RATE: 66 BPM | SYSTOLIC BLOOD PRESSURE: 134 MMHG | TEMPERATURE: 98.4 F | OXYGEN SATURATION: 98 %

## 2018-02-21 VITALS
HEART RATE: 66 BPM | DIASTOLIC BLOOD PRESSURE: 72 MMHG | SYSTOLIC BLOOD PRESSURE: 134 MMHG | OXYGEN SATURATION: 98 % | TEMPERATURE: 98.4 F

## 2018-02-21 LAB
INR BLD: 2.4 (ref 0.9–1.1)
INR, EXTERNAL: 2.4
PT POC: 28.7 SECONDS (ref 11.8–14.9)

## 2018-02-21 PROCEDURE — 3331090002 HH PPS REVENUE DEBIT

## 2018-02-21 PROCEDURE — G0151 HHCP-SERV OF PT,EA 15 MIN: HCPCS

## 2018-02-21 PROCEDURE — G0152 HHCP-SERV OF OT,EA 15 MIN: HCPCS

## 2018-02-21 PROCEDURE — G0299 HHS/HOSPICE OF RN EA 15 MIN: HCPCS

## 2018-02-21 PROCEDURE — 3331090001 HH PPS REVENUE CREDIT

## 2018-02-22 PROCEDURE — 3331090002 HH PPS REVENUE DEBIT

## 2018-02-22 PROCEDURE — 3331090001 HH PPS REVENUE CREDIT

## 2018-02-23 ENCOUNTER — HOME CARE VISIT (OUTPATIENT)
Dept: SCHEDULING | Facility: HOME HEALTH | Age: 78
End: 2018-02-23
Payer: MEDICARE

## 2018-02-23 VITALS
HEART RATE: 68 BPM | TEMPERATURE: 97 F | DIASTOLIC BLOOD PRESSURE: 65 MMHG | SYSTOLIC BLOOD PRESSURE: 140 MMHG | OXYGEN SATURATION: 98 %

## 2018-02-23 PROCEDURE — G0151 HHCP-SERV OF PT,EA 15 MIN: HCPCS

## 2018-02-23 PROCEDURE — G0299 HHS/HOSPICE OF RN EA 15 MIN: HCPCS

## 2018-02-23 PROCEDURE — 3331090002 HH PPS REVENUE DEBIT

## 2018-02-23 PROCEDURE — 3331090001 HH PPS REVENUE CREDIT

## 2018-02-23 PROCEDURE — G0152 HHCP-SERV OF OT,EA 15 MIN: HCPCS

## 2018-02-24 PROCEDURE — 3331090002 HH PPS REVENUE DEBIT

## 2018-02-24 PROCEDURE — 3331090001 HH PPS REVENUE CREDIT

## 2018-02-25 VITALS
HEART RATE: 92 BPM | OXYGEN SATURATION: 98 % | DIASTOLIC BLOOD PRESSURE: 78 MMHG | SYSTOLIC BLOOD PRESSURE: 130 MMHG | TEMPERATURE: 98.3 F | RESPIRATION RATE: 16 BRPM

## 2018-02-25 PROCEDURE — 3331090001 HH PPS REVENUE CREDIT

## 2018-02-25 PROCEDURE — 3331090002 HH PPS REVENUE DEBIT

## 2018-02-26 VITALS — SYSTOLIC BLOOD PRESSURE: 140 MMHG | TEMPERATURE: 97 F | DIASTOLIC BLOOD PRESSURE: 65 MMHG

## 2018-02-26 VITALS
SYSTOLIC BLOOD PRESSURE: 130 MMHG | OXYGEN SATURATION: 98 % | DIASTOLIC BLOOD PRESSURE: 66 MMHG | HEART RATE: 76 BPM | RESPIRATION RATE: 18 BRPM | TEMPERATURE: 98.6 F

## 2018-02-26 PROCEDURE — 3331090001 HH PPS REVENUE CREDIT

## 2018-02-26 PROCEDURE — 3331090002 HH PPS REVENUE DEBIT

## 2018-02-27 ENCOUNTER — HOME CARE VISIT (OUTPATIENT)
Dept: SCHEDULING | Facility: HOME HEALTH | Age: 78
End: 2018-02-27
Payer: MEDICARE

## 2018-02-27 VITALS — TEMPERATURE: 98.8 F | DIASTOLIC BLOOD PRESSURE: 74 MMHG | SYSTOLIC BLOOD PRESSURE: 120 MMHG

## 2018-02-27 PROCEDURE — G0151 HHCP-SERV OF PT,EA 15 MIN: HCPCS

## 2018-02-27 PROCEDURE — 3331090002 HH PPS REVENUE DEBIT

## 2018-02-27 PROCEDURE — 3331090001 HH PPS REVENUE CREDIT

## 2018-02-28 PROCEDURE — 3331090001 HH PPS REVENUE CREDIT

## 2018-02-28 PROCEDURE — 3331090002 HH PPS REVENUE DEBIT

## 2018-03-01 ENCOUNTER — HOME CARE VISIT (OUTPATIENT)
Dept: SCHEDULING | Facility: HOME HEALTH | Age: 78
End: 2018-03-01
Payer: MEDICARE

## 2018-03-01 VITALS
HEART RATE: 68 BPM | DIASTOLIC BLOOD PRESSURE: 72 MMHG | OXYGEN SATURATION: 98 % | TEMPERATURE: 98.7 F | SYSTOLIC BLOOD PRESSURE: 120 MMHG

## 2018-03-01 PROCEDURE — G0151 HHCP-SERV OF PT,EA 15 MIN: HCPCS

## 2018-03-01 PROCEDURE — 3331090002 HH PPS REVENUE DEBIT

## 2018-03-01 PROCEDURE — G0152 HHCP-SERV OF OT,EA 15 MIN: HCPCS

## 2018-03-01 PROCEDURE — 3331090001 HH PPS REVENUE CREDIT

## 2018-03-02 VITALS
DIASTOLIC BLOOD PRESSURE: 68 MMHG | HEART RATE: 71 BPM | TEMPERATURE: 97.8 F | SYSTOLIC BLOOD PRESSURE: 140 MMHG | OXYGEN SATURATION: 99 %

## 2018-03-02 PROCEDURE — 3331090002 HH PPS REVENUE DEBIT

## 2018-03-02 PROCEDURE — 3331090001 HH PPS REVENUE CREDIT

## 2018-03-03 PROCEDURE — 3331090002 HH PPS REVENUE DEBIT

## 2018-03-03 PROCEDURE — 3331090001 HH PPS REVENUE CREDIT

## 2018-03-04 PROCEDURE — 3331090002 HH PPS REVENUE DEBIT

## 2018-03-04 PROCEDURE — 3331090001 HH PPS REVENUE CREDIT

## 2018-03-05 ENCOUNTER — TELEPHONE (OUTPATIENT)
Dept: CARDIOLOGY CLINIC | Age: 78
End: 2018-03-05

## 2018-03-05 ENCOUNTER — HOME CARE VISIT (OUTPATIENT)
Dept: HOME HEALTH SERVICES | Facility: HOME HEALTH | Age: 78
End: 2018-03-05
Payer: MEDICARE

## 2018-03-05 DIAGNOSIS — I48.0 PAF (PAROXYSMAL ATRIAL FIBRILLATION) (HCC): Primary | ICD-10-CM

## 2018-03-05 PROCEDURE — 3331090001 HH PPS REVENUE CREDIT

## 2018-03-05 PROCEDURE — 3331090002 HH PPS REVENUE DEBIT

## 2018-03-05 NOTE — TELEPHONE ENCOUNTER
Marcela Pittman at [phone] from [facility] called office to advise that patient was scheduled for INR today but she reports nobody came to door when she was there this morning? She is requesting new order to check INR tomorrow. Jeremiea Later that I will get new order form Dr. Bridget Lucas and fax to [phone] attn: Lewis Hogan. I called patient to check on him and advised that Home Health tried to come to home today to check INR. He advised that he is unable to get to front door and nurse has been calling prior to coming and he asked them to come to side door. I advised patient that I will add note to INR order to advise nurse to call prior to coming out. Per Dr. Bridget Lucas okay to move INR order to 3/6/18.      Verbal order and read back per Myrtle Zuniga MD

## 2018-03-06 ENCOUNTER — HOME CARE VISIT (OUTPATIENT)
Dept: HOME HEALTH SERVICES | Facility: HOME HEALTH | Age: 78
End: 2018-03-06
Payer: MEDICARE

## 2018-03-06 ENCOUNTER — HOME CARE VISIT (OUTPATIENT)
Dept: SCHEDULING | Facility: HOME HEALTH | Age: 78
End: 2018-03-06
Payer: MEDICARE

## 2018-03-06 ENCOUNTER — TELEPHONE ANTICOAG (OUTPATIENT)
Dept: CARDIOLOGY CLINIC | Age: 78
End: 2018-03-06

## 2018-03-06 LAB
INR BLD: 2.2 (ref 0.9–1.1)
INR, EXTERNAL: 2.2
PT POC: 26.2 SECONDS (ref 11.8–14.9)

## 2018-03-06 PROCEDURE — 3331090002 HH PPS REVENUE DEBIT

## 2018-03-06 PROCEDURE — 3331090001 HH PPS REVENUE CREDIT

## 2018-03-06 PROCEDURE — G0299 HHS/HOSPICE OF RN EA 15 MIN: HCPCS

## 2018-03-07 ENCOUNTER — HOME CARE VISIT (OUTPATIENT)
Dept: SCHEDULING | Facility: HOME HEALTH | Age: 78
End: 2018-03-07
Payer: MEDICARE

## 2018-03-07 VITALS
DIASTOLIC BLOOD PRESSURE: 64 MMHG | OXYGEN SATURATION: 98 % | SYSTOLIC BLOOD PRESSURE: 110 MMHG | HEART RATE: 70 BPM | TEMPERATURE: 97.8 F

## 2018-03-07 VITALS
HEART RATE: 74 BPM | TEMPERATURE: 98.4 F | RESPIRATION RATE: 16 BRPM | DIASTOLIC BLOOD PRESSURE: 68 MMHG | SYSTOLIC BLOOD PRESSURE: 122 MMHG

## 2018-03-07 PROCEDURE — 3331090001 HH PPS REVENUE CREDIT

## 2018-03-07 PROCEDURE — 3331090003 HH PPS REVENUE ADJ

## 2018-03-07 PROCEDURE — G0151 HHCP-SERV OF PT,EA 15 MIN: HCPCS

## 2018-03-07 PROCEDURE — 3331090002 HH PPS REVENUE DEBIT

## 2018-03-08 PROCEDURE — 3331090002 HH PPS REVENUE DEBIT

## 2018-03-08 PROCEDURE — 3331090001 HH PPS REVENUE CREDIT

## 2018-03-09 PROCEDURE — 3331090002 HH PPS REVENUE DEBIT

## 2018-03-09 PROCEDURE — 3331090001 HH PPS REVENUE CREDIT

## 2018-03-10 PROCEDURE — 3331090001 HH PPS REVENUE CREDIT

## 2018-03-10 PROCEDURE — 3331090002 HH PPS REVENUE DEBIT

## 2018-03-11 PROCEDURE — 3331090002 HH PPS REVENUE DEBIT

## 2018-03-11 PROCEDURE — 3331090001 HH PPS REVENUE CREDIT

## 2018-03-12 PROCEDURE — 3331090002 HH PPS REVENUE DEBIT

## 2018-03-12 PROCEDURE — 3331090001 HH PPS REVENUE CREDIT

## 2018-04-02 ENCOUNTER — OFFICE VISIT (OUTPATIENT)
Dept: FAMILY MEDICINE CLINIC | Age: 78
End: 2018-04-02

## 2018-04-02 ENCOUNTER — ANTI-COAG VISIT (OUTPATIENT)
Dept: CARDIOLOGY CLINIC | Age: 78
End: 2018-04-02

## 2018-04-02 ENCOUNTER — HOSPITAL ENCOUNTER (OUTPATIENT)
Dept: LAB | Age: 78
Discharge: HOME | End: 2018-04-02

## 2018-04-02 VITALS
HEIGHT: 77 IN | RESPIRATION RATE: 20 BRPM | DIASTOLIC BLOOD PRESSURE: 55 MMHG | WEIGHT: 265 LBS | HEART RATE: 73 BPM | TEMPERATURE: 96.6 F | BODY MASS INDEX: 31.29 KG/M2 | SYSTOLIC BLOOD PRESSURE: 104 MMHG | OXYGEN SATURATION: 99 %

## 2018-04-02 DIAGNOSIS — M79.89 LEG SWELLING: ICD-10-CM

## 2018-04-02 DIAGNOSIS — I48.0 PAF (PAROXYSMAL ATRIAL FIBRILLATION) (HCC): Primary | ICD-10-CM

## 2018-04-02 DIAGNOSIS — I73.9 CLAUDICATION (HCC): ICD-10-CM

## 2018-04-02 DIAGNOSIS — E11.9 DM TYPE 2, GOAL HBA1C < 7% (HCC): Primary | ICD-10-CM

## 2018-04-02 DIAGNOSIS — I42.8 OTHER CARDIOMYOPATHY (HCC): ICD-10-CM

## 2018-04-02 LAB
INR BLD: 3
PT POC: NORMAL SECONDS
VALID INTERNAL CONTROL?: YES

## 2018-04-02 PROCEDURE — 99001 SPECIMEN HANDLING PT-LAB: CPT | Performed by: INTERNAL MEDICINE

## 2018-04-02 RX ORDER — CEPHALEXIN 500 MG/1
500 CAPSULE ORAL 4 TIMES DAILY
Qty: 40 CAP | Refills: 0 | Status: SHIPPED | OUTPATIENT
Start: 2018-04-02 | End: 2018-04-16 | Stop reason: ALTCHOICE

## 2018-04-02 RX ORDER — METFORMIN HYDROCHLORIDE 500 MG/1
500 TABLET ORAL 2 TIMES DAILY WITH MEALS
Qty: 60 TAB | Refills: 3 | Status: SHIPPED | OUTPATIENT
Start: 2018-04-02 | End: 2018-07-09 | Stop reason: SDUPTHER

## 2018-04-02 NOTE — PROGRESS NOTES
James De La Rosa is a 76 y.o.  male and presents with     Chief Complaint   Patient presents with    Lesion     right and left     Diabetes    Leg Swelling       Pt was admitted in February to hospital for left sided weakness. Pt was felt to have TIA and symptoms realted to neuralgia. Pt says he has not been able to walk primarily due to left knee pain and swelling. Pt is currently not taking any meds for DM. Pt is her as his left leg is swollen, especially his foot. Pt has not been ambulatory. Pt has neuropathy in his legs so he does not have significant pain. HIs left foot has been swollen for over a month. Past Medical History:   Diagnosis Date    Asthma     BPH (benign prostatic hyperplasia)     Herniated disc, cervical     Knee pain     Pacemaker 2011    [facility]    PAF (paroxysmal atrial fibrillation) ([facility])     During Pacer interogation     SSS (sick sinus syndrome) ([facility])     S/P Dual chamber [facility] Pacemaker     Past Surgical History:   Procedure Laterality Date    HX HERNIA REPAIR Bilateral 1989    HX HIP REPLACEMENT  2006    right    HX KNEE REPLACEMENT Bilateral 1992/1993/2006/2008/2011/2012/2013    HX PACEMAKER  2011     Current Outpatient Prescriptions   Medication Sig    cephALEXin (KEFLEX) 500 mg capsule Take 1 Cap by mouth four (4) times daily.  metFORMIN (GLUCOPHAGE) 500 mg tablet Take 1 Tab by mouth two (2) times daily (with meals).  raNITIdine (ZANTAC) 75 mg tablet Take 75 mg by mouth daily.  prasterone, dhea, (DHEA) 50 mg tab Take 50 mg by mouth daily.  Potassium Gluconate 595 mg (99 mg) tablet Take 595 mg by mouth daily.  cholecalciferol (VITAMIN D3) 1,000 unit tablet Take 1,000 Units by mouth daily.  acetaminophen (TYLENOL) 500 mg tablet Take 500 mg by mouth every six (6) hours as needed for Pain.  nicotinic acid (NIACIN) 500 mg tablet Take 250 mg by mouth Daily (before breakfast).     SODIUM CHLORIDE (AFRIN SALINE NASAL MIST NA) 1 [place] by IntraNASal route as needed (congestion/allergies).  gabapentin (NEURONTIN) 600 mg tablet One po in am , one at noon and 2 at hs    mupirocin (BACTROBAN) 2 % ointment Apply  to affected area daily. (Patient not taking: Reported on 2/13/2018)    metoprolol succinate (TOPROL-XL) 25 mg XL tablet Take 1 Tab by mouth daily.  warfarin (COUMADIN) 4 mg tablet Take 1 Tab by mouth daily.  diphenhydrAMINE (BENADRYL) 25 mg capsule Take 50 mg by mouth nightly as needed.  cyanocobalamin 1,000 mcg tablet Take 1,000 mcg by mouth daily.  loratadine (CLARITIN) 10 mg tablet Take 1 Tab by mouth daily. (Patient not taking: Reported on 2/11/2018)     No current facility-administered medications for this visit. Health Maintenance   Topic Date Due    GLAUCOMA SCREENING Q2Y  08/28/2003    MEDICARE YEARLY EXAM  09/08/2018    DTaP/Tdap/Td series (2 - Td) 07/16/2024    ZOSTER VACCINE AGE 60>  Completed    Pneumococcal 65+ High/Highest Risk  Completed    Influenza Age 5 to Adult  Completed     Immunization History   Administered Date(s) Administered    Influenza High Dose Vaccine PF 09/07/2017    Pneumococcal Polysaccharide (PPSV-23) 09/07/2017     No LMP for male patient. Allergies and Intolerances: Allergies   Allergen Reactions    Sulfa (Sulfonamide Antibiotics) Hives and Rash       Family History:   No family history on file. Social History:   He  reports that he has been smoking Cigars. He has never used smokeless tobacco.  He  reports that he drinks alcohol.             Review of Systems:   General: negative for - chills, fatigue, fever, weight change  Psych: negative for - anxiety, depression, irritability or mood swings  ENT: negative for - headaches, hearing change, nasal congestion, oral lesions, sneezing or sore throat  Heme/ Lymph: negative for - bleeding problems, bruising, pallor or swollen lymph nodes  Endo: negative for - hot flashes, polydipsia/polyuria or temperature intolerance  Resp: negative for - cough, shortness of breath or wheezing  CV: negative for - chest pain, edema or palpitations  GI: negative for - abdominal pain, change in bowel habits, constipation, diarrhea or nausea/vomiting  : negative for - dysuria, hematuria, incontinence, pelvic pain or vulvar/vaginal symptoms  MSK: negative for - joint pain, joint swelling or muscle pain  Neuro: negative for - confusion, headaches, seizures or weakness  Derm: negative for - dry skin, hair changes, rash or skin lesion changes          Physical:   Vitals:   Vitals:    04/02/18 1400   BP: 104/55   Pulse: 73   Resp: 20   Temp: 96.6 °F (35.9 °C)   TempSrc: Oral   SpO2: 99%   Weight: 265 lb (120.2 kg)   Height: 6' 5\" (1.956 m)           Exam:   HEENT- atraumatic,normocephalic, awake, oriented, well nourished  Neck - supple,no enlarged lymph nodes, no JVD, no thyromegaly  Chest- CTA, no rhonchi, no crackles  Heart- rrr, no murmurs / gallop/rub  Abdomen- soft,BS+,NT, no hepatosplenomegaly  Ext - pos for left leg swelling, DP weak  Neuro- no focal deficits. Power 5/5 all extremities  Skin - warm,dry, no obvious rashes.           Review of Data:   LABS:   Lab Results   Component Value Date/Time    WBC 4.9 02/07/2018 07:58 AM    HGB 13.8 02/07/2018 07:58 AM    HCT 41.3 02/07/2018 07:58 AM    PLATELET 850 45/50/6677 07:58 AM     Lab Results   Component Value Date/Time    Sodium 137 02/07/2018 07:58 AM    Potassium 4.1 02/07/2018 07:58 AM    Chloride 103 02/07/2018 07:58 AM    CO2 27 02/07/2018 07:58 AM    Glucose 150 (H) 02/07/2018 07:58 AM    BUN 19 (H) 02/07/2018 07:58 AM    Creatinine 0.77 02/07/2018 07:58 AM     Lab Results   Component Value Date/Time    Cholesterol, total 129 02/07/2018 07:58 AM    HDL Cholesterol 30 (L) 02/07/2018 07:58 AM    LDL, calculated 71.4 02/07/2018 07:58 AM    Triglyceride 138 02/07/2018 07:58 AM     No results found for: GPT        Impression / Plan:        ICD-10-CM ICD-9-CM    1. DM type 2, goal HbA1c < 7% ([facility]) E11.9 250.00 metFORMIN (GLUCOPHAGE) 500 mg tablet      METABOLIC PANEL, COMPREHENSIVE   2. Leg swelling M79.89 729.81 URIC ACID      CBC WITH AUTOMATED DIFF      cephALEXin (KEFLEX) 500 mg capsule      DUPLEX LO EXTREM ART BILAT      DUPLEX LOWER EXT VENOUS BILAT   3. Other cardiomyopathy ([address] I42.8 425.4    4. Claudication (HCC) I73.9 443.9 DUPLEX LO EXTREM ART BILAT     Par Afib - on coumadin , INR therapeutic, pt is being followed by coumadin clinic. Explained to patient risk benefits of the medications. Advised patient to stop meds if having any side effects. Pt verbalized understanding of the instructions. I have discussed the diagnosis with the patient and the intended plan as seen in the above orders. The patient has received an after-visit summary and questions were answered concerning future plans. I have discussed medication side effects and warnings with the patient as well. I have reviewed the plan of care with the patient, accepted their input and they are in agreement with the treatment goals. Reviewed plan of care. Patient has provided input and agrees with goals.     Follow-up Disposition: Not on Lonnie Arambula MD

## 2018-04-02 NOTE — MR AVS SNAPSHOT
303 [facility] 
 
 
 [address] [place] [address] [address] [facility] 83 [address] [phone] Patient: Crow Parker MRN: [id number] TWO:2/50/6422 Visit Information Date & Time Provider Department Dept. Phone Encounter #  
 4/2/2018  1:45 PM Anne Gutierrez, [address] [place] [address] [phone] [id number] Follow-up Instructions Return in about 2 weeks (around 4/16/2018). Your Appointments 4/2/2018  2:50 PM  
ANTICOAG NURSE with Pt Inr Norf Csi Cardio Specialist at [facility]) Appt Note: 3 weeks; .; Same/r/s'd with the patient/weather; Ivette Bateman; Rescheduling Appointment From 03/26/2018  
 [facility] [address] [place] [address] [place] [address] Upcoming Health Maintenance Date Due  
 GLAUCOMA SCREENING Q2Y 8/28/2003 MEDICARE YEARLY EXAM 9/8/2018 DTaP/Tdap/Td series (2 - Td) 7/16/2024 Allergies as of 4/2/2018  Review Complete On: 2/7/2018 By: Fanny Barakat Severity Noted Reaction Type Reactions Sulfa (Sulfonamide Antibiotics)  12/03/2015    Hives, Rash Current Immunizations  Never Reviewed Name Date Influenza High Dose Vaccine PF 9/7/2017  9:00 AM  
 Pneumococcal Polysaccharide (PPSV-23) 9/7/2017  9:00 AM  
  
 Not reviewed this visit You Were Diagnosed With   
  
 Codes Comments DM type 2, goal HbA1c < 7% ([facility])    -  Primary ICD-10-CM: E11.9 ICD-9-CM: 250.00 Leg swelling     ICD-10-CM: M79.89 ICD-9-CM: 729.81 Other cardiomyopathy ([facility]     ICD-10-CM: I42.8 ICD-9-CM: 425.4 [facility])     ICD-10-CM: I73.9 ICD-9-CM: 443. 9 Vitals BP Pulse Temp Resp Height(growth percentile) Weight(growth percentile) 104/55 (BP 1 Location: Right arm, BP Patient Position: At rest) 73 96.6 °F (35.9 °C) (Oral) 20 6' 5\" (1.956 m) 265 lb (120.2 kg) SpO2 BMI Smoking Status 99% 31.42 kg/m2 Current Some Day Smoker BMI and BSA Data Body Mass Index Body Surface Area  
 31.42 kg/m 2 2.56 m 2 Preferred Pharmacy Pharmacy Name Phone Rex Borden [address] [place] [address] - [address] [place] [address] [address] [phone] Your Updated Medication List  
  
   
This list is accurate as of 4/2/18  2:21 PM.  Always use your most recent med list.  
  
  
  
  
 acetaminophen 500 mg tablet Commonly known as:  TYLENOL Take 500 mg by mouth every six (6) hours as needed for Pain. AFRIN SALINE NASAL MIST NA  
1 [place] by IntraNASal route as needed (congestion/allergies). BENADRYL 25 mg capsule Generic drug:  diphenhydrAMINE Take 50 mg by mouth nightly as needed. cephALEXin 500 mg capsule Commonly known as:  [address] Take 1 Cap by mouth four (4) times daily. cholecalciferol 1,000 unit tablet Commonly known as:  VITAMIN D3 Take 1,000 Units by mouth daily. cyanocobalamin 1,000 mcg tablet Take 1,000 mcg by mouth daily. DHEA 50 mg Tab Generic drug:  prasterone (dhea) Take 50 mg by mouth daily. gabapentin 600 mg tablet Commonly known as:  NEURONTIN One po in am , one at noon and 2 at hs  
  
 loratadine 10 mg tablet Commonly known as:  Dominic Moulder Take 1 Tab by mouth daily. metFORMIN 500 mg tablet Commonly known as:  GLUCOPHAGE Take 1 Tab by mouth two (2) times daily (with meals). metoprolol succinate 25 mg XL tablet Commonly known as:  TOPROL-XL Take 1 Tab by mouth daily. mupirocin 2 % ointment Commonly known as:  [facility] Apply  to affected area daily. nicotinic acid 500 mg tablet Commonly known as:  NIACIN Take 250 mg by mouth Daily (before breakfast). Potassium Gluconate 595 mg (99 mg) tablet Take 595 mg by mouth daily. raNITIdine 75 mg tablet Commonly known as:  ZANTAC Take 75 mg by mouth daily. warfarin 4 mg tablet Commonly known as:  COUMADIN Take 1 Tab by mouth daily. Prescriptions Sent to Pharmacy Refills  
 cephALEXin (KEFLEX) 500 mg capsule 0 Sig: Take 1 Cap by mouth four (4) times daily. Class: Normal  
 Pharmacy: [address] [place] [address] Ph #: [phone] Route: Oral  
 metFORMIN (GLUCOPHAGE) 500 mg tablet 3 Sig: Take 1 Tab by mouth two (2) times daily (with meals). Class: Normal  
 Pharmacy: [address] [place] [address] Ph #: [phone] Route: Oral  
  
We Performed the Following DUPLEX LO EXTREM ART BILAT [id number] CPT(R)] Follow-up Instructions Return in about 2 weeks (around 4/16/2018). To-Do List   
 04/02/2018 Lab:  CBC WITH AUTOMATED DIFF   
  
 04/02/2018 Imaging:  DUPLEX LOWER EXT VENOUS BILAT   
  
 04/02/2018 Lab:  METABOLIC PANEL, COMPREHENSIVE   
  
 04/02/2018 Lab:  URIC ACID Please provide this summary of care documentation to your next provider. Your primary care clinician is listed as Ramiro Parham If you have any questions after today's visit, please call [phone].

## 2018-04-02 NOTE — MR AVS SNAPSHOT
[facility] 
 
 
 [address] [address] 
[phone] Patient: Smith Lennox Hover Eletha Pipes MRN: [id number] WAK:0[birth year]14/[birth year]94 Visit Information Date & Time Provider Department Dept. Phone Encounter #  
 4/2/2018  2:50 PM Pt [address] Specialist at [id number] Your Appointments 4/16/2018  2:15 PM  
Follow Up with Audrey Gomes MD  
[facility] [address] [place] [address]) Appt Note: Return in about 2 weeks (around 4/16/2018). [address] [place] [address]  
  
   
 [address] [facility] 4/16/2018  2:50 PM  
ANTICOAG NURSE with Pt Inr Norf Csi Cardio Specialist at [facility] [place] [address]) Appt Note: 2 weeks, after PCP appt [address] [place]  
  
    
 5/29/2018  3:00 PM  
Follow Up with Sharee Rivera MD  
Cardio Specialist at [facility] [place] [address] Road) Appt Note: 9 months follow up- pt needed afternoon [address]  
  
   
 [address] [place] [address] [place] [address] Upcoming Health Maintenance Date Due  
 GLAUCOMA SCREENING Q2Y 8/28/2003 MEDICARE YEARLY EXAM 9/8/2018 DTaP/Tdap/Td series (2 - Td) 7/16/2024 Allergies as of 4/2/2018  Review Complete On: 2/7/2018 By: Grace Kerns Severity Noted Reaction Type Reactions Sulfa (Sulfonamide Antibiotics)  12/03/2015    Hives, Rash Current Immunizations  Never Reviewed Name Date Influenza High Dose Vaccine PF 9/7/2017  9:00 AM  
 Pneumococcal Polysaccharide (PPSV-23) 9/7/2017  9:00 AM  
  
 Not reviewed this visit You Were Diagnosed With   
  
 Codes Comments PAF (paroxysmal atrial fibrillation) ([facility]    -  Primary ICD-10-CM: I48.0 ICD-9-CM: 427.31 Vitals Smoking Status Current Some Day Smoker Preferred Pharmacy Pharmacy Name Phone [facility] - [facility] [facility] [address] [phone] Your Updated Medication List  
  
   
This list is accurate as of 4/2/18  3:00 PM.  Always use your most recent med list.  
  
  
  
  
 acetaminophen 500 mg tablet Commonly known as:  TYLENOL Take 500 mg by mouth every six (6) hours as needed for Pain. AFRIN SALINE NASAL MIST NA  
1 [place] by IntraNASal route as needed (congestion/allergies). BENADRYL 25 mg capsule Generic drug:  diphenhydrAMINE Take 50 mg by mouth nightly as needed. cephALEXin 500 mg capsule Commonly known as:  Candy Soheila Take 1 Cap by mouth four (4) times daily. cholecalciferol 1,000 unit tablet Commonly known as:  VITAMIN D3 Take 1,000 Units by mouth daily. cyanocobalamin 1,000 mcg tablet Take 1,000 mcg by mouth daily. DHEA 50 mg Tab Generic drug:  prasterone (dhea) Take 50 mg by mouth daily. gabapentin 600 mg tablet Commonly known as:  NEURONTIN One po in am , one at noon and 2 at hs  
  
 loratadine 10 mg tablet Commonly known as:  Tayler Nett Take 1 Tab by mouth daily. metFORMIN 500 mg tablet Commonly known as:  GLUCOPHAGE Take 1 Tab by mouth two (2) times daily (with meals). metoprolol succinate 25 mg XL tablet Commonly known as:  TOPROL-XL Take 1 Tab by mouth daily. mupirocin 2 % ointment Commonly known as:  [facility] Apply  to affected area daily. nicotinic acid 500 mg tablet Commonly known as:  NIACIN Take 250 mg by mouth Daily (before breakfast). Potassium Gluconate 595 mg (99 mg) tablet Take 595 mg by mouth daily. raNITIdine 75 mg tablet Commonly known as:  ZANTAC Take 75 mg by mouth daily. warfarin 4 mg tablet Commonly known as:  COUMADIN Take 1 Tab by mouth daily. Description Verbal order and read back per COSMO Castañeda Take 2mg today then Continue 4mg daily April 2018 Details [place] Tue Wed Thu Fri Sat  
  1  
  
  
  
   2  
  
2 mg See details 3  
  
4 mg 4  
  
4 mg 5  
  
4 mg 6  
  
4 mg 7  
  
4 mg 8  
  
4 mg 9  
  
4 mg  
  
   10  
  
4 mg  
  
   11  
  
4 mg  
  
   12  
  
4 mg  
  
   13  
  
4 mg  
  
   14  
  
4 mg  
  
  
  15  
  
4 mg  
  
   16  
  
4 mg  
  
   17  
  
  
  
   18  
  
  
  
   19  
  
  
  
   20  
  
  
  
   21  
  
  
  
  
  22  
  
  
  
   23  
  
  
  
   24  
  
  
  
   25  
  
  
  
   26  
  
  
  
   27  
  
  
  
   28  
  
  
  
  
  29  
  
  
  
   30  
  
  
  
       
 Date Details 04/02 This INR check INR: 3.0 Date of next INR:  4/16/2018 How to take your warfarin dose To take:  2 mg Take half of a 4 mg tablet. To take:  4 mg Take one of the 4 mg tablets. We Performed the Following AMB POC PT/INR [15686 CPT(R)] Please provide this summary of care documentation to your next provider. Your primary care clinician is listed as Nica Cage. If you have any questions after today's visit, please call [phone].

## 2018-04-03 LAB
ALBUMIN SERPL-MCNC: 4 G/DL (ref 3.5–4.8)
ALBUMIN/GLOB SERPL: 1.1 {RATIO} (ref 1.2–2.2)
ALP SERPL-CCNC: 76 IU/L (ref 39–117)
ALT SERPL-CCNC: 17 IU/L (ref 0–44)
AST SERPL-CCNC: 21 IU/L (ref 0–40)
BASOPHILS # BLD AUTO: 0 X10E3/UL (ref 0–0.2)
BASOPHILS NFR BLD AUTO: 0 %
BILIRUB SERPL-MCNC: 0.3 MG/DL (ref 0–1.2)
BUN SERPL-MCNC: 11 MG/DL (ref 8–27)
BUN/CREAT SERPL: 20 (ref 10–24)
CALCIUM SERPL-MCNC: 9.5 MG/DL (ref 8.6–10.2)
CHLORIDE SERPL-SCNC: 99 MMOL/L (ref 96–106)
CO2 SERPL-SCNC: 25 MMOL/L (ref 18–29)
CREAT SERPL-MCNC: 0.56 MG/DL (ref 0.76–1.27)
EOSINOPHIL # BLD AUTO: 0.2 X10E3/UL (ref 0–0.4)
EOSINOPHIL NFR BLD AUTO: 4 %
ERYTHROCYTE [DISTWIDTH] IN BLOOD BY AUTOMATED COUNT: 14.9 % (ref 12.3–15.4)
GFR SERPLBLD CREATININE-BSD FMLA CKD-EPI: 114 ML/MIN/1.73
GFR SERPLBLD CREATININE-BSD FMLA CKD-EPI: 98 ML/MIN/1.73
GLOBULIN SER CALC-MCNC: 3.7 G/DL (ref 1.5–4.5)
GLUCOSE SERPL-MCNC: 140 MG/DL (ref 65–99)
HCT VFR BLD AUTO: 39.1 % (ref 37.5–51)
HGB BLD-MCNC: 13.1 G/DL (ref 13–17.7)
IMM GRANULOCYTES # BLD: 0 X10E3/UL (ref 0–0.1)
IMM GRANULOCYTES NFR BLD: 0 %
LYMPHOCYTES # BLD AUTO: 1.4 X10E3/UL (ref 0.7–3.1)
LYMPHOCYTES NFR BLD AUTO: 26 %
MCH RBC QN AUTO: 31.2 PG (ref 26.6–33)
MCHC RBC AUTO-ENTMCNC: 33.5 G/DL (ref 31.5–35.7)
MCV RBC AUTO: 93 FL (ref 79–97)
MONOCYTES # BLD AUTO: 0.5 X10E3/UL (ref 0.1–0.9)
MONOCYTES NFR BLD AUTO: 10 %
NEUTROPHILS # BLD AUTO: 3.1 X10E3/UL (ref 1.4–7)
NEUTROPHILS NFR BLD AUTO: 60 %
PLATELET # BLD AUTO: 214 X10E3/UL (ref 150–379)
POTASSIUM SERPL-SCNC: 4.6 MMOL/L (ref 3.5–5.2)
PROT SERPL-MCNC: 7.7 G/DL (ref 6–8.5)
RBC # BLD AUTO: 4.2 X10E6/UL (ref 4.14–5.8)
SODIUM SERPL-SCNC: 138 MMOL/L (ref 134–144)
URATE SERPL-MCNC: 5.9 MG/DL (ref 3.7–8.6)
WBC # BLD AUTO: 5.2 X10E3/UL (ref 3.4–10.8)

## 2018-04-16 ENCOUNTER — ANTI-COAG VISIT (OUTPATIENT)
Dept: CARDIOLOGY CLINIC | Age: 78
End: 2018-04-16

## 2018-04-16 ENCOUNTER — OFFICE VISIT (OUTPATIENT)
Dept: FAMILY MEDICINE CLINIC | Age: 78
End: 2018-04-16

## 2018-04-16 VITALS
RESPIRATION RATE: 18 BRPM | WEIGHT: 280 LBS | DIASTOLIC BLOOD PRESSURE: 51 MMHG | OXYGEN SATURATION: 97 % | BODY MASS INDEX: 33.06 KG/M2 | TEMPERATURE: 96.6 F | HEIGHT: 77 IN | HEART RATE: 66 BPM | SYSTOLIC BLOOD PRESSURE: 106 MMHG

## 2018-04-16 DIAGNOSIS — I42.9 CARDIOMYOPATHY, UNSPECIFIED TYPE (HCC): ICD-10-CM

## 2018-04-16 DIAGNOSIS — N40.0 BENIGN PROSTATIC HYPERPLASIA WITHOUT LOWER URINARY TRACT SYMPTOMS: ICD-10-CM

## 2018-04-16 DIAGNOSIS — R26.81 GAIT INSTABILITY: ICD-10-CM

## 2018-04-16 DIAGNOSIS — M79.89 LEG SWELLING: Primary | ICD-10-CM

## 2018-04-16 DIAGNOSIS — I48.0 PAF (PAROXYSMAL ATRIAL FIBRILLATION) (HCC): Primary | ICD-10-CM

## 2018-04-16 DIAGNOSIS — G62.9 NEUROPATHY: ICD-10-CM

## 2018-04-16 LAB
INR BLD: 3.1
PT POC: NORMAL SECONDS
VALID INTERNAL CONTROL?: YES

## 2018-04-16 RX ORDER — GABAPENTIN 600 MG/1
TABLET ORAL
Qty: 120 TAB | Refills: 3 | Status: SHIPPED | OUTPATIENT
Start: 2018-04-16 | End: 2018-07-02 | Stop reason: SDUPTHER

## 2018-04-16 RX ORDER — FUROSEMIDE 40 MG/1
TABLET ORAL
Qty: 30 TAB | Refills: 3 | Status: SHIPPED | OUTPATIENT
Start: 2018-04-16 | End: 2018-05-07 | Stop reason: SDUPTHER

## 2018-04-16 NOTE — PROGRESS NOTES
The INR is above the therapeutic range. Ask the patient about bleeding complications. Please make the following adjustments to Coumadin dosing: Verbal order and read back per Dr. Jeannene Phalen  Hold x 1 then Take 2mg today then Continue 4mg daily   . Repeat the INR in 3 weeks.

## 2018-04-16 NOTE — PROGRESS NOTES
1. Have you been to the ER, urgent care clinic since your last visit? Hospitalized since your last visit? No    2. Have you seen or consulted any other health care providers outside of the [facility] since your last visit? Include any pap smears or colon screening.  No

## 2018-04-16 NOTE — PROGRESS NOTES
Mirtha Pastor is a 76 y.o.  male and presents with     Chief Complaint   Patient presents with    Leg Pain    Leg Swelling    Knee Pain       Pt is here for lab results. Pt has leg pain. Pt wants refils on Gabapentin. He is aware that neurontin can cause wt gain and leg swelling. Pt is awainting doppler studies. Past Medical History:   Diagnosis Date    Asthma     BPH (benign prostatic hyperplasia)     Herniated disc, cervical     Knee pain     Pacemaker 2011    [facility]    PAF ([facility]oxysmal [facility] fibrillation) ([facility])     During Pacer interogation     SSS (sick sinus syndrome) ([facility])     S/P Dual chamber [employer] Pacemaker     Past Surgical History:   Procedure Laterality Date    HX HERNIA REPAIR Bilateral 1989    HX HIP REPLACEMENT  2006    right    HX KNEE REPLACEMENT Bilateral 1992/1993/2006/2008/2011/2012/2013    HX PACEMAKER  2011     Current Outpatient Prescriptions   Medication Sig    gabapentin (NEURONTIN) 600 mg tablet One po in am , one at noon and 2 at hs    furosemide (LASIX) 40 mg tablet One po daily    metFORMIN (GLUCOPHAGE) 500 mg tablet Take 1 Tab by mouth two (2) times daily (with meals).  raNITIdine (ZANTAC) 75 mg tablet Take 75 mg by mouth daily.  prasterone, dhea, (DHEA) 50 mg tab Take 50 mg by mouth daily.  Potassium Gluconate 595 mg (99 mg) tablet Take 595 mg by mouth daily.  cholecalciferol (VITAMIN D3) 1,000 unit tablet Take 1,000 Units by mouth daily.  acetaminophen (TYLENOL) 500 mg tablet Take 500 mg by mouth every six (6) hours as needed for Pain.  nicotinic acid (NIACIN) 500 mg tablet Take 250 mg by mouth Daily (before breakfast).  SODIUM CHLORIDE (AFRIN SALINE NASAL MIST NA) 1 [place] by IntraNASal route as needed (congestion/allergies).  mupirocin (BACTROBAN) 2 % ointment Apply  to affected area daily.  metoprolol succinate (TOPROL-XL) 25 mg XL tablet Take 1 Tab by mouth daily.     warfarin (COUMADIN) 4 mg tablet Take 1 Tab by mouth daily.    diphenhydrAMINE (BENADRYL) 25 mg capsule Take 50 mg by mouth nightly as needed.  cyanocobalamin 1,000 mcg tablet Take 1,000 mcg by mouth daily.  loratadine (CLARITIN) 10 mg tablet Take 1 Tab by mouth daily. No current facility-administered medications for this visit. Health Maintenance   Topic Date Due    GLAUCOMA SCREENING Q2Y  08/28/2003    MEDICARE YEARLY EXAM  09/08/2018    DTaP/Tdap/Td series (2 - Td) 07/16/2024    ZOSTER VACCINE AGE 60>  Completed    Pneumococcal 65+ High/Highest Risk  Completed    Influenza Age 5 to Adult  Completed     Immunization History   Administered Date(s) Administered    Influenza High Dose Vaccine PF 09/07/2017    Pneumococcal Polysaccharide (PPSV-23) 09/07/2017     No LMP for male patient. Allergies and Intolerances: Allergies   Allergen Reactions    Sulfa (Sulfonamide Antibiotics) Hives and Rash       Family History:   No family history on file. Social History:   He  reports that he has been smoking Cigars. He has never used smokeless tobacco.  He  reports that he drinks alcohol.             Review of Systems: pos for leg swelling and leg pain  General: negative for - chills, fatigue, fever, weight change  Psych: negative for - anxiety, depression, irritability or mood swings  ENT: negative for - headaches, hearing change, nasal congestion, oral lesions, sneezing or sore throat  Heme/ Lymph: negative for - bleeding problems, bruising, pallor or swollen lymph nodes  Endo: negative for - hot flashes, polydipsia/polyuria or temperature intolerance  Resp: negative for - cough, shortness of breath or wheezing  CV: negative for - chest pain, edema or palpitations  GI: negative for - abdominal pain, change in bowel habits, constipation, diarrhea or nausea/vomiting  : negative for - dysuria, hematuria, incontinence, pelvic pain or vulvar/vaginal symptoms  MSK: negative for - joint pain, joint swelling or muscle pain  Neuro: negative for - confusion, headaches, seizures or weakness  Derm: negative for - dry skin, hair changes, rash or skin lesion changes          Physical:   Vitals:   Vitals:    04/16/18 1448   BP: 106/51   Pulse: 66   Resp: 18   Temp: 96.6 °F (35.9 °C)   TempSrc: Oral   SpO2: 97%   Weight: 280 lb (127 kg)   Height: 6' 5\" (1.956 m)           Exam:   HEENT- atraumatic,normocephalic, awake, oriented, well nourished  Neck - supple,no enlarged lymph nodes, no JVD, no thyromegaly  Chest- CTA, no rhonchi, no crackles  Heart- rrr, no murmurs / gallop/rub  Abdomen- soft,BS+,NT, no hepatosplenomegaly  Ext - no c/c/edema , bilateral leg swelling , left greater than rt  Neuro- no focal deficits. Power 5/5 all extremities  Skin - warm,dry, no obvious rashes. Review of Data:   LABS:   Lab Results   Component Value Date/Time    WBC 5.2 04/02/2018 02:29 AM    HGB 13.1 04/02/2018 02:29 AM    HCT 39.1 04/02/2018 02:29 AM    PLATELET 379 67/06/4873 02:29 AM     Lab Results   Component Value Date/Time    Sodium 138 04/02/2018 02:29 AM    Potassium 4.6 04/02/2018 02:29 AM    Chloride 99 04/02/2018 02:29 AM    CO2 25 04/02/2018 02:29 AM    Glucose 140 (H) 04/02/2018 02:29 AM    BUN 11 04/02/2018 02:29 AM    Creatinine 0.56 (L) 04/02/2018 02:29 AM     Lab Results   Component Value Date/Time    Cholesterol, total 129 02/07/2018 07:58 AM    HDL Cholesterol 30 (L) 02/07/2018 07:58 AM    LDL, calculated 71.4 02/07/2018 07:58 AM    Triglyceride 138 02/07/2018 07:58 AM     No results found for: GPT        Impression / Plan:        ICD-10-CM ICD-9-CM    1. Leg swelling M79.89 729.81 furosemide (LASIX) 40 mg tablet   2. Neuropathy G62.9 355.9 gabapentin (NEURONTIN) 600 mg tablet   3. Gait instability R26.81 781.2 gabapentin (NEURONTIN) 600 mg tablet   4. Cardiomyopathy, unspecified type ([facility].) I42.9 425.4    5.  Benign prostatic hyperplasia without lower urinary tract symptoms N40.0 600.00          Explained to patient risk benefits of the medications. Advised patient to stop meds if having any side effects. Pt verbalized understanding of the instructions. I have discussed the diagnosis with the patient and the intended plan as seen in the above orders. The patient has received an after-visit summary and questions were answered concerning future plans. I have discussed medication side effects and warnings with the patient as well. I have reviewed the plan of care with the patient, accepted their input and they are in agreement with the treatment goals. Reviewed plan of care. Patient has provided input and agrees with goals. Follow-up Disposition:  Return in about 2 months (around 6/16/2018).     [address] Gomes MD

## 2018-04-16 NOTE — MR AVS SNAPSHOT
303 [facility] 
 
 
 [address] [facility] [address] [place] [address] 
[phone] Patient: Hanna Maciel MRN: [id number] FNA:2/49/7077 Visit Information Date & Time Provider Department Dept. Phone Encounter #  
 4/16/2018  2:15 PM [id number] Gomes, [address] [place] [id number] Follow-up Instructions Return in about 2 months (around 6/16/2018). Your Appointments 5/29/2018  3:00 PM  
Follow Up with Jasmeet Goldsmith MD  
Cardio Specialist at [facility]) Appt Note: 9 months follow up- pt needed afternoon [facility] [address] [facility] [address] [place] [address] Upcoming Health Maintenance Date Due  
 GLAUCOMA SCREENING Q2Y 8/28/2003 MEDICARE YEARLY EXAM 9/8/2018 DTaP/Tdap/Td series (2 - Td) 7/16/2024 Allergies as of 4/16/2018  Review Complete On: 4/16/2018 By: Radha Wallace LPN Severity Noted Reaction Type Reactions Sulfa (Sulfonamide Antibiotics)  12/03/2015    Hives, Rash Current Immunizations  Never Reviewed Name Date Influenza High Dose Vaccine PF 9/7/2017  9:00 AM  
 Pneumococcal Polysaccharide (PPSV-23) 9/7/2017  9:00 AM  
  
 Not reviewed this visit You Were Diagnosed With   
  
 Codes Comments Leg swelling    -  Primary ICD-10-CM: M79.89 ICD-9-CM: 729.81 Neuropathy     ICD-10-CM: G62.9 ICD-9-CM: 355.9 Gait instability     ICD-10-CM: R26.81 
ICD-9-CM: 221. 2 Cardiomyopathy, unspecified type ([email or web address]     ICD-10-CM: I42.9 ICD-9-CM: 425.4 Benign prostatic hyperplasia without lower urinary tract symptoms     ICD-10-CM: N40.0 ICD-9-CM: 600.00 Vitals BP Pulse Temp Resp Height(growth percentile) Weight(growth percentile) 106/51 66 96.6 °F (35.9 °C) (Oral) 18 6' 5\" (1.956 m) 280 lb (127 kg) SpO2 BMI Smoking Status 97% 33.2 kg/m2 Current Some Day Smoker Vitals History BMI and BSA Data Body Mass Index Body Surface Area  
 33.2 kg/m 2 2.63 m 2 Preferred Pharmacy Pharmacy Name Phone [facility] [place] - [address] [facility] [address] [place] [address] [phone] Your Updated Medication List  
  
   
This list is accurate as of 4/16/18  3:29 PM.  Always use your most recent med list.  
  
  
  
  
 acetaminophen 500 mg tablet Commonly known as:  TYLENOL Take 500 mg by mouth every six (6) hours as needed for Pain. AFRIN SALINE NASAL MIST NA  
1 [place] by IntraNASal route as needed (congestion/allergies). BENADRYL 25 mg capsule Generic drug:  diphenhydrAMINE Take 50 mg by mouth nightly as needed. cholecalciferol 1,000 unit tablet Commonly known as:  VITAMIN D3 Take 1,000 Units by mouth daily. cyanocobalamin 1,000 mcg tablet Take 1,000 mcg by mouth daily. DHEA 50 mg Tab Generic drug:  prasterone (dhea) Take 50 mg by mouth daily. furosemide 40 mg tablet Commonly known as:  LASIX One po daily  
  
 gabapentin 600 mg tablet Commonly known as:  NEURONTIN One po in am , one at noon and 2 at hs  
  
 loratadine 10 mg tablet Commonly known as:  Curlee Arms Take 1 Tab by mouth daily. metFORMIN 500 mg tablet Commonly known as:  GLUCOPHAGE Take 1 Tab by mouth two (2) times daily (with meals). metoprolol succinate 25 mg XL tablet Commonly known as:  TOPROL-XL Take 1 Tab by mouth daily. mupirocin 2 % ointment Commonly known as:  [facility] Apply  to affected area daily. nicotinic acid 500 mg tablet Commonly known as:  NIACIN Take 250 mg by mouth Daily (before breakfast). Potassium Gluconate 595 mg (99 mg) tablet Take 595 mg by mouth daily. raNITIdine 75 mg tablet Commonly known as:  ZANTAC Take 75 mg by mouth daily. warfarin 4 mg tablet Commonly known as:  COUMADIN  
 Take 1 Tab by mouth daily. Prescriptions Sent to Pharmacy Refills  
 gabapentin (NEURONTIN) 600 mg tablet 3 Sig: One po in am , one at noon and 2 at hs  
 Class: Normal  
 Pharmacy: [address] [place] [address], [address] [place] [address] Ph #: [phone]  
 furosemide (LASIX) 40 mg tablet 3 Sig: One po daily Class: Normal  
 Pharmacy: [address] [place] [address], [address] [place] [address] Ph #: [phone] Follow-up Instructions Return in about 2 months (around 6/16/2018). To-Do List   
 04/18/2018 2:00 PM  
  Appointment with [facility] V[facility] LAB RM 2 at [facility] [address] [place] [address] ([phone]) Please provide this summary of care documentation to your next provider. Your primary care clinician is listed as Patricia Pa. If you have any questions after today's visit, please call [phone].

## 2018-04-16 NOTE — MR AVS SNAPSHOT
[address] [place] [address] [address] [address] 
[phone] Patient: John Abbott MRN: [id number] [facility]:9/93/9285 Visit Information Date & Time Provider Department Dept. Phone Encounter #  
 4/16/2018  3:20 PM Pt Inr Norf Csi Cardio Specialist at [place] [address] [id number] Your Appointments 5/1/2018 10:30 AM  
ANTICOAG NURSE with Pt Inr Norf Csi Cardio Specialist at [facility]) Appt Note: with pacer check [facility] [address] [address] [place] [address]  
  
    
 5/1/2018 11:00 AM  
PROCEDURE with Pacer Norf Csi Cardio Specialist at [facility]) Appt Note: 6 month [facility] device check -kmc [facility] [address] [place] [address]  
  
    
 5/29/2018  3:00 PM  
Follow Up with Chang Whyte MD  
[facility] Special[facility] Appt Note: 9 months follow up- pt needed afternoon [facility] [address] [place] [address] Upcoming Health Maintenance Date Due  
 GLAUCOMA SCREENING Q2Y 8/28/2003 MEDICARE YEARLY EXAM 9/8/2018 DTaP/Tdap/Td series (2 - Td) 7/16/2024 Allergies as of 4/16/2018  Review Complete On: 4/16/2018 By: Juan Tavarez LPN Severity Noted Reaction Type Reactions Sulfa (Sulfonamide Antibiotics)  12/03/2015    Hives, Rash Current Immunizations  Never Reviewed Name Date Influenza High Dose Vaccine PF 9/7/2017  9:00 AM  
 Pneumococcal Polysaccharide (PPSV-23) 9/7/2017  9:00 AM  
  
 Not reviewed this visit You Were Diagnosed With   
  
 Codes Comments PA[facility]ox[facility]    -  Primary ICD-10-CM: I48.0 ICD-9-CM: 427.31 Vitals Smoking Status Current Some Day Smoker Preferred Pharmacy Pharmacy Name Phone [facility] [address] [phone] Your Updated Medication List  
  
   
This list is accurate as of 4/16/18  3:49 PM.  Always use your most recent med list.  
  
  
  
  
 acetaminophen 500 mg tablet Commonly known as:  TYLENOL Take 500 mg by mouth every six (6) hours as needed for Pain. AFRIN SALINE NASAL MIST NA  
1 [place] by IntraNASal route as needed (congestion/allergies). BENADRYL 25 mg capsule Generic drug:  diphenhydrAMINE Take 50 mg by mouth nightly as needed. cholecalciferol 1,000 unit tablet Commonly known as:  VITAMIN D3 Take 1,000 Units by mouth daily. cyanocobalamin 1,000 mcg tablet Take 1,000 mcg by mouth daily. DHEA 50 mg Tab Generic drug:  prasterone (dhea) Take 50 mg by mouth daily. furosemide 40 mg tablet Commonly known as:  LASIX One po daily  
  
 gabapentin 600 mg tablet Commonly known as:  NEURONTIN One po in am , one at noon and 2 at hs  
  
 loratadine 10 mg tablet Commonly known as:  Sharilyn Feil Take 1 Tab by mouth daily. metFORMIN 500 mg tablet Commonly known as:  GLUCOPHAGE Take 1 Tab by mouth two (2) times daily (with meals). metoprolol succinate 25 mg XL tablet Commonly known as:  TOPROL-XL Take 1 Tab by mouth daily. mupirocin 2 % ointment Commonly known as:  [employer] Apply  to affected area daily. nicotinic acid 500 mg tablet Commonly known as:  NIACIN Take 250 mg by mouth Daily (before breakfast). Potassium Gluconate 595 mg (99 mg) tablet Take 595 mg by mouth daily. raNITIdine 75 mg tablet Commonly known as:  ZANTAC Take 75 mg by mouth daily. warfarin 4 mg tablet Commonly known as:  COUMADIN Take 1 Tab by mouth daily. Description Verbal order and read back per Dr. Low Rasmussen 
Hold x 1 then Take 2mg today then Continue 4mg daily April 2018 Details Sun Mon Tue Wed Thu Fri Sat  
  1  
  
  
  
   2  
  
  
  
   3  
  
  
  
   4  
  
  
  
   5  
  
  
  
   6  
  
  
  
   7  
  
  
  
  
  8  
  
  
  
   9  
  
  
  
   10  
  
  
  
   11  
  
  
  
   12  
  
  
  
   13  
  
  
  
   14  
  
  
  
  
  15  
  
  
  
   16  
  
4 mg See details 17  
  
4 mg  
  
   18  
  
4 mg  
  
   19  
  
4 mg  
  
   20  
  
4 mg  
  
   21  
  
4 mg  
  
  
  22  
  
4 mg  
  
   23  
  
4 mg  
  
   24  
  
4 mg  
  
   25  
  
4 mg  
  
   26  
  
4 mg  
  
   27  
  
4 mg  
  
   28  
  
4 mg  
  
  
  29  
  
4 mg  
  
   30  
  
4 mg Date Details 04/16 This INR check INR: 3.1! How to take your warfarin dose To take:  4 mg Take one of the 4 mg tablets. May 2018 Details Cb Cortez Tue Wed Thu Fri Sat 1  
  
4 mg  
  
   2  
  
  
  
   3  
  
  
  
   4  
  
  
  
   5  
  
  
  
  
  6  
  
  
  
   7  
  
  
  
   8  
  
  
  
   9  
  
  
  
   10  
  
  
  
   11  
  
  
  
   12  
  
  
  
  
  13  
  
  
  
   14  
  
  
  
   15  
  
  
  
   16  
  
  
  
   17  
  
  
  
   18  
  
  
  
   19  
  
  
  
  
  20  
  
  
  
   21  
  
  
  
   22  
  
  
  
   23  
  
  
  
   24  
  
  
  
   25  
  
  
  
   26  
  
  
  
  
  27  
  
  
  
   28  
  
  
  
   29  
  
  
  
   30  
  
  
  
   31  
  
  
  
    
 Date Details No additional details Date of next INR:  5/1/2018 How to take your warfarin dose To take:  4 mg Take one of the 4 mg tablets. We Performed the Following AMB POC PT/INR [07746 CPT(R)] To-Do List   
 04/18/2018 2:00 PM  
  Appointment with [facility] VAS LAB RM 2 at [facility] VASCULAR [facility] ([phone]) Please provide this summary of care documentation to your next provider. Your primary care clinician is listed as Gerda Bradshaw. If you have any questions after today's visit, please call [phone].

## 2018-04-27 ENCOUNTER — HOSPITAL ENCOUNTER (OUTPATIENT)
Dept: VASCULAR SURGERY | Age: 78
Discharge: HOME | End: 2018-04-27
Attending: INTERNAL MEDICINE
Payer: MEDICARE

## 2018-04-27 DIAGNOSIS — M79.89 LEG SWELLING: ICD-10-CM

## 2018-04-27 PROCEDURE — 93970 EXTREMITY STUDY: CPT

## 2018-04-27 NOTE — PROCEDURES
[facility]  *** FINAL REPORT ***    Name: Daily Sharpe  MRN: [id number]    Outpatient  : 28 Aug 1940  HIS Order #: [id number]  [address] [facility] Visit #: [id number]  Date: 2018    TYPE OF TEST: Peripheral Venous Testing    REASON FOR TEST  Swelling    Right Leg:-  Deep venous thrombosis:           No  Superficial venous thrombosis:    No  Deep venous insufficiency:        Not examined  Superficial venous insufficiency: Not examined    Left Leg:-  Deep venous thrombosis:           No  Superficial venous thrombosis:    No  Deep venous insufficiency:        Not examined  Superficial venous insufficiency: Not examined      INTERPRETATION/FINDINGS  Duplex images were obtained using 2-D gray scale, color flow, and  spectral Doppler analysis. Right leg :  1. Deep vein(s) visualized include the common femoral, deep femoral,  proximal femoral, mid femoral, distal femoral, popliteal(above knee),  popliteal(fossa), popliteal(below knee) and posterior tibial veins. 2. No evidence of deep venous thrombosis detected in the veins  visualized. 3. No evidence of superficial thrombosis detected in the proximal  greater saphenous vein. Left leg :  1. Deep vein(s) visualized include the common femoral, deep femoral,  proximal femoral, mid femoral, distal femoral, popliteal(above knee),  popliteal(fossa), popliteal(below knee) and posterior tibial veins. 2. No evidence of deep venous thrombosis detected in the veins  visualized. 3. No evidence of superficial thrombosis detected in the proximal  greater saphenous vein. ADDITIONAL COMMENTS  No evidence of deep or superficial vein thrombus bilaterally on  previous exam done 4-15-16. I have personally reviewed the data relevant to the interpretation of  this  study. TECHNOLOGIST: Mary Jane Arboleda. ROXY Hopson  Signed: 2018 04:35 PM    PHYSICIAN: Florence Rothman.  Yosvany Levy MD  Signed: 2018 05:49 PM

## 2018-05-01 ENCOUNTER — TELEPHONE (OUTPATIENT)
Dept: FAMILY MEDICINE CLINIC | Age: 78
End: 2018-05-01

## 2018-05-01 NOTE — TELEPHONE ENCOUNTER
Pt. Stated that he is having trouble with left hip pain. Is seeking medical advice. Asking to be called back. Please advise.

## 2018-05-02 NOTE — TELEPHONE ENCOUNTER
Patient states he have hip pain and it hurts like hell. Nurse scheduled an appt for 5/3/2018 1:45 PM. This encounter will be closed.

## 2018-05-07 ENCOUNTER — OFFICE VISIT (OUTPATIENT)
Dept: FAMILY MEDICINE CLINIC | Age: 78
End: 2018-05-07

## 2018-05-07 ENCOUNTER — HOSPITAL ENCOUNTER (OUTPATIENT)
Dept: LAB | Age: 78
Discharge: HOME | End: 2018-05-07
Payer: MEDICARE

## 2018-05-07 ENCOUNTER — HOSPITAL ENCOUNTER (OUTPATIENT)
Dept: LAB | Age: 78
Discharge: HOME | End: 2018-05-07

## 2018-05-07 VITALS
HEIGHT: 77 IN | TEMPERATURE: 97.2 F | OXYGEN SATURATION: 96 % | RESPIRATION RATE: 18 BRPM | SYSTOLIC BLOOD PRESSURE: 129 MMHG | DIASTOLIC BLOOD PRESSURE: 65 MMHG | HEART RATE: 97 BPM

## 2018-05-07 DIAGNOSIS — L03.032 CELLULITIS OF TOE OF LEFT FOOT: ICD-10-CM

## 2018-05-07 DIAGNOSIS — I73.9 CLAUDICATION (HCC): Primary | ICD-10-CM

## 2018-05-07 DIAGNOSIS — I42.9 CARDIOMYOPATHY, UNSPECIFIED TYPE (HCC): ICD-10-CM

## 2018-05-07 DIAGNOSIS — M79.89 LEG SWELLING: ICD-10-CM

## 2018-05-07 DIAGNOSIS — M25.552 PAIN OF LEFT HIP JOINT: ICD-10-CM

## 2018-05-07 DIAGNOSIS — I49.5 SICK SINUS SYNDROME (HCC): ICD-10-CM

## 2018-05-07 DIAGNOSIS — I48.0 PAROXYSMAL ATRIAL FIBRILLATION (HCC): ICD-10-CM

## 2018-05-07 LAB
BASOPHILS # BLD: 0 K/UL (ref 0–0.06)
BASOPHILS NFR BLD: 0 % (ref 0–2)
CRP SERPL-MCNC: 0.5 MG/DL (ref 0–0.3)
DIFFERENTIAL METHOD BLD: ABNORMAL
EOSINOPHIL # BLD: 0.2 K/UL (ref 0–0.4)
EOSINOPHIL NFR BLD: 4 % (ref 0–5)
ERYTHROCYTE [DISTWIDTH] IN BLOOD BY AUTOMATED COUNT: 14 % (ref 11.6–14.5)
ERYTHROCYTE [SEDIMENTATION RATE] IN BLOOD: 44 MM/HR (ref 0–20)
HCT VFR BLD AUTO: 38.5 % (ref 36–48)
HGB BLD-MCNC: 12.4 G/DL (ref 13–16)
LYMPHOCYTES # BLD: 1.9 K/UL (ref 0.9–3.6)
LYMPHOCYTES NFR BLD: 31 % (ref 21–52)
MCH RBC QN AUTO: 30.6 PG (ref 24–34)
MCHC RBC AUTO-ENTMCNC: 32.2 G/DL (ref 31–37)
MCV RBC AUTO: 95.1 FL (ref 74–97)
MONOCYTES # BLD: 0.5 K/UL (ref 0.05–1.2)
MONOCYTES NFR BLD: 9 % (ref 3–10)
NEUTS SEG # BLD: 3.4 K/UL (ref 1.8–8)
NEUTS SEG NFR BLD: 56 % (ref 40–73)
PLATELET # BLD AUTO: 173 K/UL (ref 135–420)
PMV BLD AUTO: 9 FL (ref 9.2–11.8)
RBC # BLD AUTO: 4.05 M/UL (ref 4.7–5.5)
WBC # BLD AUTO: 6 K/UL (ref 4.6–13.2)

## 2018-05-07 PROCEDURE — 36415 COLL VENOUS BLD VENIPUNCTURE: CPT | Performed by: INTERNAL MEDICINE

## 2018-05-07 PROCEDURE — 85652 RBC SED RATE AUTOMATED: CPT | Performed by: INTERNAL MEDICINE

## 2018-05-07 PROCEDURE — 85025 COMPLETE CBC W/AUTO DIFF WBC: CPT | Performed by: INTERNAL MEDICINE

## 2018-05-07 PROCEDURE — 86140 C-REACTIVE PROTEIN: CPT | Performed by: INTERNAL MEDICINE

## 2018-05-07 PROCEDURE — 99001 SPECIMEN HANDLING PT-LAB: CPT | Performed by: INTERNAL MEDICINE

## 2018-05-07 RX ORDER — FUROSEMIDE 40 MG/1
40 TABLET ORAL 2 TIMES DAILY
Qty: 60 TAB | Refills: 3 | Status: SHIPPED | OUTPATIENT
Start: 2018-05-07 | End: 2018-07-24 | Stop reason: SDUPTHER

## 2018-05-07 RX ORDER — CLINDAMYCIN HYDROCHLORIDE 300 MG/1
300 CAPSULE ORAL 3 TIMES DAILY
Qty: 30 CAP | Refills: 0 | Status: SHIPPED | OUTPATIENT
Start: 2018-05-07 | End: 2018-05-07 | Stop reason: SDUPTHER

## 2018-05-07 RX ORDER — CEFTRIAXONE 1 G/1
1 INJECTION, POWDER, FOR SOLUTION INTRAMUSCULAR; INTRAVENOUS ONCE
Qty: 1 VIAL | Refills: 0
Start: 2018-05-07 | End: 2018-05-07

## 2018-05-07 RX ORDER — METOPROLOL SUCCINATE 25 MG/1
25 TABLET, EXTENDED RELEASE ORAL DAILY
Qty: 90 TAB | Refills: 2 | Status: SHIPPED | OUTPATIENT
Start: 2018-05-07 | End: 2019-04-12 | Stop reason: SDUPTHER

## 2018-05-07 RX ORDER — CLINDAMYCIN HYDROCHLORIDE 300 MG/1
300 CAPSULE ORAL 3 TIMES DAILY
Qty: 30 CAP | Refills: 0 | Status: SHIPPED | OUTPATIENT
Start: 2018-05-07 | End: 2018-05-25

## 2018-05-07 NOTE — MR AVS SNAPSHOT
[address] [facility] [address] 
[phone] Patient: Aundrea Mccormick MRN: [id number] MDU:5/96/0827 Visit Information Date & Time Provider Department Dept. Phone Encounter #  
 5/7/2018  3:15 PM Alis Stover, [facility] [phone] [id number] Follow-up Instructions Return in about 8 days (around 5/15/2018). Your Appointments 5/23/2018 11:45 AM  
PROCEDURE with Rex Dominguez Csi Cardio Specialist at [facility] [facility]) Appt Note: 6 month [facility] [facility] device check, scheduled with Marilu nieves; 04.27.18 Pt confirmed appt.//JBJ; Rescheduling Appointment From 05/01/2018  
 [address] [address] [address]  
  
   
 [address] [place] [address]  
  
    
 5/29/2018  3:00 PM  
Follow Up with Natty Lynch MD  
Cardio Specialist at [facility] [facility]) Appt Note: 9 months follow up- pt needed afternoon [address]  
  
   
 [facility] [address] [place] [address] Upcoming Health Maintenance Date Due  
 GLAUCOMA SCREENING Q2Y 8/28/2003 Influenza Age 5 to Adult 8/1/2018 MEDICARE YEARLY EXAM 9/8/2018 DTaP/Tdap/Td series (2 - Td) 7/16/2024 Allergies as of 5/7/2018  Review Complete On: 5/7/2018 By: Ольга Domínguez LPN Severity Noted Reaction Type Reactions Sulfa (Sulfonamide Antibiotics)  12/03/2015    Hives, Rash Current Immunizations  Never Reviewed Name Date Influenza High Dose Vaccine PF 9/7/2017  9:00 AM  
 Pneumococcal Polysaccharide (PPSV-23) 9/7/2017  9:00 AM  
  
 Not reviewed this visit You Were Diagnosed With   
  
 Codes Comments Claudication [facility])    -  Primary ICD-10-CM: I73.9 ICD-9-CM: 443.9 Sick sinus syndrome (HCC)     ICD-10-CM: I49.5 ICD-9-CM: 427.81   
 Cardiomyopathy, unspecified type ([facility]     ICD-10-CM: I42.9 ICD-9-CM: 425.4 Paroxysmal atrial fibrillation (HCC)     ICD-10-CM: I48.0 ICD-9-CM: 427.31 Cellulitis of toe of left foot     ICD-10-CM: F11.447 
ICD-9-CM: 681.10 Pain of left hip joint     ICD-10-CM: M25.552 ICD-9-CM: 719.45 Leg swelling     ICD-10-CM: M79.89 ICD-9-CM: 729.81 Vitals BP Pulse Temp Resp Height(growth percentile) SpO2  
 129/65 97 97.2 °F (36.2 °C) (Oral) 18 6' 5\" (1.956 m) 96% Smoking Status Current Some Day Smoker Vitals History Preferred Pharmacy Pharmacy Name Phone [place] [address] [address] [phone] Your Updated Medication List  
  
   
This list is accurate as of 5/7/18  4:33 PM.  Always use your most recent med list.  
  
  
  
  
 acetaminophen 500 mg tablet Commonly known as:  TYLENOL Take 500 mg by mouth every six (6) hours as needed for Pain. AFRIN SALINE NASAL MIST NA  
1 [place] by IntraNASal route as needed (congestion/allergies). BENADRYL 25 mg capsule Generic drug:  diphenhydrAMINE Take 50 mg by mouth nightly as needed. cefTRIAXone 1 gram injection Commonly known as:  ROCEPHIN  
1 g by IntraMUSCular route once for 1 dose. cholecalciferol 1,000 unit tablet Commonly known as:  VITAMIN D3 Take 1,000 Units by mouth daily. clindamycin 300 mg capsule Commonly known as:  CLEOCIN Take 1 Cap by mouth three (3) times daily. cyanocobalamin 1,000 mcg tablet Take 1,000 mcg by mouth daily. DHEA 50 mg Tab Generic drug:  prasterone (dhea) Take 50 mg by mouth daily. furosemide 40 mg tablet Commonly known as:  LASIX Take 1 Tab by mouth two (2) times a day. One po daily  
  
 gabapentin 600 mg tablet Commonly known as:  NEURONTIN One po in am , one at noon and 2 at hs  
  
 loratadine 10 mg tablet Commonly known as:  Marietta Peace  
 Take 1 Tab by mouth daily. metFORMIN 500 mg tablet Commonly known as:  GLUCOPHAGE Take 1 Tab by mouth two (2) times daily (with meals). metoprolol succinate 25 mg XL tablet Commonly known as:  TOPROL-XL Take 1 Tab by mouth daily. mupirocin 2 % ointment Commonly known as:  [employer] Apply  to affected area daily. nicotinic acid 500 mg tablet Commonly known as:  NIACIN Take 250 mg by mouth Daily (before breakfast). Potassium Gluconate 595 mg (99 mg) tablet Take 595 mg by mouth daily. raNITIdine 75 mg tablet Commonly known as:  ZANTAC Take 75 mg by mouth daily. warfarin 4 mg tablet Commonly known as:  COUMADIN Take 1 Tab by mouth daily. Prescriptions Sent to Pharmacy Refills  
 metoprolol succinate (TOPROL-XL) 25 mg XL tablet 2 Sig: Take 1 Tab by mouth daily. Class: Normal  
 Pharmacy: [address] [place] [address] Ph #: [phone] Route: Oral  
 furosemide (LASIX) 40 mg tablet 3 Sig: Take 1 Tab by mouth two (2) times a day. One po daily Class: Normal  
 Pharmacy: [address] [place] [address] Ph #: [phone] Route: Oral  
 clindamycin (CLEOCIN) 300 mg capsule 0 Sig: Take 1 Cap by mouth three (3) times daily. Class: Normal  
 Pharmacy: [address] [place] [address] Ph #: [phone] Route: Oral  
  
We Performed the Following CEFTRIAXONE SODIUM INJECTION  MG [ HCP] Comments:  
 Mix per protocol AK THER/PROPH/DIAG INJECTION, SUBCUT/IM [id number] CPT(R)] REFERRAL TO ORTHOPEDICS [WJY468 Custom] Follow-up Instructions Return in about 8 days (around 5/15/2018). To-Do List   
 05/07/2018 Imaging:  LOWER EXT ART PVR MULT LEVEL SEG PRESSURES   
  
 05/14/2018 11:00 AM  
  Appointment with [facility] VAS LAB RM 2 at [facility] VASCULAR [address] [place] [address] ([phone]) There are no restrictions for this study. The patient can eat breakfast and take their medications. This study requires a written physician's order which may be either hand-carried by the patient or faxed to Central Scheduling at [phone]. Patients do NOT need to report early for registration when scheduled for this study at [facility]. The report time is the time the study has been scheduled. Referral Information Referral ID Referred By Referred To  
  
 [id number] Valeria IRVIN Not Available Visits Status Start Date End Date 1 New Request 5/7/18 5/7/19 If your referral has a status of pending review or denied, additional information will be sent to support the outcome of this decision. Please provide this summary of care documentation to your next provider. Your primary care clinician is listed as Jerardo Guzman. If you have any questions after today's visit, please call [phone].

## 2018-05-07 NOTE — PROGRESS NOTES
Diego Marina is a 76 y.o.  male and presents with     Chief Complaint   Patient presents with    Hip Pain    Leg Swelling    Allergies     Pt has infection to  his left great  Toe. It has foul smell  Pt also has sever pain in left hip. Pt developed allergies to citrus, blueberriesm raisins. He developed a rash all over his body. Took benadryl and the rash cleared up. Pt also has swelling in his left leg. Past Medical History:   Diagnosis Date    Asthma     BPH (benign prostatic hyperplasia)     Herniated disc, cervical     Knee pain     Pacemaker 2011    [facility]    PAF ([facility]oxysmal [facility] fibrillation) ([facility])     During Pacer interogation     SSS (sick sinus syndrome) ([facility])     S/P Dual chamber [facility] Pacemaker     Past Surgical History:   Procedure Laterality Date    HX HERNIA REPAIR Bilateral 1989    HX HIP REPLACEMENT  2006    right    HX KNEE REPLACEMENT Bilateral 1992/1993/2006/2008/2011/2012/2013    HX PACEMAKER  2011     Current Outpatient Prescriptions   Medication Sig    metoprolol succinate (TOPROL-XL) 25 mg XL tablet Take 1 Tab by mouth daily.  cefTRIAXone (ROCEPHIN) 1 gram injection 1 g by IntraMUSCular route once for 1 dose.  furosemide (LASIX) 40 mg tablet Take 1 Tab by mouth two (2) times a day. One po daily    clindamycin (CLEOCIN) 300 mg capsule Take 1 Cap by mouth three (3) times daily.  gabapentin (NEURONTIN) 600 mg tablet One po in am , one at noon and 2 at hs    metFORMIN (GLUCOPHAGE) 500 mg tablet Take 1 Tab by mouth two (2) times daily (with meals).  raNITIdine (ZANTAC) 75 mg tablet Take 75 mg by mouth daily.  prasterone, dhea, (DHEA) 50 mg tab Take 50 mg by mouth daily.  Potassium Gluconate 595 mg (99 mg) tablet Take 595 mg by mouth daily.  cholecalciferol (VITAMIN D3) 1,000 unit tablet Take 1,000 Units by mouth daily.  acetaminophen (TYLENOL) 500 mg tablet Take 500 mg by mouth every six (6) hours as needed for Pain.     nicotinic acid (NIACIN) 500 mg tablet Take 250 mg by mouth Daily (before breakfast).  SODIUM CHLORIDE (AFRIN SALINE NASAL MIST NA) 1 [place] by IntraNASal route as needed (congestion/allergies).  mupirocin (BACTROBAN) 2 % ointment Apply  to affected area daily.  warfarin (COUMADIN) 4 mg tablet Take 1 Tab by mouth daily.  diphenhydrAMINE (BENADRYL) 25 mg capsule Take 50 mg by mouth nightly as needed.  cyanocobalamin 1,000 mcg tablet Take 1,000 mcg by mouth daily.  loratadine (CLARITIN) 10 mg tablet Take 1 Tab by mouth daily. No current facility-administered medications for this visit. Health Maintenance   Topic Date Due    GLAUCOMA SCREENING Q2Y  08/28/2003    Influenza Age 5 to Adult  08/01/2018    MEDICARE YEARLY EXAM  09/08/2018    DTaP/Tdap/Td series (2 - Td) 07/16/2024    ZOSTER VACCINE AGE 60>  Completed    Pneumococcal 65+ High/Highest Risk  Completed     Immunization History   Administered Date(s) Administered    Influenza High Dose Vaccine PF 09/07/2017    Pneumococcal Polysaccharide (PPSV-23) 09/07/2017     No LMP for male patient. Allergies and Intolerances: Allergies   Allergen Reactions    Sulfa (Sulfonamide Antibiotics) Hives and Rash       Family History:   No family history on file. Social History:   He  reports that he has been smoking Cigars. He has never used smokeless tobacco.  He  reports that he drinks alcohol.             Review of Systems:   General: negative for - chills, fatigue, fever, weight change  Psych: negative for - anxiety, depression, irritability or mood swings  ENT: negative for - headaches, hearing change, nasal congestion, oral lesions, sneezing or sore throat  Heme/ Lymph: negative for - bleeding problems, bruising, pallor or swollen lymph nodes  Endo: negative for - hot flashes, polydipsia/polyuria or temperature intolerance  Resp: negative for - cough, shortness of breath or wheezing  CV: negative for - chest pain, edema or palpitations  GI: negative for - abdominal pain, change in bowel habits, constipation, diarrhea or nausea/vomiting  : negative for - dysuria, hematuria, incontinence, pelvic pain or vulvar/vaginal symptoms  MSK: negative for - joint pain, joint swelling or muscle pain, pos for left hip pain, pos for leg swelling  Neuro: negative for - confusion, headaches, seizures or weakness  Derm: negative for - dry skin, hair changes, rash or skin lesion changes          Physical:   Vitals:   Vitals:    05/07/18 1524   BP: 129/65   Pulse: 97   Resp: 18   Temp: 97.2 °F (36.2 °C)   TempSrc: Oral   SpO2: 96%   Height: 6' 5\" (1.956 m)           Exam:   HEENT- atraumatic,normocephalic, awake, oriented, well nourished  Neck - supple,no enlarged lymph nodes, no JVD, no thyromegaly  Chest- CTA, no rhonchi, no crackles  Heart- rrr, no murmurs / gallop/rub  Abdomen- soft,BS+,NT, no hepatosplenomegaly  Ext - left great toe - infection on the dorsum of the great toe, left leg swelling +, foul smell to the foot, diminished pulsations in the leg  Neuro- no focal deficits. Power 5/5 all extremities  Skin - warm,dry, no obvious rashes. Review of Data:   LABS:   Lab Results   Component Value Date/Time    WBC 5.2 04/02/2018 02:29 AM    HGB 13.1 04/02/2018 02:29 AM    HCT 39.1 04/02/2018 02:29 AM    PLATELET 231 67/36/0688 02:29 AM     Lab Results   Component Value Date/Time    Sodium 138 04/02/2018 02:29 AM    Potassium 4.6 04/02/2018 02:29 AM    Chloride 99 04/02/2018 02:29 AM    CO2 25 04/02/2018 02:29 AM    Glucose 140 (H) 04/02/2018 02:29 AM    BUN 11 04/02/2018 02:29 AM    Creatinine 0.56 (L) 04/02/2018 02:29 AM     Lab Results   Component Value Date/Time    Cholesterol, total 129 02/07/2018 07:58 AM    HDL Cholesterol 30 (L) 02/07/2018 07:58 AM    LDL, calculated 71.4 02/07/2018 07:58 AM    Triglyceride 138 02/07/2018 07:58 AM     No results found for: GPT        Impression / Plan:        ICD-10-CM ICD-9-CM    1.  Claudication ([facility] I73.9 443.9 LOWER EXT ART PVR MULT LEVEL SEG PRESSURES   2. Sick sinus syndrome (HCC) I49.5 427.81 metoprolol succinate (TOPROL-XL) 25 mg XL tablet   3. Cardiomyopathy, unspecified type (HCC) I42.9 425.4 metoprolol succinate (TOPROL-XL) 25 mg XL tablet   4. Paroxysmal atrial fibrillation (HCC) I48.0 427.31 metoprolol succinate (TOPROL-XL) 25 mg XL tablet   5. Cellulitis of toe of left foot L03.032 681.10 CULTURE, WOUND W GRAM STAIN      CBC WITH AUTOMATED DIFF      SED RATE (ESR)      C REACTIVE PROTEIN, QT      cefTRIAXone (ROCEPHIN) 1 gram injection      CEFTRIAXONE SODIUM INJECTION  MG      PA THER/PROPH/DIAG INJECTION, SUBCUT/IM      clindamycin (CLEOCIN) 300 mg capsule   6. Pain of left hip joint M25.552 719.45 REFERRAL TO ORTHOPEDICS   7. Leg swelling M79.89 729.81 furosemide (LASIX) 40 mg tablet         Explained to patient risk benefits of the medications. Advised patient to stop meds if having any side effects. Pt verbalized understanding of the instructions. I have discussed the diagnosis with the patient and the intended plan as seen in the above orders. The patient has received an after-visit summary and questions were answered concerning future plans. I have discussed medication side effects and warnings with the patient as well. I have reviewed the plan of care with the patient, accepted their input and they are in agreement with the treatment goals. Reviewed plan of care. Patient has provided input and agrees with goals.     Follow-up Disposition: Not on Joshua MD Jareth

## 2018-05-07 NOTE — PROGRESS NOTES
1. Have you been to the ER, urgent care clinic since your last visit? Hospitalized since your last visit? No    2. Have you seen or consulted any other health care providers outside of the [address] [place] [address] since your last visit? Include any pap smears or colon screening.  No

## 2018-05-10 LAB
BACTERIA SPEC AEROBE CULT: ABNORMAL
BACTERIA SPEC AEROBE CULT: ABNORMAL

## 2018-05-14 ENCOUNTER — TELEPHONE (OUTPATIENT)
Dept: FAMILY MEDICINE CLINIC | Age: 78
End: 2018-05-14

## 2018-05-14 DIAGNOSIS — L08.9 FOOT INFECTION: Primary | ICD-10-CM

## 2018-05-14 RX ORDER — DOXYCYCLINE 100 MG/1
100 TABLET ORAL 2 TIMES DAILY
Qty: 20 TAB | Refills: 0 | Status: SHIPPED | OUTPATIENT
Start: 2018-05-14 | End: 2018-05-25

## 2018-05-15 NOTE — TELEPHONE ENCOUNTER
Twice  A day till I see him in 7-  10 days. Wound shows Staph and Klebsiella. Will send Doxy for 10 days. Will ask pt to set up appt in  7-10 days after doing arterial dopplers. doxycycline (ADOXA) 100 mg tablet Take 1 tab by mouth 2 times a day for 10 days    lvm to return call.

## 2018-05-21 ENCOUNTER — TELEPHONE (OUTPATIENT)
Dept: FAMILY MEDICINE CLINIC | Age: 78
End: 2018-05-21

## 2018-05-21 NOTE — TELEPHONE ENCOUNTER
Pt. Is requesting to get an MRI done on left knee and Hip along with his MRI scheduled. Pt. Stated that he is having extreme pain, and that it is so bad that he cannot get into his bed, he is sleeping in wheelchair. Please advise.

## 2018-05-22 ENCOUNTER — HOSPITAL ENCOUNTER (OUTPATIENT)
Age: 78
Setting detail: OBSERVATION
Discharge: HOME | End: 2018-05-25
Attending: EMERGENCY MEDICINE | Admitting: HOSPITALIST
Payer: MEDICARE

## 2018-05-22 ENCOUNTER — APPOINTMENT (OUTPATIENT)
Dept: GENERAL RADIOLOGY | Age: 78
End: 2018-05-22
Attending: EMERGENCY MEDICINE
Payer: MEDICARE

## 2018-05-22 DIAGNOSIS — L03.116 LEFT LEG CELLULITIS: Primary | ICD-10-CM

## 2018-05-22 DIAGNOSIS — R68.83 CHILLS: ICD-10-CM

## 2018-05-22 PROBLEM — L03.90 CELLULITIS: Status: ACTIVE | Noted: 2018-05-22

## 2018-05-22 LAB
ALBUMIN SERPL-MCNC: 3.3 G/DL (ref 3.4–5)
ALBUMIN/GLOB SERPL: 0.9 {RATIO} (ref 0.8–1.7)
ALP SERPL-CCNC: 65 U/L (ref 45–117)
ALT SERPL-CCNC: 27 U/L (ref 16–61)
ANION GAP BLD CALC-SCNC: 16 MMOL/L (ref 10–20)
ANION GAP SERPL CALC-SCNC: 6 MMOL/L (ref 3–18)
APPEARANCE UR: CLEAR
APTT PPP: 37.9 SEC (ref 23–36.4)
AST SERPL-CCNC: 27 U/L (ref 15–37)
BACTERIA URNS QL MICRO: NEGATIVE /HPF
BASOPHILS # BLD: 0 K/UL (ref 0–0.06)
BASOPHILS NFR BLD: 0 % (ref 0–2)
BILIRUB SERPL-MCNC: 0.7 MG/DL (ref 0.2–1)
BILIRUB UR QL: NEGATIVE
BUN BLD-MCNC: 17 MG/DL (ref 7–18)
BUN SERPL-MCNC: 17 MG/DL (ref 7–18)
BUN/CREAT SERPL: 23 (ref 12–20)
CA-I BLD-MCNC: 1.17 MMOL/L (ref 1.12–1.32)
CALCIUM SERPL-MCNC: 8.7 MG/DL (ref 8.5–10.1)
CHLORIDE BLD-SCNC: 102 MMOL/L (ref 100–108)
CHLORIDE SERPL-SCNC: 101 MMOL/L (ref 100–108)
CK MB CFR SERPL CALC: 2 % (ref 0–4)
CK MB SERPL-MCNC: 3.3 NG/ML (ref 5–25)
CK SERPL-CCNC: 163 U/L (ref 39–308)
CO2 BLD-SCNC: 26 MMOL/L (ref 19–24)
CO2 SERPL-SCNC: 30 MMOL/L (ref 21–32)
COLOR UR: YELLOW
CREAT SERPL-MCNC: 0.75 MG/DL (ref 0.6–1.3)
CREAT UR-MCNC: 0.7 MG/DL (ref 0.6–1.3)
DIFFERENTIAL METHOD BLD: ABNORMAL
EOSINOPHIL # BLD: 0.2 K/UL (ref 0–0.4)
EOSINOPHIL NFR BLD: 3 % (ref 0–5)
EPITH CASTS URNS QL MICRO: NORMAL /LPF (ref 0–5)
ERYTHROCYTE [DISTWIDTH] IN BLOOD BY AUTOMATED COUNT: 15.2 % (ref 11.6–14.5)
EST. AVERAGE GLUCOSE BLD GHB EST-MCNC: 137 MG/DL
GLOBULIN SER CALC-MCNC: 3.8 G/DL (ref 2–4)
GLUCOSE BLD STRIP.AUTO-MCNC: 154 MG/DL (ref 74–106)
GLUCOSE BLD STRIP.AUTO-MCNC: 228 MG/DL (ref 70–110)
GLUCOSE SERPL-MCNC: 148 MG/DL (ref 74–99)
GLUCOSE UR STRIP.AUTO-MCNC: NEGATIVE MG/DL
HBA1C MFR BLD: 6.4 % (ref 4.2–5.6)
HCT VFR BLD AUTO: 27.3 % (ref 36–48)
HCT VFR BLD CALC: 25 % (ref 36–49)
HGB BLD-MCNC: 8.5 G/DL (ref 12–16)
HGB BLD-MCNC: 8.8 G/DL (ref 13–16)
HGB UR QL STRIP: NEGATIVE
INR PPP: 2.5 (ref 0.8–1.2)
KETONES UR QL STRIP.AUTO: NEGATIVE MG/DL
LACTATE BLD-SCNC: 1.3 MMOL/L (ref 0.4–2)
LEUKOCYTE ESTERASE UR QL STRIP.AUTO: ABNORMAL
LYMPHOCYTES # BLD: 1.3 K/UL (ref 0.9–3.6)
LYMPHOCYTES NFR BLD: 20 % (ref 21–52)
MCH RBC QN AUTO: 31.1 PG (ref 24–34)
MCHC RBC AUTO-ENTMCNC: 32.2 G/DL (ref 31–37)
MCV RBC AUTO: 96.5 FL (ref 74–97)
MONOCYTES # BLD: 0.6 K/UL (ref 0.05–1.2)
MONOCYTES NFR BLD: 10 % (ref 3–10)
NEUTS SEG # BLD: 4.6 K/UL (ref 1.8–8)
NEUTS SEG NFR BLD: 67 % (ref 40–73)
NITRITE UR QL STRIP.AUTO: NEGATIVE
PH UR STRIP: 7 [PH] (ref 5–8)
PLATELET # BLD AUTO: 243 K/UL (ref 135–420)
PMV BLD AUTO: 8.1 FL (ref 9.2–11.8)
POTASSIUM BLD-SCNC: 3.9 MMOL/L (ref 3.5–5.5)
POTASSIUM SERPL-SCNC: 4 MMOL/L (ref 3.5–5.5)
PROT SERPL-MCNC: 7.1 G/DL (ref 6.4–8.2)
PROT UR STRIP-MCNC: NEGATIVE MG/DL
PROTHROMBIN TIME: 26.5 SEC (ref 11.5–15.2)
RBC # BLD AUTO: 2.83 M/UL (ref 4.7–5.5)
RBC #/AREA URNS HPF: 0 /HPF (ref 0–5)
SODIUM BLD-SCNC: 138 MMOL/L (ref 136–145)
SODIUM SERPL-SCNC: 137 MMOL/L (ref 136–145)
SP GR UR REFRACTOMETRY: 1.01 (ref 1–1.03)
TROPONIN I SERPL-MCNC: <0.02 NG/ML (ref 0–0.04)
UROBILINOGEN UR QL STRIP.AUTO: 0.2 EU/DL (ref 0.2–1)
WBC # BLD AUTO: 6.8 K/UL (ref 4.6–13.2)
WBC URNS QL MICRO: NORMAL /HPF (ref 0–4)

## 2018-05-22 PROCEDURE — 93005 ELECTROCARDIOGRAM TRACING: CPT

## 2018-05-22 PROCEDURE — 99285 EMERGENCY DEPT VISIT HI MDM: CPT

## 2018-05-22 PROCEDURE — 80047 BASIC METABLC PNL IONIZED CA: CPT

## 2018-05-22 PROCEDURE — 74011636637 HC RX REV CODE- 636/637: Performed by: HOSPITALIST

## 2018-05-22 PROCEDURE — 85025 COMPLETE CBC W/AUTO DIFF WBC: CPT | Performed by: EMERGENCY MEDICINE

## 2018-05-22 PROCEDURE — 83605 ASSAY OF LACTIC ACID: CPT

## 2018-05-22 PROCEDURE — 96367 TX/PROPH/DG ADDL SEQ IV INF: CPT

## 2018-05-22 PROCEDURE — 80053 COMPREHEN METABOLIC PANEL: CPT | Performed by: EMERGENCY MEDICINE

## 2018-05-22 PROCEDURE — 74011000258 HC RX REV CODE- 258: Performed by: EMERGENCY MEDICINE

## 2018-05-22 PROCEDURE — 96365 THER/PROPH/DIAG IV INF INIT: CPT

## 2018-05-22 PROCEDURE — 71045 X-RAY EXAM CHEST 1 VIEW: CPT

## 2018-05-22 PROCEDURE — 85730 THROMBOPLASTIN TIME PARTIAL: CPT | Performed by: EMERGENCY MEDICINE

## 2018-05-22 PROCEDURE — 83036 HEMOGLOBIN GLYCOSYLATED A1C: CPT | Performed by: HOSPITALIST

## 2018-05-22 PROCEDURE — 73620 X-RAY EXAM OF FOOT: CPT

## 2018-05-22 PROCEDURE — 74011250637 HC RX REV CODE- 250/637: Performed by: HOSPITALIST

## 2018-05-22 PROCEDURE — 74011250636 HC RX REV CODE- 250/636: Performed by: EMERGENCY MEDICINE

## 2018-05-22 PROCEDURE — 85610 PROTHROMBIN TIME: CPT | Performed by: EMERGENCY MEDICINE

## 2018-05-22 PROCEDURE — 65390000012 HC CONDITION CODE 44 OBSERVATION

## 2018-05-22 PROCEDURE — 96366 THER/PROPH/DIAG IV INF ADDON: CPT

## 2018-05-22 PROCEDURE — 96361 HYDRATE IV INFUSION ADD-ON: CPT

## 2018-05-22 PROCEDURE — 82962 GLUCOSE BLOOD TEST: CPT

## 2018-05-22 PROCEDURE — 65270000029 HC RM PRIVATE

## 2018-05-22 PROCEDURE — 87040 BLOOD CULTURE FOR BACTERIA: CPT | Performed by: EMERGENCY MEDICINE

## 2018-05-22 PROCEDURE — 82553 CREATINE MB FRACTION: CPT | Performed by: EMERGENCY MEDICINE

## 2018-05-22 PROCEDURE — 81001 URINALYSIS AUTO W/SCOPE: CPT | Performed by: EMERGENCY MEDICINE

## 2018-05-22 RX ORDER — WARFARIN 2 MG/1
4 TABLET ORAL DAILY
Status: DISCONTINUED | OUTPATIENT
Start: 2018-05-23 | End: 2018-05-25 | Stop reason: HOSPADM

## 2018-05-22 RX ORDER — METOPROLOL SUCCINATE 25 MG/1
25 TABLET, EXTENDED RELEASE ORAL DAILY
Status: DISCONTINUED | OUTPATIENT
Start: 2018-05-23 | End: 2018-05-25 | Stop reason: HOSPADM

## 2018-05-22 RX ORDER — INSULIN LISPRO 100 [IU]/ML
INJECTION, SOLUTION INTRAVENOUS; SUBCUTANEOUS
Status: DISCONTINUED | OUTPATIENT
Start: 2018-05-22 | End: 2018-05-25 | Stop reason: HOSPADM

## 2018-05-22 RX ORDER — DEXTROSE 50 % IN WATER (D50W) INTRAVENOUS SYRINGE
25-50 AS NEEDED
Status: DISCONTINUED | OUTPATIENT
Start: 2018-05-22 | End: 2018-05-25 | Stop reason: HOSPADM

## 2018-05-22 RX ORDER — MAGNESIUM SULFATE 100 %
4 CRYSTALS MISCELLANEOUS AS NEEDED
Status: DISCONTINUED | OUTPATIENT
Start: 2018-05-22 | End: 2018-05-25 | Stop reason: HOSPADM

## 2018-05-22 RX ORDER — FUROSEMIDE 40 MG/1
40 TABLET ORAL 2 TIMES DAILY
Status: DISCONTINUED | OUTPATIENT
Start: 2018-05-22 | End: 2018-05-25 | Stop reason: HOSPADM

## 2018-05-22 RX ORDER — GABAPENTIN 400 MG/1
1200 CAPSULE ORAL
Status: DISCONTINUED | OUTPATIENT
Start: 2018-05-22 | End: 2018-05-25 | Stop reason: HOSPADM

## 2018-05-22 RX ORDER — FAMOTIDINE 20 MG/1
20 TABLET, FILM COATED ORAL DAILY
Status: DISCONTINUED | OUTPATIENT
Start: 2018-05-23 | End: 2018-05-25 | Stop reason: HOSPADM

## 2018-05-22 RX ORDER — GABAPENTIN 300 MG/1
600 CAPSULE ORAL 2 TIMES DAILY
Status: DISCONTINUED | OUTPATIENT
Start: 2018-05-23 | End: 2018-05-25 | Stop reason: HOSPADM

## 2018-05-22 RX ORDER — SODIUM CHLORIDE 9 MG/ML
150 INJECTION, SOLUTION INTRAVENOUS CONTINUOUS
Status: DISPENSED | OUTPATIENT
Start: 2018-05-22 | End: 2018-05-22

## 2018-05-22 RX ORDER — ACETAMINOPHEN 500 MG
500 TABLET ORAL
Status: DISCONTINUED | OUTPATIENT
Start: 2018-05-22 | End: 2018-05-25 | Stop reason: HOSPADM

## 2018-05-22 RX ORDER — SODIUM CHLORIDE 0.9 % (FLUSH) 0.9 %
5-10 SYRINGE (ML) INJECTION AS NEEDED
Status: DISCONTINUED | OUTPATIENT
Start: 2018-05-22 | End: 2018-05-25 | Stop reason: HOSPADM

## 2018-05-22 RX ADMIN — GABAPENTIN 1200 MG: 400 CAPSULE ORAL at 23:08

## 2018-05-22 RX ADMIN — ACETAMINOPHEN 500 MG: 500 TABLET ORAL at 21:23

## 2018-05-22 RX ADMIN — SODIUM CHLORIDE 150 ML/HR: 900 INJECTION, SOLUTION INTRAVENOUS at 15:46

## 2018-05-22 RX ADMIN — FUROSEMIDE 40 MG: 40 TABLET ORAL at 21:23

## 2018-05-22 RX ADMIN — INSULIN LISPRO 4 UNITS: 100 INJECTION, SOLUTION INTRAVENOUS; SUBCUTANEOUS at 23:09

## 2018-05-22 RX ADMIN — SODIUM CHLORIDE 1000 MG: 900 INJECTION, SOLUTION INTRAVENOUS at 15:38

## 2018-05-22 RX ADMIN — SODIUM CHLORIDE 1000 MG: 900 INJECTION, SOLUTION INTRAVENOUS at 17:33

## 2018-05-22 RX ADMIN — CEFTRIAXONE 1 G: 1 INJECTION, POWDER, FOR SOLUTION INTRAMUSCULAR; INTRAVENOUS at 15:37

## 2018-05-22 NOTE — IP AVS SNAPSHOT
[address] [address] 
[phone] Patient: Flakito Salgado MRN: [id number] About your hospitalization You were admitted on:  May 22, 2018 You last received care in the:  [address] NEURO [facility] You were discharged on:  May 25, 2018 Why you were hospitalized Your primary diagnosis was:  Cellulitis Your diagnoses also included:  Peripheral Neuropathy, Paf (Paroxysmal Atrial Fibrillation) (Hcc), Cardiomyopathy (Hcc), Bph (Benign Prostatic Hyperplasia), Monoclonal Gammopathy, Stroke (Hcc), Sick Sinus Syndrome (Hcc), Diabetes Mellitus Type 2, Controlled (Formerly [facility]) Follow-up Information Follow up With Details Comments Contact Info Kalyn Camarena MD On 5/30/2018 11am [id number] [place] [phone] 
[phone] Your Scheduled Appointments Tuesday May 29, 2018  3:00 PM EDT Follow Up with Yury Bragg MD  
Cardio Specialist at [address] [phone]  
[phone] Tuesday May 29, 2018  3:00 PM EDT PROCEDURE with Rex Dominguez Csi Cardio Specialist at [address] [phone]  
[phone] Wednesday May 30, 2018 11:00 AM EDT TRANSITIONAL CARE MANAGEMENT with Kalyn Camarena MD  
[address] [facility] [id number]  
[phone] Discharge Orders None A check jessica indicates which time of day the medication should be taken. My Medications START taking these medications Instructions Each Dose to Equal  
 Morning Noon Evening Bedtime  
 levoFLOXacin 500 mg tablet Commonly known as:  Shayy Steven Your last dose was: Your next dose is: Take 1 Tab by mouth every twenty-four (24) hours.   
 500 mg  
    
 CONTINUE taking these medications Instructions Each Dose to Equal  
 Morning Noon Evening Bedtime  
 acetaminophen 500 mg tablet Commonly known as:  TYLENOL Your last dose was: Your next dose is: Take 500 mg by mouth every six (6) hours as needed for Pain. 500 mg  
    
   
   
   
  
 AFRIN SALINE NASAL MIST NA Your last dose was: Your next dose is:    
   
   
 1 [place] by IntraNASal route as needed (congestion/allergies). 1 Spray BENADRYL 25 mg capsule Generic drug:  diphenhydrAMINE Your last dose was: Your next dose is: Take 50 mg by mouth nightly as needed. 50 mg  
    
   
   
   
  
 cholecalciferol 1,000 unit tablet Commonly known as:  VITAMIN D3 Your last dose was: Your next dose is: Take 1,000 Units by mouth daily. 1000 Units  
    
   
   
   
  
 cyanocobalamin 1,000 mcg tablet Your last dose was: Your next dose is: Take 1,000 mcg by mouth daily. 1000 mcg DHEA 50 mg Tab Generic drug:  prasterone (dhea) Your last dose was: Your next dose is: Take 50 mg by mouth daily. 50 mg  
    
   
   
   
  
 furosemide 40 mg tablet Commonly known as:  LASIX Your last dose was: Your next dose is: Take 1 Tab by mouth two (2) times a day. One po daily 40 mg  
    
   
   
   
  
 gabapentin 600 mg tablet Commonly known as:  NEURONTIN Your last dose was: Your next dose is: One po in am , one at noon and 2 at hs  
     
   
   
   
  
 loratadine 10 mg tablet Commonly known as:  [place] Pain Your last dose was: Your next dose is: Take 1 Tab by mouth daily. 10 mg  
    
   
   
   
  
 metFORMIN 500 mg tablet Commonly known as:  GLUCOPHAGE Your last dose was: Your next dose is: Take 1 Tab by mouth two (2) times daily (with meals). 500 mg  
    
   
   
   
  
 metoprolol succinate 25 mg XL tablet Commonly known as:  TOPROL-XL Your last dose was: Your next dose is: Take 1 Tab by mouth daily. 25 mg  
    
   
   
   
  
 mupirocin 2 % ointment Commonly known as:  [facility] Your last dose was: Your next dose is:    
   
   
 Apply  to affected area daily. nicotinic acid 500 mg tablet Commonly known as:  NIACIN Your last dose was: Your next dose is: Take 250 mg by mouth Daily (before breakfast). 250 mg  
    
   
   
   
  
 Potassium Gluconate 595 mg (99 mg) tablet Your last dose was: Your next dose is: Take 595 mg by mouth daily. 595 mg  
    
   
   
   
  
 raNITIdine 75 mg tablet Commonly known as:  ZANTAC Your last dose was: Your next dose is: Take 75 mg by mouth daily. 75 mg  
    
   
   
   
  
 warfarin 4 mg tablet Commonly known as:  COUMADIN Your last dose was: Your next dose is: Take 1 Tab by mouth daily. 4 mg STOP taking these medications   
 clindamycin 300 mg capsule Commonly known as:  CLEOCIN  
   
  
 doxycycline 100 mg tablet Commonly known as:  ADOXA Where to Get Your Medications Information on where to get these meds will be given to you by the nurse or doctor. ! Ask your nurse or doctor about these medications  
  levoFLOXacin 500 mg tablet Discharge Instructions DISCHARGE SUMMARY from Nurse PATIENT INSTRUCTIONS: 
 
 
F-face looks uneven A-arms unable to move or move unevenly S-speech slurred or non-existent T-time-call 911 as soon as signs and symptoms begin-DO NOT go Back to bed or wait to see if you get better-TIME IS BRAIN. Warning Signs of HEART ATTACK Call 911 if you have these symptoms: 
? Chest discomfort. Most heart attacks involve discomfort in the center of the chest that lasts more than a few minutes, or that goes away and comes back. It can feel like uncomfortable pressure, squeezing, fullness, or pain. ? Discomfort in other areas of the upper body. Symptoms can include pain or discomfort in one or both arms, the back, neck, jaw, or stomach. ? Shortness of breath with or without chest discomfort. ? Other signs may include breaking out in a cold sweat, nausea, or lightheadedness. Don't wait more than five minutes to call [address]! Fast action can save your life. Calling 911 is almost always the fastest way to get lifesaving treatment. Emergency Medical Services staff can begin treatment when they arrive  up to an hour sooner than if someone gets to the hospital by car. The discharge information has been reviewed with the patient. The patient verbalized understanding. Discharge medications reviewed with the patient and appropriate educational materials and side effects teaching were provided. ___________________________________________________________________________________________________________________________________ Cellulitis: Care Instructions Your Care Instructions Cellulitis is a skin infection. It often occurs after a break in the skin from a scrape, cut, bite, or puncture, or after a rash. The doctor has checked you carefully, but problems can develop later. If you notice any problems or new symptoms, get medical treatment right away. Follow-up care is a key part of your treatment and safety.  Be sure to make and go to all appointments, and call your doctor if you are having problems. It's also a good idea to know your test results and keep a list of the medicines you take. How can you care for yourself at home? · Take your antibiotics as directed. Do not stop taking them just because you feel better. You need to take the full course of antibiotics. · Prop up the infected area on pillows to reduce pain and swelling. Try to keep the area above the level of your heart as often as you can. · If your doctor told you how to care for your wound, follow your doctor's instructions. If you did not get instructions, follow this general advice: ¨ Wash the wound with clean water 2 times a day. Don't use hydrogen peroxide or alcohol, which can slow healing. ¨ You may cover the wound with a thin layer of petroleum jelly, such as Vaseline, and a nonstick bandage. ¨ Apply more petroleum jelly and replace the bandage as needed. · Be safe with medicines. Take pain medicines exactly as directed. ¨ If the doctor gave you a prescription medicine for pain, take it as prescribed. ¨ If you are not taking a prescription pain medicine, ask your doctor if you can take an over-the-counter medicine. To prevent cellulitis in the future · Try to prevent cuts, scrapes, or other injuries to your skin. Cellulitis most often occurs where there is a break in the skin. · If you get a scrape, cut, mild burn, or bite, wash the wound with clean water as soon as you can to help avoid infection. Don't use hydrogen peroxide or alcohol, which can slow healing. · If you have swelling in your legs (edema), support stockings and good skin care may help prevent leg sores and cellulitis. · Take care of your feet, especially if you have diabetes or other conditions that increase the risk of infection. Wear shoes and socks. Do not go barefoot. If you have athlete's foot or other skin problems on your feet, talk to your doctor about how to treat them. When should you call for help? Call your doctor now or seek immediate medical care if: 
? · You have signs that your infection is getting worse, such as: 
¨ Increased pain, swelling, warmth, or redness. ¨ Red streaks leading from the area. ¨ Pus draining from the area. ¨ A fever. ? · You get a rash. ? Watch closely for changes in your health, and be sure to contact your doctor if: 
? · You are not getting better after 1 day (24 hours). ? · You do not get better as expected. Where can you learn more? Go to http://tristan-shaye.info/. Jolly Mckeon in the search box to learn more about \"Cellulitis: Care Instructions. \" Current as of: October 13, 2016 Content Version: 11.4 © 8423-3905 scrible. Care instructions adapted under license by [employer] (which disclaims liability or warranty for this information). If you have questions about a medical condition or this instruction, always ask your healthcare professional. [address] [address] any warranty or liability for your use of this information. Patient armband removed and shredded MyChart Announcement We are excited to announce that we are making your provider's discharge notes available to you in Ikariat. You will see these notes when they are completed and signed by the physician that discharged you from your recent hospital stay. If you have any questions or concerns about any information you see in Ikariat, please call the Health Information Department where you were seen or reach out to your Primary Care Provider for more information about your plan of care. Introducing Matteo Kohler As a [employer] patient, I wanted to make you aware of our electronic visit tool called Matteo Kohler. [employer] 24/7 allows you to connect within minutes with a medical provider 24 hours a day, seven days a week via a mobile device or tablet or logging into a secure website from your computer. You can access Matteo MossMaxxAthlete from anywhere in the [place]. A virtual visit might be right for you when you have a simple condition and feel like you just dont want to get out of bed, or cant get away from work for an appointment, when your regular [facility] provider is not available (evenings, weekends or holidays), or when youre out of town and need minor care. Electronic visits cost only $49 and if the [facility] 24/7 provider determines a prescription is needed to treat your condition, one can be electronically transmitted to a nearby pharmacy*. Please take a moment to enroll today if you have not already done so. The enrollment process is free and takes just a few minutes. To enroll, please download the Kennethdra Meraz [employer] felix to your tablet or phone, or visit [email or web address] to enroll on your computer. And, as an [address] [place] [address] patient with a [employer] account, the results of your visits will be scanned into your electronic medical record and your primary care provider will be able to view the scanned results. We urge you to continue to see your regular [facility] provider for your ongoing medical care. And while your primary care provider may not be the one available when you seek a Matteo Ajaymarilynfin virtual visit, the peace of mind you get from getting a real diagnosis real time can be priceless. For more information on Matteo Ajaymarilynfin, view our Frequently Asked Questions (FAQs) at [email or web address]. Sincerely, 
 
Radha Pinto MD 
Chief Medical Officer [address] *:  certain medications cannot be prescribed via Matteo [employer] Unresulted Labs-Please follow up with your PCP about these lab tests Order Current Status CULTURE, BLOOD Preliminary result CULTURE, BLOOD Preliminary result Providers Seen During Your Hospitalization Provider Specialty Primary office phone Felecia Joyner MD Emergency Medicine [phone] Yoly Mcdonald MD Family Practice [phone] Your Primary Care Physician (PCP) Primary Care Physician Office Phone Office Fax [facility] [phone] You are allergic to the following Allergen Reactions Sulfa (Sulfonamide Antibiotics) Hives Rash Recent Documentation Height Weight BMI Smoking Status 1.93 m 127 kg 34.08 kg/m2 Current Some Day Smoker Emergency Contacts Name Discharge Info Relation Home Work Mobile [facility] DISCHARGE CAREGIVER [3] Son [22]   [phone] Patient Belongings The following personal items are in your possession at time of discharge: 
  Dental Appliances: None         Home Medications: None   Jewelry: Watch  Clothing: Shirt, Shorts    Other Valuables: Cell Phone Please provide this summary of care documentation to your next provider. Signatures-by signing, you are acknowledging that this After Visit Summary has been reviewed with you and you have received a copy. Patient Signature:  ____________________________________________________________ Date:  ____________________________________________________________  
  
Larri Hazard Provider Signature:  ____________________________________________________________ Date:  ____________________________________________________________

## 2018-05-22 NOTE — IP AVS SNAPSHOT
[address] [address] 
[phone] Patient: Claribel Brown MRN: [id number] A check jessica indicates which time of day the medication should be taken. My Medications START taking these medications Instructions Each Dose to Equal  
 Morning Noon Evening Bedtime  
 levoFLOXacin 500 mg tablet Commonly known as:  Opal Vamsi Your last dose was: Your next dose is: Take 1 Tab by mouth every twenty-four (24) hours. 500 mg CONTINUE taking these medications Instructions Each Dose to Equal  
 Morning Noon Evening Bedtime  
 acetaminophen 500 mg tablet Commonly known as:  TYLENOL Your last dose was: Your next dose is: Take 500 mg by mouth every six (6) hours as needed for Pain. 500 mg  
    
   
   
   
  
 AFRIN SALINE NASAL MIST NA Your last dose was: Your next dose is:    
   
   
 1 [place] by IntraNASal route as needed (congestion/allergies). 1 Spray BENADRYL 25 mg capsule Generic drug:  diphenhydrAMINE Your last dose was: Your next dose is: Take 50 mg by mouth nightly as needed. 50 mg  
    
   
   
   
  
 cholecalciferol 1,000 unit tablet Commonly known as:  VITAMIN D3 Your last dose was: Your next dose is: Take 1,000 Units by mouth daily. 1000 Units  
    
   
   
   
  
 cyanocobalamin 1,000 mcg tablet Your last dose was: Your next dose is: Take 1,000 mcg by mouth daily. 1000 mcg DHEA 50 mg Tab Generic drug:  prasterone (dhea) Your last dose was: Your next dose is: Take 50 mg by mouth daily. 50 mg  
    
   
   
   
  
 furosemide 40 mg tablet Commonly known as:  LASIX Your last dose was: Your next dose is: Take 1 Tab by mouth two (2) times a day. One po daily 40 mg  
    
   
   
   
  
 gabapentin 600 mg tablet Commonly known as:  NEURONTIN Your last dose was: Your next dose is: One po in am , one at noon and 2 at hs  
     
   
   
   
  
 loratadine 10 mg tablet Commonly known as:  Maryam Case Your last dose was: Your next dose is: Take 1 Tab by mouth daily. 10 mg  
    
   
   
   
  
 metFORMIN 500 mg tablet Commonly known as:  GLUCOPHAGE Your last dose was: Your next dose is: Take 1 Tab by mouth two (2) times daily (with meals). 500 mg  
    
   
   
   
  
 metoprolol succinate 25 mg XL tablet Commonly known as:  TOPROL-XL Your last dose was: Your next dose is: Take 1 Tab by mouth daily. 25 mg  
    
   
   
   
  
 mupirocin 2 % ointment Commonly known as:  [facility] [employer] Your last dose was: Your next dose is:    
   
   
 Apply  to affected area daily. nicotinic acid 500 mg tablet Commonly known as:  NIACIN Your last dose was: Your next dose is: Take 250 mg by mouth Daily (before breakfast). 250 mg  
    
   
   
   
  
 Potassium Gluconate 595 mg (99 mg) tablet Your last dose was: Your next dose is: Take 595 mg by mouth daily. 595 mg  
    
   
   
   
  
 raNITIdine 75 mg tablet Commonly known as:  ZANTAC Your last dose was: Your next dose is: Take 75 mg by mouth daily. 75 mg  
    
   
   
   
  
 warfarin 4 mg tablet Commonly known as:  COUMADIN Your last dose was: Your next dose is: Take 1 Tab by mouth daily. 4 mg STOP taking these medications   
 clindamycin 300 mg capsule Commonly known as:  CLEOCIN  
   
  
 doxycycline 100 mg tablet Commonly known as:  ADOXA Where to Get Your Medications Information on where to get these meds will be given to you by the nurse or doctor. ! Ask your nurse or doctor about these medications  
  levoFLOXacin 500 mg tablet

## 2018-05-22 NOTE — PROGRESS NOTES
Pt received to unit via stretcher from ED accompanied by ED tech. He is alert and oriented x4. Denies pain. No s/s of distress noted. Pt oriented to room and use of call bell for assistance. Pt voiced understanding. VS taken. Bed locked in low position, call bell within reach.

## 2018-05-22 NOTE — H&P
History and Physical    Patient: Shantell Joyner               Sex: male          DOA: 5/22/2018       YOB: 1940      Age:  76 y.o.        LOS:  LOS: 1 day        HPI:     Kwame Martin is a 76 y.o. male who presented to the ER with redness and welling involving his left foot. This has been worsening at home for at least a week. It is part of bigger problem with ongoing swelling involving his lower extremities. He his a long standing history of Cardiomyopathy and has been anticoagulated after previous CVA. He was begun on antibiotics as an outpatient but he continued to have redness and swelling and drainage which were worsening. He will be admitted for antibiotic therapy. Past Medical History:   Diagnosis Date    Asthma     BPH (benign prostatic hyperplasia)     Herniated disc, cervical     Knee pain     Pacemaker 2011    [employer]    PA[facility]paroxysmal atrial fibrillation) ([facility])     During Pacer interogation     SSS (sick sinus syndrome) (HCC)     S/P Dual chamber [employer] Pacemaker       Social History:   Tobacco use:  Patient does not smoke   Alcohol use:  Patient does not use alcohol   Occupation:  Patient is not working    Family History:   Multiple family members with HTN    Review of Systems    Constitutional:  No fever or weight loss  HEENT:  No headache or visual changes  Cardiovascular:  No chest pain or diaphoresis  Respiratory:  No coughing, wheezing, or shortness of breath. GI:  No nausea or vomitting.   No diarrhea  :  No hematuria or dysuria  Skin:  Redness and swelling involving left leg as above  Neuro:  No seizures or syncope  Hematological:  No bruising or bleeding  Endocrine:  Known diabetes, no thyroid disease    Physical Exam:      Visit Vitals    /58 (BP 1 Location: Left arm, BP Patient Position: At rest)    Pulse 87    Temp 98.4 °F (36.9 °C)    Resp 16    Ht 6' 4\" (1.93 m)    Wt 127 kg (280 lb)    SpO2 100%    BMI 34.08 kg/m2 Physical Exam:    Gen:  No distress, alert  HEENT:  Normal cephalic atraumatic, extra-occular movements are intact. Neck:  Supple, No JVD  Lungs:  Clear bilaterally, no wheeze, no rales, normal effort  Heart:  Regular Rate and Rhythm, normal S1 and S2, no edema  Abdomen:  Soft, non tender, normal bowel sounds, no guarding. Extremities:  Well perfused, no cyanosis or edema  Neurological:  Awake and alert, CN's are intact, normal strength throughout extremities  Skin:  Erythema involving dorsum of left foot. Crusting drainage present between toes  Mental Status:  Normal thought process, does not appear anxious    Laboratory Studies:    BMP:   Lab Results   Component Value Date/Time     05/23/2018 04:30 AM    K 4.0 05/23/2018 04:30 AM     05/23/2018 04:30 AM    CO2 26 05/23/2018 04:30 AM    AGAP 7 05/23/2018 04:30 AM     (H) 05/23/2018 04:30 AM    BUN 12 05/23/2018 04:30 AM    CREA 0.60 05/23/2018 04:30 AM    GFRAA >60 05/23/2018 04:30 AM    GFRNA >60 05/23/2018 04:30 AM     CBC:   Lab Results   Component Value Date/Time    WBC 4.8 05/23/2018 04:30 AM    HGB 7.6 (L) 05/23/2018 04:30 AM    HCT 23.6 (L) 05/23/2018 04:30 AM     05/23/2018 04:30 AM       Assessment/Plan     Principal Problem:    Cellulitis (5/22/2018)    Active Problems:    Stroke ([address] (2/7/2018)      Diabetes mellitus type 2, controlled ([address] (5/23/2018)      Cardiomyopathy ([address] (12/3/2015)      PAF (paroxysmal atrial fibrillation) ([address] (2/7/2018)      Monoclonal gammopathy (3/7/2016)      BPH (benign prostatic hyperplasia) (2/7/2018)      Peripheral neuropathy (12/3/2015)      Sick sinus syndrome ([address] (2/25/2016)        PLAN:    Will treat with IV antibiotics  Follow cultures  BS control  BP control  Monitor Cardiac status  DVT prophylaxis not needed on Warfarin.

## 2018-05-22 NOTE — ED PROVIDER NOTES
EMERGENCY DEPARTMENT HISTORY AND PHYSICAL EXAM    3:16 PM      Date: 5/22/2018  Patient Name: Tracy Díaz Jr    History of Presenting Illness     Chief Complaint   Patient presents with    Foot Pain         History Provided By: Patient    Chief Complaint: Foot pain  Duration:  Weeks  Timing:  Constant, Progressive and Worsening  Location: L foot, LLE  Quality: N/A  Severity: Severe  Modifying Factors: worsened by weight bearing  Associated Symptoms: LLE swelling, redness      Additional History (Context): Oswaldo Kellogg is a 76 y.o. male with pacemaker, sick sinus syndrome, asthma, BPH who presents with worsening L foot pain and LLE swelling and redness for weeks. Pt has a red and worsening wound in L foot; states it is so painful he cannot move on it. Associated sx include chills. Denies fever, melena, hematochezia. Pt sent in from [facility]'s office after more than 1wk of outpatient Abx x2. Sx have been worsening. Last Abx is Clindaymcin, pt otherwise denies other acute sx. Hx heart failure, a-fib, DM, HTN. Allergies to sulfa. Pt on Coumadin.        PCP: [id number] S Baldemar Gomes MD    Current Facility-Administered Medications   Medication Dose Route Frequency Provider Last Rate Last Dose    sodium chloride (NS) flush 5-10 mL  5-10 mL IntraVENous PRN Nina Griffith MD        0.9% sodium chloride infusion  150 mL/hr IntraVENous CONTINUOUS Nina Griffith  mL/hr at 05/22/18 1546 150 mL/hr at 05/22/18 1546    cefTRIAXone (ROCEPHIN) 1 g in 0.9% sodium chloride (MBP/ADV) 50 mL MBP  1 g IntraVENous Q24H Nina Griffith  mL/hr at 05/22/18 1537 1 g at 05/22/18 1537    vancomycin (VANCOCIN) 1,000 mg in 0.9% sodium chloride (MBP/ADV) 250 mL adv  1,000 mg IntraVENous ONCE Nina Griffith  mL/hr at 05/22/18 1733 1,000 mg at 05/22/18 1733    [START ON 5/23/2018] vancomycin (VANCOCIN) 1,250 mg in 0.9% sodium chloride 250 mL IVPB  1,250 mg IntraVENous Q8H MD Maren Pereira ON 5/23/2018] VANCOMYCIN INFORMATION NOTE   Other ONCE Vijaya Cherry MD        VANCOMYCIN INFORMATION NOTE   Other Rx Dosing/Monitoring Vijaya Cherry MD         Current Outpatient Prescriptions   Medication Sig Dispense Refill    doxycycline (ADOXA) 100 mg tablet Take 1 Tab by mouth two (2) times a day for 10 days. 20 Tab 0    metoprolol succinate (TOPROL-XL) 25 mg XL tablet Take 1 Tab by mouth daily. 90 Tab 2    furosemide (LASIX) 40 mg tablet Take 1 Tab by mouth two (2) times a day. One po daily 60 Tab 3    clindamycin (CLEOCIN) 300 mg capsule Take 1 Cap by mouth three (3) times daily. 30 Cap 0    gabapentin (NEURONTIN) 600 mg tablet One po in am , one at noon and 2 at hs 120 Tab 3    metFORMIN (GLUCOPHAGE) 500 mg tablet Take 1 Tab by mouth two (2) times daily (with meals). 60 Tab 3    raNITIdine (ZANTAC) 75 mg tablet Take 75 mg by mouth daily.  prasterone, dhea, (DHEA) 50 mg tab Take 50 mg by mouth daily.  Potassium Gluconate 595 mg (99 mg) tablet Take 595 mg by mouth daily.  cholecalciferol (VITAMIN D3) 1,000 unit tablet Take 1,000 Units by mouth daily.  acetaminophen (TYLENOL) 500 mg tablet Take 500 mg by mouth every six (6) hours as needed for Pain.  nicotinic acid (NIACIN) 500 mg tablet Take 250 mg by mouth Daily (before breakfast).  SODIUM CHLORIDE (AFRIN SALINE NASAL MIST NA) 1 [place] by IntraNASal route as needed (congestion/allergies).  mupirocin (BACTROBAN) 2 % ointment Apply  to affected area daily. 22 g 0    warfarin (COUMADIN) 4 mg tablet Take 1 Tab by mouth daily. 120 Tab 3    diphenhydrAMINE (BENADRYL) 25 mg capsule Take 50 mg by mouth nightly as needed.  cyanocobalamin 1,000 mcg tablet Take 1,000 mcg by mouth daily.  loratadine (CLARITIN) 10 mg tablet Take 1 Tab by mouth daily.  80 Tab 1       Past History     Past Medical History:  Past Medical History:   Diagnosis Date    Asthma     BPH (benign prostatic hyperplasia)     Herniated disc, cervical     Knee pain     Pacemaker 2011    [facility]    PAF (paroxysmal atrial fibrillation) ([address]     During Pacer interogation     SSS (sick sinus syndrome) ([facility])     S/P Dual chamber [employer] Pacemaker       Past Surgical History:  Past Surgical History:   Procedure Laterality Date    HX HERNIA REPAIR Bilateral 1989    HX HIP REPLACEMENT  2006    right    HX KNEE REPLACEMENT Bilateral 1992/1993/2006/2008/2011/2012/2013    HX PACEMAKER  2011       Family History:  History reviewed. No pertinent family history. Social History:  Social History   Substance Use Topics    Smoking status: Current Some Day Smoker     Types: Cigars    Smokeless tobacco: Never Used      Comment: rare cigar smoker    Alcohol use 0.0 oz/week     0 Standard drinks or equivalent per week      Comment: rarely drinks       Allergies: Allergies   Allergen Reactions    Sulfa (Sulfonamide Antibiotics) Hives and Rash         Review of Systems       Review of Systems   Constitutional: Positive for chills. Negative for activity change, fatigue and fever. HENT: Negative for congestion and rhinorrhea. Eyes: Negative for visual disturbance. Respiratory: Positive for shortness of breath. Cardiovascular: Negative for chest pain and palpitations. Gastrointestinal: Negative for abdominal pain, diarrhea, nausea and vomiting. Genitourinary: Negative for dysuria and hematuria. Musculoskeletal: Positive for myalgias (LLE). Negative for back pain. Skin: Positive for wound (L foot). Negative for rash. Neurological: Negative for dizziness, weakness and light-headedness. Psychiatric/Behavioral: Negative for agitation. All other systems reviewed and are negative.         Physical Exam     Visit Vitals    /47    Pulse (!) 110    Temp 98 °F (36.7 °C)    Resp 20    Ht 6' 4\" (1.93 m)    Wt 127 kg (280 lb)    SpO2 99%    BMI 34.08 kg/m2         Physical Exam   Constitutional: He appears well-developed and well-nourished. HENT:   Head: Normocephalic and atraumatic. Eyes: Conjunctivae are normal. Pupils are equal, round, and reactive to light. Neck: Normal range of motion. Neck supple. Cardiovascular: Normal rate and regular rhythm. Pulses:       Dorsalis pedis pulses are 2+ on the right side, and 2+ on the left side. Posterior tibial pulses are 2+ on the right side, and 2+ on the left side. Capillary refill intact   Pulmonary/Chest: Effort normal and breath sounds normal.   Abdominal: Soft. Bowel sounds are normal.   Genitourinary: Rectal exam shows guaiac negative stool. Genitourinary Comments: Brown stool  No melena  No hematochezia   Musculoskeletal: Normal range of motion. Lymphadenopathy:     He has no cervical adenopathy. Neurological: He is alert. Skin: Skin is warm. Significant L foot dorsal edema with exposed skin, epidermis removed,  distal 1/3 of foot with cellulitis erythema, edema, warmth   Dry callus lateral R foot  Medial thigh abrasions and ecchymosis; pt states this is from attempt to get out of house and is chronic condition. Psychiatric: He has a normal mood and affect.          Diagnostic Study Results     Labs -  Recent Results (from the past 12 hour(s))   CULTURE, BLOOD    Collection Time: 05/22/18  2:43 PM   Result Value Ref Range    Special Requests: PERIPHERAL      Culture result: PENDING    METABOLIC PANEL, COMPREHENSIVE    Collection Time: 05/22/18  2:44 PM   Result Value Ref Range    Sodium 137 136 - 145 mmol/L    Potassium 4.0 3.5 - 5.5 mmol/L    Chloride 101 100 - 108 mmol/L    CO2 30 21 - 32 mmol/L    Anion gap 6 3.0 - 18 mmol/L    Glucose 148 (H) 74 - 99 mg/dL    BUN 17 7.0 - 18 MG/DL    Creatinine 0.75 0.6 - 1.3 MG/DL    BUN/Creatinine ratio 23 (H) 12 - 20      GFR est AA >60 >60 ml/min/1.73m2    GFR est non-AA >60 >60 ml/min/1.73m2    Calcium 8.7 8.5 - 10.1 MG/DL    Bilirubin, total 0.7 0.2 - 1.0 MG/DL    ALT (SGPT) 27 16 - 61 U/L    AST (SGOT) 27 15 - 37 U/L    Alk. phosphatase 65 45 - 117 U/L    Protein, total 7.1 6.4 - 8.2 g/dL    Albumin 3.3 (L) 3.4 - 5.0 g/dL    Globulin 3.8 2.0 - 4.0 g/dL    A-G Ratio 0.9 0.8 - 1.7     CBC WITH AUTOMATED DIFF    Collection Time: 05/22/18  2:44 PM   Result Value Ref Range    WBC 6.8 4.6 - 13.2 K/uL    RBC 2.83 (L) 4.70 - 5.50 M/uL    HGB 8.8 (L) 13.0 - 16.0 g/dL    HCT 27.3 (L) 36.0 - 48.0 %    MCV 96.5 74.0 - 97.0 FL    MCH 31.1 24.0 - 34.0 PG    MCHC 32.2 31.0 - 37.0 g/dL    RDW 15.2 (H) 11.6 - 14.5 %    PLATELET 407 705 - 897 K/uL    MPV 8.1 (L) 9.2 - 11.8 FL    NEUTROPHILS 67 40 - 73 %    LYMPHOCYTES 20 (L) 21 - 52 %    MONOCYTES 10 3 - 10 %    EOSINOPHILS 3 0 - 5 %    BASOPHILS 0 0 - 2 %    ABS. NEUTROPHILS 4.6 1.8 - 8.0 K/UL    ABS. LYMPHOCYTES 1.3 0.9 - 3.6 K/UL    ABS. MONOCYTES 0.6 0.05 - 1.2 K/UL    ABS. EOSINOPHILS 0.2 0.0 - 0.4 K/UL    ABS. BASOPHILS 0.0 0.0 - 0.06 K/UL    DF AUTOMATED     CULTURE, BLOOD    Collection Time: 05/22/18  2:55 PM   Result Value Ref Range    Special Requests: PERIPHERAL      Culture result: PENDING    POC LACTIC ACID    Collection Time: 05/22/18  3:49 PM   Result Value Ref Range    Lactic Acid (POC) 1.3 0.4 - 2.0 mmol/L   EKG, 12 LEAD, INITIAL    Collection Time: 05/22/18  3:50 PM   Result Value Ref Range    Ventricular Rate 103 BPM    Atrial Rate 103 BPM    P-R Interval 116 ms    QRS Duration 164 ms    Q-T Interval 394 ms    QTC Calculation (Bezet) 516 ms    Calculated P Axis -25 degrees    Calculated R Axis -76 degrees    Calculated T Axis 92 degrees    Diagnosis       Sinus tachycardia  Ventricular pre-excitation, WPW pattern type A  Abnormal ECG  When compared with ECG of 07-FEB-2018 08:19,  Sinus rhythm has replaced Electronic ventricular pacemaker  Vent.  rate has increased BY  42 BPM     POC CHEM8    Collection Time: 05/22/18  5:12 PM   Result Value Ref Range    CO2, POC 26 (H) 19 - 24 MMOL/L    Glucose,  (H) 74 - 106 MG/DL    BUN, POC 17 7 - 18 MG/DL    Creatinine, POC 0.7 0.6 - 1.3 MG/DL    GFRAA, POC >60 >60 ml/min/1.73m2    GFRNA, POC >60 >60 ml/min/1.73m2    Sodium,  136 - 145 MMOL/L    Potassium, POC 3.9 3.5 - 5.5 MMOL/L    Calcium, ionized (POC) 1.17 1.12 - 1.32 mmol/L    Chloride,  100 - 108 MMOL/L    Anion gap, POC 16 10 - 20      Hematocrit, POC 25 (L) 36 - 49 %    Hemoglobin, POC 8.5 (L) 12 - 16 G/DL       Radiologic Studies -     Xr Foot Lt Ap/lat    Result Date: 5/22/2018  Examination: Left foot 2 views. HISTORY: Left foot pain and soft tissue wound. FINDINGS: Dorsal lateral projections of the left lateral performed and interpreted without comparison. No acute osseous malalignment demonstrated on these nonweightbearing projections. Left first MTP joint osteoarthrosis noted along with additional degenerative spur formation at the tibiotalar articulation. There is considerable soft tissue edema involving the imaged lower leg and left foot, greatest over the dorsum of the foot. No retained radiopaque foreign object or soft tissue gas formation. No fracture. IMPRESSION: 1. Considerable left lower extremity and left foot soft tissue swelling, greatest over the dorsum of the foot without acute osseous abnormality or retained radiopaque foreign object. 2. Mild degenerative changes as described. Xr Chest Port    Result Date: 5/22/2018  CHEST AP PORTABLE Indication: Sepsis. Comparison: 02/07/2018. Findings: Dual-chamber left chest wall cardiac stimulator. The lungs appear clear. No evidence for pneumothorax or pleural effusion. Cardiac silhouette and pulmonary vascularity appear within normal limits. IMPRESSION: No acute cardiopulmonary disease. Medical Decision Making   I am the first provider for this patient. I reviewed the vital signs, available nursing notes, past medical history, past surgical history, family history and social history. Vital Signs-Reviewed the patient's vital signs.     Pulse Oximetry Analysis -  100% on room air (Interpretation) nonhypoxic     Cardiac Monitor:  Rate: 86    EKG: Interpreted by the EP. Time Interpreted: 1550   Rate: 103   Rhythm: Sinus Tachycardia, Paced        Records Reviewed: Nursing Notes and Old Medical Records (Time of Review: 3:16 PM)    ED Course: Progress Notes, Reevaluation, and Consults:    17:30 Consult:  Discussed care with Dr. Johnny Wiseman (Hospitalist). Standard discussion; including history of patients chief complaint, available diagnostic results, and treatment course. Accepts for admission. 18:23 Pt awake alert no distress no CP no abd pain, unclear on his blood loss and no acute sx or trauma. Provider Notes (Medical Decision Making):     Pt with significant cellulitis, r/o osteomyelitis, failed outpatient therapy, recent outpatient duplex  was negative per pt. Abx will be started, sepsis protocol initiated but does not meet criteria for severe sepsis, just local cellulitis. Pt will be discussed with admitting teams once labs return. Hemoglobin 8.8 from 12.4 2 wks ago. Otherwise CBC, CMP and lactate are nml. Pt's platelets are nml. Pt on coumadin, I will check pt's coagulation profile and rectal exam. Not symptomatic with CP or any acute SOB. Rectal exam without any abnormality. Pt with normal vitals and no chest pain or shortness of breath in ED      CRITICAL CARE:  1800  I have spent 30 minutes of critical care time involved in lab review, consultations with specialist, family decision-making, and documentation. During this entire length of time I was immediately available to the patient. Critical Care: The reason for providing this level of medical care for this critically ill patient was due a critical illness that impaired one or more vital organ systems such that there was a high probability of imminent or life threatening deterioration in the patients condition.  This care involved high complexity decision making to assess, manipulate, and support vital system functions, to treat this degree vital organ system failure and to prevent further life threatening deterioration of the patients condition. For Hospitalized Patients:    1. Hospitalization Decision Time:  The decision to hospitalize the patient was made by Dr. Bri Khan at [phone] on 5/22/2018    2. Aspirin: Aspirin was not given because the patient did not present with a stroke at the time of their Emergency Department evaluation    Diagnosis     Clinical Impression:   1. Left leg cellulitis    2. Chills        Disposition: Admit        Patient's Medications   Start Taking    No medications on file   Continue Taking    ACETAMINOPHEN (TYLENOL) 500 MG TABLET    Take 500 mg by mouth every six (6) hours as needed for Pain. CHOLECALCIFEROL (VITAMIN D3) 1,000 UNIT TABLET    Take 1,000 Units by mouth daily. CLINDAMYCIN (CLEOCIN) 300 MG CAPSULE    Take 1 Cap by mouth three (3) times daily. CYANOCOBALAMIN 1,000 MCG TABLET    Take 1,000 mcg by mouth daily. DIPHENHYDRAMINE (BENADRYL) 25 MG CAPSULE    Take 50 mg by mouth nightly as needed. DOXYCYCLINE (ADOXA) 100 MG TABLET    Take 1 Tab by mouth two (2) times a day for 10 days. FUROSEMIDE (LASIX) 40 MG TABLET    Take 1 Tab by mouth two (2) times a day. One po daily    GABAPENTIN (NEURONTIN) 600 MG TABLET    One po in am , one at noon and 2 at hs    LORATADINE (CLARITIN) 10 MG TABLET    Take 1 Tab by mouth daily. METFORMIN (GLUCOPHAGE) 500 MG TABLET    Take 1 Tab by mouth two (2) times daily (with meals). METOPROLOL SUCCINATE (TOPROL-XL) 25 MG XL TABLET    Take 1 Tab by mouth daily. MUPIROCIN (BACTROBAN) 2 % OINTMENT    Apply  to affected area daily. NICOTINIC ACID (NIACIN) 500 MG TABLET    Take 250 mg by mouth Daily (before breakfast). POTASSIUM GLUCONATE 595 MG (99 MG) TABLET    Take 595 mg by mouth daily. PRASTERONE, DHEA, (DHEA) 50 MG TAB    Take 50 mg by mouth daily. RANITIDINE (ZANTAC) 75 MG TABLET    Take 75 mg by mouth daily. SODIUM CHLORIDE (AFRIN SALINE NASAL MIST NA)    1 [place] by IntraNASal route as needed (congestion/allergies). WARFARIN (COUMADIN) 4 MG TABLET    Take 1 Tab by mouth daily. These Medications have changed    No medications on file   Stop Taking    No medications on file     _______________________________    Attestations:  [address] [place] [address] acting as a scribe for and in the presence of Myah Wyatt MD      May 22, 2018 at 6:06 PM       Provider Attestation:      I personally performed the services described in the documentation, reviewed the documentation, as recorded by the scribe in my presence, and it accurately and completely records my words and actions.  May 22, 2018 at 6:06 PM - Myah Wyatt MD    _______________________________

## 2018-05-22 NOTE — PROGRESS NOTES
Pharmacy Dosing Services: Vancomycin    Indication: Skin and Soft Tissue Infection    Day of therapy: 0    Other Antimicrobials (Include dose, start day & day of therapy):  Ceftriaxone 1 gram, every 24 hours, 2018    Loading dose (date given): 1,000 mg x 2 = 2,000 mg  Current Maintenance dose: New start    Goal Vancomycin Level: 15-20 mcg/mL  (Trough 15-20 for most infections, 20 for meningitis/osteomyelitis, pre-HD level ~25)    Vancomycin Level (if drawn): N/A     Significant Cultures:   2018 Blood culture taken    Renal function stable? (unstable defined as SCr increase of 0.5 mg/dL or > 50% increase from baseline, whichever is greater) (Y/N): Y     CAPD, Hemodialysis or Renal Replacement Therapy (Y/N): N     Recent Labs      18   1444   CREA  0.75   BUN  17   WBC  6.8     Temp (24hrs), Av °F (36.7 °C), Min:98 °F (36.7 °C), Max:98 °F (36.7 °C)    Creatinine Clearance (Creatinine Clearance (ml/min)): 116 mL/min     Regimen assessment: New start Skin and soft Tissue infection   Maintenance dose: 1,250 mg every 8 hours  Next scheduled level: 2018 at 1730       Pharmacy will follow daily and adjust medications as appropriate for renal function and/or serum levels.     Thank you,  Angel Luis Styles, PHARMD

## 2018-05-22 NOTE — ROUTINE PROCESS
Bedside and Verbal shift change report given to SIL James (oncoming nurse) by Christy Pham RN (offgoing nurse). Dual order check done with SIL James. Report included the following information SBAR, Kardex, Intake/Output, MAR and Recent Results.

## 2018-05-23 PROBLEM — E11.9 DIABETES MELLITUS TYPE 2, CONTROLLED (HCC): Status: ACTIVE | Noted: 2018-05-23

## 2018-05-23 LAB
ANION GAP SERPL CALC-SCNC: 7 MMOL/L (ref 3–18)
ATRIAL RATE: 103 BPM
BASOPHILS # BLD: 0 K/UL (ref 0–0.06)
BASOPHILS NFR BLD: 0 % (ref 0–2)
BUN SERPL-MCNC: 12 MG/DL (ref 7–18)
BUN/CREAT SERPL: 20 (ref 12–20)
CALCIUM SERPL-MCNC: 7.7 MG/DL (ref 8.5–10.1)
CALCULATED P AXIS, ECG09: -25 DEGREES
CALCULATED R AXIS, ECG10: -76 DEGREES
CALCULATED T AXIS, ECG11: 92 DEGREES
CHLORIDE SERPL-SCNC: 105 MMOL/L (ref 100–108)
CO2 SERPL-SCNC: 26 MMOL/L (ref 21–32)
CREAT SERPL-MCNC: 0.6 MG/DL (ref 0.6–1.3)
DATE LAST DOSE: NORMAL
DIAGNOSIS, 93000: NORMAL
DIFFERENTIAL METHOD BLD: ABNORMAL
EOSINOPHIL # BLD: 0.2 K/UL (ref 0–0.4)
EOSINOPHIL NFR BLD: 4 % (ref 0–5)
ERYTHROCYTE [DISTWIDTH] IN BLOOD BY AUTOMATED COUNT: 15.4 % (ref 11.6–14.5)
GLUCOSE BLD STRIP.AUTO-MCNC: 156 MG/DL (ref 70–110)
GLUCOSE BLD STRIP.AUTO-MCNC: 164 MG/DL (ref 70–110)
GLUCOSE BLD STRIP.AUTO-MCNC: 168 MG/DL (ref 70–110)
GLUCOSE BLD STRIP.AUTO-MCNC: 205 MG/DL (ref 70–110)
GLUCOSE SERPL-MCNC: 133 MG/DL (ref 74–99)
HCT VFR BLD AUTO: 23.6 % (ref 36–48)
HEMOCCULT STL QL: NEGATIVE
HGB BLD-MCNC: 7.6 G/DL (ref 13–16)
LYMPHOCYTES # BLD: 0.9 K/UL (ref 0.9–3.6)
LYMPHOCYTES NFR BLD: 19 % (ref 21–52)
MCH RBC QN AUTO: 31 PG (ref 24–34)
MCHC RBC AUTO-ENTMCNC: 32.2 G/DL (ref 31–37)
MCV RBC AUTO: 96.3 FL (ref 74–97)
MONOCYTES # BLD: 0.5 K/UL (ref 0.05–1.2)
MONOCYTES NFR BLD: 11 % (ref 3–10)
NEUTS SEG # BLD: 3.2 K/UL (ref 1.8–8)
NEUTS SEG NFR BLD: 66 % (ref 40–73)
P-R INTERVAL, ECG05: 116 MS
PLATELET # BLD AUTO: 224 K/UL (ref 135–420)
PMV BLD AUTO: 7.9 FL (ref 9.2–11.8)
POTASSIUM SERPL-SCNC: 4 MMOL/L (ref 3.5–5.5)
Q-T INTERVAL, ECG07: 394 MS
QRS DURATION, ECG06: 164 MS
QTC CALCULATION (BEZET), ECG08: 516 MS
RBC # BLD AUTO: 2.45 M/UL (ref 4.7–5.5)
REPORTED DOSE,DOSE: NORMAL UNITS
REPORTED DOSE/TIME,TMG: 1000
SODIUM SERPL-SCNC: 138 MMOL/L (ref 136–145)
VANCOMYCIN TROUGH SERPL-MCNC: 15.5 UG/ML (ref 10–20)
VENTRICULAR RATE, ECG03: 103 BPM
WBC # BLD AUTO: 4.8 K/UL (ref 4.6–13.2)

## 2018-05-23 PROCEDURE — 77030011256 HC DRSG MEPILEX <16IN NO BORD MOLN -A

## 2018-05-23 PROCEDURE — 82962 GLUCOSE BLOOD TEST: CPT

## 2018-05-23 PROCEDURE — 74011250637 HC RX REV CODE- 250/637: Performed by: HOSPITALIST

## 2018-05-23 PROCEDURE — 74011250636 HC RX REV CODE- 250/636: Performed by: HOSPITALIST

## 2018-05-23 PROCEDURE — 74011250636 HC RX REV CODE- 250/636: Performed by: EMERGENCY MEDICINE

## 2018-05-23 PROCEDURE — 65270000029 HC RM PRIVATE

## 2018-05-23 PROCEDURE — 96375 TX/PRO/DX INJ NEW DRUG ADDON: CPT

## 2018-05-23 PROCEDURE — 80202 ASSAY OF VANCOMYCIN: CPT | Performed by: HOSPITALIST

## 2018-05-23 PROCEDURE — 74011000258 HC RX REV CODE- 258: Performed by: EMERGENCY MEDICINE

## 2018-05-23 PROCEDURE — 80048 BASIC METABOLIC PNL TOTAL CA: CPT | Performed by: HOSPITALIST

## 2018-05-23 PROCEDURE — 82272 OCCULT BLD FECES 1-3 TESTS: CPT | Performed by: EMERGENCY MEDICINE

## 2018-05-23 PROCEDURE — 36415 COLL VENOUS BLD VENIPUNCTURE: CPT | Performed by: HOSPITALIST

## 2018-05-23 PROCEDURE — 85025 COMPLETE CBC W/AUTO DIFF WBC: CPT | Performed by: HOSPITALIST

## 2018-05-23 PROCEDURE — 65390000012 HC CONDITION CODE 44 OBSERVATION

## 2018-05-23 PROCEDURE — 74011000250 HC RX REV CODE- 250: Performed by: HOSPITALIST

## 2018-05-23 PROCEDURE — 74011636637 HC RX REV CODE- 636/637: Performed by: HOSPITALIST

## 2018-05-23 PROCEDURE — 96366 THER/PROPH/DIAG IV INF ADDON: CPT

## 2018-05-23 RX ORDER — IPRATROPIUM BROMIDE AND ALBUTEROL SULFATE 2.5; .5 MG/3ML; MG/3ML
3 SOLUTION RESPIRATORY (INHALATION)
Status: DISCONTINUED | OUTPATIENT
Start: 2018-05-23 | End: 2018-05-24

## 2018-05-23 RX ORDER — DIPHENHYDRAMINE HYDROCHLORIDE 50 MG/ML
50 INJECTION, SOLUTION INTRAMUSCULAR; INTRAVENOUS
Status: DISCONTINUED | OUTPATIENT
Start: 2018-05-23 | End: 2018-05-23

## 2018-05-23 RX ORDER — DIPHENHYDRAMINE HYDROCHLORIDE 50 MG/ML
50 INJECTION, SOLUTION INTRAMUSCULAR; INTRAVENOUS
Status: DISCONTINUED | OUTPATIENT
Start: 2018-05-23 | End: 2018-05-25 | Stop reason: HOSPADM

## 2018-05-23 RX ADMIN — INSULIN LISPRO 4 UNITS: 100 INJECTION, SOLUTION INTRAVENOUS; SUBCUTANEOUS at 12:19

## 2018-05-23 RX ADMIN — WARFARIN SODIUM 4 MG: 2 TABLET ORAL at 09:47

## 2018-05-23 RX ADMIN — INSULIN LISPRO 2 UNITS: 100 INJECTION, SOLUTION INTRAVENOUS; SUBCUTANEOUS at 16:26

## 2018-05-23 RX ADMIN — GABAPENTIN 600 MG: 300 CAPSULE ORAL at 09:47

## 2018-05-23 RX ADMIN — GABAPENTIN 600 MG: 300 CAPSULE ORAL at 16:27

## 2018-05-23 RX ADMIN — SODIUM CHLORIDE 1250 MG: 900 INJECTION, SOLUTION INTRAVENOUS at 01:01

## 2018-05-23 RX ADMIN — SODIUM CHLORIDE 1250 MG: 900 INJECTION, SOLUTION INTRAVENOUS at 18:53

## 2018-05-23 RX ADMIN — INSULIN LISPRO 2 UNITS: 100 INJECTION, SOLUTION INTRAVENOUS; SUBCUTANEOUS at 22:53

## 2018-05-23 RX ADMIN — IPRATROPIUM BROMIDE AND ALBUTEROL SULFATE 3 ML: .5; 3 SOLUTION RESPIRATORY (INHALATION) at 21:18

## 2018-05-23 RX ADMIN — SODIUM CHLORIDE 1250 MG: 900 INJECTION, SOLUTION INTRAVENOUS at 09:55

## 2018-05-23 RX ADMIN — CEFTRIAXONE 1 G: 1 INJECTION, POWDER, FOR SOLUTION INTRAMUSCULAR; INTRAVENOUS at 16:27

## 2018-05-23 RX ADMIN — DIPHENHYDRAMINE HYDROCHLORIDE 50 MG: 50 INJECTION, SOLUTION INTRAMUSCULAR; INTRAVENOUS at 01:00

## 2018-05-23 RX ADMIN — METOPROLOL SUCCINATE 25 MG: 25 TABLET, EXTENDED RELEASE ORAL at 09:47

## 2018-05-23 RX ADMIN — INSULIN LISPRO 2 UNITS: 100 INJECTION, SOLUTION INTRAVENOUS; SUBCUTANEOUS at 09:46

## 2018-05-23 RX ADMIN — GABAPENTIN 1200 MG: 400 CAPSULE ORAL at 22:53

## 2018-05-23 RX ADMIN — FUROSEMIDE 40 MG: 40 TABLET ORAL at 18:52

## 2018-05-23 RX ADMIN — FUROSEMIDE 40 MG: 40 TABLET ORAL at 09:47

## 2018-05-23 RX ADMIN — FAMOTIDINE 20 MG: 20 TABLET ORAL at 09:47

## 2018-05-23 NOTE — PROGRESS NOTES
Reason for Admission:   \"infected leg\"                  RRAT Score:     16             Do you (patient/family) have any concerns for transition/discharge? Getting up steps to enter his home              Plan for utilizing home health:     yes    Likelihood of readmission? Low/green            Transition of Care Plan:      Spoke with pt, lives with his son. He states he is unable to stand on left leg. He is essentially wc bound now. He has a walker but does not use it, also has shower chair. Riley Ryann He stand/pivots to transfer into chair. Has used hh in past for iv antibxs and wd care. if needs again, wants to do it at home but needs to speak with his son, to see if he can help him.last time his wife and dtr helped, but wife is now . He designates his son for dcp and transport home. pcp dr Antonietta Adorno, he last  Was seen about 2 wks ago. He is considering getting visiting physicians. Demographics correct. Plan home with hh services. Patient has designated ________son________________ to participate in his/her discharge plan and to receive any needed information. Name: Lala Zacarias address  Phone number:[phone]  .

## 2018-05-23 NOTE — TELEPHONE ENCOUNTER
Xray knee looked fine. I doubt insurance will pay for MRI. I would prefer that he sees Ortho. If he insists I will write the order, but I doubt that they will cover for MRI. Lvm to return call, please let patient speak to nurse.

## 2018-05-23 NOTE — PROGRESS NOTES
[facility]  Hospitalist Division        Inpatient Daily Progress Note    Daily progress Note    Patient: James De La Rosa MRN: [id number]  [facility]: [id number]    YOB: 1940  Age: 76 y.o. Sex: male    DOA: 5/22/2018 LOS:  LOS: 1 day                    Chief Complaint:  Cellulitis    Interval History: 75 y/o male with hx of PAF, SSS, pacemaker, CHF, HTN, CVA on Coumadin, DM, asthma, BPH, presented to the ED on 5/22/2018 with worsening left foot pain and LLE edema and erythema for weeks. He followed up with his PCP and was given oral antibiotics for one week x 2 rounds, last given Clindamycin. Work up in the ED with normal WBC, INR 2.5, negative troponin, A1C 6.4%, blood cultures NGTD x 2. Left foot xray with considerable LLE and left foot soft tissue swelling, greatest over the dorsum of the foot w/o acute osseous abnormality. He was started on IV abx, admitted for further management of care. Wound care consulted and following.       Assessment/Plan:     Patient Active Problem List   Diagnosis Code    Cardiomyopathy ([facility] I42.9    Asthma J45.909    Peripheral neuropathy G62.9    Other allergic rhinitis J30.89    Nocturia R35.1    Knee pain M25.569    Sick sinus syndrome (HCC) I49.5    Monoclonal gammopathy D47.2    Tinea corporis B35.4    Actinic keratosis L57.0    Idiopathic peripheral neuropathy G60.9    Abnormal SPEP R77.8    Stroke ([facility]) I63.9    PAF (paroxysmal atrial fibrillation) ([facility]) I48.0    Weakness of left upper extremity R29.898    BPH (benign prostatic hyperplasia) N40.0    Cellulitis L03.90    [facility] E11.9       A/P:  Cellulitis  - wound care consulted, appreciate input  - continue IV abx   - lactic acid and WBC's normal on admission  - continue Ceftriaxone 1 gram daily and Vancomycin IV     Diabetes  - A1C 6.4% on admission    Hypertension  - Toprol XL 25 mg daily    Hx of SSS  - pacemaker    Hx of PAF, hx of CVA  - coumadin 4 mg daily- monitor INR    Hx of cardiomyopathy  - Lasix 40 mg BID    DVT Prophylaxis  - pt on Coumadin    Disposition  - home w/ home health when medically stable      VINCENT Dempsey 81  Pager:  [phone]  Office:  [phone]        Subjective:      No events overnight. No complaints at this time. Minimal pain/discomfort LLE. Objective:      Visit Vitals    /58 (BP 1 Location: Left arm, BP Patient Position: At rest)    Pulse 87    Temp 98.4 °F (36.9 °C)    Resp 16    Ht 6' 4\" (1.93 m)    Wt 127 kg (280 lb)    SpO2 100%    BMI 34.08 kg/m2           Physical Exam:  General appearance: alert, cooperative, no distress, appears stated age  Head: Normocephalic, without obvious abnormality, atraumatic  Lungs: clear to auscultation bilaterally  Heart: regular rate and rhythm, S1, S2 normal, no murmur, click, rub or gallop  Abdomen: soft, non tender, non distended. Normoactive bowel sounds. Extremities: extremities normal, atraumatic, no cyanosis or edema  Skin: cellulitis LLE, dressing to LLE intact, crusted drainage noted between toes  Neurologic: Grossly normal  PSY: Mood and affect normal, appropriately behaved        Intake and Output:  Current Shift:     Last three shifts:  05/21 1901 - 05/23 0700  In: 370 [P.O.:120;  I.V.:250]  Out: 2250 [Urine:2250]    Recent Results (from the past 24 hour(s))   CULTURE, BLOOD    Collection Time: 05/22/18  2:43 PM   Result Value Ref Range    Special Requests: PERIPHERAL      Culture result: NO GROWTH AFTER 16 HOURS     METABOLIC PANEL, COMPREHENSIVE    Collection Time: 05/22/18  2:44 PM   Result Value Ref Range    Sodium 137 136 - 145 mmol/L    Potassium 4.0 3.5 - 5.5 mmol/L    Chloride 101 100 - 108 mmol/L    CO2 30 21 - 32 mmol/L    Anion gap 6 3.0 - 18 mmol/L    Glucose 148 (H) 74 - 99 mg/dL    BUN 17 7.0 - 18 MG/DL    Creatinine 0.75 0.6 - 1.3 MG/DL BUN/Creatinine ratio 23 (H) 12 - 20      GFR est AA >60 >60 ml/min/1.73m2    GFR est non-AA >60 >60 ml/min/1.73m2    Calcium 8.7 8.5 - 10.1 MG/DL    Bilirubin, total 0.7 0.2 - 1.0 MG/DL    ALT (SGPT) 27 16 - 61 U/L    AST (SGOT) 27 15 - 37 U/L    Alk. phosphatase 65 45 - 117 U/L    Protein, total 7.1 6.4 - 8.2 g/dL    Albumin 3.3 (L) 3.4 - 5.0 g/dL    Globulin 3.8 2.0 - 4.0 g/dL    A-G Ratio 0.9 0.8 - 1.7     CBC WITH AUTOMATED DIFF    Collection Time: 05/22/18  2:44 PM   Result Value Ref Range    WBC 6.8 4.6 - 13.2 K/uL    RBC 2.83 (L) 4.70 - 5.50 M/uL    HGB 8.8 (L) 13.0 - 16.0 g/dL    HCT 27.3 (L) 36.0 - 48.0 %    MCV 96.5 74.0 - 97.0 FL    MCH 31.1 24.0 - 34.0 PG    MCHC 32.2 31.0 - 37.0 g/dL    RDW 15.2 (H) 11.6 - 14.5 %    PLATELET 915 251 - 932 K/uL    MPV 8.1 (L) 9.2 - 11.8 FL    NEUTROPHILS 67 40 - 73 %    LYMPHOCYTES 20 (L) 21 - 52 %    MONOCYTES 10 3 - 10 %    EOSINOPHILS 3 0 - 5 %    BASOPHILS 0 0 - 2 %    ABS. NEUTROPHILS 4.6 1.8 - 8.0 K/UL    ABS. LYMPHOCYTES 1.3 0.9 - 3.6 K/UL    ABS. MONOCYTES 0.6 0.05 - 1.2 K/UL    ABS. EOSINOPHILS 0.2 0.0 - 0.4 K/UL    ABS.  BASOPHILS 0.0 0.0 - 0.06 K/UL    DF AUTOMATED     CULTURE, BLOOD    Collection Time: 05/22/18  2:55 PM   Result Value Ref Range    Special Requests: PERIPHERAL      Culture result: NO GROWTH AFTER 16 HOURS     CARDIAC PANEL,(CK, CKMB & TROPONIN)    Collection Time: 05/22/18  2:55 PM   Result Value Ref Range     39 - 308 U/L    CK - MB 3.3 <3.6 ng/ml    CK-MB Index 2.0 0.0 - 4.0 %    Troponin-I, Qt. <0.02 0.0 - 0.045 NG/ML   PTT    Collection Time: 05/22/18  2:55 PM   Result Value Ref Range    aPTT 37.9 (H) 23.0 - 36.4 SEC   PROTHROMBIN TIME + INR    Collection Time: 05/22/18  2:55 PM   Result Value Ref Range    Prothrombin time 26.5 (H) 11.5 - 15.2 sec    INR 2.5 (H) 0.8 - 1.2     HEMOGLOBIN A1C WITH EAG    Collection Time: 05/22/18  2:55 PM   Result Value Ref Range    Hemoglobin A1c 6.4 (H) 4.2 - 5.6 %    Est. average glucose 137 mg/dL URINALYSIS W/ RFLX MICROSCOPIC    Collection Time: 05/22/18  3:00 PM   Result Value Ref Range    Color YELLOW      Appearance CLEAR      Specific gravity 1.012 1.005 - 1.030      pH (UA) 7.0 5.0 - 8.0      Protein NEGATIVE  NEG mg/dL    Glucose NEGATIVE  NEG mg/dL    Ketone NEGATIVE  NEG mg/dL    Bilirubin NEGATIVE  NEG      Blood NEGATIVE  NEG      Urobilinogen 0.2 0.2 - 1.0 EU/dL    Nitrites NEGATIVE  NEG      Leukocyte Esterase TRACE (A) NEG     URINE MICROSCOPIC ONLY    Collection Time: 05/22/18  3:00 PM   Result Value Ref Range    WBC 0 to 2 0 - 4 /hpf    RBC 0 0 - 5 /hpf    Epithelial cells FEW 0 - 5 /lpf    Bacteria NEGATIVE  NEG /hpf   POC LACTIC ACID    Collection Time: 05/22/18  3:49 PM   Result Value Ref Range    Lactic Acid (POC) 1.3 0.4 - 2.0 mmol/L   EKG, 12 LEAD, INITIAL    Collection Time: 05/22/18  3:50 PM   Result Value Ref Range    Ventricular Rate 103 BPM    Atrial Rate 103 BPM    P-R Interval 116 ms    QRS Duration 164 ms    Q-T Interval 394 ms    QTC Calculation (Bezet) 516 ms    Calculated P Axis -25 degrees    Calculated R Axis -76 degrees    Calculated T Axis 92 degrees    Diagnosis       Sinus tachycardia  Ventricular pre-excitation, WPW pattern type A  Abnormal ECG  When compared with ECG of 07-FEB-2018 08:19,  Sinus rhythm has replaced Electronic ventricular pacemaker  Vent.  rate has increased BY  42 BPM     POC CHEM8    Collection Time: 05/22/18  5:12 PM   Result Value Ref Range    CO2, POC 26 (H) 19 - 24 MMOL/L    Glucose,  (H) 74 - 106 MG/DL    BUN, POC 17 7 - 18 MG/DL    Creatinine, POC 0.7 0.6 - 1.3 MG/DL    GFRAA, POC >60 >60 ml/min/1.73m2    GFRNA, POC >60 >60 ml/min/1.73m2    Sodium,  136 - 145 MMOL/L    Potassium, POC 3.9 3.5 - 5.5 MMOL/L    Calcium, ionized (POC) 1.17 1.12 - 1.32 mmol/L    Chloride,  100 - 108 MMOL/L    Anion gap, POC 16 10 - 20      Hematocrit, POC 25 (L) 36 - 49 %    Hemoglobin, POC 8.5 (L) 12 - 16 G/DL   GLUCOSE, POC    Collection Time: 05/22/18 10:20 PM   Result Value Ref Range    Glucose (POC) 228 (H) 70 - 110 mg/dL   CBC WITH AUTOMATED DIFF    Collection Time: 05/23/18  4:30 AM   Result Value Ref Range    WBC 4.8 4.6 - 13.2 K/uL    RBC 2.45 (L) 4.70 - 5.50 M/uL    HGB 7.6 (L) 13.0 - 16.0 g/dL    HCT 23.6 (L) 36.0 - 48.0 %    MCV 96.3 74.0 - 97.0 FL    MCH 31.0 24.0 - 34.0 PG    MCHC 32.2 31.0 - 37.0 g/dL    RDW 15.4 (H) 11.6 - 14.5 %    PLATELET 385 621 - 110 K/uL    MPV 7.9 (L) 9.2 - 11.8 FL    NEUTROPHILS 66 40 - 73 %    LYMPHOCYTES 19 (L) 21 - 52 %    MONOCYTES 11 (H) 3 - 10 %    EOSINOPHILS 4 0 - 5 %    BASOPHILS 0 0 - 2 %    ABS. NEUTROPHILS 3.2 1.8 - 8.0 K/UL    ABS. LYMPHOCYTES 0.9 0.9 - 3.6 K/UL    ABS. MONOCYTES 0.5 0.05 - 1.2 K/UL    ABS. EOSINOPHILS 0.2 0.0 - 0.4 K/UL    ABS. BASOPHILS 0.0 0.0 - 0.06 K/UL    DF AUTOMATED     METABOLIC PANEL, BASIC    Collection Time: 05/23/18  4:30 AM   Result Value Ref Range    Sodium 138 136 - 145 mmol/L    Potassium 4.0 3.5 - 5.5 mmol/L    Chloride 105 100 - 108 mmol/L    CO2 26 21 - 32 mmol/L    Anion gap 7 3.0 - 18 mmol/L    Glucose 133 (H) 74 - 99 mg/dL    BUN 12 7.0 - 18 MG/DL    Creatinine 0.60 0.6 - 1.3 MG/DL    BUN/Creatinine ratio 20 12 - 20      GFR est AA >60 >60 ml/min/1.73m2    GFR est non-AA >60 >60 ml/min/1.73m2    Calcium 7.7 (L) 8.5 - 10.1 MG/DL   GLUCOSE, POC    Collection Time: 05/23/18  8:20 AM   Result Value Ref Range    Glucose (POC) 156 (H) 70 - 110 mg/dL           Lab Results   Component Value Date/Time    Glucose 133 (H) 05/23/2018 04:30 AM    Glucose 148 (H) 05/22/2018 02:44 PM    Glucose 140 (H) 04/02/2018 02:29 AM    Glucose 150 (H) 02/07/2018 07:58 AM    Glucose 103 (H) 10/31/2017 02:16 PM        Imaging:  Xr Foot Lt Ap/lat    Result Date: 5/22/2018  Examination: Left foot 2 views. HISTORY: Left foot pain and soft tissue wound. FINDINGS: Dorsal lateral projections of the left lateral performed and interpreted without comparison.  No acute osseous malalignment demonstrated on these nonweightbearing projections. Left first MTP joint osteoarthrosis noted along with additional degenerative spur formation at the tibiotalar articulation. There is considerable soft tissue edema involving the imaged lower leg and left foot, greatest over the dorsum of the foot. No retained radiopaque foreign object or soft tissue gas formation. No fracture. IMPRESSION: 1. Considerable left lower extremity and left foot soft tissue swelling, greatest over the dorsum of the foot without acute osseous abnormality or retained radiopaque foreign object. 2. Mild degenerative changes as described. Xr Chest Port    Result Date: 5/22/2018  CHEST AP PORTABLE Indication: Sepsis. Comparison: 02/07/2018. Findings: Dual-chamber left chest wall cardiac stimulator. The lungs appear clear. No evidence for pneumothorax or pleural effusion. Cardiac silhouette and pulmonary vascularity appear within normal limits. IMPRESSION: No acute cardiopulmonary disease.       Medication List Reviewed:  Current Facility-Administered Medications   Medication Dose Route Frequency    diphenhydrAMINE (BENADRYL) injection 50 mg  50 mg IntraVENous QHS PRN    sodium chloride (NS) flush 5-10 mL  5-10 mL IntraVENous PRN    cefTRIAXone (ROCEPHIN) 1 g in 0.9% sodium chloride (MBP/ADV) 50 mL MBP  1 g IntraVENous Q24H    vancomycin (VANCOCIN) 1,250 mg in 0.9% sodium chloride 250 mL IVPB  1,250 mg IntraVENous Q8H    VANCOMYCIN INFORMATION NOTE   Other ONCE    VANCOMYCIN INFORMATION NOTE   Other Rx Dosing/Monitoring    acetaminophen (TYLENOL) tablet 500 mg  500 mg Oral Q6H PRN    gabapentin (NEURONTIN) capsule 600 mg  600 mg Oral BID    furosemide (LASIX) tablet 40 mg  40 mg Oral BID    metoprolol succinate (TOPROL-XL) XL tablet 25 mg  25 mg Oral DAILY    warfarin (COUMADIN) tablet 4 mg  4 mg Oral DAILY    famotidine (PEPCID) tablet 20 mg  20 mg Oral DAILY    prasterone (dhea) tab 50 mg (Patient Supplied)  50 mg Oral DAILY    gabapentin (NEURONTIN) capsule 1,200 mg  1,200 mg Oral QHS    WARFARIN INFORMATION NOTE (COUMADIN)   Other Rx Dosing/Monitoring    Please ask if someone can bring in patient's own supply of prasterone, dhea, (DHEA) 50 mg tab  1 Each Other DAILY    insulin lispro (HUMALOG) injection   SubCUTAneous AC&HS    glucose chewable tablet 16 g  4 Tab Oral PRN    glucagon (GLUCAGEN) injection 1 mg  1 mg IntraMUSCular PRN    dextrose (D50W) injection syrg 12.5-25 g  25-50 mL IntraVENous PRN

## 2018-05-23 NOTE — WOUND CARE
Received IP WOUND CARE CONSULT per Dr. Fuad Lopez, reviewed EMR, visited patient at bedside. Assessed bilateral lower extremities. Multiple venous wounds noted on left lower leg with edema present. Patient reported infected left knee hardware and history of 5 surgeries on left knee. Cleansed wounds with DWC and dried with gauze, applied Aquacel AG to open wounds and wrapped leg with gauze. Patient tolerated well. Discussed wound care findings and dressing changes with Bennie MUJICA. PLEASE SEE WOUND CARE ORDERS:                           05/23/18 0945   Wound Foot Left;Dorsal   Date First Assessed/Time First Assessed: 05/22/18 2215   POA: Yes  Wound Type: Vascular  Location: Foot  Orientation: Left;Dorsal   DRESSING STATUS Removed   DRESSING TYPE Gauze wrap (ajith)   Non-Pressure Injury Partial thickness (epider/derm)   Wound Length (cm) 2.4 cm   Wound Width (cm) 5 cm   Wound Depth (cm) 0.1   Wound Surface area (cm^2) 12 cm^2   Condition of Base Pink   Condition of Edges Open   Tissue Type Red   Tissue Type Percent Red 100   Drainage Amount  Moderate   Drainage Color Serous   Wound Odor None   Periwound Skin Condition Edematous; Erythema, blanchable   Cleansing and Cleansing Agents  Dermal wound cleanser   Dressing Type Applied Other (Comment)  (Aquacel AG, Gauze 4 x 4, Kerlix, tape)   Procedure Tolerated Well   Wound Leg Lower Left; Anterior   Date First Assessed/Time First Assessed: 05/23/18 0930   POA: Yes  Wound Type: Venous  Location: Leg Lower  Orientation: Left; Anterior   DRESSING STATUS Removed   DRESSING TYPE Gauze wrap (ajith)   Non-Pressure Injury Full thickness (subcut/muscle)   Wound Length (cm) 1.9 cm   Wound Width (cm) 1.5 cm   Wound Depth (cm) 0.2   Wound Surface area (cm^2) 2.85 cm^2   Condition of Base Slough   Condition of Edges Open   Tissue Type Yellow   Tissue Type Percent Yellow 100   Drainage Amount  Moderate   Drainage Color Serous   Wound Odor None   Periwound Skin Condition Edematous; Erythema, blanchable   Cleansing and Cleansing Agents  Dermal wound cleanser   Dressing Type Applied Other (Comment)  (Aquacel AG, gauze 4 x 4, kerlix, tape)   Procedure Tolerated Well   Wound Leg Lower Left;Posterior   Date First Assessed/Time First Assessed: 05/23/18 0930   POA: Yes  Wound Type: Venous  Location: Leg Lower  Orientation: Left;Posterior   DRESSING STATUS Removed   DRESSING TYPE Gauze wrap (ajith)   Non-Pressure Injury Partial thickness (epider/derm)   Wound Length (cm) 2 cm   Wound Width (cm) 1.6 cm   Wound Depth (cm) 0.1   Wound Surface area (cm^2) 3.2 cm^2   Condition of Base Pink   Condition of Edges Open   Tissue Type Red   Tissue Type Percent Red 100   Drainage Amount  Small    Drainage Color Serous   Wound Odor None   Periwound Skin Condition Edematous   Cleansing and Cleansing Agents  Dermal wound cleanser   Dressing Type Applied Other (Comment)  (Aquacel AG, gauze 4 x 4, kerlix, tape)   Procedure Tolerated Well

## 2018-05-23 NOTE — PROGRESS NOTES
Assumed care of pt from [facility] pt awake in bed A&O x 4 , no distress noted and denies pain. Frequently used items and call bell within reach. Patient verbalized understanding to use call bell for any needs or assistance. Bed locked in lowest position. Dual skin assessment performed. 2025 Bedside and Verbal shift change report given to SIL James (oncoming nurse) by Courtney Quiroz RN (offgoing nurse). Report included the following information SBAR, Kardex, Intake/Output, MAR and Recent Results.

## 2018-05-23 NOTE — PROGRESS NOTES
conducted an initial consultation and Spiritual Assessment for Maurizio Tinoco, who is a 76 y.o.,male. Patients Primary Language is: [place]. According to the patients EMR Congregation Affiliation is: [facility].     The reason the Patient came to the hospital is:   Patient Active Problem List    Diagnosis Date Noted    Diabetes mellitus type 2, controlled ([facility] 05/23/2018    Cellulitis 05/22/2018    [facility]    PA[facility]ox[facility] 02/07/2018    Weakness of left upper extremity 02/07/2018    BPH (benign prostatic hyperplasia) 02/07/2018    Abnormal SPEP 10/19/2017    Idiopathic peripheral neuropathy 05/12/2016    Monoclonal gammopathy 03/07/2016    Tinea corporis 03/07/2016    Actinic keratosis 03/07/2016    Sick sinus syndrome ([facility] 02/25/2016    Knee pain     Nocturia 12/10/2015    Cardiomyopathy ([facility] 12/03/2015    Asthma 12/03/2015    Peripheral neuropathy 12/03/2015    Other allergic rhinitis 12/03/2015        The  provided the following Interventions:   attempted to initiate  a relationship of care and support with patient in room 2106 this morning. Found patient to be in contact isolation and was not very talkative at time of this visit. Provided information about Spiritual Care Services. Offered prayer and assurance of continued prayers on patients behalf. The following outcomes were achieved:  Patient shared limited information about his medical narrative. Assessment:  Patient does not have any Religion/cultural needs that will affect patients preferences in health care. There are no further spiritual or Religion issues which require Spiritual Care Services interventions at this time. Plan:  Chaplains will continue to follow and will provide pastoral care on an as needed/requested basis    . Margo Keller   Spiritual Care   [phone]

## 2018-05-23 NOTE — PROGRESS NOTES
Problem: Discharge Planning  Goal: *Discharge to safe environment  Outcome: Progressing Towards Goal  Home with  services

## 2018-05-23 NOTE — PROGRESS NOTES
Problem: Falls - Risk of  Goal: *Absence of Falls  Document Anne Fall Risk and appropriate interventions in the flowsheet. Outcome: Progressing Towards Goal  Fall Risk Interventions:  Mobility Interventions: Patient to call before getting OOB         Medication Interventions: Evaluate medications/consider consulting pharmacy    Elimination Interventions: Call light in reach, Patient to call for help with toileting needs    History of Falls Interventions: Door open when patient unattended        Problem: Pressure Injury - Risk of  Goal: *Prevention of pressure injury  Document Mendel Scale and appropriate interventions in the flowsheet. Outcome: Progressing Towards Goal  Pressure Injury Interventions:             Activity Interventions: Pressure redistribution bed/mattress(bed type)    Mobility Interventions: HOB 30 degrees or less    Nutrition Interventions: Document food/fluid/supplement intake    Friction and Shear Interventions: HOB 30 degrees or less

## 2018-05-23 NOTE — PROGRESS NOTES
1930: Bedside verbal shift report received from [facility]. Pt is awake in bed, no signs of distress, instructed to press call light if assistance is needed, call light within reach. 2045: Admission documents completed with patient. 2123: Tylenol given for foot/leg pain (Left). 2215: Dual Skin assessment with Eric Marie, [address] [place] [address] Road: Bedside and Verbal shift change [facility] ([facility] by Karla Guzman RN (offgoing nurse). Report included the following information SBAR, Kardex, Intake/Output, MAR and Recent Results.

## 2018-05-23 NOTE — PROGRESS NOTES
Bedside and Verbal shift change report given to Marco Carpenter [facility] (oncoming nurse) by Estelle Gilbert RN (offgoing nurse). Report included the following information SBAR, Kardex and MAR. Dual skin assessment completed. AAOX4, calm and cooperative. Patient stated he takes two 25 mg Benadryl for sleep at night. Will notify doctor. Bed locked and at lowest position. Call bell within reach. Patient resting at this time. 36-  Notified Dr. Cole Sun in regards to patient's request for two Benadryl 25mg for sleep. Doctor ordered Benadryl 50mg IV bedtime prn. trb    0750- Bedside and Verbal shift change report given to SIL Avery (oncoming nurse) by Marco Carpenter RN (offgoing nurse). Report included the following information SBAR, Kardex and MAR. Dual skin assessment completed. Patient resting in bed.

## 2018-05-23 NOTE — PROGRESS NOTES
Pharmacy Dosing Services: Vancomycin    Indication: Skin and Soft Tissue Infection    Day of therapy: 1    Other Antimicrobials (Include dose, start day & day of therapy):  Ceftriaxone 1 gram, every 24 hours, 2018    Loading dose (date given): 1,000 mg x 2 = 2,000 mg  Current Maintenance dose: Vanco 1250mg IV Q8H    Goal Vancomycin Level: 15-20 mcg/mL  (Trough 15-20 for most infections, 20 for meningitis/osteomyelitis, pre-HD level ~25)    Vancomycin Level (if drawn):    - 15.5 ~ 8 hours post-dose    Significant Cultures:   2018 Blood culture taken    Renal function stable? (unstable defined as SCr increase of 0.5 mg/dL or > 50% increase from baseline, whichever is greater) (Y/N): Y     CAPD, Hemodialysis or Renal Replacement Therapy (Y/N): N     Recent Labs      18   0430  18   1444   CREA  0.60  0.75   BUN  12  17   WBC  4.8  6.8     Temp (24hrs), Av.3 °F (36.8 °C), Min:98.2 °F (36.8 °C), Max:98.4 °F (36.9 °C)    Creatinine Clearance (Creatinine Clearance (ml/min)): >120 mL/min     Regimen assessment: Level therapeutic, will continue current dose   Maintenance dose: 1,250 mg every 8 hours  Next scheduled level: 2018 at . Amalia Villarreal will follow daily and adjust medications as appropriate for renal function and/or serum levels.     Thank you,  Demar Anthony, PHARMD

## 2018-05-23 NOTE — PROGRESS NOTES
Problem: Falls - Risk of  Goal: *Absence of Falls  Document Anne Fall Risk and appropriate interventions in the flowsheet. Outcome: Progressing Towards Goal  Fall Risk Interventions:  Mobility Interventions: Assess mobility with egress test         Medication Interventions: Evaluate medications/consider consulting pharmacy    Elimination Interventions: Call light in reach    History of Falls Interventions: Door open when patient unattended        Problem: Pressure Injury - Risk of  Goal: *Prevention of pressure injury  Document Mendel Scale and appropriate interventions in the flowsheet. Outcome: Progressing Towards Goal  Pressure Injury Interventions:             Activity Interventions: Pressure redistribution bed/mattress(bed type)    Mobility Interventions: HOB 30 degrees or less, Pressure redistribution bed/mattress (bed type)    Nutrition Interventions: Document food/fluid/supplement intake    Friction and Shear Interventions: Apply protective barrier, creams and emollients

## 2018-05-24 LAB
ANION GAP SERPL CALC-SCNC: 8 MMOL/L (ref 3–18)
BUN SERPL-MCNC: 19 MG/DL (ref 7–18)
BUN/CREAT SERPL: 21 (ref 12–20)
CALCIUM SERPL-MCNC: 8 MG/DL (ref 8.5–10.1)
CHLORIDE SERPL-SCNC: 107 MMOL/L (ref 100–108)
CO2 SERPL-SCNC: 24 MMOL/L (ref 21–32)
CREAT SERPL-MCNC: 0.89 MG/DL (ref 0.6–1.3)
GLUCOSE BLD STRIP.AUTO-MCNC: 174 MG/DL (ref 70–110)
GLUCOSE BLD STRIP.AUTO-MCNC: 202 MG/DL (ref 70–110)
GLUCOSE BLD STRIP.AUTO-MCNC: 225 MG/DL (ref 70–110)
GLUCOSE BLD STRIP.AUTO-MCNC: 242 MG/DL (ref 70–110)
GLUCOSE SERPL-MCNC: 128 MG/DL (ref 74–99)
INR PPP: 1.9 (ref 0.8–1.2)
POTASSIUM SERPL-SCNC: 3.8 MMOL/L (ref 3.5–5.5)
PROTHROMBIN TIME: 20.8 SEC (ref 11.5–15.2)
SODIUM SERPL-SCNC: 139 MMOL/L (ref 136–145)

## 2018-05-24 PROCEDURE — 96366 THER/PROPH/DIAG IV INF ADDON: CPT

## 2018-05-24 PROCEDURE — 74011000258 HC RX REV CODE- 258: Performed by: EMERGENCY MEDICINE

## 2018-05-24 PROCEDURE — 74011250637 HC RX REV CODE- 250/637: Performed by: HOSPITALIST

## 2018-05-24 PROCEDURE — 74011636637 HC RX REV CODE- 636/637: Performed by: HOSPITALIST

## 2018-05-24 PROCEDURE — 65390000012 HC CONDITION CODE 44 OBSERVATION

## 2018-05-24 PROCEDURE — 99218 HC RM OBSERVATION: CPT

## 2018-05-24 PROCEDURE — 80048 BASIC METABOLIC PNL TOTAL CA: CPT | Performed by: HOSPITALIST

## 2018-05-24 PROCEDURE — 85610 PROTHROMBIN TIME: CPT | Performed by: HOSPITALIST

## 2018-05-24 PROCEDURE — 36415 COLL VENOUS BLD VENIPUNCTURE: CPT | Performed by: HOSPITALIST

## 2018-05-24 PROCEDURE — 74011250636 HC RX REV CODE- 250/636: Performed by: EMERGENCY MEDICINE

## 2018-05-24 PROCEDURE — 74011000250 HC RX REV CODE- 250: Performed by: HOSPITALIST

## 2018-05-24 PROCEDURE — 96376 TX/PRO/DX INJ SAME DRUG ADON: CPT

## 2018-05-24 PROCEDURE — 74011250636 HC RX REV CODE- 250/636: Performed by: HOSPITALIST

## 2018-05-24 PROCEDURE — 74011000250 HC RX REV CODE- 250: Performed by: NURSE PRACTITIONER

## 2018-05-24 PROCEDURE — 82962 GLUCOSE BLOOD TEST: CPT

## 2018-05-24 RX ORDER — IPRATROPIUM BROMIDE AND ALBUTEROL SULFATE 2.5; .5 MG/3ML; MG/3ML
3 SOLUTION RESPIRATORY (INHALATION)
Status: DISCONTINUED | OUTPATIENT
Start: 2018-05-24 | End: 2018-05-25 | Stop reason: HOSPADM

## 2018-05-24 RX ORDER — FLUTICASONE PROPIONATE 50 MCG
2 SPRAY, SUSPENSION (ML) NASAL DAILY
Status: DISCONTINUED | OUTPATIENT
Start: 2018-05-25 | End: 2018-05-25 | Stop reason: HOSPADM

## 2018-05-24 RX ADMIN — FUROSEMIDE 40 MG: 40 TABLET ORAL at 09:47

## 2018-05-24 RX ADMIN — INSULIN LISPRO 4 UNITS: 100 INJECTION, SOLUTION INTRAVENOUS; SUBCUTANEOUS at 17:52

## 2018-05-24 RX ADMIN — FAMOTIDINE 20 MG: 20 TABLET ORAL at 09:47

## 2018-05-24 RX ADMIN — METOPROLOL SUCCINATE 25 MG: 25 TABLET, EXTENDED RELEASE ORAL at 09:47

## 2018-05-24 RX ADMIN — SODIUM CHLORIDE 1250 MG: 900 INJECTION, SOLUTION INTRAVENOUS at 10:45

## 2018-05-24 RX ADMIN — IPRATROPIUM BROMIDE AND ALBUTEROL SULFATE 3 ML: .5; 3 SOLUTION RESPIRATORY (INHALATION) at 10:25

## 2018-05-24 RX ADMIN — IPRATROPIUM BROMIDE AND ALBUTEROL SULFATE 3 ML: .5; 3 SOLUTION RESPIRATORY (INHALATION) at 19:49

## 2018-05-24 RX ADMIN — ACETAMINOPHEN 500 MG: 500 TABLET ORAL at 20:47

## 2018-05-24 RX ADMIN — GABAPENTIN 600 MG: 300 CAPSULE ORAL at 09:47

## 2018-05-24 RX ADMIN — GABAPENTIN 1200 MG: 400 CAPSULE ORAL at 22:22

## 2018-05-24 RX ADMIN — WARFARIN SODIUM 4 MG: 2 TABLET ORAL at 09:47

## 2018-05-24 RX ADMIN — SODIUM CHLORIDE 1250 MG: 900 INJECTION, SOLUTION INTRAVENOUS at 03:27

## 2018-05-24 RX ADMIN — INSULIN LISPRO 4 UNITS: 100 INJECTION, SOLUTION INTRAVENOUS; SUBCUTANEOUS at 12:12

## 2018-05-24 RX ADMIN — DIPHENHYDRAMINE HYDROCHLORIDE 50 MG: 50 INJECTION, SOLUTION INTRAMUSCULAR; INTRAVENOUS at 23:39

## 2018-05-24 RX ADMIN — CEFTRIAXONE 1 G: 1 INJECTION, POWDER, FOR SOLUTION INTRAMUSCULAR; INTRAVENOUS at 16:25

## 2018-05-24 RX ADMIN — GABAPENTIN 600 MG: 300 CAPSULE ORAL at 16:25

## 2018-05-24 RX ADMIN — INSULIN LISPRO 6 UNITS: 100 INJECTION, SOLUTION INTRAVENOUS; SUBCUTANEOUS at 22:22

## 2018-05-24 RX ADMIN — FUROSEMIDE 40 MG: 40 TABLET ORAL at 17:57

## 2018-05-24 NOTE — CONSULTS
Infectious Disease Consultation Note    Requested by: Dr. Nida Méndez, NP [facility]    Reason:  Left foot cellulitis failed OP therapy    Current abx Prior abx   Vancomycin 5/22 - 2 Ceftriaxone 5/22 - 2      ASSESSMENT - > REC:     Bullous cellulitis, left foot  - improved on Vancomycin, Ceftriaxone  - prior wd cx 5/7 had Klebsiella pneumoniae resistant to Ceftriaxone so doubt this is playing a role at present since improved on current abx. Also had MSSA in 5/7 cx -> continue Vancomycin  -> leg elevation above heart level if possible  -> dc Ceftriaxone  -> de-escalate to Keflex when further improved to complete 7 day course. Allergy Sulfa (hives, rash)    Chronic Venous stasis, BLE    DM    HTN    PAF    SSS  - s/p pacemaker    CVA    asthma    BPH    s/p multiple knee surgeries including revision TKR's due to infection      MICROBIOLOGY:   5/7 Wd cx MSSA, Klebsiella pneumoniae sens amox/clav, cipro, levofloxacin, TMP-SMX. Resistant to Ceftriaxone    LINES AND CATHETERS:   piv    HPI:    76year-old  male w/ h/o DM, HTN, PAF, SSS, pacemaker, CVA, asthma, BPH, s/p multiple knee surgeries including revision TKR's due to infection, admitted to [facility] 5/22/2018 due to left foot swelling and redness. He tells me had has had chronic leg swelling mostly on the left which has not been improved with lasix. About one week PTA he developed redness of the left foot with weeping and a large bulla on the dorsum of the forefoot. He also felt chills but no fever or sweats. He was prescribed Clindamycin by his PCP but apparently had no improvement so he presented to the ED and was admitted. Vancomycin and Ceftriaxone were started 5/22 and there has been some improvement in the erythema and edema by 5/24.     Past Medical History:   Diagnosis Date    Asthma     BPH (benign prostatic hyperplasia)     Herniated disc, cervical     Knee pain     Pacemaker 2011    [employer]    PAF (paroxysmal atrial fibrillation) ([facility] [address] During Pacer interogation     SSS (sick sinus syndrome) ([facility] [address]     S/P Dual chamber [employer] Pacemaker       Past Surgical History:   Procedure Laterality Date    HX HERNIA REPAIR Bilateral 1989    HX HIP REPLACEMENT  2006    right    HX KNEE REPLACEMENT Bilateral 1992/1993/2006/2008/2011/2012/2013    HX PACEMAKER  2011       Allergies: Sulfa (sulfonamide antibiotics) . Current Facility-Administered Medications   Medication Dose Route Frequency    albuterol-ipratropium (DUO-NEB) 2.5 MG-0.5 MG/3 ML  3 mL Nebulization Q4H PRN    [START ON 5/25/2018] fluticasone (FLONASE) 50 mcg/actuation nasal spray 2 Spray  2 Spray Both Nostrils DAILY    diphenhydrAMINE (BENADRYL) injection 50 mg  50 mg IntraVENous QHS PRN    sodium chloride (NS) flush 5-10 mL  5-10 mL IntraVENous PRN    cefTRIAXone (ROCEPHIN) 1 g in 0.9% sodium chloride (MBP/ADV) 50 mL MBP  1 g IntraVENous Q24H    acetaminophen (TYLENOL) tablet 500 mg  500 mg Oral Q6H PRN    gabapentin (NEURONTIN) capsule 600 mg  600 mg Oral BID    furosemide (LASIX) tablet 40 mg  40 mg Oral BID    metoprolol succinate (TOPROL-XL) XL tablet 25 mg  25 mg Oral DAILY    warfarin (COUMADIN) tablet 4 mg  4 mg Oral DAILY    famotidine (PEPCID) tablet 20 mg  20 mg Oral DAILY    prasterone (dhea) tab 50 mg (Patient Supplied)  50 mg Oral DAILY    gabapentin (NEURONTIN) capsule 1,200 mg  1,200 mg Oral QHS    WARFARIN INFORMATION NOTE (COUMADIN)   Other Rx Dosing/Monitoring    Please ask if someone can bring in patient's own supply of prasterone, dhea, (DHEA) 50 mg tab  1 Each Other DAILY    insulin lispro (HUMALOG) injection   SubCUTAneous AC&HS    glucose chewable tablet 16 g  4 Tab Oral PRN    glucagon (GLUCAGEN) injection 1 mg  1 mg IntraMUSCular PRN    dextrose (D50W) injection syrg 12.5-25 g  25-50 mL IntraVENous PRN       History reviewed. No pertinent family history.   Social History     Social History    Marital status:      Spouse name: N/A    Number of children: N/A    Years of education: N/A     Occupational History    Not on file. Social History Main Topics    Smoking status: Current Some Day Smoker     Types: Cigars    Smokeless tobacco: Never Used      Comment: rare cigar smoker    Alcohol use 0.0 oz/week     0 Standard drinks or equivalent per week      Comment: rarely drinks    Drug use: No    Sexual activity: Not Currently     Partners: Female     Other Topics Concern    Not on file     Social History Narrative     History   Smoking Status    Current Some Day Smoker    Types: Cigars   Smokeless Tobacco    Never Used     Comment: rare cigar smoker        Temp (24hrs), Av.7 °F (37.1 °C), Min:98.4 °F (36.9 °C), Max:98.8 °F (37.1 °C)    Visit Vitals    /60 (BP 1 Location: Left arm, BP Patient Position: At rest)    Pulse 73    Temp 98.8 °F (37.1 °C)    Resp 16    Ht 6' 4\" (1.93 m)    Wt 127 kg (280 lb)    SpO2 97%    BMI 34.08 kg/m2       ROS:A comprehensive review of systems was negative except for that written in the History of Present Illness. Physical Exam:    General: Well developed, well nourished 76 y.o.  male in no acute distress. ENT: ENT exam normal, no neck nodes or sinus tenderness  Head: normocephalic, without obvious abnormality  Mouth:  mucous membranes moist, pharynx normal without lesions  Neck: supple, symmetrical, trachea midline   Cardio:  regular rate and rhythm, S1, S2 normal, no murmur, click, rub or gallop  Chest: inspection normal - no chest wall deformities or tenderness, respiratory effort normal  Lungs: clear to auscultation and no wheezes or rales  Abdomen: soft, non-tender. Bowel sounds normal. No masses, no organomegaly.   Extremities:  Left LE edema 1+, pedal edema appears to be subsiding, minimal erythema visible but foot/leg enclosed in dressing (wound care photograph reviewed)  Neuro: Grossly normal      Labs: Results:   Chemistry Recent Labs      18   0510  18   8755 05/22/18   1444   GLU  128*  133*  148*   NA  139  138  137   K  3.8  4.0  4.0   CL  107  105  101   CO2  24  26  30   BUN  19*  12  17   CREA  0.89  0.60  0.75   CA  8.0*  7.7*  8.7   AGAP  8  7  6   BUCR  21*  20  23*   AP   --    --   65   TP   --    --   7.1   ALB   --    --   3.3*   GLOB   --    --   3.8   AGRAT   --    --   0.9      CBC w/Diff Recent Labs      05/23/18   0430  05/22/18   1444   WBC  4.8  6.8   RBC  2.45*  2.83*   HGB  7.6*  8.8*   HCT  23.6*  27.3*   PLT  224  243   GRANS  66  67   LYMPH  19*  20*   EOS  4  3      Microbiology Recent Labs      05/22/18   1455  05/22/18   1443   CULT  NO GROWTH 2 DAYS  NO GROWTH 2 DAYS        Sean Feldman M.D.  [facility] AT THE [facility]   [phone]

## 2018-05-24 NOTE — PROGRESS NOTES
Patient and/or next of kin has been given and has signed the [facility] Outpatient Observation  Notification letter and all questions answered. Copy of this notice given to patient and copy placed on chart. Patient and/or next of kin has been given the Outpatient Observation Information and Notification letter and all questions answered.

## 2018-05-24 NOTE — PROGRESS NOTES
Assumed care of pt from SIL James pt resting in bed with no signs of pain or distress. Frequently used items and call bell within reach. Bed locked in lowest position. Dressing intact. 1044 Pt complained of SOB. PRN arun lindsayd was given will reassess. 1120 Pt SOB resolved. 1946 Bedside and Verbal shift change report given to Dakota Robertson RN (oncoming nurse) by Tristan Guzman RN (offgoing nurse). Report included the following information SBAR, Kardex, Intake/Output, MAR and Recent Results. Pt given breathing treatment for SOB.

## 2018-05-24 NOTE — PROGRESS NOTES
2030: Bedside verbal shift report received from [facility], [address] [facility]. Pt is awake in bed, instructed to press call light if assistance is needed, call light within reach.    -Pt complaining of \"slight\" shortness of breath on shift change, states that he has a history of asthma, requesting for a breathing treatment. Was not able to state what he usually takes at home, pt states that he takes some kind of \"inhaler\". Paged Dr. Lam Pisano, on call hospitalist, he ordered albuterol duo-neb q6 PRN. 2118: Pt was given albuterol. Pt states that he felt better afterwards. Pt rested all night. No complaints of pain. 9837: Daily weight was not able to obtain. Pt on a bed with no scale. 0725: Bedside and Verbal shift change report given to SIL Avery (oncoming nurse) by Natividad Garcia RN (offgoing nurse). Report included the following information SBAR, Kardex, Intake/Output, MAR and Recent Results.

## 2018-05-24 NOTE — PROGRESS NOTES
If pt needs iv antibx to con't at dc, will need to check coverage  With insurance and obtain  Auth, would also need picc

## 2018-05-24 NOTE — PROGRESS NOTES
Patient has been changed from inpatient to observation and a Condition 44 has been completed based on Medicare criteria. The patient / or next of kin will be made aware of this change in status and given a copy of  the [facility] Outpatient Observation Information and Notification letter.

## 2018-05-24 NOTE — PROGRESS NOTES
Problem: Falls - Risk of  Goal: *Absence of Falls  Document Anne Fall Risk and appropriate interventions in the flowsheet. Outcome: Progressing Towards Goal  Fall Risk Interventions:  Mobility Interventions: Patient to call before getting OOB         Medication Interventions: Patient to call before getting OOB    Elimination Interventions: Call light in reach    History of Falls Interventions: Door open when patient unattended        Problem: Pressure Injury - Risk of  Goal: *Prevention of pressure injury  Document Mendel Scale and appropriate interventions in the flowsheet. Outcome: Progressing Towards Goal  Pressure Injury Interventions:             Activity Interventions: Pressure redistribution bed/mattress(bed type)    Mobility Interventions: HOB 30 degrees or less    Nutrition Interventions: Document food/fluid/supplement intake    Friction and Shear Interventions: HOB 30 degrees or less

## 2018-05-24 NOTE — PROGRESS NOTES
[facility]  Hospitalist Division        Inpatient Daily Progress Note    Daily progress Note    Patient: Lamar Dobbins MRN: [id number]  CSN: [id number]    YOB: 1940  Age: 76 y.o. Sex: male    DOA: 5/22/2018 LOS:  LOS: 2 days                    Chief Complaint:  Cellulitis    Interval History: 75 y/o male with hx of PAF, SSS, pacemaker, CHF, HTN, CVA on Coumadin, DM, asthma, BPH, presented to the ED on 5/22/2018 with worsening left foot pain and LLE edema and erythema for weeks. He followed up with his PCP and was given oral antibiotics for one week x 2 rounds, last given Clindamycin. Work up in the ED with normal WBC, INR 2.5, negative troponin, A1C 6.4%, blood cultures NGTD x 2. Left foot xray with considerable LLE and left foot soft tissue swelling, greatest over the dorsum of the foot w/o acute osseous abnormality. He was started on IV abx, admitted for further management of care. Wound care consulted and following. ID consulted on 5/24/2018 to assist with abx management as patient has failed outpatient therapy x 2.       Assessment/Plan:     Patient Active Problem List   Diagnosis Code    Cardiomyopathy ([facility] [address] I42.9    Asthma J45.909    Peripheral neuropathy G62.9    Other allergic rhinitis J30.89    Nocturia R35.1    Knee pain M25.569    Sick sinus syndrome ([facility]) I49.5    Monoclonal gammopathy D47.2    Tinea corporis B35.4    Actinic keratosis L57.0    Idiopathic peripheral neuropathy G60.9    Abnormal SPEP R77.8    Stroke ([facility]) I63.9    PAF (paroxysmal atrial fibrillation) ([facility]) I48.0    Weakness of left upper extremity R29.898    BPH (benign prostatic hyperplasia) N40.0    Cellulitis L03.90    Diabetes mellitus type 2, controlled ([facility] [address] E11.9       A/P:  Cellulitis  - wound care consulted, appreciate input  - continue IV abx   - lactic acid and WBC's normal on admission  - continue Ceftriaxone 1 gram daily and Vancomycin IV   - ID consulted, appreciate input    Diabetes  - A1C 6.4% on admission  - correctional SSI    Hypertension  - Toprol XL 25 mg daily    Hx of SSS  - pacemaker    Hx of PAF, hx of CVA  - coumadin 4 mg daily- monitor INR    Hx of cardiomyopathy  - Lasix 40 mg BID    DVT Prophylaxis  - pt on Coumadin    Disposition  - home w/ home health when medically stable      [facility]  Pager:  [phone]  Office:  [phone]        Subjective:        No events overnight. C/o \"stuffy nose,\" takes Flonase at home. Feels better with breathing treatments. No c/o pain in LLE. Objective:      Visit Vitals    /53 (BP 1 Location: Left arm, BP Patient Position: At rest)    Pulse 90    Temp 98.7 °F (37.1 °C)    Resp 18    Ht 6' 4\" (1.93 m)    Wt 127 kg (280 lb)    SpO2 100%    BMI 34.08 kg/m2           Physical Exam:  General appearance: alert, cooperative, no distress, appears stated age  Head: Normocephalic, without obvious abnormality, atraumatic  Lungs: clear to auscultation bilaterally  Heart: regular rate and rhythm, S1, S2 normal, no murmur, click, rub or gallop  Abdomen: soft, non tender, non distended. Normoactive bowel sounds. Extremities: extremities normal, atraumatic, no cyanosis or edema  Skin: cellulitis LLE, dressing to LLE intact, crusted drainage noted between toes  Neurologic: Grossly normal  PSY: Mood and affect normal, appropriately behaved        Intake and Output:  Current Shift:     Last three shifts:  05/22 1901 - 05/24 0700  In: 490 [P.O.:240;  I.V.:250]  Out: 3300 [Urine:3300]    Recent Results (from the past 24 hour(s))   GLUCOSE, POC    Collection Time: 05/23/18  8:20 AM   Result Value Ref Range    Glucose (POC) 156 (H) 70 - 110 mg/dL   GLUCOSE, POC    Collection Time: 05/23/18 11:50 AM   Result Value Ref Range    Glucose (POC) 205 (H) 70 - 110 mg/dL   OCCULT BLOOD, STOOL    Collection Time: 05/23/18  2:29 PM   Result Value Ref Range    Occult blood, stool NEGATIVE  NEG     GLUCOSE, POC    Collection Time: 05/23/18  3:57 PM   Result Value Ref Range    Glucose (POC) 164 (H) 70 - 110 mg/dL   VANCOMYCIN, TROUGH    Collection Time: 05/23/18  5:42 PM   Result Value Ref Range    Vancomycin,trough 15.5 10.0 - 20.0 ug/mL    Reported dose date: 20180523      Reported dose time: 1000      Reported dose: 1,250 MG UNITS   GLUCOSE, POC    Collection Time: 05/23/18  9:24 PM   Result Value Ref Range    Glucose (POC) 168 (H) 70 - 110 mg/dL   PROTHROMBIN TIME + INR    Collection Time: 05/24/18  5:10 AM   Result Value Ref Range    Prothrombin time 20.8 (H) 11.5 - 15.2 sec    INR 1.9 (H) 0.8 - 1.2     METABOLIC PANEL, BASIC    Collection Time: 05/24/18  5:10 AM   Result Value Ref Range    Sodium 139 136 - 145 mmol/L    Potassium 3.8 3.5 - 5.5 mmol/L    Chloride 107 100 - 108 mmol/L    CO2 24 21 - 32 mmol/L    Anion gap 8 3.0 - 18 mmol/L    Glucose 128 (H) 74 - 99 mg/dL    BUN 19 (H) 7.0 - 18 MG/DL    Creatinine 0.89 0.6 - 1.3 MG/DL    BUN/Creatinine ratio 21 (H) 12 - 20      GFR est AA >60 >60 ml/min/1.73m2    GFR est non-AA >60 >60 ml/min/1.73m2    Calcium 8.0 (L) 8.5 - 10.1 MG/DL           Lab Results   Component Value Date/Time    Glucose 128 (H) 05/24/2018 05:10 AM    Glucose 133 (H) 05/23/2018 04:30 AM    Glucose 148 (H) 05/22/2018 02:44 PM    Glucose 140 (H) 04/02/2018 02:29 AM    Glucose 150 (H) 02/07/2018 07:58 AM        Imaging:  No results found.     Medication List Reviewed:  Current Facility-Administered Medications   Medication Dose Route Frequency    diphenhydrAMINE (BENADRYL) injection 50 mg  50 mg IntraVENous QHS PRN    [START ON 5/25/2018] VANCOMYCIN INFORMATION NOTE   Other ONCE    albuterol-ipratropium (DUO-NEB) 2.5 MG-0.5 MG/3 ML  3 mL Nebulization Q6H PRN    sodium chloride (NS) flush 5-10 mL  5-10 mL IntraVENous PRN    cefTRIAXone (ROCEPHIN) 1 g in 0.9% sodium chloride (MBP/ADV) 50 mL MBP  1 g IntraVENous Q24H    vancomycin (VANCOCIN) 1,250 mg in 0.9% sodium chloride 250 mL IVPB  1,250 mg IntraVENous Q8H    VANCOMYCIN INFORMATION NOTE   Other Rx Dosing/Monitoring    acetaminophen (TYLENOL) tablet 500 mg  500 mg Oral Q6H PRN    gabapentin (NEURONTIN) capsule 600 mg  600 mg Oral BID    furosemide (LASIX) tablet 40 mg  40 mg Oral BID    metoprolol succinate (TOPROL-XL) XL tablet 25 mg  25 mg Oral DAILY    warfarin (COUMADIN) tablet 4 mg  4 mg Oral DAILY    famotidine (PEPCID) tablet 20 mg  20 mg Oral DAILY    prasterone (dhea) tab 50 mg (Patient Supplied)  50 mg Oral DAILY    gabapentin (NEURONTIN) capsule 1,200 mg  1,200 mg Oral QHS    WARFARIN INFORMATION NOTE (COUMADIN)   Other Rx Dosing/Monitoring    Please ask if someone can bring in patient's own supply of prasterone, dhea, (DHEA) 50 mg tab  1 Each Other DAILY    insulin lispro (HUMALOG) injection   SubCUTAneous AC&HS    glucose chewable tablet 16 g  4 Tab Oral PRN    glucagon (GLUCAGEN) injection 1 mg  1 mg IntraMUSCular PRN    dextrose (D50W) injection syrg 12.5-25 g  25-50 mL IntraVENous PRN

## 2018-05-25 ENCOUNTER — HOME HEALTH ADMISSION (OUTPATIENT)
Dept: HOME HEALTH SERVICES | Facility: HOME HEALTH | Age: 78
End: 2018-05-25
Payer: MEDICARE

## 2018-05-25 VITALS
OXYGEN SATURATION: 96 % | RESPIRATION RATE: 14 BRPM | DIASTOLIC BLOOD PRESSURE: 68 MMHG | BODY MASS INDEX: 34.1 KG/M2 | TEMPERATURE: 98.1 F | HEIGHT: 76 IN | HEART RATE: 73 BPM | WEIGHT: 280 LBS | SYSTOLIC BLOOD PRESSURE: 123 MMHG

## 2018-05-25 LAB
ANION GAP SERPL CALC-SCNC: 9 MMOL/L (ref 3–18)
BASOPHILS # BLD: 0 K/UL (ref 0–0.06)
BASOPHILS NFR BLD: 0 % (ref 0–2)
BUN SERPL-MCNC: 21 MG/DL (ref 7–18)
BUN/CREAT SERPL: 26 (ref 12–20)
CALCIUM SERPL-MCNC: 7.4 MG/DL (ref 8.5–10.1)
CHLORIDE SERPL-SCNC: 106 MMOL/L (ref 100–108)
CO2 SERPL-SCNC: 25 MMOL/L (ref 21–32)
CREAT SERPL-MCNC: 0.8 MG/DL (ref 0.6–1.3)
DIFFERENTIAL METHOD BLD: ABNORMAL
EOSINOPHIL # BLD: 0.1 K/UL (ref 0–0.4)
EOSINOPHIL NFR BLD: 2 % (ref 0–5)
ERYTHROCYTE [DISTWIDTH] IN BLOOD BY AUTOMATED COUNT: 15.5 % (ref 11.6–14.5)
GLUCOSE BLD STRIP.AUTO-MCNC: 159 MG/DL (ref 70–110)
GLUCOSE BLD STRIP.AUTO-MCNC: 197 MG/DL (ref 70–110)
GLUCOSE SERPL-MCNC: 131 MG/DL (ref 74–99)
HCT VFR BLD AUTO: 24.5 % (ref 36–48)
HGB BLD-MCNC: 7.7 G/DL (ref 13–16)
INR PPP: 1.7 (ref 0.8–1.2)
LYMPHOCYTES # BLD: 1.2 K/UL (ref 0.9–3.6)
LYMPHOCYTES NFR BLD: 18 % (ref 21–52)
MCH RBC QN AUTO: 30.6 PG (ref 24–34)
MCHC RBC AUTO-ENTMCNC: 31.4 G/DL (ref 31–37)
MCV RBC AUTO: 97.2 FL (ref 74–97)
MONOCYTES # BLD: 0.7 K/UL (ref 0.05–1.2)
MONOCYTES NFR BLD: 10 % (ref 3–10)
NEUTS SEG # BLD: 4.6 K/UL (ref 1.8–8)
NEUTS SEG NFR BLD: 70 % (ref 40–73)
PLATELET # BLD AUTO: 211 K/UL (ref 135–420)
PMV BLD AUTO: 8 FL (ref 9.2–11.8)
POTASSIUM SERPL-SCNC: 4 MMOL/L (ref 3.5–5.5)
PROTHROMBIN TIME: 19.2 SEC (ref 11.5–15.2)
RBC # BLD AUTO: 2.52 M/UL (ref 4.7–5.5)
SODIUM SERPL-SCNC: 140 MMOL/L (ref 136–145)
WBC # BLD AUTO: 6.6 K/UL (ref 4.6–13.2)

## 2018-05-25 PROCEDURE — 99218 HC RM OBSERVATION: CPT

## 2018-05-25 PROCEDURE — 85025 COMPLETE CBC W/AUTO DIFF WBC: CPT | Performed by: HOSPITALIST

## 2018-05-25 PROCEDURE — 36415 COLL VENOUS BLD VENIPUNCTURE: CPT | Performed by: HOSPITALIST

## 2018-05-25 PROCEDURE — 85610 PROTHROMBIN TIME: CPT | Performed by: HOSPITALIST

## 2018-05-25 PROCEDURE — 74011250637 HC RX REV CODE- 250/637: Performed by: INTERNAL MEDICINE

## 2018-05-25 PROCEDURE — 74011000250 HC RX REV CODE- 250: Performed by: NURSE PRACTITIONER

## 2018-05-25 PROCEDURE — 82962 GLUCOSE BLOOD TEST: CPT

## 2018-05-25 PROCEDURE — 80048 BASIC METABOLIC PNL TOTAL CA: CPT | Performed by: HOSPITALIST

## 2018-05-25 PROCEDURE — 74011250637 HC RX REV CODE- 250/637: Performed by: NURSE PRACTITIONER

## 2018-05-25 PROCEDURE — 74011250637 HC RX REV CODE- 250/637: Performed by: HOSPITALIST

## 2018-05-25 PROCEDURE — 74011636637 HC RX REV CODE- 636/637: Performed by: HOSPITALIST

## 2018-05-25 RX ORDER — GUAIFENESIN 600 MG/1
600 TABLET, EXTENDED RELEASE ORAL EVERY 12 HOURS
Status: DISCONTINUED | OUTPATIENT
Start: 2018-05-25 | End: 2018-05-25 | Stop reason: HOSPADM

## 2018-05-25 RX ORDER — LEVOFLOXACIN 500 MG/1
500 TABLET, FILM COATED ORAL EVERY 24 HOURS
Status: DISCONTINUED | OUTPATIENT
Start: 2018-05-25 | End: 2018-05-25 | Stop reason: HOSPADM

## 2018-05-25 RX ORDER — CEPHALEXIN 500 MG/1
500 CAPSULE ORAL 2 TIMES DAILY
Qty: 10 CAP | Refills: 0 | Status: SHIPPED | OUTPATIENT
Start: 2018-05-25 | End: 2018-05-25

## 2018-05-25 RX ORDER — LEVOFLOXACIN 500 MG/1
500 TABLET, FILM COATED ORAL EVERY 24 HOURS
Qty: 10 TAB | Refills: 0 | Status: SHIPPED | OUTPATIENT
Start: 2018-05-25 | End: 2018-11-18 | Stop reason: SDUPTHER

## 2018-05-25 RX ADMIN — GABAPENTIN 600 MG: 300 CAPSULE ORAL at 09:02

## 2018-05-25 RX ADMIN — WARFARIN SODIUM 4 MG: 2 TABLET ORAL at 09:01

## 2018-05-25 RX ADMIN — GUAIFENESIN 600 MG: 600 TABLET, EXTENDED RELEASE ORAL at 10:35

## 2018-05-25 RX ADMIN — FLUTICASONE PROPIONATE 2 SPRAY: 50 SPRAY, METERED NASAL at 09:02

## 2018-05-25 RX ADMIN — FAMOTIDINE 20 MG: 20 TABLET ORAL at 09:01

## 2018-05-25 RX ADMIN — IPRATROPIUM BROMIDE AND ALBUTEROL SULFATE 3 ML: .5; 3 SOLUTION RESPIRATORY (INHALATION) at 10:35

## 2018-05-25 RX ADMIN — INSULIN LISPRO 3 UNITS: 100 INJECTION, SOLUTION INTRAVENOUS; SUBCUTANEOUS at 12:53

## 2018-05-25 RX ADMIN — LEVOFLOXACIN 500 MG: 500 TABLET, FILM COATED ORAL at 14:10

## 2018-05-25 RX ADMIN — INSULIN LISPRO 3 UNITS: 100 INJECTION, SOLUTION INTRAVENOUS; SUBCUTANEOUS at 09:00

## 2018-05-25 RX ADMIN — METOPROLOL SUCCINATE 25 MG: 25 TABLET, EXTENDED RELEASE ORAL at 09:04

## 2018-05-25 RX ADMIN — FUROSEMIDE 40 MG: 40 TABLET ORAL at 09:01

## 2018-05-25 NOTE — PROGRESS NOTES
Problem: Falls - Risk of  Goal: *Absence of Falls  Document Anne Fall Risk and appropriate interventions in the flowsheet. Outcome: Progressing Towards Goal  Fall Risk Interventions:  Mobility Interventions: Patient to call before getting OOB, Communicate number of staff needed for ambulation/transfer         Medication Interventions: Evaluate medications/consider consulting pharmacy    Elimination Interventions: Call light in reach, Toileting schedule/hourly rounds    History of Falls Interventions: Evaluate medications/consider consulting pharmacy        Problem: Pressure Injury - Risk of  Goal: *Prevention of pressure injury  Document Mendel Scale and appropriate interventions in the flowsheet.    Outcome: Progressing Towards Goal  Pressure Injury Interventions:  Sensory Interventions: Pressure redistribution bed/mattress (bed type)    Moisture Interventions: Absorbent underpads    Activity Interventions: Pressure redistribution bed/mattress(bed type), PT/OT evaluation    Mobility Interventions: HOB 30 degrees or less, PT/OT evaluation    Nutrition Interventions: Document food/fluid/supplement intake    Friction and Shear Interventions: HOB 30 degrees or less

## 2018-05-25 NOTE — PROGRESS NOTES
Offered the pt FOC for home care, he chose [facility]. Pt verified the contact information on the face sheet is correct. Referral sent to intake via [facility] and called to intake rep, Raissa Baron. Care Management Interventions  PCP Verified by CM: Yes  Palliative Care Criteria Met (RRAT>21 & CHF Dx)?: No  Mode of Transport at Discharge:  Other (see comment)  Transition of Care Consult (CM Consult): [address]: Yes  Discharge Durable Medical Equipment: No  Physical Therapy Consult: No  Occupational Therapy Consult: No  Speech Therapy Consult: No  Current Support Network: Relative's Home  Confirm Follow Up Transport: Family  Plan discussed with Pt/Family/Caregiver: Yes  Freedom of Choice Offered: Yes  Discharge Location  Discharge Placement: Home with home health

## 2018-05-25 NOTE — DISCHARGE SUMMARY
[facility]    [facility] Summary    Patient: Ronald Gomez MRN: [id number]  CSN: [id number]    YOB: 1940  Age: 76 y.o.   Sex: male    DOA: 5/22/2018 LOS:  LOS: 2 days   Discharge Date:      Admission Diagnoses: Cellulitis  Cellulitis    Discharge Diagnoses:    Problem List as of 5/25/2018  Date Reviewed: 4/2/2018          Codes Class Noted - Resolved    Diabetes mellitus type 2, controlled ([facility] [place] [facility] ICD-10-CM: E11.9  ICD-9-CM: 250.00  5/23/2018 - Present        * (Principal)Cellulitis ICD-10-CM: L03.90  ICD-9-CM: 682.9  5/22/2018 - Present        Stroke ([facility] [place] [facility] ICD-10-CM: I63.9  ICD-9-CM: 434.91  2/7/2018 - Present        PAF (paroxysmal atrial fibrillation) ([place] [address].) ICD-10-CM: I48.0  ICD-9-CM: 427.31  2/7/2018 - Present        Weakness of left upper extremity ICD-10-CM: R29.898  ICD-9-CM: 729.89  2/7/2018 - Present        BPH (benign prostatic hyperplasia) ICD-10-CM: N40.0  ICD-9-CM: 600.00  2/7/2018 - Present        Abnormal SPEP ICD-10-CM: R77.8  ICD-9-CM: 790.99  10/19/2017 - Present        Idiopathic peripheral neuropathy ICD-10-CM: G60.9  ICD-9-CM: 356.9  5/12/2016 - Present        Monoclonal gammopathy ICD-10-CM: D47.2  ICD-9-CM: 273.1  3/7/2016 - Present        Tinea corporis ICD-10-CM: B35.4  ICD-9-CM: 110.5  3/7/2016 - Present        Actinic keratosis ICD-10-CM: L57.0  ICD-9-CM: 702.0  3/7/2016 - Present        Sick sinus syndrome ([facility] [place] [facility] ICD-10-CM: I49.5  ICD-9-CM: 427.81  2/25/2016 - Present        Knee pain ICD-10-CM: M25.569  ICD-9-CM: 719.46  Unknown - Present        Nocturia ICD-10-CM: R35.1  ICD-9-CM: 788.43  12/10/2015 - Present        Cardiomyopathy ([facility] ICD-10-CM: I42.9  ICD-9-CM: 425.4  12/3/2015 - Present        Asthma ICD-10-CM: J45.909  ICD-9-CM: 493.90  12/3/2015 - Present        Peripheral neuropathy ICD-10-CM: G62.9  ICD-9-CM: 356.9  12/3/2015 - Present        Other allergic rhinitis ICD-10-CM: J30.89  ICD-9-CM: 477.8  12/3/2015 - Present        RESOLVED: Type 2 diabetes mellitus without complication ([facility] YFH-35-CU: E11.9  ICD-9-CM: 250.00  12/3/2015 - 12/10/2015              Discharge Condition: Stable    Discharge To: Home    Consults: [facility] Course: 77 y/o male with hx of PAF, SSS, pacemaker, CHF, HTN, CVA on Coumadin, DM, asthma, BPH, wheelchair bound, presented to the ED on 5/22/2018 with worsening left foot pain and LLE edema and erythema for weeks. He followed up with his PCP and was given oral antibiotics for one week x 2 rounds, last given Clindamycin. Work up in the ED with normal WBC, INR 2.5, negative troponin, A1C 6.4%, blood cultures NGTD x 2. Left foot xray with considerable LLE and left foot soft tissue swelling, greatest over the dorsum of the foot w/o acute osseous abnormality. He was started on IV abx, admitted for further management of care. Wound care consulted and following. ID consulted on 5/24/2018 to assist with abx management as patient has failed outpatient therapy x 2, recommend continue IV Vancomycin then switch to oral Levaquin. Labs and vital signs WNL. He is appropriate for discharge home on oral abx x 10 days, to follow up with PCP, appt scheduled for 5/30/2018 @11am.    Physical Exam:  General appearance: alert, cooperative, no distress, appears stated age  Head: Normocephalic, without obvious abnormality, atraumatic  Lungs: clear to auscultation bilaterally  Heart: regular rate and rhythm, S1, S2 normal, no murmur, click, rub or gallop  Abdomen: soft, non tender, non distended. Normoactive bowel sounds.    Extremities: extremities normal, atraumatic, no cyanosis or edema  Skin: cellulitis LLE, dressing to LLE intact, crusted drainage noted between toes  Neurologic: Grossly normal  PSY: Mood and affect normal, appropriately behaved    Significant Diagnostic Studies: labs:   Recent Results (from the past 24 hour(s))   GLUCOSE, POC    Collection Time: 05/24/18 11:57 AM   Result Value Ref Range    Glucose (POC) 242 (H) 70 - 110 mg/dL GLUCOSE, POC    Collection Time: 05/24/18  4:49 PM   Result Value Ref Range    Glucose (POC) 202 (H) 70 - 110 mg/dL   GLUCOSE, POC    Collection Time: 05/24/18  9:28 PM   Result Value Ref Range    Glucose (POC) 225 (H) 70 - 110 mg/dL   PROTHROMBIN TIME + INR    Collection Time: 05/25/18  4:30 AM   Result Value Ref Range    Prothrombin time 19.2 (H) 11.5 - 15.2 sec    INR 1.7 (H) 0.8 - 1.2     METABOLIC PANEL, BASIC    Collection Time: 05/25/18  4:30 AM   Result Value Ref Range    Sodium 140 136 - 145 mmol/L    Potassium 4.0 3.5 - 5.5 mmol/L    Chloride 106 100 - 108 mmol/L    CO2 25 21 - 32 mmol/L    Anion gap 9 3.0 - 18 mmol/L    Glucose 131 (H) 74 - 99 mg/dL    BUN 21 (H) 7.0 - 18 MG/DL    Creatinine 0.80 0.6 - 1.3 MG/DL    BUN/Creatinine ratio 26 (H) 12 - 20      GFR est AA >60 >60 ml/min/1.73m2    GFR est non-AA >60 >60 ml/min/1.73m2    Calcium 7.4 (L) 8.5 - 10.1 MG/DL   CBC WITH AUTOMATED DIFF    Collection Time: 05/25/18  4:30 AM   Result Value Ref Range    WBC 6.6 4.6 - 13.2 K/uL    RBC 2.52 (L) 4.70 - 5.50 M/uL    HGB 7.7 (L) 13.0 - 16.0 g/dL    HCT 24.5 (L) 36.0 - 48.0 %    MCV 97.2 (H) 74.0 - 97.0 FL    MCH 30.6 24.0 - 34.0 PG    MCHC 31.4 31.0 - 37.0 g/dL    RDW 15.5 (H) 11.6 - 14.5 %    PLATELET 184 734 - 162 K/uL    MPV 8.0 (L) 9.2 - 11.8 FL    NEUTROPHILS 70 40 - 73 %    LYMPHOCYTES 18 (L) 21 - 52 %    MONOCYTES 10 3 - 10 %    EOSINOPHILS 2 0 - 5 %    BASOPHILS 0 0 - 2 %    ABS. NEUTROPHILS 4.6 1.8 - 8.0 K/UL    ABS. LYMPHOCYTES 1.2 0.9 - 3.6 K/UL    ABS. MONOCYTES 0.7 0.05 - 1.2 K/UL    ABS. EOSINOPHILS 0.1 0.0 - 0.4 K/UL    ABS. BASOPHILS 0.0 0.0 - 0.06 K/UL    DF AUTOMATED     GLUCOSE, POC    Collection Time: 05/25/18  7:50 AM   Result Value Ref Range    Glucose (POC) 159 (H) 70 - 110 mg/dL       Discharge Medications:     Current Discharge Medication List      START taking these medications    Details   levoFLOXacin (LEVAQUIN) 500 mg tablet Take 1 Tab by mouth every twenty-four (24) hours.   Qty: 10 Tab, Refills: 0         CONTINUE these medications which have NOT CHANGED    Details   metoprolol succinate (TOPROL-XL) 25 mg XL tablet Take 1 Tab by mouth daily. Qty: 90 Tab, Refills: 2    Associated Diagnoses: Sick sinus syndrome ([address] Cardiomyopathy, unspecified type ([address] Paroxysmal atrial fibrillation (HCC)      furosemide (LASIX) 40 mg tablet Take 1 Tab by mouth two (2) times a day. One po daily  Qty: 60 Tab, Refills: 3    Associated Diagnoses: Leg swelling      gabapentin (NEURONTIN) 600 mg tablet One po in am , one at noon and 2 at hs  Qty: 120 Tab, Refills: 3    Associated Diagnoses: Neuropathy; Gait instability      metFORMIN (GLUCOPHAGE) 500 mg tablet Take 1 Tab by mouth two (2) times daily (with meals). Qty: 60 Tab, Refills: 3    Associated Diagnoses: DM type 2, goal HbA1c < 7% ([facility])      raNITIdine (ZANTAC) 75 mg tablet Take 75 mg by mouth daily. cholecalciferol (VITAMIN D3) 1,000 unit tablet Take 1,000 Units by mouth daily. acetaminophen (TYLENOL) 500 mg tablet Take 500 mg by mouth every six (6) hours as needed for Pain.      warfarin (COUMADIN) 4 mg tablet Take 1 Tab by mouth daily. Qty: 120 Tab, Refills: 3      diphenhydrAMINE (BENADRYL) 25 mg capsule Take 50 mg by mouth nightly as needed. loratadine (CLARITIN) 10 mg tablet Take 1 Tab by mouth daily. Qty: 90 Tab, Refills: 1      prasterone, dhea, (DHEA) 50 mg tab Take 50 mg by mouth daily. Potassium Gluconate 595 mg (99 mg) tablet Take 595 mg by mouth daily. nicotinic acid (NIACIN) 500 mg tablet Take 250 mg by mouth Daily (before breakfast). SODIUM CHLORIDE (AFRIN SALINE NASAL MIST NA) 1 [place] by IntraNASal route as needed (congestion/allergies). mupirocin (BACTROBAN) 2 % ointment Apply  to affected area daily. Qty: 22 g, Refills: 0    Associated Diagnoses: Pressure ulcer, unstageable, with eschar (HCC)      cyanocobalamin 1,000 mcg tablet Take 1,000 mcg by mouth daily.          STOP taking these medications       doxycycline (ADOXA) 100 mg tablet Comments:   Reason for Stopping:         clindamycin (CLEOCIN) 300 mg capsule Comments:   Reason for Stopping:                 Activity: Activity as tolerated and PT/OT per Home Health    Diet: Diabetic Diet    Wound Care: Wound Care/Dressing change to LEFT FOOT/ LEFT ANT LEG / LEFT POST LEG:  Cleanse wound with normal saline or dermal wound cleanser, apply no sting skin prep to periwound skin, apply AQUACEL AG to wound base/sides, cover with KERLIX, secure with silk tape.     Follow-up: Follow up with PCP as scheduled on 5/30/2018 @11 am.      Discharge time: > 35 mins  Juan A Grimes NP  5/25/2018, 11:41 AM/

## 2018-05-25 NOTE — PROGRESS NOTES
1947: Assumed pt care. Received pt resting in bed, pt is alert and oriented x 4. No signs of distress. Pulled up pt in bed with SIL Avery. Bed on lowest position, wheels locked, call bell within reach. 2047: patient complains of headache rated pain as 4/10, tylenol given. 2145: reassessed pain, pt stated the pain is gone. 5-    0025: pt sleeping. 0235: patient is sleeping. 3315: Bedside and Verbal shift change report given to Pedro Pablo Younger (oncoming nurse) by Jose Boyd   (offgoing nurse). Report included the following information SBAR, Kardex, Intake/Output, MAR and Recent Results.

## 2018-05-25 NOTE — PROGRESS NOTES
Assumed care of patient from [place], RN (offgoing nurse). Patient sleeping in bed, no signs of distress or discomfort noted. Bed locked and in lowest position with call bell and frequently used items in reach. Patient states he is wheelchair bound at home and does not want to get up to the [facility], he wants to use a bedpan. 1120- Dressing change and wound care completed. Patient tolerated well. 1524- Patient discharge home via transport. No distress noted. Patient has discharge instructions along with belongings. Voiced understanding of discharge instructions.

## 2018-05-25 NOTE — PROGRESS NOTES
Transportation at Discharge:  5/25/2018    Transport Company:  [id number] ([address])  Reference number:  none    Estimated pick-up time: 3:00P    Method of Transport: Stretcher / BLS    Insurance Info:  [employer] COLT O  Authorization: No auth required     Made DC transportation arrangements at the request of [address], [address] [facility] ext [phone]

## 2018-05-25 NOTE — PROGRESS NOTES
INFECTIOUS DISEASE FOLLOW UP NOTE :-     We are available over weekend and plan to f/u Tuesday if pt still here. Dr Sherman Upton is on call and can be reached at [phone] if any problem or question for ID prior. Admit Date: 5/22/2018    Current abx Prior abx   levaquin 5/25-0  Ceftriaxone 5/22 - 2Vancomycin 5/22 - 2       ASSESSMENT - > REC:      Bullous cellulitis, left foot  - improved on Vancomycin, Ceftriaxone  - prior wd cx 5/7 had Klebsiella pneumoniae resistant to Ceftriaxone so doubt this is playing a role at present since improved on current abx. Also had MSSA in 5/7 cx -> on Vancomycin but will suggest to change to oral Levaquin at discharge to cover both SA and Kleb seen in last wd cx  -> leg elevation above heart level if possible  -> plan 7 days course  -> d/w medicine team   Allergy Sulfa (hives, rash)     Chronic Venous stasis, BLE     DM     HTN     PAF     SSS  - s/p pacemaker     CVA     asthma     BPH     s/p multiple knee surgeries including revision TKR's due to infection        MICROBIOLOGY:   5/7                 Wd cx MSSA, Klebsiella pneumoniae sens amox/clav, cipro, levofloxacin, TMP-SMX.  Resistant to Ceftriaxone     LINES AND CATHETERS:   piv    MEDICATIONS:     Current Facility-Administered Medications   Medication Dose Route Frequency    guaiFENesin ER (MUCINEX) tablet 600 mg  600 mg Oral Q12H    levoFLOXacin (LEVAQUIN) tablet 500 mg  500 mg Oral Q24H    albuterol-ipratropium (DUO-NEB) 2.5 MG-0.5 MG/3 ML  3 mL Nebulization Q4H PRN    fluticasone (FLONASE) 50 mcg/actuation nasal spray 2 Spray  2 Spray Both Nostrils DAILY    diphenhydrAMINE (BENADRYL) injection 50 mg  50 mg IntraVENous QHS PRN    sodium chloride (NS) flush 5-10 mL  5-10 mL IntraVENous PRN    acetaminophen (TYLENOL) tablet 500 mg  500 mg Oral Q6H PRN    gabapentin (NEURONTIN) capsule 600 mg  600 mg Oral BID    furosemide (LASIX) tablet 40 mg  40 mg Oral BID  metoprolol succinate (TOPROL-XL) XL tablet 25 mg  25 mg Oral DAILY    warfarin (COUMADIN) tablet 4 mg  4 mg Oral DAILY    famotidine (PEPCID) tablet 20 mg  20 mg Oral DAILY    prasterone (dhea) tab 50 mg (Patient Supplied)  50 mg Oral DAILY    gabapentin (NEURONTIN) capsule 1,200 mg  1,200 mg Oral QHS    WARFARIN INFORMATION NOTE (COUMADIN)   Other Rx Dosing/Monitoring    Please ask if someone can bring in patient's own supply of prasterone, dhea, (DHEA) 50 mg tab  1 Each Other DAILY    insulin lispro (HUMALOG) injection   SubCUTAneous AC&HS    glucose chewable tablet 16 g  4 Tab Oral PRN    glucagon (GLUCAGEN) injection 1 mg  1 mg IntraMUSCular PRN    dextrose (D50W) injection syrg 12.5-25 g  25-50 mL IntraVENous PRN       SUBJECTIVE :     Interval notes reviewed. Feels better. Afebrile. Pain well control. D/w him that will switch Abx and continue with [address]. Business card provided. OBJECTIVE     Visit Vitals    /72 (BP 1 Location: Left arm, BP Patient Position: At rest)    Pulse 76    Temp 98.3 °F (36.8 °C)    Resp 16    Ht 6' 4\" (1.93 m)    Wt 127 kg (280 lb)    SpO2 96%    BMI 34.08 kg/m2       Temp (24hrs), Av.4 °F (37.4 °C), Min:98.3 °F (36.8 °C), Max:100.8 °F (38.2 °C)      Gen:No distress  HENT: No thrush  Chest: Symmetric expansion, CTA  CV:S1S2 RR, No JVD, No edema  Abd:Soft, NT, ND, BS+  Skin:No jaundice, cyanosis, clubbing.  No decubitus  Muscsk: Left LE edema 1+, pedal edema appears to be subsiding, minimal erythema visible but foot/leg enclosed in dressing (wound care photograph reviewed)        Labs: Results:   Chemistry Recent Labs      18   0430  18   0510  18   0430  18   1444   GLU  131*  128*  133*  148*   NA  140  139  138  137   K  4.0  3.8  4.0  4.0   CL  106  107  105  101   CO2  25  24  26  30   BUN  21*  19*  12  17   CREA  0.80  0.89  0.60  0.75   CA  7.4*  8.0*  7.7*  8.7   AGAP  9  8  7  6   BUCR  26*  21*  20  23*   AP   --    -- --   65   TP   --    --    --   7.1   ALB   --    --    --   3.3*   GLOB   --    --    --   3.8   AGRAT   --    --    --   0.9      CBC w/Diff Recent Labs      05/25/18   0430  05/23/18   0430  05/22/18   1444   WBC  6.6  4.8  6.8   RBC  2.52*  2.45*  2.83*   HGB  7.7*  7.6*  8.8*   HCT  24.5*  23.6*  27.3*   PLT  211  224  243   GRANS  70  66  67   LYMPH  18*  19*  20*   EOS  2  4  3          RADIOLOGY :          Imaging    Results from [place] encounter on 05/22/18   XR FOOT LT AP/LAT   Narrative Examination: Left foot 2 views. HISTORY: Left foot pain and soft tissue wound. FINDINGS: Dorsal lateral projections of the left lateral performed and  interpreted without comparison. No acute osseous malalignment demonstrated on  these nonweightbearing projections. Left first MTP joint osteoarthrosis noted  along with additional degenerative spur formation at the tibiotalar  articulation. There is considerable soft tissue edema involving the imaged lower  leg and left foot, greatest over the dorsum of the foot. No retained radiopaque  foreign object or soft tissue gas formation. No fracture. Impression IMPRESSION:  1. Considerable left lower extremity and left foot soft tissue swelling,  greatest over the dorsum of the foot without acute osseous abnormality or  retained radiopaque foreign object. 2. Mild degenerative changes as described. Results from [place] encounter on 02/07/18   CTA HEAD NECK W WO CONT   Narrative CTA head and neck    History: Dysarthria, left arm weakness    Comparison: No prior studies    Technique:     Multiple axial CT images of the neck were obtained extending from the level of  the aortic arch to the skull base after the administration of the IV contrast  utilizing a CTA protocol. Maximum intensity projection reconstructions were  performed in multiple planes.      Multiple axial CT images of the head were obtained extending from below the  level of the skull base to the vertex after the administration of the IV  contrast utilizing a CTA protocol. Maximum intensity projection reconstructions  were performed in three planes. Additional 3 D reconstructions were performed  at a separate workstation. One or more dose reduction techniques were used on this CT: automated exposure  control, adjustment of the mAs and/or kVp according to patient's size, and  iterative reconstruction techniques. The specific techniques utilized on this CT  exam have been documented in the patient's electronic medical record. Findings:    CTA neck    Mild atherosclerotic calcifications are present along the thoracic aorta and the  proximal great vessels. Normal variant retropharyngeal courses of the bilateral  internal carotid arteries is seen. The left common carotid artery is mildly irregular. The left carotid  bifurcation is mildly irregular with multiple calcifications. Estimated minimal  luminal diameter proximal left ICA 5.8 mm. Estimated luminal diameter of normal  left ICA cervical segment is 4.3 mm. Estimated degree of stenosis of the left  ICA based upon NASCET criteria is 0%. Remainder of left ICA cervical segment is  unremarkable. The right common carotid artery is mildly irregular. The right carotid  bifurcation is mildly irregular. Estimated minimal luminal diameter proximal  right ICA 5.5 mm. Estimated luminal diameter of normal right ICA cervical  segment is 4.9 mm. Estimated degree of stenosis of the right ICA based upon  NASCET criteria is 0%. Remainder of right ICA cervical segment is unremarkable. Vertebral arteries are relatively equal in size. No focal vertebral artery  stenosis is seen. Degenerative changes are seen in the spine. C6-C7 central stenosis is present  due to disc osteophyte complex, moderate in degree. Additional multilevel  foraminal stenosis is present. Left upper chest cardiac pacing device is partially visualized. Visualized lung  apices are clear. C2-C3 anterior listhesis is present which may be due to facet  degeneration at this level. Mild mucoperiosteal thickening is scattered in the  paranasal sinuses. No air-fluid levels are present. CTA head    There is no evidence of aneurysm. There is no focal high-grade stenosis within  the intracranial arteries. Very mild narrowing is seen in the cavernous segments  of the bilateral internal carotid arteries. Right ICA overall slightly smaller  in size than the left. Left carotid terminus is unremarkable. Dominant left A1  segment is present. An anterior communicating artery is present with thecal  sized A2 segments. The right A1 segment is not visualized, extremely  hypoplastic/atretic. Right ICA provides relatively isolated right MCA supply. The middle cerebral artery bifurcations are unremarkable. The vertebral arteries  are relatively equal in size. [Has a normal variant proximal extradural origin. Right PICA origin is not distinctly seen. Tortuosity is present at the  vertebrobasilar junction. The basilar artery is otherwise unremarkable. Posterior cerebral arteries are within normal limits. The dural venous sinuses are patent. Impression IMPRESSION:    1. No definite large vessel occlusion. 2.  No hemodynamically significant ICA stenosis, based on NASCET criteria. 3. No high-grade vertebral artery stenosis is seen. 4. C2-C3 anterior listhesis present perhaps due to facet degeneration at this  level. 5. Moderate degree C6-C7 central stenosis due to disc osteophyte complex with  probable cord flattening. Preliminary report of this study was provided to Dr. Frida Pan at 9:00 AM  2/7/2018. I have independently examined the patient and reviewed all lab studies and imgaing as well as review of nursing notes and physican notes from the past 24 hours.  The plan of care has been discussed with the patient/relative and all questions are answered.      Trista Rivas MD  May 25, 2018    [facility] AT THE [facility]  [phone]

## 2018-05-25 NOTE — DISCHARGE INSTRUCTIONS
DISCHARGE SUMMARY from Nurse    PATIENT INSTRUCTIONS:    After general anesthesia or intravenous sedation, for 24 hours or while taking prescription Narcotics:  · Limit your activities  · Do not drive and operate hazardous machinery  · Do not make important personal or business decisions  · Do  not drink alcoholic beverages  · If you have not urinated within 8 hours after discharge, please contact your surgeon on call. Report the following to your surgeon:  · Excessive pain, swelling, redness or odor of or around the surgical area  · Temperature over 100.5  · Nausea and vomiting lasting longer than 4 hours or if unable to take medications  · Any signs of decreased circulation or nerve impairment to extremity: change in color, persistent  numbness, tingling, coldness or increase pain  · Any questions    What to do at Home:  Recommended activity: Activity as tolerated    If you experience any of the following symptoms listed under Signs and Symptoms of a Stroke or Warning Signs of a Heart Attack, please follow up with your PCP and/or call 911. *  Please give a list of your current medications to your Primary Care Provider. *  Please update this list whenever your medications are discontinued, doses are      changed, or new medications (including over-the-counter products) are added. *  Please carry medication information at all times in case of emergency situations. These are general instructions for a healthy lifestyle:    No smoking/ No tobacco products/ Avoid exposure to second hand smoke  Surgeon General's Warning:  Quitting smoking now greatly reduces serious risk to your health.     Obesity, smoking, and sedentary lifestyle greatly increases your risk for illness    A healthy diet, regular physical exercise & weight monitoring are important for maintaining a healthy lifestyle    You may be retaining fluid if you have a history of heart failure or if you experience any of the following symptoms: Weight gain of 3 pounds or more overnight or 5 pounds in a week, increased swelling in our hands or feet or shortness of breath while lying flat in bed. Please call your doctor as soon as you notice any of these symptoms; do not wait until your next office visit. Recognize signs and symptoms of STROKE:    F-face looks uneven    A-arms unable to move or move unevenly    S-speech slurred or non-existent    T-time-call 911 as soon as signs and symptoms begin-DO NOT go       Back to bed or wait to see if you get better-TIME IS BRAIN. Warning Signs of HEART ATTACK     Call 911 if you have these symptoms:   Chest discomfort. Most heart attacks involve discomfort in the center of the chest that lasts more than a few minutes, or that goes away and comes back. It can feel like uncomfortable pressure, squeezing, fullness, or pain.  Discomfort in other areas of the upper body. Symptoms can include pain or discomfort in one or both arms, the back, neck, jaw, or stomach.  Shortness of breath with or without chest discomfort.  Other signs may include breaking out in a cold sweat, nausea, or lightheadedness. Don't wait more than five minutes to call 911 - MINUTES MATTER! Fast action can save your life. Calling 911 is almost always the fastest way to get lifesaving treatment. Emergency Medical Services staff can begin treatment when they arrive -- up to an hour sooner than if someone gets to the hospital by car. The discharge information has been reviewed with the patient. The patient verbalized understanding. Discharge medications reviewed with the patient and appropriate educational materials and side effects teaching were provided. ___________________________________________________________________________________________________________________________________     Cellulitis: Care Instructions  Your Care Instructions    Cellulitis is a skin infection.  It often occurs after a break in the skin from a scrape, cut, bite, or puncture, or after a rash. The doctor has checked you carefully, but problems can develop later. If you notice any problems or new symptoms, get medical treatment right away. Follow-up care is a key part of your treatment and safety. Be sure to make and go to all appointments, and call your doctor if you are having problems. It's also a good idea to know your test results and keep a list of the medicines you take. How can you care for yourself at home? · Take your antibiotics as directed. Do not stop taking them just because you feel better. You need to take the full course of antibiotics. · Prop up the infected area on pillows to reduce pain and swelling. Try to keep the area above the level of your heart as often as you can. · If your doctor told you how to care for your wound, follow your doctor's instructions. If you did not get instructions, follow this general advice:  ¨ Wash the wound with clean water 2 times a day. Don't use hydrogen peroxide or alcohol, which can slow healing. ¨ You may cover the wound with a thin layer of petroleum jelly, such as Vaseline, and a nonstick bandage. ¨ Apply more petroleum jelly and replace the bandage as needed. · Be safe with medicines. Take pain medicines exactly as directed. ¨ If the doctor gave you a prescription medicine for pain, take it as prescribed. ¨ If you are not taking a prescription pain medicine, ask your doctor if you can take an over-the-counter medicine. To prevent cellulitis in the future  · Try to prevent cuts, scrapes, or other injuries to your skin. Cellulitis most often occurs where there is a break in the skin. · If you get a scrape, cut, mild burn, or bite, wash the wound with clean water as soon as you can to help avoid infection. Don't use hydrogen peroxide or alcohol, which can slow healing.   · If you have swelling in your legs (edema), support stockings and good skin care may help prevent leg sores and cellulitis. · Take care of your feet, especially if you have diabetes or other conditions that increase the risk of infection. Wear shoes and socks. Do not go barefoot. If you have athlete's foot or other skin problems on your feet, talk to your doctor about how to treat them. When should you call for help? Call your doctor now or seek immediate medical care if:  ? · You have signs that your infection is getting worse, such as:  ¨ Increased pain, swelling, warmth, or redness. ¨ Red streaks leading from the area. ¨ Pus draining from the area. ¨ A fever. ? · You get a rash. ? Watch closely for changes in your health, and be sure to contact your doctor if:  ? · You are not getting better after 1 day (24 hours). ? · You do not get better as expected. Where can you learn more? Go to http://tristan-shaye.info/. Mika Johnson in the search box to learn more about \"Cellulitis: Care Instructions. \"  Current as of: October 13, 2016  Content Version: 11.4  © 4562-3012 [employer]. Care instructions adapted under license by [employer] (which disclaims liability or warranty for this information). If you have questions about a medical condition or this instruction, always ask your healthcare professional. [address] any warranty or liability for your use of this information.     Patient armband removed and shredded

## 2018-05-25 NOTE — PROGRESS NOTES
Pt wants transport home, has 7 steps to enter home. He is aware of 265.00 copay. Pcs completed, placed in nurses station.

## 2018-05-26 ENCOUNTER — HOME CARE VISIT (OUTPATIENT)
Dept: SCHEDULING | Facility: HOME HEALTH | Age: 78
End: 2018-05-26
Payer: MEDICARE

## 2018-05-26 VITALS
OXYGEN SATURATION: 98 % | SYSTOLIC BLOOD PRESSURE: 104 MMHG | RESPIRATION RATE: 16 BRPM | HEIGHT: 76 IN | WEIGHT: 280 LBS | HEART RATE: 64 BPM | BODY MASS INDEX: 34.1 KG/M2 | TEMPERATURE: 97 F | DIASTOLIC BLOOD PRESSURE: 60 MMHG

## 2018-05-26 PROCEDURE — 3331090002 HH PPS REVENUE DEBIT

## 2018-05-26 PROCEDURE — 3331090001 HH PPS REVENUE CREDIT

## 2018-05-26 PROCEDURE — G0299 HHS/HOSPICE OF RN EA 15 MIN: HCPCS

## 2018-05-26 PROCEDURE — 400013 HH SOC

## 2018-05-26 NOTE — PROGRESS NOTES
Problem: Falls - Risk of  Goal: *Absence of Falls  Document Anne Fall Risk and appropriate interventions in the flowsheet. Outcome: Progressing Towards Goal  Fall Risk Interventions:  Mobility Interventions: Communicate number of staff needed for ambulation/transfer, Patient to call before getting OOB         Medication Interventions: Evaluate medications/consider consulting pharmacy    Elimination Interventions: Call light in reach, Patient to call for help with toileting needs    History of Falls Interventions: Evaluate medications/consider consulting pharmacy        Problem: Pressure Injury - Risk of  Goal: *Prevention of pressure injury  Document Mendel Scale and appropriate interventions in the flowsheet.    Outcome: Progressing Towards Goal  Pressure Injury Interventions:  Sensory Interventions: Pressure redistribution bed/mattress (bed type)    Moisture Interventions: Absorbent underpads    Activity Interventions: Pressure redistribution bed/mattress(bed type)    Mobility Interventions: HOB 30 degrees or less, Pressure redistribution bed/mattress (bed type)    Nutrition Interventions: Document food/fluid/supplement intake    Friction and Shear Interventions: HOB 30 degrees or less

## 2018-05-27 PROCEDURE — 3331090001 HH PPS REVENUE CREDIT

## 2018-05-27 PROCEDURE — 3331090002 HH PPS REVENUE DEBIT

## 2018-05-28 ENCOUNTER — HOME CARE VISIT (OUTPATIENT)
Dept: SCHEDULING | Facility: HOME HEALTH | Age: 78
End: 2018-05-28
Payer: MEDICARE

## 2018-05-28 LAB
BACTERIA SPEC CULT: NORMAL
BACTERIA SPEC CULT: NORMAL
SERVICE CMNT-IMP: NORMAL
SERVICE CMNT-IMP: NORMAL

## 2018-05-28 PROCEDURE — 3331090001 HH PPS REVENUE CREDIT

## 2018-05-28 PROCEDURE — 3331090002 HH PPS REVENUE DEBIT

## 2018-05-28 PROCEDURE — G0299 HHS/HOSPICE OF RN EA 15 MIN: HCPCS

## 2018-05-29 ENCOUNTER — PATIENT OUTREACH (OUTPATIENT)
Dept: FAMILY MEDICINE CLINIC | Age: 78
End: 2018-05-29

## 2018-05-29 ENCOUNTER — HOME CARE VISIT (OUTPATIENT)
Dept: SCHEDULING | Facility: HOME HEALTH | Age: 78
End: 2018-05-29
Payer: MEDICARE

## 2018-05-29 PROCEDURE — 3331090002 HH PPS REVENUE DEBIT

## 2018-05-29 PROCEDURE — 3331090001 HH PPS REVENUE CREDIT

## 2018-05-29 PROCEDURE — G0151 HHCP-SERV OF PT,EA 15 MIN: HCPCS

## 2018-05-29 NOTE — PROGRESS NOTES
Nurse Henderson 10 Follow Up Note  -18 Admitted at [facility] for cellulitis  -18 Discharged to home with [facility]   -18 NN contact              Hospital Discharge Follow-Up      Date/Time:  2018 2:33 PM    Patient was admitted to [facility] on 18 and discharged on 18 for cellulitis. The physician discharge summary was available at the time of outreach. Patient was contacted within 1 business days of discharge. Top Challenges reviewed with the provider   1. Cellulitis   2. Abx (confirm if picked up and taking)  3. Attend follow up appt         Method of communication with provider :face to face, chart routing    Inpatient RRAT score: 21  Was this a readmission? no   Patient stated reason for the readmission: n/a    Nurse Navigator (NN) contacted the patient by telephone to perform post hospital discharge assessment. Verified name and  with patient as identifiers. Provided introduction to self, and explanation of the Nurse Navigator role. Pt stated he is \"good. \" Pt denied any fever/chills, NVD, swelling, CP ,SOB, numbness/tingling, Pt reported pain of his Left leg. Pt reported Left leg with dressing per HH. Reviewed discharge instructions and red flags with patient who verbalized understanding. Patient given an opportunity to ask questions and does not have any further questions or concerns at this time. The patient agrees to contact the PCP office for questions related to their healthcare. NN provided contact information for future reference. Summary of patient's top problems:  1. cellulitis  2. Medication compliance (ABx)   3.      Home Health orders at discharge: [facility]y [facility]: [facility]  Date of initial visit: 18  Pt reported [place] changed his dressing 18    Durable Medical Equipment ordered/company:   Durable Medical Equipment received:     Barriers to care? lack of knowledge about disease, level of motivation, medication management, transportation    Advance Care Planning:   Does patient have an Advance Directive:  not on file       Medication:     New Medications at Discharge: levaquin  Changed Medications at Discharge:   Discontinued Medications at Discharge:     Unabel to complete medication reconciliation, pt kept conversation short. Pt reported he has not picked up ABx. Pt stated he was not informed that he was given a prescription for his ABx. Pt stated his medications are usually sent electronically. Pt reported that he has the prescription and will have the medication filled ASAP. I discussed with patient the importance of taking his meds, especially his ABx. Pt verbalized understanding. Referral to Pharm D needed: no     Current Outpatient Prescriptions   Medication Sig    levoFLOXacin (LEVAQUIN) 500 mg tablet Take 1 Tab by mouth every twenty-four (24) hours. (Patient not taking: Reported on 5/28/2018)    metoprolol succinate (TOPROL-XL) 25 mg XL tablet Take 1 Tab by mouth daily.  furosemide (LASIX) 40 mg tablet Take 1 Tab by mouth two (2) times a day. One po daily    gabapentin (NEURONTIN) 600 mg tablet One po in am , one at noon and 2 at hs    metFORMIN (GLUCOPHAGE) 500 mg tablet Take 1 Tab by mouth two (2) times daily (with meals).  raNITIdine (ZANTAC) 75 mg tablet Take 75 mg by mouth daily.  prasterone, dhea, (DHEA) 50 mg tab Take 50 mg by mouth daily.  cholecalciferol (VITAMIN D3) 1,000 unit tablet Take 1,000 Units by mouth daily.  acetaminophen (TYLENOL) 500 mg tablet Take 500 mg by mouth every six (6) hours as needed for Pain.  SODIUM CHLORIDE (AFRIN SALINE NASAL MIST NA) 1 [place] by IntraNASal route as needed (congestion/allergies).  mupirocin (BACTROBAN) 2 % ointment Apply  to affected area daily. (Patient not taking: Reported on 5/26/2018)    warfarin (COUMADIN) 4 mg tablet Take 1 Tab by mouth daily.     diphenhydrAMINE (BENADRYL) 25 mg capsule Take 50 mg by mouth nightly as needed for Sleep.  cyanocobalamin 1,000 mcg tablet Take 1,000 mcg by mouth daily.  loratadine (CLARITIN) 10 mg tablet Take 1 Tab by mouth daily. (Patient not taking: Reported on 5/28/2018)     No current facility-administered medications for this visit. There are no discontinued medications. PCP/Specialist follow up: Future Appointments  Date Time Provider Brad Adela   5/29/2018 3:00 PM Natty Lynch  [facility]   5/29/2018 3:00 PM Rex Dominguez [address] [facility]   5/30/2018 To Be Determined Dewight Other, RN [facility] [address] [place] [address]   6/1/2018 To Be Determined Dewight Other, RN [address]   6/4/2018 To Be Determined Dewight Other, SIL [address]   6/6/2018 To Be Determined Dewight Other, SIL [address]   6/8/2018 To Be Determined Dewight Other, RN [address]   6/13/2018 To Be Determined Dewight Other, RN [facility]    Pt's previously scheduled appt with PCP on 05/30/18, pt cancelled appt. Stated he will call office back to rescheduled. I discussed at length the importance of following up with PCP after his hospitalization. Pt verbalized understanding. Pt stated his son assist him at home. Goals      Attends follow-up appointments as directed. -Follow up with PCP       Establish PCP relationships and regularly scheduled appointments.  Prevent complications post hospitalization.  Understands red flags post discharge.             Fever/chills, NVD, increased swelling, redness, drainage, warmth

## 2018-05-29 NOTE — Clinical Note
Dr. Tara Claire,  -Pt admitted at [place] [address] 05/22 - 05/25 for cellulitis -Pt declined to schedule an appt at this time, stated he is still weak and unable to come in office. I discussed with patient the importance of following up asap -pt prescribed levaquin at discharge, At time of phone call, pt reported he has not picked up ABx. Pt stated he was not informed that he was given a prescription for his ABx. Pt stated his medications are usually sent electronically. Pt reported that he has the prescription and will have the medication filled ASAP. I discussed with patient the importance of taking his meds, especially his ABx. Pt verbalized understanding. -Pt has Home health   Thank you!

## 2018-05-30 ENCOUNTER — TELEPHONE (OUTPATIENT)
Dept: FAMILY MEDICINE CLINIC | Age: 78
End: 2018-05-30

## 2018-05-30 ENCOUNTER — HOME CARE VISIT (OUTPATIENT)
Dept: SCHEDULING | Facility: HOME HEALTH | Age: 78
End: 2018-05-30
Payer: MEDICARE

## 2018-05-30 VITALS
DIASTOLIC BLOOD PRESSURE: 66 MMHG | SYSTOLIC BLOOD PRESSURE: 112 MMHG | RESPIRATION RATE: 14 BRPM | OXYGEN SATURATION: 98 % | TEMPERATURE: 97.8 F | HEART RATE: 64 BPM

## 2018-05-30 PROCEDURE — 3331090001 HH PPS REVENUE CREDIT

## 2018-05-30 PROCEDURE — 3331090002 HH PPS REVENUE DEBIT

## 2018-05-30 PROCEDURE — G0299 HHS/HOSPICE OF RN EA 15 MIN: HCPCS

## 2018-05-30 NOTE — TELEPHONE ENCOUNTER
Franco Duke from [facility], called to see if patient can add something to his wound care, MediHoney Calcium alginate three times a week. VBO approved. Provider requested patient to schedule follow up appt.

## 2018-05-31 ENCOUNTER — TELEPHONE (OUTPATIENT)
Dept: FAMILY MEDICINE CLINIC | Age: 78
End: 2018-05-31

## 2018-05-31 ENCOUNTER — HOME CARE VISIT (OUTPATIENT)
Dept: SCHEDULING | Facility: HOME HEALTH | Age: 78
End: 2018-05-31
Payer: MEDICARE

## 2018-05-31 VITALS
OXYGEN SATURATION: 94 % | TEMPERATURE: 98.4 F | SYSTOLIC BLOOD PRESSURE: 128 MMHG | DIASTOLIC BLOOD PRESSURE: 62 MMHG | HEART RATE: 72 BPM

## 2018-05-31 PROCEDURE — 3331090002 HH PPS REVENUE DEBIT

## 2018-05-31 PROCEDURE — 3331090001 HH PPS REVENUE CREDIT

## 2018-05-31 PROCEDURE — G0157 HHC PT ASSISTANT EA 15: HCPCS

## 2018-05-31 NOTE — TELEPHONE ENCOUNTER
Osito Momin from [facility] is calling to get clarification- when does Dr Elaine Ulloa want the INR checks done. Please clarify and call.

## 2018-06-01 ENCOUNTER — HOME CARE VISIT (OUTPATIENT)
Dept: SCHEDULING | Facility: HOME HEALTH | Age: 78
End: 2018-06-01
Payer: MEDICARE

## 2018-06-01 PROCEDURE — 3331090001 HH PPS REVENUE CREDIT

## 2018-06-01 PROCEDURE — G0299 HHS/HOSPICE OF RN EA 15 MIN: HCPCS

## 2018-06-01 PROCEDURE — 3331090002 HH PPS REVENUE DEBIT

## 2018-06-01 NOTE — TELEPHONE ENCOUNTER
I called natalie informed him Dr. Raz Bynum wants to see him next week but he states his foot is infected and Is getting wound care 3x/week from [facility]. He states he will make an appnt when it heals more and can put a shoe on it.

## 2018-06-02 PROCEDURE — 3331090001 HH PPS REVENUE CREDIT

## 2018-06-02 PROCEDURE — 3331090002 HH PPS REVENUE DEBIT

## 2018-06-03 PROCEDURE — 3331090002 HH PPS REVENUE DEBIT

## 2018-06-03 PROCEDURE — 3331090001 HH PPS REVENUE CREDIT

## 2018-06-04 ENCOUNTER — HOME CARE VISIT (OUTPATIENT)
Dept: SCHEDULING | Facility: HOME HEALTH | Age: 78
End: 2018-06-04
Payer: MEDICARE

## 2018-06-04 VITALS
TEMPERATURE: 98.6 F | SYSTOLIC BLOOD PRESSURE: 125 MMHG | DIASTOLIC BLOOD PRESSURE: 63 MMHG | OXYGEN SATURATION: 98 % | HEART RATE: 62 BPM

## 2018-06-04 VITALS
TEMPERATURE: 98.9 F | OXYGEN SATURATION: 98 % | RESPIRATION RATE: 18 BRPM | DIASTOLIC BLOOD PRESSURE: 64 MMHG | SYSTOLIC BLOOD PRESSURE: 138 MMHG | HEART RATE: 76 BPM

## 2018-06-04 VITALS
OXYGEN SATURATION: 98 % | DIASTOLIC BLOOD PRESSURE: 82 MMHG | SYSTOLIC BLOOD PRESSURE: 128 MMHG | RESPIRATION RATE: 16 BRPM | TEMPERATURE: 97.6 F | HEART RATE: 88 BPM

## 2018-06-04 PROCEDURE — G0157 HHC PT ASSISTANT EA 15: HCPCS

## 2018-06-04 PROCEDURE — 3331090002 HH PPS REVENUE DEBIT

## 2018-06-04 PROCEDURE — 3331090001 HH PPS REVENUE CREDIT

## 2018-06-04 NOTE — TELEPHONE ENCOUNTER
Home health can do PT,INR on Tuesday and call us back. Clarified order to Abby Aguayo, check INR tues and call with results. This encounter will be closed.

## 2018-06-05 ENCOUNTER — HOME CARE VISIT (OUTPATIENT)
Dept: SCHEDULING | Facility: HOME HEALTH | Age: 78
End: 2018-06-05
Payer: MEDICARE

## 2018-06-05 ENCOUNTER — TELEPHONE (OUTPATIENT)
Dept: FAMILY MEDICINE CLINIC | Age: 78
End: 2018-06-05

## 2018-06-05 VITALS
OXYGEN SATURATION: 98 % | TEMPERATURE: 99 F | HEART RATE: 71 BPM | DIASTOLIC BLOOD PRESSURE: 66 MMHG | SYSTOLIC BLOOD PRESSURE: 124 MMHG

## 2018-06-05 LAB
INR BLD: 3 (ref 0.9–1.1)
INR BLD: 3 (ref 0.9–1.1)
PT POC: 35.7 SECONDS (ref 11.8–14.9)
PT POC: 35.7 SECONDS (ref 11.8–14.9)

## 2018-06-05 PROCEDURE — 3331090001 HH PPS REVENUE CREDIT

## 2018-06-05 PROCEDURE — G0299 HHS/HOSPICE OF RN EA 15 MIN: HCPCS

## 2018-06-05 PROCEDURE — 3331090002 HH PPS REVENUE DEBIT

## 2018-06-05 PROCEDURE — G0152 HHCP-SERV OF OT,EA 15 MIN: HCPCS

## 2018-06-05 NOTE — TELEPHONE ENCOUNTER
3. 0 INR  35.7 PTT  Pt taking 4 mg daily Ofe [phone]  Pt have 4 mg and 5mg tablets at home.      Nurse will speak to provider

## 2018-06-05 NOTE — TELEPHONE ENCOUNTER
Per provider, continue current dosage, recheck in 1 week. Ofe notified this encounter will be closed.

## 2018-06-06 ENCOUNTER — HOME CARE VISIT (OUTPATIENT)
Dept: HOME HEALTH SERVICES | Facility: HOME HEALTH | Age: 78
End: 2018-06-06
Payer: MEDICARE

## 2018-06-06 ENCOUNTER — HOME CARE VISIT (OUTPATIENT)
Dept: SCHEDULING | Facility: HOME HEALTH | Age: 78
End: 2018-06-06
Payer: MEDICARE

## 2018-06-06 VITALS
OXYGEN SATURATION: 98 % | SYSTOLIC BLOOD PRESSURE: 132 MMHG | TEMPERATURE: 99.6 F | HEART RATE: 64 BPM | DIASTOLIC BLOOD PRESSURE: 61 MMHG

## 2018-06-06 PROCEDURE — G0157 HHC PT ASSISTANT EA 15: HCPCS

## 2018-06-06 PROCEDURE — 3331090001 HH PPS REVENUE CREDIT

## 2018-06-06 PROCEDURE — 3331090002 HH PPS REVENUE DEBIT

## 2018-06-07 ENCOUNTER — HOME CARE VISIT (OUTPATIENT)
Dept: SCHEDULING | Facility: HOME HEALTH | Age: 78
End: 2018-06-07
Payer: MEDICARE

## 2018-06-07 VITALS
TEMPERATURE: 97.7 F | HEART RATE: 68 BPM | OXYGEN SATURATION: 98 % | SYSTOLIC BLOOD PRESSURE: 100 MMHG | RESPIRATION RATE: 20 BRPM | DIASTOLIC BLOOD PRESSURE: 48 MMHG

## 2018-06-07 PROCEDURE — G0300 HHS/HOSPICE OF LPN EA 15 MIN: HCPCS

## 2018-06-07 PROCEDURE — 3331090001 HH PPS REVENUE CREDIT

## 2018-06-07 PROCEDURE — 3331090002 HH PPS REVENUE DEBIT

## 2018-06-08 ENCOUNTER — HOME CARE VISIT (OUTPATIENT)
Dept: SCHEDULING | Facility: HOME HEALTH | Age: 78
End: 2018-06-08
Payer: MEDICARE

## 2018-06-08 VITALS
SYSTOLIC BLOOD PRESSURE: 126 MMHG | DIASTOLIC BLOOD PRESSURE: 65 MMHG | OXYGEN SATURATION: 98 % | HEART RATE: 65 BPM | TEMPERATURE: 99 F

## 2018-06-08 PROCEDURE — 3331090002 HH PPS REVENUE DEBIT

## 2018-06-08 PROCEDURE — G0157 HHC PT ASSISTANT EA 15: HCPCS

## 2018-06-08 PROCEDURE — 3331090001 HH PPS REVENUE CREDIT

## 2018-06-09 ENCOUNTER — HOME CARE VISIT (OUTPATIENT)
Dept: SCHEDULING | Facility: HOME HEALTH | Age: 78
End: 2018-06-09
Payer: MEDICARE

## 2018-06-09 VITALS
DIASTOLIC BLOOD PRESSURE: 55 MMHG | TEMPERATURE: 98.2 F | SYSTOLIC BLOOD PRESSURE: 110 MMHG | HEART RATE: 64 BPM | OXYGEN SATURATION: 97 % | RESPIRATION RATE: 18 BRPM

## 2018-06-09 PROCEDURE — 3331090001 HH PPS REVENUE CREDIT

## 2018-06-09 PROCEDURE — 3331090002 HH PPS REVENUE DEBIT

## 2018-06-09 PROCEDURE — G0299 HHS/HOSPICE OF RN EA 15 MIN: HCPCS

## 2018-06-10 PROCEDURE — 3331090001 HH PPS REVENUE CREDIT

## 2018-06-10 PROCEDURE — 3331090002 HH PPS REVENUE DEBIT

## 2018-06-11 ENCOUNTER — HOME CARE VISIT (OUTPATIENT)
Dept: SCHEDULING | Facility: HOME HEALTH | Age: 78
End: 2018-06-11
Payer: MEDICARE

## 2018-06-11 VITALS
SYSTOLIC BLOOD PRESSURE: 128 MMHG | DIASTOLIC BLOOD PRESSURE: 61 MMHG | OXYGEN SATURATION: 98 % | TEMPERATURE: 98.3 F | HEART RATE: 68 BPM

## 2018-06-11 PROCEDURE — 3331090001 HH PPS REVENUE CREDIT

## 2018-06-11 PROCEDURE — G0157 HHC PT ASSISTANT EA 15: HCPCS

## 2018-06-11 PROCEDURE — 3331090002 HH PPS REVENUE DEBIT

## 2018-06-12 ENCOUNTER — TELEPHONE (OUTPATIENT)
Dept: FAMILY MEDICINE CLINIC | Age: 78
End: 2018-06-12

## 2018-06-12 ENCOUNTER — HOME CARE VISIT (OUTPATIENT)
Dept: HOME HEALTH SERVICES | Facility: HOME HEALTH | Age: 78
End: 2018-06-12
Payer: MEDICARE

## 2018-06-12 ENCOUNTER — HOME CARE VISIT (OUTPATIENT)
Dept: SCHEDULING | Facility: HOME HEALTH | Age: 78
End: 2018-06-12
Payer: MEDICARE

## 2018-06-12 PROCEDURE — 3331090001 HH PPS REVENUE CREDIT

## 2018-06-12 PROCEDURE — G0299 HHS/HOSPICE OF RN EA 15 MIN: HCPCS

## 2018-06-12 PROCEDURE — 3331090002 HH PPS REVENUE DEBIT

## 2018-06-12 NOTE — TELEPHONE ENCOUNTER
Ofe RN with [facility] health called to report INR results:    PT 25.8  INR: 2.2    Per Dr. Flory Orozco, patient can continue same dosage and recheck INR next week. Verbal given to Ofe. Also spoke with Tiago Carrasco was given to continue wound care 3 x weekly for 4 weeks per reccomendation, for the open wound on foot.

## 2018-06-13 ENCOUNTER — HOME CARE VISIT (OUTPATIENT)
Dept: SCHEDULING | Facility: HOME HEALTH | Age: 78
End: 2018-06-13
Payer: MEDICARE

## 2018-06-13 VITALS
TEMPERATURE: 97.9 F | DIASTOLIC BLOOD PRESSURE: 63 MMHG | SYSTOLIC BLOOD PRESSURE: 128 MMHG | OXYGEN SATURATION: 98 % | HEART RATE: 65 BPM

## 2018-06-13 PROCEDURE — 3331090001 HH PPS REVENUE CREDIT

## 2018-06-13 PROCEDURE — G0157 HHC PT ASSISTANT EA 15: HCPCS

## 2018-06-13 PROCEDURE — 3331090002 HH PPS REVENUE DEBIT

## 2018-06-14 PROCEDURE — 3331090001 HH PPS REVENUE CREDIT

## 2018-06-14 PROCEDURE — 3331090002 HH PPS REVENUE DEBIT

## 2018-06-15 ENCOUNTER — HOME CARE VISIT (OUTPATIENT)
Dept: SCHEDULING | Facility: HOME HEALTH | Age: 78
End: 2018-06-15
Payer: MEDICARE

## 2018-06-15 ENCOUNTER — TELEPHONE (OUTPATIENT)
Dept: FAMILY MEDICINE CLINIC | Age: 78
End: 2018-06-15

## 2018-06-15 VITALS
TEMPERATURE: 98.5 F | DIASTOLIC BLOOD PRESSURE: 61 MMHG | SYSTOLIC BLOOD PRESSURE: 143 MMHG | HEART RATE: 65 BPM | OXYGEN SATURATION: 98 %

## 2018-06-15 PROCEDURE — 3331090001 HH PPS REVENUE CREDIT

## 2018-06-15 PROCEDURE — G0299 HHS/HOSPICE OF RN EA 15 MIN: HCPCS

## 2018-06-15 PROCEDURE — G0157 HHC PT ASSISTANT EA 15: HCPCS

## 2018-06-15 PROCEDURE — 3331090002 HH PPS REVENUE DEBIT

## 2018-06-15 NOTE — TELEPHONE ENCOUNTER
Nurse from [facility], right buttocks on two areas one is 2 by 2 other 2 by 1.5 in measurements. Twice a week care to wound. VB order given. This encounter will be closed.

## 2018-06-16 PROCEDURE — 3331090001 HH PPS REVENUE CREDIT

## 2018-06-16 PROCEDURE — 3331090002 HH PPS REVENUE DEBIT

## 2018-06-17 PROCEDURE — 3331090001 HH PPS REVENUE CREDIT

## 2018-06-17 PROCEDURE — 3331090002 HH PPS REVENUE DEBIT

## 2018-06-18 ENCOUNTER — HOME CARE VISIT (OUTPATIENT)
Dept: SCHEDULING | Facility: HOME HEALTH | Age: 78
End: 2018-06-18
Payer: MEDICARE

## 2018-06-18 ENCOUNTER — HOME CARE VISIT (OUTPATIENT)
Dept: HOME HEALTH SERVICES | Facility: HOME HEALTH | Age: 78
End: 2018-06-18
Payer: MEDICARE

## 2018-06-18 VITALS
HEART RATE: 65 BPM | SYSTOLIC BLOOD PRESSURE: 131 MMHG | OXYGEN SATURATION: 98 % | TEMPERATURE: 98.7 F | DIASTOLIC BLOOD PRESSURE: 62 MMHG

## 2018-06-18 PROCEDURE — 3331090002 HH PPS REVENUE DEBIT

## 2018-06-18 PROCEDURE — G0157 HHC PT ASSISTANT EA 15: HCPCS

## 2018-06-18 PROCEDURE — 3331090001 HH PPS REVENUE CREDIT

## 2018-06-19 ENCOUNTER — HOME CARE VISIT (OUTPATIENT)
Dept: SCHEDULING | Facility: HOME HEALTH | Age: 78
End: 2018-06-19
Payer: MEDICARE

## 2018-06-19 ENCOUNTER — PATIENT OUTREACH (OUTPATIENT)
Dept: FAMILY MEDICINE CLINIC | Age: 78
End: 2018-06-19

## 2018-06-19 ENCOUNTER — TELEPHONE (OUTPATIENT)
Dept: FAMILY MEDICINE CLINIC | Age: 78
End: 2018-06-19

## 2018-06-19 PROCEDURE — 3331090001 HH PPS REVENUE CREDIT

## 2018-06-19 PROCEDURE — 3331090002 HH PPS REVENUE DEBIT

## 2018-06-19 PROCEDURE — G0299 HHS/HOSPICE OF RN EA 15 MIN: HCPCS

## 2018-06-19 NOTE — PROGRESS NOTES
Follow up      Called and spoke to patient. Pt's identity verified x2. Pt reported he is doing \"fine. \" Pt c/o left leg swelling and foot pain. Pt denied any fever/chills, NVD, fall, CP, SOB. Pt reported [facility] currently at home for wound care. Pt stated he graduated home PT on Monday. Discussed with patient regarding following up with PCP. Pt declined, stated \"I still can't walk. \" Pt reported he is getting around the house in a wheelchair. He has not been able to get up. Discussed with patient the importance of being evaluated by provider, however pt declined as he is unable to get up to come to the office. Pt also requesting he would like to switch from coumadin to eliquis. Informed patient that that is a discussion he will need to come in and talk with provider. Pt verbalized understanding. Pt reported he lives with his son who assists him at home and does the cooking. Pt reported that he completed the ABx previously prescribed; however still has leg pain and swelling. I informed patient NN will contact him again next week to follow up on how he is doing. Pt agreed. Pt advised to contact NN/PCP office if he has any questions/concerns. Pt verbalized understanding.      -[address] and attempted to reach PT. Unable to reach. Left message on voicemail requesting callback. Contact info provided. Also spoke to [facility] intake and requested to have [facility] nurse contact NN after patient's home visit for an update. Contact info provided to intake Shanique.

## 2018-06-19 NOTE — TELEPHONE ENCOUNTER
INR 3.6  PT 42.7  Dosage 4 mg    Per Dr. Kusum Barry, hold 06/19/2018 dosage, restart 4 mg daily, recheck in 1 week. Tay Valladares at [phone] and understanding verbalized. This encounter will be closed.

## 2018-06-20 ENCOUNTER — HOME CARE VISIT (OUTPATIENT)
Dept: HOME HEALTH SERVICES | Facility: HOME HEALTH | Age: 78
End: 2018-06-20
Payer: MEDICARE

## 2018-06-20 VITALS
RESPIRATION RATE: 18 BRPM | SYSTOLIC BLOOD PRESSURE: 130 MMHG | OXYGEN SATURATION: 98 % | TEMPERATURE: 98.2 F | DIASTOLIC BLOOD PRESSURE: 68 MMHG | HEART RATE: 68 BPM

## 2018-06-20 PROCEDURE — 3331090001 HH PPS REVENUE CREDIT

## 2018-06-20 PROCEDURE — 3331090002 HH PPS REVENUE DEBIT

## 2018-06-21 ENCOUNTER — HOME CARE VISIT (OUTPATIENT)
Dept: SCHEDULING | Facility: HOME HEALTH | Age: 78
End: 2018-06-21
Payer: MEDICARE

## 2018-06-21 PROCEDURE — 3331090001 HH PPS REVENUE CREDIT

## 2018-06-21 PROCEDURE — G0299 HHS/HOSPICE OF RN EA 15 MIN: HCPCS

## 2018-06-21 PROCEDURE — A6212 FOAM DRG <=16 SQ IN W/BORDER: HCPCS

## 2018-06-21 PROCEDURE — 3331090002 HH PPS REVENUE DEBIT

## 2018-06-22 PROCEDURE — 3331090001 HH PPS REVENUE CREDIT

## 2018-06-22 PROCEDURE — 3331090002 HH PPS REVENUE DEBIT

## 2018-06-23 ENCOUNTER — HOME CARE VISIT (OUTPATIENT)
Dept: SCHEDULING | Facility: HOME HEALTH | Age: 78
End: 2018-06-23
Payer: MEDICARE

## 2018-06-23 VITALS
HEART RATE: 69 BPM | SYSTOLIC BLOOD PRESSURE: 116 MMHG | OXYGEN SATURATION: 98 % | RESPIRATION RATE: 20 BRPM | DIASTOLIC BLOOD PRESSURE: 64 MMHG | TEMPERATURE: 97 F

## 2018-06-23 PROCEDURE — 3331090002 HH PPS REVENUE DEBIT

## 2018-06-23 PROCEDURE — G0299 HHS/HOSPICE OF RN EA 15 MIN: HCPCS

## 2018-06-23 PROCEDURE — 3331090001 HH PPS REVENUE CREDIT

## 2018-06-24 PROCEDURE — 3331090001 HH PPS REVENUE CREDIT

## 2018-06-24 PROCEDURE — 3331090002 HH PPS REVENUE DEBIT

## 2018-06-25 ENCOUNTER — TELEPHONE (OUTPATIENT)
Dept: FAMILY MEDICINE CLINIC | Age: 78
End: 2018-06-25

## 2018-06-25 VITALS
SYSTOLIC BLOOD PRESSURE: 140 MMHG | HEART RATE: 68 BPM | DIASTOLIC BLOOD PRESSURE: 70 MMHG | RESPIRATION RATE: 18 BRPM | OXYGEN SATURATION: 98 % | TEMPERATURE: 99 F

## 2018-06-25 PROCEDURE — 3331090002 HH PPS REVENUE DEBIT

## 2018-06-25 PROCEDURE — 3331090001 HH PPS REVENUE CREDIT

## 2018-06-25 NOTE — TELEPHONE ENCOUNTER
Jaspreet Spaulding,  with [facility], called in to request order for visiting MD or Telehealth. States patient is not mobile needs to follow up.   For more information please call patient at [phone]  For Rika Garces, please call [phone]

## 2018-06-26 ENCOUNTER — TELEPHONE (OUTPATIENT)
Dept: FAMILY MEDICINE CLINIC | Age: 78
End: 2018-06-26

## 2018-06-26 ENCOUNTER — HOME CARE VISIT (OUTPATIENT)
Dept: SCHEDULING | Facility: HOME HEALTH | Age: 78
End: 2018-06-26
Payer: MEDICARE

## 2018-06-26 PROCEDURE — G0299 HHS/HOSPICE OF RN EA 15 MIN: HCPCS

## 2018-06-26 PROCEDURE — 3331090002 HH PPS REVENUE DEBIT

## 2018-06-26 PROCEDURE — 3331090001 HH PPS REVENUE CREDIT

## 2018-06-26 NOTE — TELEPHONE ENCOUNTER
Ofe MUJICA from [facility] called to report PT , 2.7 - patient is taking 4mg everyday. Per Dr. West File, patient should continue same dosage and recheck pt inr next Tuesday. Ofe read back directions.

## 2018-06-27 ENCOUNTER — HOME CARE VISIT (OUTPATIENT)
Dept: HOME HEALTH SERVICES | Facility: HOME HEALTH | Age: 78
End: 2018-06-27
Payer: MEDICARE

## 2018-06-27 VITALS
SYSTOLIC BLOOD PRESSURE: 140 MMHG | RESPIRATION RATE: 16 BRPM | DIASTOLIC BLOOD PRESSURE: 68 MMHG | HEART RATE: 68 BPM | TEMPERATURE: 98.8 F | OXYGEN SATURATION: 98 %

## 2018-06-27 VITALS
DIASTOLIC BLOOD PRESSURE: 64 MMHG | SYSTOLIC BLOOD PRESSURE: 140 MMHG | OXYGEN SATURATION: 98 % | RESPIRATION RATE: 18 BRPM | HEART RATE: 68 BPM | TEMPERATURE: 99.3 F

## 2018-06-27 PROCEDURE — 3331090002 HH PPS REVENUE DEBIT

## 2018-06-27 PROCEDURE — 3331090001 HH PPS REVENUE CREDIT

## 2018-06-28 ENCOUNTER — PATIENT OUTREACH (OUTPATIENT)
Dept: FAMILY MEDICINE CLINIC | Age: 78
End: 2018-06-28

## 2018-06-28 ENCOUNTER — HOME CARE VISIT (OUTPATIENT)
Dept: HOME HEALTH SERVICES | Facility: HOME HEALTH | Age: 78
End: 2018-06-28
Payer: MEDICARE

## 2018-06-28 ENCOUNTER — HOME CARE VISIT (OUTPATIENT)
Dept: SCHEDULING | Facility: HOME HEALTH | Age: 78
End: 2018-06-28
Payer: MEDICARE

## 2018-06-28 ENCOUNTER — TELEPHONE (OUTPATIENT)
Dept: FAMILY MEDICINE CLINIC | Age: 78
End: 2018-06-28

## 2018-06-28 VITALS
SYSTOLIC BLOOD PRESSURE: 118 MMHG | DIASTOLIC BLOOD PRESSURE: 58 MMHG | OXYGEN SATURATION: 99 % | TEMPERATURE: 99.8 F | RESPIRATION RATE: 18 BRPM | HEART RATE: 60 BPM

## 2018-06-28 PROCEDURE — G0300 HHS/HOSPICE OF LPN EA 15 MIN: HCPCS

## 2018-06-28 PROCEDURE — 3331090002 HH PPS REVENUE DEBIT

## 2018-06-28 PROCEDURE — 3331090001 HH PPS REVENUE CREDIT

## 2018-06-28 NOTE — PROGRESS NOTES
Follow up      -Noted patient has been discharged from home PT 06/18/18. Per PT notes: : \"Progress toward goals: Patient demonstrated a positive result to therapy this date as evidenced by performance of transfers from various surfaces. Patient continues to refuse to perform stairs due to new R buttocks wounds, L foot wounds, and fear of increased risk of infection. Patient discharged from [address] this date\"      -[address] ([phone]). Pt's identity verified with nurse x2. Requested to have PT callback NN. Contact info provided. Await call back      -Attempted to reach patient for follow up. Number went straight to voicemail.  Left message on voicemail requesting call back

## 2018-06-28 NOTE — PROGRESS NOTES
1300  Received call back from home health PT Satish Burch ([phone]). She informed me she worked with patient with transfers, getting OOB, to toilet. She reported patient gets around the house in a wheelchair with assistance from patient's son. Further, she reported that patient refused to perform therapy/stairs the entire 4 weeks in fear of patient not wanting to use legs due to cellulitis. She reported that patient worked on stairs previously back in March. She stated she recommended visiting physicians for the patient if he was unable to get out of the house. She also recommended getting a stretcher, however it would cost at least $300. She reported order was previously sent to PCP for wheelchair cushion and order has been signed and sent to [facility]. I verbalized understanding. 1325  Called back patient. Unable to reach. Left message on voicemail requesting call back.  Contact info provided

## 2018-06-28 NOTE — PROGRESS NOTES
Follow up      1510:  Received phone call from patient. Patient stated he is doing OK. Pt denied any fever/chills, fall, CP, SOB, numbness/tingling. Pt reported [facility] nurse performing PT/INR and wound care for his legs and buttocks. Pt stated he recently develop blisters on his buttocks area. Pt denied any bleeding, drainage from legs/butttocks. Pt c/o pain on his Left foot, and swelling of his both his legs. Pt stated he has been transferring himself from wheelchair to bed without any difficulty. Pt reported unable to lift his left leg, but is able to move toes. Pt stated he ordered a cushion for his wheelchair from [facility] and will be arriving soon. Further, patient stated he spoke to his [facility]  and discussed regarding his inability to get out of the house to see physician to be evaluated. I discussed with patient that PCP will need to see him in office in order to evaluate and assess and do interventions. Unless he has a stretcher to transport him, he is unable to get up. Pt stated he contacted [facility] last week to get established with them, but has not received any call back from them yet. Informed patient NN will contact them to follow up. Pt verbalized understanding. 1526:  Contacted [facility] ([phone]). Spoke to [place], pt's identity verified x2. Per staff, patient's insurance is not in network with them and will require prior auth from [employer]. Further, she stated patient will only get up to 4 home visits and will need to switch his insurance to traditional medicare or pay as self pay if patient wishes to continue services with them. If patient wishes to keep [facility], patient can return to Dr. Karen Egan office for primary care. Once authorization acquired, she stated to call back and ask for the intake department to process and they will schedule patient's home visit. I verbalized understanding.       1537:  Called back patient and relayed info per [facility]. Informed him that once authorization acquired, he will have 4 home visits and if he wishes to continue services with them for primary care, he will need to switch his insurance, or will have to pay as self pay. Pt verbalized understanding. Will request from lead nurse to initiate prior auth and once obtained, will contact [facility] to schedule patient's home visit. Pt verbalized understanding. Pt given opportunity to ask questions. Pt advised to contact PCP office for any questions/concerns. Pt provided me [facility] nurse number [phone] ext . Will reach out to [address] [place] [address]:  Discussed with lead nurse [facility] regarding patient. Requested to have prior auth initiated. Once obtained, to let me know so I can contact [facility].  For [facility], please call [phone]        Will continue to follow

## 2018-06-28 NOTE — TELEPHONE ENCOUNTER
If pt will have a visiting physician. HE should be the primary for the pt. I can take my name off . I cannot be refiling meds, without seeing the pt. The visiting physician can take over complete care. It is not a good idea for me to be PCP if I am not seeing the pt.

## 2018-06-28 NOTE — PROGRESS NOTES
Attempted to call back [facility] , however, no callback number provided. Shanae Tim and spoke to staff. Pt's identity verified x3. Per staff, I have no authorization to get info. Message left to have [facility]  NN. Contact number provided.     Await call back

## 2018-06-29 ENCOUNTER — PATIENT OUTREACH (OUTPATIENT)
Dept: FAMILY MEDICINE CLINIC | Age: 78
End: 2018-06-29

## 2018-06-29 ENCOUNTER — TELEPHONE (OUTPATIENT)
Dept: FAMILY MEDICINE CLINIC | Age: 78
End: 2018-06-29

## 2018-06-29 VITALS
SYSTOLIC BLOOD PRESSURE: 110 MMHG | DIASTOLIC BLOOD PRESSURE: 60 MMHG | OXYGEN SATURATION: 99 % | RESPIRATION RATE: 18 BRPM | TEMPERATURE: 98.4 F | HEART RATE: 86 BPM

## 2018-06-29 PROCEDURE — 3331090001 HH PPS REVENUE CREDIT

## 2018-06-29 PROCEDURE — 3331090002 HH PPS REVENUE DEBIT

## 2018-06-29 NOTE — TELEPHONE ENCOUNTER
Bear Celestin from [facility] would like to know if the doctor could referred the patient to [facility] or if there is a tele program. Patient is unable to get out of house to come to office or afford an ambulance.  Bear Celestin said it may be best to contact the clinical office for [facility] to explain the situation [phone]

## 2018-06-29 NOTE — TELEPHONE ENCOUNTER
Nurse [facility], at [phone], Initiated prior authorization for [facility], [facility], [address] [facility]  Phone: [phone]  Fax: [phone] is in clinical review. #[id number]. Information faxed to  [phone].

## 2018-06-29 NOTE — PROGRESS NOTES
8945  I was informed by Nurse Tamiko Jackson, prior Cherylin Venice has been initiated. Nurse has requested 5 visits for 6 months with a start date of today. Authorization currently in clinical review. Per nurse, insurance will fax back result to Dr. Rae Steven office. See nurse Maricruz's note in a separate encounter for more info. [phone]  [facility] ([phone]). Spoke to Anika. Informed her prior Cherylin Venice has been intiated. Once result received back from [facility], she is requesting to fax info to [phone] so their intake can schedule patient for the home visit. I verbalized understanding. [address] back Katy rBian, patient [facility]  ([phone]   ext [phone] ). Unable to reach. Left message on voicemail requesting call back.

## 2018-06-30 ENCOUNTER — HOME CARE VISIT (OUTPATIENT)
Dept: SCHEDULING | Facility: HOME HEALTH | Age: 78
End: 2018-06-30
Payer: MEDICARE

## 2018-06-30 PROCEDURE — 3331090001 HH PPS REVENUE CREDIT

## 2018-06-30 PROCEDURE — 3331090002 HH PPS REVENUE DEBIT

## 2018-06-30 PROCEDURE — G0299 HHS/HOSPICE OF RN EA 15 MIN: HCPCS

## 2018-07-01 VITALS
RESPIRATION RATE: 20 BRPM | HEART RATE: 65 BPM | TEMPERATURE: 97.4 F | OXYGEN SATURATION: 98 % | DIASTOLIC BLOOD PRESSURE: 64 MMHG | SYSTOLIC BLOOD PRESSURE: 114 MMHG

## 2018-07-01 PROCEDURE — 3331090001 HH PPS REVENUE CREDIT

## 2018-07-01 PROCEDURE — 3331090002 HH PPS REVENUE DEBIT

## 2018-07-02 ENCOUNTER — PATIENT OUTREACH (OUTPATIENT)
Dept: FAMILY MEDICINE CLINIC | Age: 78
End: 2018-07-02

## 2018-07-02 DIAGNOSIS — G62.9 NEUROPATHY: ICD-10-CM

## 2018-07-02 DIAGNOSIS — R26.81 GAIT INSTABILITY: ICD-10-CM

## 2018-07-02 PROCEDURE — 3331090002 HH PPS REVENUE DEBIT

## 2018-07-02 PROCEDURE — 3331090001 HH PPS REVENUE CREDIT

## 2018-07-02 RX ORDER — GABAPENTIN 600 MG/1
TABLET ORAL
Qty: 120 TAB | Refills: 3 | Status: SHIPPED | OUTPATIENT
Start: 2018-07-02 | End: 2018-10-10 | Stop reason: SDUPTHER

## 2018-07-03 ENCOUNTER — HOME CARE VISIT (OUTPATIENT)
Dept: HOME HEALTH SERVICES | Facility: HOME HEALTH | Age: 78
End: 2018-07-03
Payer: MEDICARE

## 2018-07-03 ENCOUNTER — HOME CARE VISIT (OUTPATIENT)
Dept: SCHEDULING | Facility: HOME HEALTH | Age: 78
End: 2018-07-03
Payer: MEDICARE

## 2018-07-03 ENCOUNTER — TELEPHONE (OUTPATIENT)
Dept: FAMILY MEDICINE CLINIC | Age: 78
End: 2018-07-03

## 2018-07-03 VITALS
DIASTOLIC BLOOD PRESSURE: 60 MMHG | SYSTOLIC BLOOD PRESSURE: 112 MMHG | HEART RATE: 71 BPM | TEMPERATURE: 97.9 F | OXYGEN SATURATION: 99 %

## 2018-07-03 PROCEDURE — 3331090001 HH PPS REVENUE CREDIT

## 2018-07-03 PROCEDURE — G0299 HHS/HOSPICE OF RN EA 15 MIN: HCPCS

## 2018-07-03 PROCEDURE — 3331090002 HH PPS REVENUE DEBIT

## 2018-07-03 NOTE — PROGRESS NOTES
5651  Received call back from [facility] . Relayed info per Claudia. Discussed with her regarding authorization currently in process. She is also requesting a  from PCP. She stated [facility] has a  but they do not do home visits. Will request order from PCP      0178  [facility] CLAUDIO ELIZABETHMANN informed me PCP uncomfortable referring patient to in home physicians, therefore, [employer] authorization stopped. She also relayed request for .  Awaiting on order from PCP

## 2018-07-03 NOTE — TELEPHONE ENCOUNTER
Ofe from [facility] called requesting and extension on skill nursing so the Pt INR can be followed. Requesting 1 visit per week x 3 weeks. Shaw Tomas will follow up with the office Thursday 07/05/2018 if they have not heard from office. Please advise. NN is requesting an order for  to eval pt's living condition. Please advise.

## 2018-07-03 NOTE — TELEPHONE ENCOUNTER
INR 2.4  PT 28.2    Pt is taking 4 mg daily. Per Dr. Narinder Lu, continue same dosage recheck in 1 week. Ofe notified.

## 2018-07-04 PROCEDURE — 3331090001 HH PPS REVENUE CREDIT

## 2018-07-04 PROCEDURE — 3331090002 HH PPS REVENUE DEBIT

## 2018-07-05 ENCOUNTER — HOME CARE VISIT (OUTPATIENT)
Dept: SCHEDULING | Facility: HOME HEALTH | Age: 78
End: 2018-07-05
Payer: MEDICARE

## 2018-07-05 DIAGNOSIS — I63.9 CEREBROVASCULAR ACCIDENT (CVA), UNSPECIFIED MECHANISM (HCC): Primary | ICD-10-CM

## 2018-07-05 DIAGNOSIS — G62.9 PERIPHERAL POLYNEUROPATHY: ICD-10-CM

## 2018-07-05 PROCEDURE — 3331090001 HH PPS REVENUE CREDIT

## 2018-07-05 PROCEDURE — G0299 HHS/HOSPICE OF RN EA 15 MIN: HCPCS

## 2018-07-05 PROCEDURE — 3331090002 HH PPS REVENUE DEBIT

## 2018-07-06 PROCEDURE — 3331090002 HH PPS REVENUE DEBIT

## 2018-07-06 PROCEDURE — 3331090001 HH PPS REVENUE CREDIT

## 2018-07-07 ENCOUNTER — HOME CARE VISIT (OUTPATIENT)
Dept: SCHEDULING | Facility: HOME HEALTH | Age: 78
End: 2018-07-07
Payer: MEDICARE

## 2018-07-07 PROCEDURE — G0299 HHS/HOSPICE OF RN EA 15 MIN: HCPCS

## 2018-07-07 PROCEDURE — 3331090001 HH PPS REVENUE CREDIT

## 2018-07-07 PROCEDURE — 3331090002 HH PPS REVENUE DEBIT

## 2018-07-08 PROCEDURE — 3331090001 HH PPS REVENUE CREDIT

## 2018-07-08 PROCEDURE — 3331090002 HH PPS REVENUE DEBIT

## 2018-07-09 VITALS
TEMPERATURE: 98.2 F | OXYGEN SATURATION: 95 % | RESPIRATION RATE: 18 BRPM | HEART RATE: 74 BPM | SYSTOLIC BLOOD PRESSURE: 124 MMHG | DIASTOLIC BLOOD PRESSURE: 70 MMHG

## 2018-07-09 DIAGNOSIS — E11.9 DM TYPE 2, GOAL HBA1C < 7% (HCC): ICD-10-CM

## 2018-07-09 PROCEDURE — 3331090002 HH PPS REVENUE DEBIT

## 2018-07-09 PROCEDURE — 3331090001 HH PPS REVENUE CREDIT

## 2018-07-09 NOTE — TELEPHONE ENCOUNTER
Pt states that all medications should go to [facility] if  they don't need to be rushed.

## 2018-07-10 ENCOUNTER — HOME CARE VISIT (OUTPATIENT)
Dept: HOME HEALTH SERVICES | Facility: HOME HEALTH | Age: 78
End: 2018-07-10
Payer: MEDICARE

## 2018-07-10 ENCOUNTER — TELEPHONE (OUTPATIENT)
Dept: FAMILY MEDICINE CLINIC | Age: 78
End: 2018-07-10

## 2018-07-10 ENCOUNTER — HOME CARE VISIT (OUTPATIENT)
Dept: SCHEDULING | Facility: HOME HEALTH | Age: 78
End: 2018-07-10
Payer: MEDICARE

## 2018-07-10 PROCEDURE — G0299 HHS/HOSPICE OF RN EA 15 MIN: HCPCS

## 2018-07-10 PROCEDURE — 3331090002 HH PPS REVENUE DEBIT

## 2018-07-10 PROCEDURE — 3331090001 HH PPS REVENUE CREDIT

## 2018-07-10 RX ORDER — METFORMIN HYDROCHLORIDE 500 MG/1
500 TABLET ORAL 2 TIMES DAILY WITH MEALS
Qty: 60 TAB | Refills: 3 | Status: SHIPPED | OUTPATIENT
Start: 2018-07-10 | End: 2018-10-10 | Stop reason: SDUPTHER

## 2018-07-10 NOTE — TELEPHONE ENCOUNTER
Jose laguna [employer] returning call from Nurse regarding services requested.   Please call [phone]

## 2018-07-10 NOTE — TELEPHONE ENCOUNTER
Pt does not have out of network services. Auth for Visiting [facility] denied. A form will be faxed with information. This encounter will be closed.

## 2018-07-10 NOTE — TELEPHONE ENCOUNTER
abis from home health called to report PT 23.1 INR 1.9 per dr Hilary Jimenez patient to continue same dose and repeat in 1 week

## 2018-07-11 VITALS
TEMPERATURE: 98.6 F | SYSTOLIC BLOOD PRESSURE: 112 MMHG | HEART RATE: 70 BPM | RESPIRATION RATE: 16 BRPM | OXYGEN SATURATION: 98 % | DIASTOLIC BLOOD PRESSURE: 64 MMHG

## 2018-07-11 PROCEDURE — 3331090001 HH PPS REVENUE CREDIT

## 2018-07-11 PROCEDURE — 3331090002 HH PPS REVENUE DEBIT

## 2018-07-12 ENCOUNTER — HOME CARE VISIT (OUTPATIENT)
Dept: SCHEDULING | Facility: HOME HEALTH | Age: 78
End: 2018-07-12
Payer: MEDICARE

## 2018-07-12 VITALS
HEART RATE: 67 BPM | OXYGEN SATURATION: 98 % | TEMPERATURE: 98 F | RESPIRATION RATE: 16 BRPM | DIASTOLIC BLOOD PRESSURE: 62 MMHG | SYSTOLIC BLOOD PRESSURE: 127 MMHG

## 2018-07-12 PROCEDURE — 3331090002 HH PPS REVENUE DEBIT

## 2018-07-12 PROCEDURE — 3331090001 HH PPS REVENUE CREDIT

## 2018-07-12 PROCEDURE — G0299 HHS/HOSPICE OF RN EA 15 MIN: HCPCS

## 2018-07-13 PROCEDURE — 3331090001 HH PPS REVENUE CREDIT

## 2018-07-13 PROCEDURE — 3331090002 HH PPS REVENUE DEBIT

## 2018-07-14 PROCEDURE — 3331090002 HH PPS REVENUE DEBIT

## 2018-07-14 PROCEDURE — 3331090001 HH PPS REVENUE CREDIT

## 2018-07-15 PROCEDURE — 3331090001 HH PPS REVENUE CREDIT

## 2018-07-15 PROCEDURE — 3331090002 HH PPS REVENUE DEBIT

## 2018-07-16 PROCEDURE — 3331090001 HH PPS REVENUE CREDIT

## 2018-07-16 PROCEDURE — 3331090002 HH PPS REVENUE DEBIT

## 2018-07-17 ENCOUNTER — HOME CARE VISIT (OUTPATIENT)
Dept: SCHEDULING | Facility: HOME HEALTH | Age: 78
End: 2018-07-17
Payer: MEDICARE

## 2018-07-17 ENCOUNTER — TELEPHONE (OUTPATIENT)
Dept: FAMILY MEDICINE CLINIC | Age: 78
End: 2018-07-17

## 2018-07-17 PROCEDURE — 3331090001 HH PPS REVENUE CREDIT

## 2018-07-17 PROCEDURE — 3331090002 HH PPS REVENUE DEBIT

## 2018-07-17 PROCEDURE — G0299 HHS/HOSPICE OF RN EA 15 MIN: HCPCS

## 2018-07-18 VITALS
OXYGEN SATURATION: 97 % | RESPIRATION RATE: 18 BRPM | DIASTOLIC BLOOD PRESSURE: 58 MMHG | HEART RATE: 70 BPM | SYSTOLIC BLOOD PRESSURE: 120 MMHG | TEMPERATURE: 99 F

## 2018-07-18 PROCEDURE — 3331090002 HH PPS REVENUE DEBIT

## 2018-07-18 PROCEDURE — 3331090001 HH PPS REVENUE CREDIT

## 2018-07-18 NOTE — TELEPHONE ENCOUNTER
Spoke with Ofe, informed her that per Dr. Romana Bone, patient is to continue same dosage and repeat INR in 2 weeks.  July 31st, 2018

## 2018-07-19 ENCOUNTER — TELEPHONE (OUTPATIENT)
Dept: FAMILY MEDICINE CLINIC | Age: 78
End: 2018-07-19

## 2018-07-19 ENCOUNTER — HOME CARE VISIT (OUTPATIENT)
Dept: HOME HEALTH SERVICES | Facility: HOME HEALTH | Age: 78
End: 2018-07-19
Payer: MEDICARE

## 2018-07-19 ENCOUNTER — HOME CARE VISIT (OUTPATIENT)
Dept: SCHEDULING | Facility: HOME HEALTH | Age: 78
End: 2018-07-19
Payer: MEDICARE

## 2018-07-19 PROCEDURE — 3331090001 HH PPS REVENUE CREDIT

## 2018-07-19 PROCEDURE — 3331090002 HH PPS REVENUE DEBIT

## 2018-07-19 PROCEDURE — G0299 HHS/HOSPICE OF RN EA 15 MIN: HCPCS

## 2018-07-19 NOTE — TELEPHONE ENCOUNTER
Ofe from [address] [place] [address] called stating that she needs more orders for visits for the patient considering that the next INR check will be on 7/21/18. VBO given.

## 2018-07-20 PROCEDURE — 3331090001 HH PPS REVENUE CREDIT

## 2018-07-20 PROCEDURE — 3331090002 HH PPS REVENUE DEBIT

## 2018-07-21 ENCOUNTER — HOME CARE VISIT (OUTPATIENT)
Dept: SCHEDULING | Facility: HOME HEALTH | Age: 78
End: 2018-07-21
Payer: MEDICARE

## 2018-07-21 PROCEDURE — 3331090001 HH PPS REVENUE CREDIT

## 2018-07-21 PROCEDURE — 3331090002 HH PPS REVENUE DEBIT

## 2018-07-21 PROCEDURE — G0299 HHS/HOSPICE OF RN EA 15 MIN: HCPCS

## 2018-07-22 VITALS
OXYGEN SATURATION: 98 % | HEART RATE: 70 BPM | RESPIRATION RATE: 20 BRPM | DIASTOLIC BLOOD PRESSURE: 68 MMHG | TEMPERATURE: 97 F | SYSTOLIC BLOOD PRESSURE: 119 MMHG

## 2018-07-22 PROCEDURE — 3331090001 HH PPS REVENUE CREDIT

## 2018-07-22 PROCEDURE — 3331090002 HH PPS REVENUE DEBIT

## 2018-07-23 PROCEDURE — 3331090001 HH PPS REVENUE CREDIT

## 2018-07-23 PROCEDURE — 3331090002 HH PPS REVENUE DEBIT

## 2018-07-24 ENCOUNTER — HOME CARE VISIT (OUTPATIENT)
Dept: SCHEDULING | Facility: HOME HEALTH | Age: 78
End: 2018-07-24
Payer: MEDICARE

## 2018-07-24 DIAGNOSIS — M79.89 LEG SWELLING: ICD-10-CM

## 2018-07-24 PROCEDURE — G0162 HHC RN E&M PLAN SVS, 15 MIN: HCPCS

## 2018-07-24 PROCEDURE — 3331090001 HH PPS REVENUE CREDIT

## 2018-07-24 PROCEDURE — 3331090003 HH PPS REVENUE ADJ

## 2018-07-24 PROCEDURE — 3331090002 HH PPS REVENUE DEBIT

## 2018-07-24 RX ORDER — FUROSEMIDE 40 MG/1
40 TABLET ORAL DAILY
Qty: 30 TAB | Refills: 0 | Status: SHIPPED | OUTPATIENT
Start: 2018-07-24 | End: 2018-10-10 | Stop reason: SDUPTHER

## 2018-07-24 NOTE — TELEPHONE ENCOUNTER
Requested Prescriptions     Pending Prescriptions Disp Refills    furosemide (LASIX) 40 mg tablet 60 Tab 3     Sig: Take 1 Tab by mouth two (2) times a day.  One po daily     Last Office Visit: 5/7/18  Next Office Visit: not scheduled

## 2018-07-25 PROCEDURE — 3331090002 HH PPS REVENUE DEBIT

## 2018-07-25 PROCEDURE — 3331090001 HH PPS REVENUE CREDIT

## 2018-07-26 ENCOUNTER — HOME CARE VISIT (OUTPATIENT)
Dept: SCHEDULING | Facility: HOME HEALTH | Age: 78
End: 2018-07-26
Payer: MEDICARE

## 2018-07-26 VITALS
DIASTOLIC BLOOD PRESSURE: 64 MMHG | TEMPERATURE: 98.9 F | RESPIRATION RATE: 16 BRPM | HEART RATE: 68 BPM | OXYGEN SATURATION: 98 % | SYSTOLIC BLOOD PRESSURE: 118 MMHG

## 2018-07-26 VITALS
RESPIRATION RATE: 16 BRPM | HEART RATE: 70 BPM | SYSTOLIC BLOOD PRESSURE: 115 MMHG | TEMPERATURE: 98.3 F | DIASTOLIC BLOOD PRESSURE: 64 MMHG | OXYGEN SATURATION: 98 %

## 2018-07-26 VITALS
OXYGEN SATURATION: 99 % | DIASTOLIC BLOOD PRESSURE: 61 MMHG | RESPIRATION RATE: 20 BRPM | SYSTOLIC BLOOD PRESSURE: 117 MMHG | HEART RATE: 73 BPM | TEMPERATURE: 97 F

## 2018-07-26 PROCEDURE — G0299 HHS/HOSPICE OF RN EA 15 MIN: HCPCS

## 2018-07-26 PROCEDURE — 400014 HH F/U

## 2018-07-26 PROCEDURE — 3331090001 HH PPS REVENUE CREDIT

## 2018-07-26 PROCEDURE — 3331090002 HH PPS REVENUE DEBIT

## 2018-07-27 PROCEDURE — 3331090001 HH PPS REVENUE CREDIT

## 2018-07-27 PROCEDURE — 3331090002 HH PPS REVENUE DEBIT

## 2018-07-28 ENCOUNTER — HOME CARE VISIT (OUTPATIENT)
Dept: SCHEDULING | Facility: HOME HEALTH | Age: 78
End: 2018-07-28
Payer: MEDICARE

## 2018-07-28 VITALS
DIASTOLIC BLOOD PRESSURE: 59 MMHG | OXYGEN SATURATION: 98 % | HEART RATE: 68 BPM | TEMPERATURE: 96.9 F | SYSTOLIC BLOOD PRESSURE: 115 MMHG | RESPIRATION RATE: 20 BRPM

## 2018-07-28 PROCEDURE — 3331090002 HH PPS REVENUE DEBIT

## 2018-07-28 PROCEDURE — 3331090001 HH PPS REVENUE CREDIT

## 2018-07-28 PROCEDURE — G0299 HHS/HOSPICE OF RN EA 15 MIN: HCPCS

## 2018-07-29 PROCEDURE — 3331090002 HH PPS REVENUE DEBIT

## 2018-07-29 PROCEDURE — 3331090001 HH PPS REVENUE CREDIT

## 2018-07-30 VITALS
DIASTOLIC BLOOD PRESSURE: 60 MMHG | TEMPERATURE: 99.4 F | RESPIRATION RATE: 18 BRPM | HEART RATE: 72 BPM | SYSTOLIC BLOOD PRESSURE: 105 MMHG | OXYGEN SATURATION: 98 %

## 2018-07-30 PROCEDURE — 3331090002 HH PPS REVENUE DEBIT

## 2018-07-30 PROCEDURE — 3331090001 HH PPS REVENUE CREDIT

## 2018-07-31 ENCOUNTER — HOME CARE VISIT (OUTPATIENT)
Dept: SCHEDULING | Facility: HOME HEALTH | Age: 78
End: 2018-07-31
Payer: MEDICARE

## 2018-07-31 ENCOUNTER — TELEPHONE (OUTPATIENT)
Dept: FAMILY MEDICINE CLINIC | Age: 78
End: 2018-07-31

## 2018-07-31 ENCOUNTER — HOME CARE VISIT (OUTPATIENT)
Dept: HOME HEALTH SERVICES | Facility: HOME HEALTH | Age: 78
End: 2018-07-31
Payer: MEDICARE

## 2018-07-31 VITALS
DIASTOLIC BLOOD PRESSURE: 52 MMHG | RESPIRATION RATE: 20 BRPM | TEMPERATURE: 98.7 F | SYSTOLIC BLOOD PRESSURE: 115 MMHG | OXYGEN SATURATION: 98 % | HEART RATE: 92 BPM

## 2018-07-31 LAB
INR BLD: 3.2 (ref 0.9–1.1)
PT POC: 38.2 SECONDS (ref 11.8–14.9)

## 2018-07-31 PROCEDURE — 3331090002 HH PPS REVENUE DEBIT

## 2018-07-31 PROCEDURE — 3331090001 HH PPS REVENUE CREDIT

## 2018-07-31 PROCEDURE — G0300 HHS/HOSPICE OF LPN EA 15 MIN: HCPCS

## 2018-07-31 NOTE — TELEPHONE ENCOUNTER
Contacted Ofe with [address] and advised her that the patient is to keep coumadin to 4mg daily and recheck in one week. [place] verbalized understanding with read back and tolerated well.

## 2018-07-31 NOTE — TELEPHONE ENCOUNTER
PT 32.8  Inr 3.4   4 mgs daily, and 4 mgs on hands. Katherleen Schilder will be called at [phone]. Please advise.

## 2018-08-01 PROCEDURE — 3331090001 HH PPS REVENUE CREDIT

## 2018-08-01 PROCEDURE — 3331090002 HH PPS REVENUE DEBIT

## 2018-08-02 ENCOUNTER — HOME CARE VISIT (OUTPATIENT)
Dept: SCHEDULING | Facility: HOME HEALTH | Age: 78
End: 2018-08-02
Payer: MEDICARE

## 2018-08-02 VITALS
SYSTOLIC BLOOD PRESSURE: 120 MMHG | TEMPERATURE: 98.6 F | RESPIRATION RATE: 18 BRPM | HEART RATE: 76 BPM | OXYGEN SATURATION: 98 % | DIASTOLIC BLOOD PRESSURE: 56 MMHG

## 2018-08-02 PROCEDURE — 3331090002 HH PPS REVENUE DEBIT

## 2018-08-02 PROCEDURE — 3331090001 HH PPS REVENUE CREDIT

## 2018-08-02 PROCEDURE — G0300 HHS/HOSPICE OF LPN EA 15 MIN: HCPCS

## 2018-08-03 PROCEDURE — 3331090001 HH PPS REVENUE CREDIT

## 2018-08-03 PROCEDURE — 3331090002 HH PPS REVENUE DEBIT

## 2018-08-04 ENCOUNTER — HOME CARE VISIT (OUTPATIENT)
Dept: SCHEDULING | Facility: HOME HEALTH | Age: 78
End: 2018-08-04
Payer: MEDICARE

## 2018-08-04 VITALS
TEMPERATURE: 96.7 F | SYSTOLIC BLOOD PRESSURE: 131 MMHG | HEART RATE: 72 BPM | OXYGEN SATURATION: 98 % | DIASTOLIC BLOOD PRESSURE: 75 MMHG | RESPIRATION RATE: 20 BRPM

## 2018-08-04 PROCEDURE — 3331090002 HH PPS REVENUE DEBIT

## 2018-08-04 PROCEDURE — 3331090001 HH PPS REVENUE CREDIT

## 2018-08-04 PROCEDURE — G0299 HHS/HOSPICE OF RN EA 15 MIN: HCPCS

## 2018-08-05 PROCEDURE — 3331090001 HH PPS REVENUE CREDIT

## 2018-08-05 PROCEDURE — 3331090002 HH PPS REVENUE DEBIT

## 2018-08-06 PROCEDURE — 3331090001 HH PPS REVENUE CREDIT

## 2018-08-06 PROCEDURE — 3331090002 HH PPS REVENUE DEBIT

## 2018-08-07 ENCOUNTER — HOME CARE VISIT (OUTPATIENT)
Dept: SCHEDULING | Facility: HOME HEALTH | Age: 78
End: 2018-08-07
Payer: MEDICARE

## 2018-08-07 ENCOUNTER — HOME CARE VISIT (OUTPATIENT)
Dept: HOME HEALTH SERVICES | Facility: HOME HEALTH | Age: 78
End: 2018-08-07
Payer: MEDICARE

## 2018-08-07 ENCOUNTER — TELEPHONE (OUTPATIENT)
Dept: FAMILY MEDICINE CLINIC | Age: 78
End: 2018-08-07

## 2018-08-07 VITALS
RESPIRATION RATE: 20 BRPM | HEART RATE: 72 BPM | OXYGEN SATURATION: 98 % | DIASTOLIC BLOOD PRESSURE: 52 MMHG | TEMPERATURE: 99.2 F | SYSTOLIC BLOOD PRESSURE: 118 MMHG

## 2018-08-07 LAB
INR BLD: 3.8 (ref 0.9–1.1)
PT POC: 45.7 SECONDS (ref 11.8–14.9)

## 2018-08-07 PROCEDURE — 3331090002 HH PPS REVENUE DEBIT

## 2018-08-07 PROCEDURE — 3331090001 HH PPS REVENUE CREDIT

## 2018-08-07 PROCEDURE — G0300 HHS/HOSPICE OF LPN EA 15 MIN: HCPCS

## 2018-08-07 NOTE — TELEPHONE ENCOUNTER
Patient INR is 3.8-    Per Dr. Rakesh Mcleod, patient is to hodl coumadin today (tues), and Friday, and to take 4mg coumadin all other days, recheck INR in 1 week. VM left to grecia. Tried calling patient several times but went to Voicemail, detailed message left with instructions. Called Emergency contact- son, Fer Gates and left detailed voicemail.

## 2018-08-08 ENCOUNTER — HOME CARE VISIT (OUTPATIENT)
Dept: HOME HEALTH SERVICES | Facility: HOME HEALTH | Age: 78
End: 2018-08-08
Payer: MEDICARE

## 2018-08-08 PROCEDURE — 3331090002 HH PPS REVENUE DEBIT

## 2018-08-08 PROCEDURE — 3331090001 HH PPS REVENUE CREDIT

## 2018-08-09 ENCOUNTER — HOME CARE VISIT (OUTPATIENT)
Dept: SCHEDULING | Facility: HOME HEALTH | Age: 78
End: 2018-08-09
Payer: MEDICARE

## 2018-08-09 VITALS
RESPIRATION RATE: 20 BRPM | TEMPERATURE: 99.1 F | HEART RATE: 72 BPM | SYSTOLIC BLOOD PRESSURE: 118 MMHG | OXYGEN SATURATION: 98 % | DIASTOLIC BLOOD PRESSURE: 56 MMHG

## 2018-08-09 PROCEDURE — 3331090002 HH PPS REVENUE DEBIT

## 2018-08-09 PROCEDURE — 3331090001 HH PPS REVENUE CREDIT

## 2018-08-09 PROCEDURE — G0300 HHS/HOSPICE OF LPN EA 15 MIN: HCPCS

## 2018-08-10 PROCEDURE — 3331090002 HH PPS REVENUE DEBIT

## 2018-08-10 PROCEDURE — 3331090001 HH PPS REVENUE CREDIT

## 2018-08-11 ENCOUNTER — HOME CARE VISIT (OUTPATIENT)
Dept: SCHEDULING | Facility: HOME HEALTH | Age: 78
End: 2018-08-11
Payer: MEDICARE

## 2018-08-11 PROCEDURE — 3331090001 HH PPS REVENUE CREDIT

## 2018-08-11 PROCEDURE — G0299 HHS/HOSPICE OF RN EA 15 MIN: HCPCS

## 2018-08-11 PROCEDURE — 3331090002 HH PPS REVENUE DEBIT

## 2018-08-12 VITALS
DIASTOLIC BLOOD PRESSURE: 70 MMHG | OXYGEN SATURATION: 97 % | RESPIRATION RATE: 14 BRPM | HEART RATE: 82 BPM | SYSTOLIC BLOOD PRESSURE: 118 MMHG | TEMPERATURE: 98 F

## 2018-08-12 PROCEDURE — 3331090002 HH PPS REVENUE DEBIT

## 2018-08-12 PROCEDURE — 3331090001 HH PPS REVENUE CREDIT

## 2018-08-13 ENCOUNTER — HOME CARE VISIT (OUTPATIENT)
Dept: SCHEDULING | Facility: HOME HEALTH | Age: 78
End: 2018-08-13
Payer: MEDICARE

## 2018-08-13 PROCEDURE — G0299 HHS/HOSPICE OF RN EA 15 MIN: HCPCS

## 2018-08-13 PROCEDURE — 3331090002 HH PPS REVENUE DEBIT

## 2018-08-13 PROCEDURE — 3331090001 HH PPS REVENUE CREDIT

## 2018-08-14 ENCOUNTER — TELEPHONE (OUTPATIENT)
Dept: FAMILY MEDICINE CLINIC | Age: 78
End: 2018-08-14

## 2018-08-14 ENCOUNTER — HOME CARE VISIT (OUTPATIENT)
Dept: SCHEDULING | Facility: HOME HEALTH | Age: 78
End: 2018-08-14
Payer: MEDICARE

## 2018-08-14 ENCOUNTER — HOME CARE VISIT (OUTPATIENT)
Dept: HOME HEALTH SERVICES | Facility: HOME HEALTH | Age: 78
End: 2018-08-14
Payer: MEDICARE

## 2018-08-14 VITALS
TEMPERATURE: 99.4 F | DIASTOLIC BLOOD PRESSURE: 62 MMHG | RESPIRATION RATE: 20 BRPM | HEART RATE: 76 BPM | OXYGEN SATURATION: 98 % | SYSTOLIC BLOOD PRESSURE: 112 MMHG

## 2018-08-14 DIAGNOSIS — B35.6 TINEA CRURIS: Primary | ICD-10-CM

## 2018-08-14 LAB
INR BLD: 1.5 (ref 0.9–1.1)
PT POC: 17.4 SECONDS (ref 11.8–14.9)

## 2018-08-14 PROCEDURE — 3331090001 HH PPS REVENUE CREDIT

## 2018-08-14 PROCEDURE — G0300 HHS/HOSPICE OF LPN EA 15 MIN: HCPCS

## 2018-08-14 PROCEDURE — 3331090002 HH PPS REVENUE DEBIT

## 2018-08-14 RX ORDER — MICONAZOLE NITRATE 2 %
POWDER (GRAM) TOPICAL 2 TIMES DAILY
Qty: 85 G | Refills: 3 | Status: SHIPPED | OUTPATIENT
Start: 2018-08-14 | End: 2021-05-24

## 2018-08-14 NOTE — TELEPHONE ENCOUNTER
Grecia from [facility] called to report INR    INR is 1.5    PT 17.4    Per Dr. Sosa Cota, patient is to take 4mg daily. And recheck INR in 1 week. Relayed message to grecia.  She verbalized understanding    Telephone: [phone]

## 2018-08-14 NOTE — TELEPHONE ENCOUNTER
Tasia Spann from [facility] stated that pt. Has yeast infection in groin and abdominal fold.

## 2018-08-15 VITALS
OXYGEN SATURATION: 97 % | TEMPERATURE: 97.8 F | HEART RATE: 70 BPM | RESPIRATION RATE: 16 BRPM | DIASTOLIC BLOOD PRESSURE: 72 MMHG | SYSTOLIC BLOOD PRESSURE: 118 MMHG

## 2018-08-15 PROCEDURE — 3331090002 HH PPS REVENUE DEBIT

## 2018-08-15 PROCEDURE — 3331090001 HH PPS REVENUE CREDIT

## 2018-08-16 ENCOUNTER — TELEPHONE (OUTPATIENT)
Dept: FAMILY MEDICINE CLINIC | Age: 78
End: 2018-08-16

## 2018-08-16 ENCOUNTER — HOME CARE VISIT (OUTPATIENT)
Dept: SCHEDULING | Facility: HOME HEALTH | Age: 78
End: 2018-08-16
Payer: MEDICARE

## 2018-08-16 VITALS
RESPIRATION RATE: 20 BRPM | OXYGEN SATURATION: 98 % | DIASTOLIC BLOOD PRESSURE: 62 MMHG | HEART RATE: 76 BPM | SYSTOLIC BLOOD PRESSURE: 118 MMHG | TEMPERATURE: 99.4 F

## 2018-08-16 PROCEDURE — G0300 HHS/HOSPICE OF LPN EA 15 MIN: HCPCS

## 2018-08-16 PROCEDURE — 3331090001 HH PPS REVENUE CREDIT

## 2018-08-16 PROCEDURE — 3331090002 HH PPS REVENUE DEBIT

## 2018-08-16 NOTE — TELEPHONE ENCOUNTER
I have sent miconazole powder to the local pharmacy. However most topical treatments are OTC and I am not sure if insurance will pay for it.      miconazole (ZEASORB, MICONAZOLE,) 2 % topical powder sent to pharmacy.

## 2018-08-16 NOTE — TELEPHONE ENCOUNTER
Marquis Petersen from [facility] health calling to ask for more visits for wound care. She also would like to know  if patient can be prescribed santyl.

## 2018-08-17 PROCEDURE — 3331090002 HH PPS REVENUE DEBIT

## 2018-08-17 PROCEDURE — 3331090001 HH PPS REVENUE CREDIT

## 2018-08-18 ENCOUNTER — HOME CARE VISIT (OUTPATIENT)
Dept: SCHEDULING | Facility: HOME HEALTH | Age: 78
End: 2018-08-18
Payer: MEDICARE

## 2018-08-18 VITALS
HEART RATE: 64 BPM | TEMPERATURE: 97.5 F | OXYGEN SATURATION: 98 % | RESPIRATION RATE: 20 BRPM | SYSTOLIC BLOOD PRESSURE: 121 MMHG | DIASTOLIC BLOOD PRESSURE: 62 MMHG

## 2018-08-18 PROCEDURE — 3331090002 HH PPS REVENUE DEBIT

## 2018-08-18 PROCEDURE — 3331090001 HH PPS REVENUE CREDIT

## 2018-08-18 PROCEDURE — G0299 HHS/HOSPICE OF RN EA 15 MIN: HCPCS

## 2018-08-19 PROCEDURE — 3331090001 HH PPS REVENUE CREDIT

## 2018-08-19 PROCEDURE — 3331090002 HH PPS REVENUE DEBIT

## 2018-08-20 PROCEDURE — 3331090002 HH PPS REVENUE DEBIT

## 2018-08-20 PROCEDURE — 3331090001 HH PPS REVENUE CREDIT

## 2018-08-21 ENCOUNTER — TELEPHONE (OUTPATIENT)
Dept: FAMILY MEDICINE CLINIC | Age: 78
End: 2018-08-21

## 2018-08-21 ENCOUNTER — HOME CARE VISIT (OUTPATIENT)
Dept: SCHEDULING | Facility: HOME HEALTH | Age: 78
End: 2018-08-21
Payer: MEDICARE

## 2018-08-21 ENCOUNTER — HOME CARE VISIT (OUTPATIENT)
Dept: HOME HEALTH SERVICES | Facility: HOME HEALTH | Age: 78
End: 2018-08-21
Payer: MEDICARE

## 2018-08-21 VITALS
DIASTOLIC BLOOD PRESSURE: 62 MMHG | RESPIRATION RATE: 20 BRPM | TEMPERATURE: 98.8 F | SYSTOLIC BLOOD PRESSURE: 115 MMHG | HEART RATE: 72 BPM | OXYGEN SATURATION: 98 %

## 2018-08-21 LAB
INR BLD: 2.4 (ref 0.9–1.1)
PT POC: 28.6 SECONDS (ref 11.8–14.9)

## 2018-08-21 PROCEDURE — 3331090001 HH PPS REVENUE CREDIT

## 2018-08-21 PROCEDURE — 3331090002 HH PPS REVENUE DEBIT

## 2018-08-21 PROCEDURE — G0300 HHS/HOSPICE OF LPN EA 15 MIN: HCPCS

## 2018-08-21 NOTE — TELEPHONE ENCOUNTER
[place] called in the following results. PT: 28.6  INR 2.4  Pt is taking 4mg daily.     [place] can be called back at [phone]

## 2018-08-21 NOTE — TELEPHONE ENCOUNTER
Called and spoke with Mindy Cueto, Per Dr Mackenzie Hastings, patient is to continue same dosage of 4mg daily and repeat INR check in 1 week. Ofe verbalized understanding.

## 2018-08-22 PROCEDURE — 3331090002 HH PPS REVENUE DEBIT

## 2018-08-22 PROCEDURE — 3331090001 HH PPS REVENUE CREDIT

## 2018-08-23 ENCOUNTER — HOME CARE VISIT (OUTPATIENT)
Dept: SCHEDULING | Facility: HOME HEALTH | Age: 78
End: 2018-08-23
Payer: MEDICARE

## 2018-08-23 ENCOUNTER — TELEPHONE (OUTPATIENT)
Dept: FAMILY MEDICINE CLINIC | Age: 78
End: 2018-08-23

## 2018-08-23 VITALS
DIASTOLIC BLOOD PRESSURE: 88 MMHG | TEMPERATURE: 97.6 F | OXYGEN SATURATION: 98 % | RESPIRATION RATE: 20 BRPM | SYSTOLIC BLOOD PRESSURE: 124 MMHG | HEART RATE: 64 BPM

## 2018-08-23 PROCEDURE — 3331090002 HH PPS REVENUE DEBIT

## 2018-08-23 PROCEDURE — G0300 HHS/HOSPICE OF LPN EA 15 MIN: HCPCS

## 2018-08-23 PROCEDURE — 3331090001 HH PPS REVENUE CREDIT

## 2018-08-24 PROCEDURE — 3331090002 HH PPS REVENUE DEBIT

## 2018-08-24 PROCEDURE — 3331090001 HH PPS REVENUE CREDIT

## 2018-08-25 ENCOUNTER — HOME CARE VISIT (OUTPATIENT)
Dept: SCHEDULING | Facility: HOME HEALTH | Age: 78
End: 2018-08-25
Payer: MEDICARE

## 2018-08-25 VITALS
OXYGEN SATURATION: 98 % | SYSTOLIC BLOOD PRESSURE: 100 MMHG | RESPIRATION RATE: 20 BRPM | DIASTOLIC BLOOD PRESSURE: 52 MMHG | TEMPERATURE: 97 F | HEART RATE: 61 BPM

## 2018-08-25 PROCEDURE — 3331090001 HH PPS REVENUE CREDIT

## 2018-08-25 PROCEDURE — 3331090002 HH PPS REVENUE DEBIT

## 2018-08-25 PROCEDURE — G0299 HHS/HOSPICE OF RN EA 15 MIN: HCPCS

## 2018-08-26 PROCEDURE — 3331090002 HH PPS REVENUE DEBIT

## 2018-08-26 PROCEDURE — 3331090001 HH PPS REVENUE CREDIT

## 2018-08-27 PROCEDURE — 3331090001 HH PPS REVENUE CREDIT

## 2018-08-27 PROCEDURE — 3331090002 HH PPS REVENUE DEBIT

## 2018-08-28 ENCOUNTER — HOME CARE VISIT (OUTPATIENT)
Dept: HOME HEALTH SERVICES | Facility: HOME HEALTH | Age: 78
End: 2018-08-28
Payer: MEDICARE

## 2018-08-28 ENCOUNTER — HOME CARE VISIT (OUTPATIENT)
Dept: SCHEDULING | Facility: HOME HEALTH | Age: 78
End: 2018-08-28
Payer: MEDICARE

## 2018-08-28 ENCOUNTER — TELEPHONE (OUTPATIENT)
Dept: FAMILY MEDICINE CLINIC | Age: 78
End: 2018-08-28

## 2018-08-28 VITALS
RESPIRATION RATE: 20 BRPM | HEART RATE: 68 BPM | OXYGEN SATURATION: 98 % | DIASTOLIC BLOOD PRESSURE: 52 MMHG | TEMPERATURE: 99.4 F | SYSTOLIC BLOOD PRESSURE: 104 MMHG

## 2018-08-28 LAB
INR BLD: 2.2 (ref 0.9–1.1)
PT POC: 26.8 SECONDS (ref 11.8–14.9)

## 2018-08-28 PROCEDURE — 3331090002 HH PPS REVENUE DEBIT

## 2018-08-28 PROCEDURE — G0300 HHS/HOSPICE OF LPN EA 15 MIN: HCPCS

## 2018-08-28 PROCEDURE — 3331090001 HH PPS REVENUE CREDIT

## 2018-08-28 NOTE — TELEPHONE ENCOUNTER
Periwound is red and it is red with yellow slough, blanches well and nurse is very concerned. Pt has a glucometer but he has no strips, requesting [employer] true touch test strips. Ofe called and patient INR is 2.2/ 26.8 PTT. 4 mg coumadin daily. Per Dr. Katrina Harepr, continue with current dosage recheck in 1 week. Understanding verbalized. Please advise about test strips.

## 2018-08-29 PROCEDURE — 3331090001 HH PPS REVENUE CREDIT

## 2018-08-29 PROCEDURE — 3331090002 HH PPS REVENUE DEBIT

## 2018-08-30 ENCOUNTER — HOME CARE VISIT (OUTPATIENT)
Dept: SCHEDULING | Facility: HOME HEALTH | Age: 78
End: 2018-08-30
Payer: MEDICARE

## 2018-08-30 VITALS
RESPIRATION RATE: 20 BRPM | SYSTOLIC BLOOD PRESSURE: 126 MMHG | TEMPERATURE: 99.4 F | DIASTOLIC BLOOD PRESSURE: 58 MMHG | OXYGEN SATURATION: 98 % | HEART RATE: 72 BPM

## 2018-08-30 PROCEDURE — 3331090002 HH PPS REVENUE DEBIT

## 2018-08-30 PROCEDURE — G0300 HHS/HOSPICE OF LPN EA 15 MIN: HCPCS

## 2018-08-30 PROCEDURE — 3331090001 HH PPS REVENUE CREDIT

## 2018-08-31 PROCEDURE — 3331090001 HH PPS REVENUE CREDIT

## 2018-08-31 PROCEDURE — 3331090002 HH PPS REVENUE DEBIT

## 2018-09-01 ENCOUNTER — HOME CARE VISIT (OUTPATIENT)
Dept: SCHEDULING | Facility: HOME HEALTH | Age: 78
End: 2018-09-01
Payer: MEDICARE

## 2018-09-01 PROCEDURE — G0299 HHS/HOSPICE OF RN EA 15 MIN: HCPCS

## 2018-09-01 PROCEDURE — 3331090001 HH PPS REVENUE CREDIT

## 2018-09-01 PROCEDURE — 3331090002 HH PPS REVENUE DEBIT

## 2018-09-02 PROCEDURE — 3331090001 HH PPS REVENUE CREDIT

## 2018-09-02 PROCEDURE — 3331090002 HH PPS REVENUE DEBIT

## 2018-09-03 VITALS
RESPIRATION RATE: 14 BRPM | OXYGEN SATURATION: 96 % | SYSTOLIC BLOOD PRESSURE: 130 MMHG | TEMPERATURE: 99 F | HEART RATE: 72 BPM | DIASTOLIC BLOOD PRESSURE: 68 MMHG

## 2018-09-03 PROCEDURE — 3331090002 HH PPS REVENUE DEBIT

## 2018-09-03 PROCEDURE — 3331090001 HH PPS REVENUE CREDIT

## 2018-09-04 ENCOUNTER — HOME CARE VISIT (OUTPATIENT)
Dept: SCHEDULING | Facility: HOME HEALTH | Age: 78
End: 2018-09-04
Payer: MEDICARE

## 2018-09-04 VITALS
TEMPERATURE: 99.6 F | SYSTOLIC BLOOD PRESSURE: 115 MMHG | HEART RATE: 76 BPM | DIASTOLIC BLOOD PRESSURE: 58 MMHG | OXYGEN SATURATION: 98 % | RESPIRATION RATE: 20 BRPM

## 2018-09-04 LAB
INR BLD: 2.5 (ref 0.9–1.1)
PT POC: 33 SECONDS (ref 11.8–14.9)

## 2018-09-04 PROCEDURE — 3331090001 HH PPS REVENUE CREDIT

## 2018-09-04 PROCEDURE — 3331090002 HH PPS REVENUE DEBIT

## 2018-09-04 PROCEDURE — G0300 HHS/HOSPICE OF LPN EA 15 MIN: HCPCS

## 2018-09-04 NOTE — TELEPHONE ENCOUNTER
I have written the script for glucometer strips  If the wound is not looking good, pt needs to make attempt to come for follow in clinic or go to ER. I am concerned that since pt does not follow up in clinic no one is able to monitor his rigo besides the home health nurse. The other option is for pt to go to nursing home or assisted living. It is almost impossible for me to take care of pt without seeing the pt in person.

## 2018-09-05 PROCEDURE — 3331090002 HH PPS REVENUE DEBIT

## 2018-09-05 PROCEDURE — 3331090001 HH PPS REVENUE CREDIT

## 2018-09-06 ENCOUNTER — HOME CARE VISIT (OUTPATIENT)
Dept: SCHEDULING | Facility: HOME HEALTH | Age: 78
End: 2018-09-06
Payer: MEDICARE

## 2018-09-06 VITALS
DIASTOLIC BLOOD PRESSURE: 62 MMHG | HEART RATE: 76 BPM | SYSTOLIC BLOOD PRESSURE: 115 MMHG | TEMPERATURE: 99.1 F | RESPIRATION RATE: 20 BRPM | OXYGEN SATURATION: 98 %

## 2018-09-06 PROCEDURE — 3331090002 HH PPS REVENUE DEBIT

## 2018-09-06 PROCEDURE — G0300 HHS/HOSPICE OF LPN EA 15 MIN: HCPCS

## 2018-09-06 PROCEDURE — 3331090001 HH PPS REVENUE CREDIT

## 2018-09-07 PROCEDURE — 3331090001 HH PPS REVENUE CREDIT

## 2018-09-07 PROCEDURE — 3331090002 HH PPS REVENUE DEBIT

## 2018-09-08 ENCOUNTER — HOME CARE VISIT (OUTPATIENT)
Dept: SCHEDULING | Facility: HOME HEALTH | Age: 78
End: 2018-09-08
Payer: MEDICARE

## 2018-09-08 VITALS
HEART RATE: 64 BPM | DIASTOLIC BLOOD PRESSURE: 60 MMHG | RESPIRATION RATE: 20 BRPM | SYSTOLIC BLOOD PRESSURE: 110 MMHG | OXYGEN SATURATION: 98 % | TEMPERATURE: 96.8 F

## 2018-09-08 PROCEDURE — 3331090002 HH PPS REVENUE DEBIT

## 2018-09-08 PROCEDURE — 3331090001 HH PPS REVENUE CREDIT

## 2018-09-08 PROCEDURE — G0299 HHS/HOSPICE OF RN EA 15 MIN: HCPCS

## 2018-09-09 PROCEDURE — 3331090002 HH PPS REVENUE DEBIT

## 2018-09-09 PROCEDURE — 3331090001 HH PPS REVENUE CREDIT

## 2018-09-10 PROCEDURE — 3331090001 HH PPS REVENUE CREDIT

## 2018-09-10 PROCEDURE — 3331090002 HH PPS REVENUE DEBIT

## 2018-09-11 ENCOUNTER — TELEPHONE (OUTPATIENT)
Dept: FAMILY MEDICINE CLINIC | Age: 78
End: 2018-09-11

## 2018-09-11 ENCOUNTER — HOME CARE VISIT (OUTPATIENT)
Dept: SCHEDULING | Facility: HOME HEALTH | Age: 78
End: 2018-09-11
Payer: MEDICARE

## 2018-09-11 ENCOUNTER — HOME CARE VISIT (OUTPATIENT)
Dept: HOME HEALTH SERVICES | Facility: HOME HEALTH | Age: 78
End: 2018-09-11
Payer: MEDICARE

## 2018-09-11 VITALS
TEMPERATURE: 99.3 F | DIASTOLIC BLOOD PRESSURE: 60 MMHG | SYSTOLIC BLOOD PRESSURE: 118 MMHG | RESPIRATION RATE: 20 BRPM | OXYGEN SATURATION: 98 % | HEART RATE: 68 BPM

## 2018-09-11 LAB
INR BLD: 2.6 (ref 0.9–1.1)
PT POC: 30.9 SECONDS (ref 11.8–14.9)

## 2018-09-11 PROCEDURE — 3331090002 HH PPS REVENUE DEBIT

## 2018-09-11 PROCEDURE — 3331090001 HH PPS REVENUE CREDIT

## 2018-09-11 PROCEDURE — G0496 LPN CARE TRAIN/EDU IN HH: HCPCS

## 2018-09-11 NOTE — TELEPHONE ENCOUNTER
Ofe called and pt   INR 2.6  PTT 30.9 taking 4 mg daily  Per Dr. Ania Thakkar take 4 mg daily recheck in 2 weeks. Nurse advised Ofe.

## 2018-09-12 PROCEDURE — 3331090002 HH PPS REVENUE DEBIT

## 2018-09-12 PROCEDURE — 3331090001 HH PPS REVENUE CREDIT

## 2018-09-13 ENCOUNTER — HOME CARE VISIT (OUTPATIENT)
Dept: SCHEDULING | Facility: HOME HEALTH | Age: 78
End: 2018-09-13
Payer: MEDICARE

## 2018-09-13 VITALS
RESPIRATION RATE: 20 BRPM | SYSTOLIC BLOOD PRESSURE: 126 MMHG | TEMPERATURE: 99.6 F | OXYGEN SATURATION: 98 % | DIASTOLIC BLOOD PRESSURE: 60 MMHG | HEART RATE: 84 BPM

## 2018-09-13 PROCEDURE — G0300 HHS/HOSPICE OF LPN EA 15 MIN: HCPCS

## 2018-09-13 PROCEDURE — 3331090002 HH PPS REVENUE DEBIT

## 2018-09-13 PROCEDURE — 3331090001 HH PPS REVENUE CREDIT

## 2018-09-14 PROCEDURE — 3331090001 HH PPS REVENUE CREDIT

## 2018-09-14 PROCEDURE — 3331090002 HH PPS REVENUE DEBIT

## 2018-09-15 ENCOUNTER — HOME CARE VISIT (OUTPATIENT)
Dept: SCHEDULING | Facility: HOME HEALTH | Age: 78
End: 2018-09-15
Payer: MEDICARE

## 2018-09-15 VITALS
DIASTOLIC BLOOD PRESSURE: 64 MMHG | RESPIRATION RATE: 20 BRPM | SYSTOLIC BLOOD PRESSURE: 130 MMHG | OXYGEN SATURATION: 98 % | HEART RATE: 67 BPM | TEMPERATURE: 97.9 F

## 2018-09-15 PROCEDURE — 3331090001 HH PPS REVENUE CREDIT

## 2018-09-15 PROCEDURE — G0299 HHS/HOSPICE OF RN EA 15 MIN: HCPCS

## 2018-09-15 PROCEDURE — 3331090002 HH PPS REVENUE DEBIT

## 2018-09-16 PROCEDURE — 3331090001 HH PPS REVENUE CREDIT

## 2018-09-16 PROCEDURE — 3331090002 HH PPS REVENUE DEBIT

## 2018-09-17 PROCEDURE — 3331090002 HH PPS REVENUE DEBIT

## 2018-09-17 PROCEDURE — 3331090001 HH PPS REVENUE CREDIT

## 2018-09-18 ENCOUNTER — HOME CARE VISIT (OUTPATIENT)
Dept: SCHEDULING | Facility: HOME HEALTH | Age: 78
End: 2018-09-18
Payer: MEDICARE

## 2018-09-18 VITALS
RESPIRATION RATE: 20 BRPM | TEMPERATURE: 99.4 F | SYSTOLIC BLOOD PRESSURE: 115 MMHG | HEART RATE: 72 BPM | DIASTOLIC BLOOD PRESSURE: 60 MMHG | OXYGEN SATURATION: 98 %

## 2018-09-18 PROCEDURE — 3331090001 HH PPS REVENUE CREDIT

## 2018-09-18 PROCEDURE — 3331090002 HH PPS REVENUE DEBIT

## 2018-09-18 PROCEDURE — G0300 HHS/HOSPICE OF LPN EA 15 MIN: HCPCS

## 2018-09-19 PROCEDURE — 3331090002 HH PPS REVENUE DEBIT

## 2018-09-19 PROCEDURE — 3331090001 HH PPS REVENUE CREDIT

## 2018-09-20 ENCOUNTER — HOME CARE VISIT (OUTPATIENT)
Dept: SCHEDULING | Facility: HOME HEALTH | Age: 78
End: 2018-09-20
Payer: MEDICARE

## 2018-09-20 PROCEDURE — 3331090002 HH PPS REVENUE DEBIT

## 2018-09-20 PROCEDURE — 3331090001 HH PPS REVENUE CREDIT

## 2018-09-20 PROCEDURE — G0299 HHS/HOSPICE OF RN EA 15 MIN: HCPCS

## 2018-09-21 PROCEDURE — 3331090002 HH PPS REVENUE DEBIT

## 2018-09-21 PROCEDURE — 3331090001 HH PPS REVENUE CREDIT

## 2018-09-22 ENCOUNTER — HOME CARE VISIT (OUTPATIENT)
Dept: HOME HEALTH SERVICES | Facility: HOME HEALTH | Age: 78
End: 2018-09-22
Payer: MEDICARE

## 2018-09-22 VITALS
DIASTOLIC BLOOD PRESSURE: 64 MMHG | TEMPERATURE: 99 F | SYSTOLIC BLOOD PRESSURE: 118 MMHG | HEART RATE: 68 BPM | RESPIRATION RATE: 18 BRPM | OXYGEN SATURATION: 98 %

## 2018-09-22 VITALS
HEART RATE: 67 BPM | RESPIRATION RATE: 20 BRPM | TEMPERATURE: 97.2 F | OXYGEN SATURATION: 98 % | SYSTOLIC BLOOD PRESSURE: 110 MMHG | DIASTOLIC BLOOD PRESSURE: 60 MMHG

## 2018-09-22 PROCEDURE — G0299 HHS/HOSPICE OF RN EA 15 MIN: HCPCS

## 2018-09-22 PROCEDURE — 3331090001 HH PPS REVENUE CREDIT

## 2018-09-22 PROCEDURE — 3331090002 HH PPS REVENUE DEBIT

## 2018-09-23 PROCEDURE — 3331090002 HH PPS REVENUE DEBIT

## 2018-09-23 PROCEDURE — 3331090001 HH PPS REVENUE CREDIT

## 2018-09-24 ENCOUNTER — TELEPHONE (OUTPATIENT)
Dept: FAMILY MEDICINE CLINIC | Age: 78
End: 2018-09-24

## 2018-09-24 PROCEDURE — 3331090002 HH PPS REVENUE DEBIT

## 2018-09-24 PROCEDURE — 3331090001 HH PPS REVENUE CREDIT

## 2018-09-24 NOTE — TELEPHONE ENCOUNTER
home health called in to advise they are re certifying patient for home care and INR. Orders will be forwarded to office soon.

## 2018-09-25 ENCOUNTER — HOME CARE VISIT (OUTPATIENT)
Dept: HOME HEALTH SERVICES | Facility: HOME HEALTH | Age: 78
End: 2018-09-25
Payer: MEDICARE

## 2018-09-25 ENCOUNTER — HOME CARE VISIT (OUTPATIENT)
Dept: SCHEDULING | Facility: HOME HEALTH | Age: 78
End: 2018-09-25
Payer: MEDICARE

## 2018-09-25 ENCOUNTER — TELEPHONE (OUTPATIENT)
Dept: FAMILY MEDICINE CLINIC | Age: 78
End: 2018-09-25

## 2018-09-25 VITALS
SYSTOLIC BLOOD PRESSURE: 132 MMHG | RESPIRATION RATE: 20 BRPM | TEMPERATURE: 98.7 F | HEART RATE: 92 BPM | DIASTOLIC BLOOD PRESSURE: 64 MMHG | OXYGEN SATURATION: 98 %

## 2018-09-25 LAB
INR BLD: 3 (ref 0.9–1.1)
PT POC: 35.7 SECONDS (ref 11.8–14.9)

## 2018-09-25 PROCEDURE — 400014 HH F/U

## 2018-09-25 PROCEDURE — 3331090001 HH PPS REVENUE CREDIT

## 2018-09-25 PROCEDURE — G0300 HHS/HOSPICE OF LPN EA 15 MIN: HCPCS

## 2018-09-25 PROCEDURE — 3331090002 HH PPS REVENUE DEBIT

## 2018-09-25 NOTE — TELEPHONE ENCOUNTER
Ofe called in to report the following INR    INR 3.0     PT 35.7    I informed her, per Dr. Gerda Garcia, patient is to continue 4mg daily and recheck INR in 1 week on 10/02/18. Ofe verbalized understanding.

## 2018-09-26 PROCEDURE — 3331090002 HH PPS REVENUE DEBIT

## 2018-09-26 PROCEDURE — 3331090001 HH PPS REVENUE CREDIT

## 2018-09-27 ENCOUNTER — HOME CARE VISIT (OUTPATIENT)
Dept: SCHEDULING | Facility: HOME HEALTH | Age: 78
End: 2018-09-27
Payer: MEDICARE

## 2018-09-27 VITALS
TEMPERATURE: 99.6 F | HEART RATE: 72 BPM | DIASTOLIC BLOOD PRESSURE: 60 MMHG | RESPIRATION RATE: 20 BRPM | SYSTOLIC BLOOD PRESSURE: 106 MMHG | OXYGEN SATURATION: 98 %

## 2018-09-27 PROCEDURE — 3331090001 HH PPS REVENUE CREDIT

## 2018-09-27 PROCEDURE — G0300 HHS/HOSPICE OF LPN EA 15 MIN: HCPCS

## 2018-09-27 PROCEDURE — 3331090002 HH PPS REVENUE DEBIT

## 2018-09-28 PROCEDURE — 3331090001 HH PPS REVENUE CREDIT

## 2018-09-28 PROCEDURE — 3331090002 HH PPS REVENUE DEBIT

## 2018-09-29 ENCOUNTER — HOME CARE VISIT (OUTPATIENT)
Dept: SCHEDULING | Facility: HOME HEALTH | Age: 78
End: 2018-09-29
Payer: MEDICARE

## 2018-09-29 PROCEDURE — 3331090002 HH PPS REVENUE DEBIT

## 2018-09-29 PROCEDURE — G0299 HHS/HOSPICE OF RN EA 15 MIN: HCPCS

## 2018-09-29 PROCEDURE — 3331090001 HH PPS REVENUE CREDIT

## 2018-09-30 PROCEDURE — 3331090002 HH PPS REVENUE DEBIT

## 2018-09-30 PROCEDURE — 3331090001 HH PPS REVENUE CREDIT

## 2018-10-01 VITALS
RESPIRATION RATE: 16 BRPM | SYSTOLIC BLOOD PRESSURE: 122 MMHG | DIASTOLIC BLOOD PRESSURE: 70 MMHG | OXYGEN SATURATION: 99 % | HEART RATE: 64 BPM | TEMPERATURE: 98.8 F

## 2018-10-01 PROCEDURE — 3331090001 HH PPS REVENUE CREDIT

## 2018-10-01 PROCEDURE — 3331090002 HH PPS REVENUE DEBIT

## 2018-10-02 ENCOUNTER — HOME CARE VISIT (OUTPATIENT)
Dept: SCHEDULING | Facility: HOME HEALTH | Age: 78
End: 2018-10-02
Payer: MEDICARE

## 2018-10-02 ENCOUNTER — TELEPHONE (OUTPATIENT)
Dept: FAMILY MEDICINE CLINIC | Age: 78
End: 2018-10-02

## 2018-10-02 VITALS
OXYGEN SATURATION: 99 % | DIASTOLIC BLOOD PRESSURE: 76 MMHG | TEMPERATURE: 99.6 F | HEART RATE: 72 BPM | SYSTOLIC BLOOD PRESSURE: 118 MMHG

## 2018-10-02 LAB
INR BLD: 2.4 (ref 0.9–1.1)
PT POC: 28.5 SECONDS (ref 11.8–14.9)

## 2018-10-02 PROCEDURE — 3331090001 HH PPS REVENUE CREDIT

## 2018-10-02 PROCEDURE — G0300 HHS/HOSPICE OF LPN EA 15 MIN: HCPCS

## 2018-10-02 PROCEDURE — 3331090002 HH PPS REVENUE DEBIT

## 2018-10-03 PROCEDURE — 3331090001 HH PPS REVENUE CREDIT

## 2018-10-03 PROCEDURE — 3331090002 HH PPS REVENUE DEBIT

## 2018-10-04 ENCOUNTER — HOME CARE VISIT (OUTPATIENT)
Dept: SCHEDULING | Facility: HOME HEALTH | Age: 78
End: 2018-10-04
Payer: MEDICARE

## 2018-10-04 VITALS
TEMPERATURE: 98.9 F | OXYGEN SATURATION: 98 % | SYSTOLIC BLOOD PRESSURE: 126 MMHG | DIASTOLIC BLOOD PRESSURE: 68 MMHG | RESPIRATION RATE: 20 BRPM | HEART RATE: 72 BPM

## 2018-10-04 PROCEDURE — G0300 HHS/HOSPICE OF LPN EA 15 MIN: HCPCS

## 2018-10-04 PROCEDURE — 3331090002 HH PPS REVENUE DEBIT

## 2018-10-04 PROCEDURE — 3331090001 HH PPS REVENUE CREDIT

## 2018-10-05 PROCEDURE — 3331090001 HH PPS REVENUE CREDIT

## 2018-10-05 PROCEDURE — 3331090002 HH PPS REVENUE DEBIT

## 2018-10-06 ENCOUNTER — HOME CARE VISIT (OUTPATIENT)
Dept: SCHEDULING | Facility: HOME HEALTH | Age: 78
End: 2018-10-06
Payer: MEDICARE

## 2018-10-06 PROCEDURE — 3331090001 HH PPS REVENUE CREDIT

## 2018-10-06 PROCEDURE — 3331090002 HH PPS REVENUE DEBIT

## 2018-10-06 PROCEDURE — G0300 HHS/HOSPICE OF LPN EA 15 MIN: HCPCS

## 2018-10-07 VITALS
RESPIRATION RATE: 20 BRPM | OXYGEN SATURATION: 98 % | TEMPERATURE: 98.7 F | HEART RATE: 88 BPM | SYSTOLIC BLOOD PRESSURE: 145 MMHG | DIASTOLIC BLOOD PRESSURE: 78 MMHG

## 2018-10-07 PROCEDURE — 3331090001 HH PPS REVENUE CREDIT

## 2018-10-07 PROCEDURE — 3331090002 HH PPS REVENUE DEBIT

## 2018-10-08 PROCEDURE — 3331090001 HH PPS REVENUE CREDIT

## 2018-10-08 PROCEDURE — 3331090002 HH PPS REVENUE DEBIT

## 2018-10-09 ENCOUNTER — HOME CARE VISIT (OUTPATIENT)
Dept: SCHEDULING | Facility: HOME HEALTH | Age: 78
End: 2018-10-09
Payer: MEDICARE

## 2018-10-09 ENCOUNTER — HOME CARE VISIT (OUTPATIENT)
Dept: HOME HEALTH SERVICES | Facility: HOME HEALTH | Age: 78
End: 2018-10-09
Payer: MEDICARE

## 2018-10-09 ENCOUNTER — TELEPHONE (OUTPATIENT)
Dept: FAMILY MEDICINE CLINIC | Age: 78
End: 2018-10-09

## 2018-10-09 VITALS
TEMPERATURE: 99.1 F | OXYGEN SATURATION: 98 % | SYSTOLIC BLOOD PRESSURE: 120 MMHG | RESPIRATION RATE: 20 BRPM | DIASTOLIC BLOOD PRESSURE: 60 MMHG | HEART RATE: 72 BPM

## 2018-10-09 LAB
INR BLD: 2.2 (ref 0.9–1.1)
PT POC: 26.4 SECONDS (ref 11.8–14.9)

## 2018-10-09 PROCEDURE — 3331090002 HH PPS REVENUE DEBIT

## 2018-10-09 PROCEDURE — 3331090001 HH PPS REVENUE CREDIT

## 2018-10-09 PROCEDURE — G0300 HHS/HOSPICE OF LPN EA 15 MIN: HCPCS

## 2018-10-09 NOTE — TELEPHONE ENCOUNTER
Home health called with PT/INR results  PT 26.4  INR 2.4  Patient taking 4mg coumadin daily, has 4mg tabs at home. Dr STRANGE made aware and told to recheck patient in 2 weeks no change to medications. Called Ofe and left message.

## 2018-10-10 DIAGNOSIS — M79.89 LEG SWELLING: ICD-10-CM

## 2018-10-10 DIAGNOSIS — E11.9 DM TYPE 2, GOAL HBA1C < 7% (HCC): ICD-10-CM

## 2018-10-10 DIAGNOSIS — R26.81 GAIT INSTABILITY: ICD-10-CM

## 2018-10-10 DIAGNOSIS — G62.9 NEUROPATHY: ICD-10-CM

## 2018-10-10 PROCEDURE — 3331090001 HH PPS REVENUE CREDIT

## 2018-10-10 PROCEDURE — 3331090002 HH PPS REVENUE DEBIT

## 2018-10-10 RX ORDER — GABAPENTIN 600 MG/1
TABLET ORAL
Qty: 120 TAB | Refills: 3 | Status: SHIPPED | OUTPATIENT
Start: 2018-10-10 | End: 2019-01-09 | Stop reason: SDUPTHER

## 2018-10-10 RX ORDER — FUROSEMIDE 40 MG/1
40 TABLET ORAL DAILY
Qty: 30 TAB | Refills: 0 | Status: SHIPPED | OUTPATIENT
Start: 2018-10-10 | End: 2018-12-24 | Stop reason: SDUPTHER

## 2018-10-10 RX ORDER — METFORMIN HYDROCHLORIDE 500 MG/1
500 TABLET ORAL 2 TIMES DAILY WITH MEALS
Qty: 60 TAB | Refills: 3 | Status: SHIPPED | OUTPATIENT
Start: 2018-10-10 | End: 2019-01-14 | Stop reason: SDUPTHER

## 2018-10-11 ENCOUNTER — HOME CARE VISIT (OUTPATIENT)
Dept: SCHEDULING | Facility: HOME HEALTH | Age: 78
End: 2018-10-11
Payer: MEDICARE

## 2018-10-11 VITALS
SYSTOLIC BLOOD PRESSURE: 116 MMHG | OXYGEN SATURATION: 98 % | TEMPERATURE: 99.5 F | HEART RATE: 76 BPM | DIASTOLIC BLOOD PRESSURE: 80 MMHG | RESPIRATION RATE: 20 BRPM

## 2018-10-11 PROCEDURE — 3331090002 HH PPS REVENUE DEBIT

## 2018-10-11 PROCEDURE — G0300 HHS/HOSPICE OF LPN EA 15 MIN: HCPCS

## 2018-10-11 PROCEDURE — 3331090001 HH PPS REVENUE CREDIT

## 2018-10-12 PROCEDURE — 3331090002 HH PPS REVENUE DEBIT

## 2018-10-12 PROCEDURE — 3331090001 HH PPS REVENUE CREDIT

## 2018-10-13 ENCOUNTER — HOME CARE VISIT (OUTPATIENT)
Dept: SCHEDULING | Facility: HOME HEALTH | Age: 78
End: 2018-10-13
Payer: MEDICARE

## 2018-10-13 PROCEDURE — 3331090002 HH PPS REVENUE DEBIT

## 2018-10-13 PROCEDURE — 3331090001 HH PPS REVENUE CREDIT

## 2018-10-13 PROCEDURE — G0299 HHS/HOSPICE OF RN EA 15 MIN: HCPCS

## 2018-10-14 PROCEDURE — 3331090001 HH PPS REVENUE CREDIT

## 2018-10-14 PROCEDURE — 3331090002 HH PPS REVENUE DEBIT

## 2018-10-15 PROCEDURE — 3331090002 HH PPS REVENUE DEBIT

## 2018-10-15 PROCEDURE — 3331090001 HH PPS REVENUE CREDIT

## 2018-10-16 ENCOUNTER — HOME CARE VISIT (OUTPATIENT)
Dept: SCHEDULING | Facility: HOME HEALTH | Age: 78
End: 2018-10-16
Payer: MEDICARE

## 2018-10-16 VITALS
RESPIRATION RATE: 20 BRPM | DIASTOLIC BLOOD PRESSURE: 62 MMHG | HEART RATE: 72 BPM | TEMPERATURE: 98.7 F | SYSTOLIC BLOOD PRESSURE: 115 MMHG | OXYGEN SATURATION: 99 %

## 2018-10-16 VITALS
DIASTOLIC BLOOD PRESSURE: 70 MMHG | RESPIRATION RATE: 16 BRPM | TEMPERATURE: 97.7 F | SYSTOLIC BLOOD PRESSURE: 122 MMHG | OXYGEN SATURATION: 97 % | HEART RATE: 64 BPM

## 2018-10-16 PROCEDURE — 3331090001 HH PPS REVENUE CREDIT

## 2018-10-16 PROCEDURE — 3331090002 HH PPS REVENUE DEBIT

## 2018-10-16 PROCEDURE — G0300 HHS/HOSPICE OF LPN EA 15 MIN: HCPCS

## 2018-10-17 PROCEDURE — 3331090002 HH PPS REVENUE DEBIT

## 2018-10-17 PROCEDURE — 3331090001 HH PPS REVENUE CREDIT

## 2018-10-18 ENCOUNTER — HOME CARE VISIT (OUTPATIENT)
Dept: SCHEDULING | Facility: HOME HEALTH | Age: 78
End: 2018-10-18
Payer: MEDICARE

## 2018-10-18 PROCEDURE — G0300 HHS/HOSPICE OF LPN EA 15 MIN: HCPCS

## 2018-10-18 PROCEDURE — 3331090001 HH PPS REVENUE CREDIT

## 2018-10-18 PROCEDURE — 3331090002 HH PPS REVENUE DEBIT

## 2018-10-19 VITALS
HEART RATE: 72 BPM | RESPIRATION RATE: 20 BRPM | TEMPERATURE: 98.8 F | OXYGEN SATURATION: 98 % | DIASTOLIC BLOOD PRESSURE: 62 MMHG | SYSTOLIC BLOOD PRESSURE: 118 MMHG

## 2018-10-19 PROCEDURE — 3331090002 HH PPS REVENUE DEBIT

## 2018-10-19 PROCEDURE — 3331090001 HH PPS REVENUE CREDIT

## 2018-10-20 ENCOUNTER — HOME CARE VISIT (OUTPATIENT)
Dept: SCHEDULING | Facility: HOME HEALTH | Age: 78
End: 2018-10-20
Payer: MEDICARE

## 2018-10-20 VITALS
HEART RATE: 64 BPM | SYSTOLIC BLOOD PRESSURE: 115 MMHG | OXYGEN SATURATION: 98 % | RESPIRATION RATE: 20 BRPM | TEMPERATURE: 98.4 F | DIASTOLIC BLOOD PRESSURE: 60 MMHG

## 2018-10-20 PROCEDURE — 3331090001 HH PPS REVENUE CREDIT

## 2018-10-20 PROCEDURE — 3331090002 HH PPS REVENUE DEBIT

## 2018-10-20 PROCEDURE — G0299 HHS/HOSPICE OF RN EA 15 MIN: HCPCS

## 2018-10-21 PROCEDURE — 3331090002 HH PPS REVENUE DEBIT

## 2018-10-21 PROCEDURE — 3331090001 HH PPS REVENUE CREDIT

## 2018-10-22 PROCEDURE — 3331090001 HH PPS REVENUE CREDIT

## 2018-10-22 PROCEDURE — 3331090002 HH PPS REVENUE DEBIT

## 2018-10-23 ENCOUNTER — HOME CARE VISIT (OUTPATIENT)
Dept: SCHEDULING | Facility: HOME HEALTH | Age: 78
End: 2018-10-23
Payer: MEDICARE

## 2018-10-23 ENCOUNTER — HOME CARE VISIT (OUTPATIENT)
Dept: HOME HEALTH SERVICES | Facility: HOME HEALTH | Age: 78
End: 2018-10-23
Payer: MEDICARE

## 2018-10-23 ENCOUNTER — TELEPHONE (OUTPATIENT)
Dept: FAMILY MEDICINE CLINIC | Age: 78
End: 2018-10-23

## 2018-10-23 VITALS
OXYGEN SATURATION: 98 % | RESPIRATION RATE: 18 BRPM | DIASTOLIC BLOOD PRESSURE: 58 MMHG | HEART RATE: 72 BPM | TEMPERATURE: 98.8 F | SYSTOLIC BLOOD PRESSURE: 100 MMHG

## 2018-10-23 LAB
INR BLD: 2.9 (ref 0.9–1.1)
PT POC: 34.4 SECONDS (ref 11.8–14.9)

## 2018-10-23 PROCEDURE — 3331090001 HH PPS REVENUE CREDIT

## 2018-10-23 PROCEDURE — 3331090002 HH PPS REVENUE DEBIT

## 2018-10-23 PROCEDURE — G0300 HHS/HOSPICE OF LPN EA 15 MIN: HCPCS

## 2018-10-23 NOTE — TELEPHONE ENCOUNTER
[place] from [facility] called in the following results:    PT : 34.4  INR: 2.9    Advised grecia, per dr Dasia Crawley to continue same dosage (4mg daily) and recheck in 1 week. [place] verbalized understanding.

## 2018-10-24 PROCEDURE — 3331090001 HH PPS REVENUE CREDIT

## 2018-10-24 PROCEDURE — 3331090002 HH PPS REVENUE DEBIT

## 2018-10-25 ENCOUNTER — HOME CARE VISIT (OUTPATIENT)
Dept: SCHEDULING | Facility: HOME HEALTH | Age: 78
End: 2018-10-25
Payer: MEDICARE

## 2018-10-25 VITALS
DIASTOLIC BLOOD PRESSURE: 64 MMHG | RESPIRATION RATE: 20 BRPM | HEART RATE: 76 BPM | OXYGEN SATURATION: 98 % | SYSTOLIC BLOOD PRESSURE: 126 MMHG | TEMPERATURE: 98.2 F

## 2018-10-25 PROCEDURE — G0300 HHS/HOSPICE OF LPN EA 15 MIN: HCPCS

## 2018-10-25 PROCEDURE — 3331090001 HH PPS REVENUE CREDIT

## 2018-10-25 PROCEDURE — 3331090002 HH PPS REVENUE DEBIT

## 2018-10-26 PROCEDURE — 3331090001 HH PPS REVENUE CREDIT

## 2018-10-26 PROCEDURE — 3331090002 HH PPS REVENUE DEBIT

## 2018-10-27 ENCOUNTER — HOME CARE VISIT (OUTPATIENT)
Dept: SCHEDULING | Facility: HOME HEALTH | Age: 78
End: 2018-10-27
Payer: MEDICARE

## 2018-10-27 VITALS
SYSTOLIC BLOOD PRESSURE: 126 MMHG | RESPIRATION RATE: 16 BRPM | TEMPERATURE: 98.1 F | DIASTOLIC BLOOD PRESSURE: 80 MMHG | HEART RATE: 66 BPM | OXYGEN SATURATION: 97 %

## 2018-10-27 PROCEDURE — 3331090001 HH PPS REVENUE CREDIT

## 2018-10-27 PROCEDURE — 3331090002 HH PPS REVENUE DEBIT

## 2018-10-27 PROCEDURE — G0299 HHS/HOSPICE OF RN EA 15 MIN: HCPCS

## 2018-10-28 PROCEDURE — 3331090001 HH PPS REVENUE CREDIT

## 2018-10-28 PROCEDURE — 3331090002 HH PPS REVENUE DEBIT

## 2018-10-29 PROCEDURE — 3331090001 HH PPS REVENUE CREDIT

## 2018-10-29 PROCEDURE — 3331090002 HH PPS REVENUE DEBIT

## 2018-10-30 ENCOUNTER — HOME CARE VISIT (OUTPATIENT)
Dept: SCHEDULING | Facility: HOME HEALTH | Age: 78
End: 2018-10-30
Payer: MEDICARE

## 2018-10-30 ENCOUNTER — HOME CARE VISIT (OUTPATIENT)
Dept: HOME HEALTH SERVICES | Facility: HOME HEALTH | Age: 78
End: 2018-10-30
Payer: MEDICARE

## 2018-10-30 VITALS
OXYGEN SATURATION: 98 % | SYSTOLIC BLOOD PRESSURE: 122 MMHG | RESPIRATION RATE: 20 BRPM | TEMPERATURE: 98.5 F | HEART RATE: 76 BPM | DIASTOLIC BLOOD PRESSURE: 64 MMHG

## 2018-10-30 LAB
INR BLD: 2.9 (ref 0.9–1.1)
PT POC: 34.8 SECONDS (ref 11.8–14.9)

## 2018-10-30 PROCEDURE — 3331090002 HH PPS REVENUE DEBIT

## 2018-10-30 PROCEDURE — G0300 HHS/HOSPICE OF LPN EA 15 MIN: HCPCS

## 2018-10-30 PROCEDURE — 3331090001 HH PPS REVENUE CREDIT

## 2018-10-31 PROCEDURE — 3331090002 HH PPS REVENUE DEBIT

## 2018-10-31 PROCEDURE — 3331090001 HH PPS REVENUE CREDIT

## 2018-11-01 ENCOUNTER — HOME CARE VISIT (OUTPATIENT)
Dept: SCHEDULING | Facility: HOME HEALTH | Age: 78
End: 2018-11-01
Payer: MEDICARE

## 2018-11-01 VITALS
OXYGEN SATURATION: 98 % | TEMPERATURE: 98.4 F | DIASTOLIC BLOOD PRESSURE: 60 MMHG | HEART RATE: 64 BPM | RESPIRATION RATE: 20 BRPM | SYSTOLIC BLOOD PRESSURE: 104 MMHG

## 2018-11-01 PROCEDURE — 3331090002 HH PPS REVENUE DEBIT

## 2018-11-01 PROCEDURE — G0300 HHS/HOSPICE OF LPN EA 15 MIN: HCPCS

## 2018-11-01 PROCEDURE — 3331090001 HH PPS REVENUE CREDIT

## 2018-11-02 PROCEDURE — 3331090002 HH PPS REVENUE DEBIT

## 2018-11-02 PROCEDURE — 3331090001 HH PPS REVENUE CREDIT

## 2018-11-03 ENCOUNTER — HOME CARE VISIT (OUTPATIENT)
Dept: SCHEDULING | Facility: HOME HEALTH | Age: 78
End: 2018-11-03
Payer: MEDICARE

## 2018-11-03 VITALS
OXYGEN SATURATION: 98 % | HEART RATE: 67 BPM | DIASTOLIC BLOOD PRESSURE: 64 MMHG | SYSTOLIC BLOOD PRESSURE: 124 MMHG | TEMPERATURE: 97.2 F | RESPIRATION RATE: 20 BRPM

## 2018-11-03 PROCEDURE — 3331090002 HH PPS REVENUE DEBIT

## 2018-11-03 PROCEDURE — G0299 HHS/HOSPICE OF RN EA 15 MIN: HCPCS

## 2018-11-03 PROCEDURE — 3331090001 HH PPS REVENUE CREDIT

## 2018-11-04 PROCEDURE — 3331090002 HH PPS REVENUE DEBIT

## 2018-11-04 PROCEDURE — 3331090001 HH PPS REVENUE CREDIT

## 2018-11-05 PROCEDURE — 3331090001 HH PPS REVENUE CREDIT

## 2018-11-05 PROCEDURE — 3331090002 HH PPS REVENUE DEBIT

## 2018-11-06 ENCOUNTER — TELEPHONE (OUTPATIENT)
Dept: FAMILY MEDICINE CLINIC | Age: 78
End: 2018-11-06

## 2018-11-06 ENCOUNTER — HOME CARE VISIT (OUTPATIENT)
Dept: SCHEDULING | Facility: HOME HEALTH | Age: 78
End: 2018-11-06
Payer: MEDICARE

## 2018-11-06 ENCOUNTER — HOME CARE VISIT (OUTPATIENT)
Dept: HOME HEALTH SERVICES | Facility: HOME HEALTH | Age: 78
End: 2018-11-06
Payer: MEDICARE

## 2018-11-06 VITALS
TEMPERATURE: 99 F | OXYGEN SATURATION: 99 % | DIASTOLIC BLOOD PRESSURE: 62 MMHG | HEART RATE: 76 BPM | RESPIRATION RATE: 20 BRPM | SYSTOLIC BLOOD PRESSURE: 120 MMHG

## 2018-11-06 LAB
INR BLD: 2.5 (ref 0.9–1.1)
PT POC: 29.6 SECONDS (ref 11.8–14.9)

## 2018-11-06 PROCEDURE — 3331090001 HH PPS REVENUE CREDIT

## 2018-11-06 PROCEDURE — 3331090002 HH PPS REVENUE DEBIT

## 2018-11-06 PROCEDURE — G0300 HHS/HOSPICE OF LPN EA 15 MIN: HCPCS

## 2018-11-07 PROCEDURE — 3331090002 HH PPS REVENUE DEBIT

## 2018-11-07 PROCEDURE — 3331090001 HH PPS REVENUE CREDIT

## 2018-11-08 ENCOUNTER — HOME CARE VISIT (OUTPATIENT)
Dept: SCHEDULING | Facility: HOME HEALTH | Age: 78
End: 2018-11-08
Payer: MEDICARE

## 2018-11-08 VITALS
RESPIRATION RATE: 20 BRPM | TEMPERATURE: 98.8 F | SYSTOLIC BLOOD PRESSURE: 122 MMHG | HEART RATE: 76 BPM | DIASTOLIC BLOOD PRESSURE: 60 MMHG | OXYGEN SATURATION: 98 %

## 2018-11-08 PROCEDURE — G0300 HHS/HOSPICE OF LPN EA 15 MIN: HCPCS

## 2018-11-08 PROCEDURE — 3331090001 HH PPS REVENUE CREDIT

## 2018-11-08 PROCEDURE — 3331090002 HH PPS REVENUE DEBIT

## 2018-11-09 PROCEDURE — 3331090001 HH PPS REVENUE CREDIT

## 2018-11-09 PROCEDURE — 3331090002 HH PPS REVENUE DEBIT

## 2018-11-10 ENCOUNTER — HOME CARE VISIT (OUTPATIENT)
Dept: SCHEDULING | Facility: HOME HEALTH | Age: 78
End: 2018-11-10
Payer: MEDICARE

## 2018-11-10 PROCEDURE — 3331090002 HH PPS REVENUE DEBIT

## 2018-11-10 PROCEDURE — 3331090001 HH PPS REVENUE CREDIT

## 2018-11-10 PROCEDURE — G0299 HHS/HOSPICE OF RN EA 15 MIN: HCPCS

## 2018-11-11 VITALS
DIASTOLIC BLOOD PRESSURE: 58 MMHG | HEART RATE: 68 BPM | TEMPERATURE: 96.8 F | OXYGEN SATURATION: 99 % | SYSTOLIC BLOOD PRESSURE: 104 MMHG | RESPIRATION RATE: 20 BRPM

## 2018-11-11 PROCEDURE — 3331090001 HH PPS REVENUE CREDIT

## 2018-11-11 PROCEDURE — 3331090002 HH PPS REVENUE DEBIT

## 2018-11-12 PROCEDURE — 3331090001 HH PPS REVENUE CREDIT

## 2018-11-12 PROCEDURE — 3331090002 HH PPS REVENUE DEBIT

## 2018-11-13 ENCOUNTER — TELEPHONE (OUTPATIENT)
Dept: FAMILY MEDICINE CLINIC | Age: 78
End: 2018-11-13

## 2018-11-13 ENCOUNTER — HOME CARE VISIT (OUTPATIENT)
Dept: SCHEDULING | Facility: HOME HEALTH | Age: 78
End: 2018-11-13
Payer: MEDICARE

## 2018-11-13 VITALS
DIASTOLIC BLOOD PRESSURE: 60 MMHG | HEART RATE: 72 BPM | RESPIRATION RATE: 20 BRPM | TEMPERATURE: 99.1 F | OXYGEN SATURATION: 99 % | SYSTOLIC BLOOD PRESSURE: 134 MMHG

## 2018-11-13 LAB
INR BLD: 1.8 (ref 0.9–1.1)
PT POC: 21.7 SECONDS (ref 11.8–14.9)

## 2018-11-13 PROCEDURE — 3331090001 HH PPS REVENUE CREDIT

## 2018-11-13 PROCEDURE — 3331090002 HH PPS REVENUE DEBIT

## 2018-11-13 PROCEDURE — G0300 HHS/HOSPICE OF LPN EA 15 MIN: HCPCS

## 2018-11-13 NOTE — TELEPHONE ENCOUNTER
Lisa from [address] care called and informed me that pt's inr is 1.8 and pt is 21.7- pt is taking 4mg daily. Per dr Mauricio De Los Santos, informed [facility] that patient can continue 4mg daily and recheck in 1 week.  Lisa verbalized understanding

## 2018-11-14 ENCOUNTER — OFFICE VISIT (OUTPATIENT)
Dept: FAMILY MEDICINE CLINIC | Age: 78
End: 2018-11-14

## 2018-11-14 ENCOUNTER — HOSPITAL ENCOUNTER (OUTPATIENT)
Dept: LAB | Age: 78
Discharge: HOME | End: 2018-11-14
Payer: MEDICARE

## 2018-11-14 ENCOUNTER — TELEPHONE (OUTPATIENT)
Dept: FAMILY MEDICINE CLINIC | Age: 78
End: 2018-11-14

## 2018-11-14 ENCOUNTER — HOME CARE VISIT (OUTPATIENT)
Dept: HOME HEALTH SERVICES | Facility: HOME HEALTH | Age: 78
End: 2018-11-14
Payer: MEDICARE

## 2018-11-14 VITALS
DIASTOLIC BLOOD PRESSURE: 60 MMHG | RESPIRATION RATE: 18 BRPM | OXYGEN SATURATION: 98 % | TEMPERATURE: 97.3 F | BODY MASS INDEX: 34.1 KG/M2 | WEIGHT: 280 LBS | HEIGHT: 76 IN | HEART RATE: 65 BPM | SYSTOLIC BLOOD PRESSURE: 116 MMHG

## 2018-11-14 DIAGNOSIS — I48.0 PAF (PAROXYSMAL ATRIAL FIBRILLATION) (HCC): ICD-10-CM

## 2018-11-14 DIAGNOSIS — I42.8 OTHER CARDIOMYOPATHY (HCC): ICD-10-CM

## 2018-11-14 DIAGNOSIS — S81.802A WOUND OF LEFT LOWER EXTREMITY, INITIAL ENCOUNTER: ICD-10-CM

## 2018-11-14 DIAGNOSIS — I63.9 CEREBROVASCULAR ACCIDENT (CVA), UNSPECIFIED MECHANISM (HCC): ICD-10-CM

## 2018-11-14 DIAGNOSIS — I73.9 CLAUDICATION (HCC): ICD-10-CM

## 2018-11-14 DIAGNOSIS — E11.9 DM TYPE 2, GOAL HBA1C < 7% (HCC): ICD-10-CM

## 2018-11-14 DIAGNOSIS — R53.83 FATIGUE, UNSPECIFIED TYPE: ICD-10-CM

## 2018-11-14 DIAGNOSIS — Z12.11 COLON CANCER SCREENING: ICD-10-CM

## 2018-11-14 DIAGNOSIS — M79.89 LEG SWELLING: ICD-10-CM

## 2018-11-14 DIAGNOSIS — I42.8 OTHER CARDIOMYOPATHY (HCC): Primary | ICD-10-CM

## 2018-11-14 DIAGNOSIS — G62.9 PERIPHERAL POLYNEUROPATHY: ICD-10-CM

## 2018-11-14 DIAGNOSIS — M79.89 FOOT SWELLING: ICD-10-CM

## 2018-11-14 DIAGNOSIS — J43.8 OTHER EMPHYSEMA (HCC): ICD-10-CM

## 2018-11-14 DIAGNOSIS — D64.9 ANEMIA, UNSPECIFIED TYPE: ICD-10-CM

## 2018-11-14 LAB
ALBUMIN SERPL-MCNC: 3.6 G/DL (ref 3.4–5)
ALBUMIN/GLOB SERPL: 0.8 {RATIO} (ref 0.8–1.7)
ALP SERPL-CCNC: 59 U/L (ref 45–117)
ALT SERPL-CCNC: 26 U/L (ref 16–61)
ANION GAP SERPL CALC-SCNC: 9 MMOL/L (ref 3–18)
AST SERPL-CCNC: 22 U/L (ref 15–37)
BASOPHILS # BLD: 0 K/UL (ref 0–0.1)
BASOPHILS NFR BLD: 0 % (ref 0–2)
BILIRUB SERPL-MCNC: 0.4 MG/DL (ref 0.2–1)
BNP SERPL-MCNC: 703 PG/ML (ref 0–1800)
BUN SERPL-MCNC: 17 MG/DL (ref 7–18)
BUN/CREAT SERPL: 20 (ref 12–20)
CALCIUM SERPL-MCNC: 8.6 MG/DL (ref 8.5–10.1)
CHLORIDE SERPL-SCNC: 103 MMOL/L (ref 100–108)
CHOLEST SERPL-MCNC: 132 MG/DL
CO2 SERPL-SCNC: 24 MMOL/L (ref 21–32)
CREAT SERPL-MCNC: 0.85 MG/DL (ref 0.6–1.3)
DIFFERENTIAL METHOD BLD: ABNORMAL
EOSINOPHIL # BLD: 0.2 K/UL (ref 0–0.4)
EOSINOPHIL NFR BLD: 3 % (ref 0–5)
ERYTHROCYTE [DISTWIDTH] IN BLOOD BY AUTOMATED COUNT: 15.4 % (ref 11.6–14.5)
ERYTHROCYTE [SEDIMENTATION RATE] IN BLOOD: 31 MM/HR (ref 0–20)
EST. AVERAGE GLUCOSE BLD GHB EST-MCNC: 148 MG/DL
GLOBULIN SER CALC-MCNC: 4.4 G/DL (ref 2–4)
GLUCOSE SERPL-MCNC: 111 MG/DL (ref 74–99)
HBA1C MFR BLD: 6.8 % (ref 4.2–5.6)
HCT VFR BLD AUTO: 38 % (ref 36–48)
HDLC SERPL-MCNC: 28 MG/DL (ref 40–60)
HDLC SERPL: 4.7 {RATIO} (ref 0–5)
HGB BLD-MCNC: 12.3 G/DL (ref 13–16)
LDLC SERPL CALC-MCNC: 67.4 MG/DL (ref 0–100)
LIPID PROFILE,FLP: ABNORMAL
LYMPHOCYTES # BLD: 1.8 K/UL (ref 0.9–3.6)
LYMPHOCYTES NFR BLD: 28 % (ref 21–52)
MCH RBC QN AUTO: 30.8 PG (ref 24–34)
MCHC RBC AUTO-ENTMCNC: 32.4 G/DL (ref 31–37)
MCV RBC AUTO: 95 FL (ref 74–97)
MONOCYTES # BLD: 0.6 K/UL (ref 0.05–1.2)
MONOCYTES NFR BLD: 9 % (ref 3–10)
NEUTS SEG # BLD: 3.8 K/UL (ref 1.8–8)
NEUTS SEG NFR BLD: 60 % (ref 40–73)
PLATELET # BLD AUTO: 184 K/UL (ref 135–420)
PMV BLD AUTO: 9.3 FL (ref 9.2–11.8)
POTASSIUM SERPL-SCNC: 4.5 MMOL/L (ref 3.5–5.5)
PROT SERPL-MCNC: 8 G/DL (ref 6.4–8.2)
RBC # BLD AUTO: 4 M/UL (ref 4.7–5.5)
SODIUM SERPL-SCNC: 136 MMOL/L (ref 136–145)
TRIGL SERPL-MCNC: 183 MG/DL (ref ?–150)
TSH SERPL DL<=0.05 MIU/L-ACNC: 1.1 UIU/ML (ref 0.36–3.74)
TSH SERPL DL<=0.05 MIU/L-ACNC: 1.1 UIU/ML (ref 0.36–3.74)
VLDLC SERPL CALC-MCNC: 36.6 MG/DL
WBC # BLD AUTO: 6.3 K/UL (ref 4.6–13.2)

## 2018-11-14 PROCEDURE — 3331090002 HH PPS REVENUE DEBIT

## 2018-11-14 PROCEDURE — 85652 RBC SED RATE AUTOMATED: CPT

## 2018-11-14 PROCEDURE — 83880 ASSAY OF NATRIURETIC PEPTIDE: CPT

## 2018-11-14 PROCEDURE — 36415 COLL VENOUS BLD VENIPUNCTURE: CPT

## 2018-11-14 PROCEDURE — 3331090001 HH PPS REVENUE CREDIT

## 2018-11-14 PROCEDURE — 85025 COMPLETE CBC W/AUTO DIFF WBC: CPT

## 2018-11-14 PROCEDURE — 80061 LIPID PANEL: CPT

## 2018-11-14 PROCEDURE — 83036 HEMOGLOBIN GLYCOSYLATED A1C: CPT

## 2018-11-14 PROCEDURE — 86141 C-REACTIVE PROTEIN HS: CPT

## 2018-11-14 PROCEDURE — 80053 COMPREHEN METABOLIC PANEL: CPT

## 2018-11-14 PROCEDURE — 87186 SC STD MICRODIL/AGAR DIL: CPT

## 2018-11-14 PROCEDURE — 84443 ASSAY THYROID STIM HORMONE: CPT

## 2018-11-14 PROCEDURE — 87070 CULTURE OTHR SPECIMN AEROBIC: CPT

## 2018-11-14 RX ORDER — FUROSEMIDE 40 MG/1
40 TABLET ORAL 2 TIMES DAILY
Qty: 60 TAB | Refills: 3 | Status: SHIPPED | OUTPATIENT
Start: 2018-11-14 | End: 2019-01-09 | Stop reason: DRUGHIGH

## 2018-11-14 RX ORDER — CEPHALEXIN 500 MG/1
500 CAPSULE ORAL 4 TIMES DAILY
Qty: 40 CAP | Refills: 0 | Status: SHIPPED | OUTPATIENT
Start: 2018-11-14 | End: 2018-11-24

## 2018-11-14 RX ORDER — ALBUTEROL SULFATE 0.83 MG/ML
2.5 SOLUTION RESPIRATORY (INHALATION)
Qty: 100 EACH | Refills: 1 | Status: SHIPPED | OUTPATIENT
Start: 2018-11-14 | End: 2018-12-18 | Stop reason: SDUPTHER

## 2018-11-14 NOTE — TELEPHONE ENCOUNTER
Ofe from  [address] [place] [address] is requesting an additional order for skilled nursing visit for 11/20/18. Asking to be called back.

## 2018-11-14 NOTE — PROGRESS NOTES
1. Have you been to the ER, urgent care clinic since your last visit? Hospitalized since your last visit? No 
 
2. Have you seen or consulted any other health care providers outside of the [facility] since your last visit? Include any pap smears or colon screening.  No

## 2018-11-14 NOTE — PROGRESS NOTES
Godwin Ku is a [de-identified] y.o.  male and presents with Chief Complaint Patient presents with  Wound Infection  Medication Refill  Leg Swelling  Irregular Heart Beat  Coronary Artery Disease Pt is here after 6 months. Pt was admitted to hospital in May . He says his breathing got worse after discharge from the hospital. Pt had surgery on left knee in January and his knee actually got worse after that. Pt says he did not feel good after he was discharged from the hospital. His wound looked the same when he left the hospital.Pt says home health has been seeing him at home and they are applying medicinal honey on it. The owund on the dorsum of his left foot is not healing. Leg is swollen. Dopplers were neg for DVT in past.he gets SOb very easily He can hardly walk. Past Medical History:  
Diagnosis Date  Asthma  BPH (benign prostatic hyperplasia)  Herniated disc, cervical   
 Knee pain 24 [facility] Pacemaker 2011 [address] LeukoDx  PAF (paroxysmal atrial fibrillation) ([facility] During Pacer interogation  SSS (sick sinus syndrome) ([facility] S/P Dual chamber [facility] Pacemaker Past Surgical History:  
Procedure Laterality Date 42 [facility] Bilateral 1989  HX HIP REPLACEMENT  2006  
 right  HX KNEE REPLACEMENT Bilateral 1992/1993/2006/2008/2011/2012/2013  HX PACEMAKER  2011 Current Outpatient Medications Medication Sig  cephALEXin (KEFLEX) 500 mg capsule Take 1 Cap by mouth four (4) times daily for 10 days.  collagenase (SANTYL) 250 unit/gram ointment Apply  to affected area daily. Apply to left lwoer ext wound once daily  furosemide (LASIX) 40 mg tablet Take 1 Tab by mouth two (2) times a day.  albuterol (PROVENTIL VENTOLIN) 2.5 mg /3 mL (0.083 %) nebulizer solution 3 mL by Nebulization route every four (4) hours as needed for Wheezing.  metFORMIN (GLUCOPHAGE) 500 mg tablet Take 1 Tab by mouth two (2) times daily (with meals).  furosemide (LASIX) 40 mg tablet Take 1 Tab by mouth daily.  gabapentin (NEURONTIN) 600 mg tablet One po in am , one at noon and 2 at hs  
 miconazole (ZEASORB, MICONAZOLE,) 2 % topical powder Apply  to affected area two (2) times a day. Apply localy to the groin twice daily  levoFLOXacin (LEVAQUIN) 500 mg tablet Take 1 Tab by mouth every twenty-four (24) hours.  metoprolol succinate (TOPROL-XL) 25 mg XL tablet Take 1 Tab by mouth daily.  raNITIdine (ZANTAC) 75 mg tablet Take 75 mg by mouth daily.  prasterone, dhea, (DHEA) 50 mg tab Take 50 mg by mouth daily.  cholecalciferol (VITAMIN D3) 1,000 unit tablet Take 1,000 Units by mouth daily.  acetaminophen (TYLENOL) 500 mg tablet Take 500 mg by mouth every six (6) hours as needed for Pain.  mupirocin (BACTROBAN) 2 % ointment Apply  to affected area daily.  warfarin (COUMADIN) 4 mg tablet Take 1 Tab by mouth daily.  diphenhydrAMINE (BENADRYL) 25 mg capsule Take 50 mg by mouth nightly as needed for Sleep.  cyanocobalamin 1,000 mcg tablet Take 1,000 mcg by mouth daily.  loratadine (CLARITIN) 10 mg tablet Take 1 Tab by mouth daily. (Patient taking differently: Take 1 Tab by mouth daily as needed for Allergies.)  SODIUM CHLORIDE (AFRIN SALINE NASAL MIST NA) 1 [place] by IntraNASal route as needed (congestion/allergies). No current facility-administered medications for this visit. Health Maintenance Topic Date Due  
 FOOT EXAM Q1  08/28/1950  
 EYE EXAM RETINAL OR DILATED Q1  08/28/1950  Shingrix Vaccine Age 50> (1 of 2) 08/28/1988  GLAUCOMA SCREENING Q2Y  08/28/2003  MICROALBUMIN Q1  07/13/2018  Influenza Age 5 to Adult  08/01/2018  MEDICARE YEARLY EXAM  09/08/2018  HEMOGLOBIN A1C Q6M  11/22/2018  LIPID PANEL Q1  02/07/2019  
 DTaP/Tdap/Td series (2 - Td) 07/16/2024  Pneumococcal 65+ High/Highest Risk  Completed Immunization History Administered Date(s) Administered  Influenza High Dose Vaccine PF 09/07/2017  Pneumococcal Polysaccharide (PPSV-23) 09/07/2017 No LMP for male patient. Allergies and Intolerances: Allergies Allergen Reactions  Raisin Flavor   
  raisins/rash  Sulfa (Sulfonamide Antibiotics) Hives and Rash  Blueberry Rash Family History: No family history on file. Social History: He  reports that he has been smoking cigars. he has never used smokeless tobacco.  He  reports that he drinks alcohol. Review of Systems:  
General: negative for - chills, fatigue, fever, weight change Psych: negative for - anxiety, depression, irritability or mood swings ENT: negative for - headaches, hearing change, nasal congestion, oral lesions, sneezing or sore throat Heme/ Lymph: negative for - bleeding problems, bruising, pallor or swollen lymph nodes Endo: negative for - hot flashes, polydipsia/polyuria or temperature intolerance Resp: negative for - cough, shortness of breath or wheezing CV: negative for - chest pain, edema or palpitations GI: negative for - abdominal pain, change in bowel habits, constipation, diarrhea or nausea/vomiting : negative for - dysuria, hematuria, incontinence, pelvic pain or vulvar/vaginal symptoms MSK: negative for - joint pain, joint swelling or muscle pain Neuro: negative for - confusion, headaches, seizures or weakness Derm: negative for - dry skin, hair changes, rash or skin lesion changes Physical:  
Vitals:  
Vitals:  
 11/14/18 1519 BP: 116/60 Pulse: 65 Resp: 18 Temp: 97.3 °F (36.3 °C) TempSrc: Oral  
SpO2: 98% Weight: 280 lb (127 kg) Height: 6' 4\" (1.93 m) Exam:  
HEENT- atraumatic,normocephalic, awake, oriented, well nourished Neck - supple,no enlarged lymph nodes, no JVD, no thyromegaly Chest- CTA, no rhonchi, no crackles Heart- rrr, no murmurs / gallop/rub Abdomen- soft,BS+,NT, no hepatosplenomegaly Ext - bilateral leg swelling left greater tan left, DP on left side is absent , small ulver on dorsum of left foot, wound covered with debris, unhealthy granulation tissue Neuro- no focal deficits. Power 5/5 all extremities Skin - warm,dry, no obvious rashes. Review of Data:  
LABS:  
Lab Results Component Value Date/Time WBC 6.6 05/25/2018 04:30 AM  
 HGB 7.7 (L) 05/25/2018 04:30 AM  
 HCT 24.5 (L) 05/25/2018 04:30 AM  
 PLATELET 380 06/81/2283 04:30 AM  
 
Lab Results Component Value Date/Time Sodium 140 05/25/2018 04:30 AM  
 Potassium 4.0 05/25/2018 04:30 AM  
 Chloride 106 05/25/2018 04:30 AM  
 CO2 25 05/25/2018 04:30 AM  
 Glucose 131 (H) 05/25/2018 04:30 AM  
 BUN 21 (H) 05/25/2018 04:30 AM  
 Creatinine 0.80 05/25/2018 04:30 AM  
 
Lab Results Component Value Date/Time Cholesterol, total 129 02/07/2018 07:58 AM  
 HDL Cholesterol 30 (L) 02/07/2018 07:58 AM  
 LDL, calculated 71.4 02/07/2018 07:58 AM  
 Triglyceride 138 02/07/2018 07:58 AM  
 
No results found for: GPT Impression / Plan: ICD-10-CM ICD-9-CM 1. Other cardiomyopathy ([facility] I42.8 425.4 NT-PRO BNP  
   TSH 3RD GENERATION  
   DUPLEX LOWER EXT ARTERY BILAT 2. Peripheral polyneuropathy G62.9 356.9 3. PAF (paroxysmal atrial fibrillation) ([facility]) I48.0 427.31   
4. Cerebrovascular accident (CVA), unspecified mechanism ([facility] I63.9 434.91   
5. DM type 2, goal HbA1c < 7% ([facility]) E11.9 250.00 CBC WITH AUTOMATED DIFF  
   METABOLIC PANEL, COMPREHENSIVE  
   HEMOGLOBIN A1C WITH EAG  
   LIPID PANEL  
   TSH 3RD GENERATION  
   DUPLEX LOWER EXT ARTERY BILAT 6. Anemia, unspecified type D64.9 285.9 7. Foot swelling M79.89 729.81 CT FOOT LT W WO CONT  
   SED RATE (ESR) CRP, HIGH SENSITIVITY  
   NT-PRO BNP  
   DUPLEX LOWER EXT ARTERY BILAT 8. Fatigue, unspecified type R53.83 780.79 TSH 3RD GENERATION 9. Claudication (HCC) I73.9 443.9 DUPLEX LOWER EXT ARTERY BILAT 10. Wound of left lower extremity, initial encounter S81.802A 894.0 CULTURE, WOUND W GRAM STAIN  
   cephALEXin (KEFLEX) 500 mg capsule  
   collagenase (SANTYL) 250 unit/gram ointment 11. Leg swelling M79.89 729.81 furosemide (LASIX) 40 mg tablet 12. Colon cancer screening Z12.11 V76.51 COLOGUARD TEST (FECAL DNA COLORECTAL CANCER SCREENING) 13. Other emphysema ([address] J43.8 492.8 albuterol (PROVENTIL VENTOLIN) 2.5 mg /3 mL (0.083 %) nebulizer solution Wound cleaned and new dressing applied. DM - stable Spent over 50 minutes with pt face to face. Explained to patient risk benefits of the medications. Advised patient to stop meds if having any side effects. Pt verbalized understanding of the instructions. I have discussed the diagnosis with the patient and the intended plan as seen in the above orders. The patient has received an after-visit summary and questions were answered concerning future plans. I have discussed medication side effects and warnings with the patient as well. I have reviewed the plan of care with the patient, accepted their input and they are in agreement with the treatment goals. Reviewed plan of care. Patient has provided input and agrees with goals. Follow-up Disposition: 
Return in about 1 month (around 12/14/2018).  
 
Janes Koenig MD

## 2018-11-15 ENCOUNTER — HOME CARE VISIT (OUTPATIENT)
Dept: SCHEDULING | Facility: HOME HEALTH | Age: 78
End: 2018-11-15
Payer: MEDICARE

## 2018-11-15 VITALS
SYSTOLIC BLOOD PRESSURE: 118 MMHG | DIASTOLIC BLOOD PRESSURE: 66 MMHG | HEART RATE: 60 BPM | OXYGEN SATURATION: 98 % | RESPIRATION RATE: 20 BRPM | TEMPERATURE: 98.6 F

## 2018-11-15 LAB — CRP SERPL HS-MCNC: 2.84 MG/L (ref 0–3)

## 2018-11-15 PROCEDURE — 3331090002 HH PPS REVENUE DEBIT

## 2018-11-15 PROCEDURE — G0300 HHS/HOSPICE OF LPN EA 15 MIN: HCPCS

## 2018-11-15 PROCEDURE — 3331090001 HH PPS REVENUE CREDIT

## 2018-11-16 PROCEDURE — 3331090002 HH PPS REVENUE DEBIT

## 2018-11-16 PROCEDURE — 3331090001 HH PPS REVENUE CREDIT

## 2018-11-17 ENCOUNTER — HOME CARE VISIT (OUTPATIENT)
Dept: SCHEDULING | Facility: HOME HEALTH | Age: 78
End: 2018-11-17
Payer: MEDICARE

## 2018-11-17 LAB
BACTERIA SPEC CULT: ABNORMAL
BACTERIA SPEC CULT: ABNORMAL
GRAM STN SPEC: ABNORMAL
GRAM STN SPEC: ABNORMAL
SERVICE CMNT-IMP: ABNORMAL

## 2018-11-17 PROCEDURE — 3331090001 HH PPS REVENUE CREDIT

## 2018-11-17 PROCEDURE — 3331090002 HH PPS REVENUE DEBIT

## 2018-11-17 PROCEDURE — G0299 HHS/HOSPICE OF RN EA 15 MIN: HCPCS

## 2018-11-18 ENCOUNTER — TELEPHONE (OUTPATIENT)
Dept: FAMILY MEDICINE CLINIC | Age: 78
End: 2018-11-18

## 2018-11-18 VITALS
TEMPERATURE: 99 F | DIASTOLIC BLOOD PRESSURE: 70 MMHG | RESPIRATION RATE: 16 BRPM | OXYGEN SATURATION: 98 % | HEART RATE: 72 BPM | SYSTOLIC BLOOD PRESSURE: 130 MMHG

## 2018-11-18 PROCEDURE — 3331090002 HH PPS REVENUE DEBIT

## 2018-11-18 PROCEDURE — 3331090001 HH PPS REVENUE CREDIT

## 2018-11-18 RX ORDER — LEVOFLOXACIN 500 MG/1
500 TABLET, FILM COATED ORAL EVERY 24 HOURS
Qty: 7 TAB | Refills: 0 | Status: SHIPPED | OUTPATIENT
Start: 2018-11-18 | End: 2018-12-18

## 2018-11-19 PROCEDURE — 3331090002 HH PPS REVENUE DEBIT

## 2018-11-19 PROCEDURE — 3331090001 HH PPS REVENUE CREDIT

## 2018-11-19 NOTE — TELEPHONE ENCOUNTER
Wound cult shows gram neg bacteria, Will send Levoflox to pharmacy. Will ask home health to check PT,INR on Wednesday.

## 2018-11-20 ENCOUNTER — HOME CARE VISIT (OUTPATIENT)
Dept: HOME HEALTH SERVICES | Facility: HOME HEALTH | Age: 78
End: 2018-11-20
Payer: MEDICARE

## 2018-11-20 ENCOUNTER — HOME CARE VISIT (OUTPATIENT)
Dept: SCHEDULING | Facility: HOME HEALTH | Age: 78
End: 2018-11-20
Payer: MEDICARE

## 2018-11-20 VITALS
TEMPERATURE: 98.2 F | OXYGEN SATURATION: 99 % | DIASTOLIC BLOOD PRESSURE: 62 MMHG | HEART RATE: 64 BPM | SYSTOLIC BLOOD PRESSURE: 115 MMHG | RESPIRATION RATE: 20 BRPM

## 2018-11-20 LAB
INR BLD: 1.9 (ref 0.9–1.1)
PT POC: 22.3 SECONDS (ref 11.8–14.9)

## 2018-11-20 PROCEDURE — G0300 HHS/HOSPICE OF LPN EA 15 MIN: HCPCS

## 2018-11-20 PROCEDURE — 3331090002 HH PPS REVENUE DEBIT

## 2018-11-20 PROCEDURE — 3331090001 HH PPS REVENUE CREDIT

## 2018-11-21 PROCEDURE — 3331090001 HH PPS REVENUE CREDIT

## 2018-11-21 PROCEDURE — 3331090002 HH PPS REVENUE DEBIT

## 2018-11-22 ENCOUNTER — HOME CARE VISIT (OUTPATIENT)
Dept: SCHEDULING | Facility: HOME HEALTH | Age: 78
End: 2018-11-22
Payer: MEDICARE

## 2018-11-22 VITALS
RESPIRATION RATE: 20 BRPM | TEMPERATURE: 98.8 F | OXYGEN SATURATION: 98 % | SYSTOLIC BLOOD PRESSURE: 128 MMHG | HEART RATE: 72 BPM | DIASTOLIC BLOOD PRESSURE: 66 MMHG

## 2018-11-22 PROCEDURE — 3331090002 HH PPS REVENUE DEBIT

## 2018-11-22 PROCEDURE — 400014 HH F/U

## 2018-11-22 PROCEDURE — 3331090001 HH PPS REVENUE CREDIT

## 2018-11-22 PROCEDURE — G0300 HHS/HOSPICE OF LPN EA 15 MIN: HCPCS

## 2018-11-23 PROCEDURE — 3331090001 HH PPS REVENUE CREDIT

## 2018-11-23 PROCEDURE — 3331090002 HH PPS REVENUE DEBIT

## 2018-11-23 NOTE — TELEPHONE ENCOUNTER
Pt advised of levoFLOXacin (LEVAQUIN) 500 mg tablet and stated he was already taking it. Pt also wanted a update on collagenase (SANTYL) 250 unit/gram ointment and nurse advised patient that it was sent to pharmacy. Pt states homehealth will check his INR on Tues. This encounter will be closed.

## 2018-11-24 ENCOUNTER — HOME CARE VISIT (OUTPATIENT)
Dept: SCHEDULING | Facility: HOME HEALTH | Age: 78
End: 2018-11-24
Payer: MEDICARE

## 2018-11-24 PROCEDURE — 3331090002 HH PPS REVENUE DEBIT

## 2018-11-24 PROCEDURE — 3331090001 HH PPS REVENUE CREDIT

## 2018-11-24 PROCEDURE — G0299 HHS/HOSPICE OF RN EA 15 MIN: HCPCS

## 2018-11-25 VITALS
OXYGEN SATURATION: 98 % | DIASTOLIC BLOOD PRESSURE: 53 MMHG | RESPIRATION RATE: 20 BRPM | HEART RATE: 62 BPM | SYSTOLIC BLOOD PRESSURE: 107 MMHG | TEMPERATURE: 97 F

## 2018-11-25 PROCEDURE — 3331090002 HH PPS REVENUE DEBIT

## 2018-11-25 PROCEDURE — 3331090001 HH PPS REVENUE CREDIT

## 2018-11-26 ENCOUNTER — TELEPHONE (OUTPATIENT)
Dept: FAMILY MEDICINE CLINIC | Age: 78
End: 2018-11-26

## 2018-11-26 PROCEDURE — 3331090001 HH PPS REVENUE CREDIT

## 2018-11-26 PROCEDURE — 3331090002 HH PPS REVENUE DEBIT

## 2018-11-26 RX ORDER — DIPHENHYDRAMINE HCL 50 MG
CAPSULE ORAL
Qty: 1 CAP | Refills: 0 | Status: SHIPPED | OUTPATIENT
Start: 2018-11-26 | End: 2019-04-29

## 2018-11-26 RX ORDER — PREDNISONE 10 MG/1
TABLET ORAL
Qty: 15 TAB | Refills: 0 | Status: SHIPPED | OUTPATIENT
Start: 2018-11-26 | End: 2019-04-29

## 2018-11-26 NOTE — TELEPHONE ENCOUNTER
Received a fax from [facility] treatment of patients who have an allergic history to iodinated contrast media is used to reduce the reaction rate due to iodinated contrast media. 1. Pre-treatment consist of:  Prednisone  50 mg po at 13,7, and 1 hour prior to study  Benadryl 50 mg po at 1 hour prior to study    Please advise.

## 2018-11-27 ENCOUNTER — TELEPHONE (OUTPATIENT)
Dept: FAMILY MEDICINE CLINIC | Age: 78
End: 2018-11-27

## 2018-11-27 ENCOUNTER — HOME CARE VISIT (OUTPATIENT)
Dept: SCHEDULING | Facility: HOME HEALTH | Age: 78
End: 2018-11-27
Payer: MEDICARE

## 2018-11-27 VITALS
SYSTOLIC BLOOD PRESSURE: 115 MMHG | HEART RATE: 64 BPM | OXYGEN SATURATION: 98 % | DIASTOLIC BLOOD PRESSURE: 60 MMHG | RESPIRATION RATE: 18 BRPM | TEMPERATURE: 98.8 F

## 2018-11-27 LAB
INR BLD: 1.8 (ref 0.9–1.1)
PT POC: 22.2 SECONDS (ref 11.8–14.9)

## 2018-11-27 PROCEDURE — 3331090002 HH PPS REVENUE DEBIT

## 2018-11-27 PROCEDURE — 3331090001 HH PPS REVENUE CREDIT

## 2018-11-27 PROCEDURE — G0300 HHS/HOSPICE OF LPN EA 15 MIN: HCPCS

## 2018-11-27 NOTE — TELEPHONE ENCOUNTER
Abhijeet Wright from [facility] called to report INR of 1.8 . Patient has been taking 4mg coumadin.

## 2018-11-27 NOTE — TELEPHONE ENCOUNTER
Per dr Julius Mccall, patient is to continue 4 mg daily and recheck INR in 1 wk. Called and spoke with Zaheer Khan at [facility]. She read back directions.

## 2018-11-27 NOTE — TELEPHONE ENCOUNTER
meds sent to pharmacy.   Will call pt that he needs to take it as detailed in the instructions before the CT scan,

## 2018-11-28 ENCOUNTER — TELEPHONE (OUTPATIENT)
Dept: FAMILY MEDICINE CLINIC | Age: 78
End: 2018-11-28

## 2018-11-28 DIAGNOSIS — L89.95: ICD-10-CM

## 2018-11-28 PROCEDURE — 3331090002 HH PPS REVENUE DEBIT

## 2018-11-28 PROCEDURE — 3331090001 HH PPS REVENUE CREDIT

## 2018-11-28 RX ORDER — MUPIROCIN 20 MG/G
OINTMENT TOPICAL DAILY
Qty: 22 G | Refills: 0 | Status: SHIPPED | OUTPATIENT
Start: 2018-11-28 | End: 2021-05-24

## 2018-11-28 NOTE — TELEPHONE ENCOUNTER
Pharmacy called in for clarification of medication Albuterol solution and would like a call back.  Please assist.

## 2018-11-28 NOTE — TELEPHONE ENCOUNTER
Patient asking if he can have mupirocin. Also informed him about directions for prednisone ( take at 12am, 7am, and 12pm starting tomorrow night) and benadryl (1 hr before procedure)- he verbalized understanding. Requested Prescriptions     Pending Prescriptions Disp Refills    mupirocin (BACTROBAN) 2 % ointment 22 g 0     Sig: Apply  to affected area daily.

## 2018-11-29 ENCOUNTER — HOME CARE VISIT (OUTPATIENT)
Dept: SCHEDULING | Facility: HOME HEALTH | Age: 78
End: 2018-11-29
Payer: MEDICARE

## 2018-11-29 VITALS
RESPIRATION RATE: 20 BRPM | SYSTOLIC BLOOD PRESSURE: 146 MMHG | OXYGEN SATURATION: 98 % | DIASTOLIC BLOOD PRESSURE: 76 MMHG | TEMPERATURE: 98.6 F | HEART RATE: 80 BPM

## 2018-11-29 PROCEDURE — 3331090002 HH PPS REVENUE DEBIT

## 2018-11-29 PROCEDURE — 3331090001 HH PPS REVENUE CREDIT

## 2018-11-29 PROCEDURE — G0300 HHS/HOSPICE OF LPN EA 15 MIN: HCPCS

## 2018-11-29 NOTE — TELEPHONE ENCOUNTER
Nurse Ronaldo Turner spoke to patient concerning how to take medication. This encounter will be closed.

## 2018-11-30 ENCOUNTER — HOSPITAL ENCOUNTER (OUTPATIENT)
Dept: CT IMAGING | Age: 78
Discharge: HOME | End: 2018-11-30
Attending: INTERNAL MEDICINE
Payer: MEDICARE

## 2018-11-30 ENCOUNTER — HOSPITAL ENCOUNTER (OUTPATIENT)
Dept: VASCULAR SURGERY | Age: 78
Discharge: HOME | End: 2018-11-30
Attending: INTERNAL MEDICINE
Payer: MEDICARE

## 2018-11-30 DIAGNOSIS — E11.9 DM TYPE 2, GOAL HBA1C < 7% (HCC): ICD-10-CM

## 2018-11-30 DIAGNOSIS — I73.9 CLAUDICATION (HCC): ICD-10-CM

## 2018-11-30 DIAGNOSIS — M79.89 FOOT SWELLING: ICD-10-CM

## 2018-11-30 DIAGNOSIS — I42.8 OTHER CARDIOMYOPATHY (HCC): ICD-10-CM

## 2018-11-30 LAB — CREAT UR-MCNC: 0.8 MG/DL (ref 0.6–1.3)

## 2018-11-30 PROCEDURE — 73701 CT LOWER EXTREMITY W/DYE: CPT

## 2018-11-30 PROCEDURE — 74011636320 HC RX REV CODE- 636/320: Performed by: INTERNAL MEDICINE

## 2018-11-30 PROCEDURE — 3331090002 HH PPS REVENUE DEBIT

## 2018-11-30 PROCEDURE — 82565 ASSAY OF CREATININE: CPT

## 2018-11-30 PROCEDURE — 3331090001 HH PPS REVENUE CREDIT

## 2018-11-30 PROCEDURE — 93922 UPR/L XTREMITY ART 2 LEVELS: CPT

## 2018-11-30 RX ADMIN — IOPAMIDOL 80 ML: 612 INJECTION, SOLUTION INTRAVENOUS at 14:42

## 2018-12-01 ENCOUNTER — HOME CARE VISIT (OUTPATIENT)
Dept: SCHEDULING | Facility: HOME HEALTH | Age: 78
End: 2018-12-01
Payer: MEDICARE

## 2018-12-01 LAB
LEFT ABI: 1.27
LEFT ANTERIOR TIBIAL: 184 MMHG
LEFT ARM BP: 145 MMHG
LEFT ATA BP LEVEL: NORMAL
LEFT POSTERIOR TIBIAL: 177 MMHG
LEFT TBI: 1.17
LEFT TOE PRESSURE: 169 MMHG
RIGHT ABI: 1.21
RIGHT ANTERIOR TIBIAL: 175 MMHG
RIGHT ARM BP: 143 MMHG
RIGHT ATA BP LEVEL: NORMAL
RIGHT POSTERIOR TIBIAL: 171 MMHG
RIGHT TBI: 1.1
RIGHT TOE PRESSURE: 159 MMHG

## 2018-12-01 PROCEDURE — 3331090002 HH PPS REVENUE DEBIT

## 2018-12-01 PROCEDURE — G0299 HHS/HOSPICE OF RN EA 15 MIN: HCPCS

## 2018-12-01 PROCEDURE — 3331090001 HH PPS REVENUE CREDIT

## 2018-12-02 VITALS
TEMPERATURE: 96.8 F | HEART RATE: 65 BPM | OXYGEN SATURATION: 98 % | SYSTOLIC BLOOD PRESSURE: 120 MMHG | RESPIRATION RATE: 20 BRPM | DIASTOLIC BLOOD PRESSURE: 60 MMHG

## 2018-12-02 PROCEDURE — 3331090002 HH PPS REVENUE DEBIT

## 2018-12-02 PROCEDURE — 3331090001 HH PPS REVENUE CREDIT

## 2018-12-03 PROCEDURE — 3331090002 HH PPS REVENUE DEBIT

## 2018-12-03 PROCEDURE — 3331090001 HH PPS REVENUE CREDIT

## 2018-12-04 ENCOUNTER — TELEPHONE (OUTPATIENT)
Dept: FAMILY MEDICINE CLINIC | Age: 78
End: 2018-12-04

## 2018-12-04 ENCOUNTER — HOME CARE VISIT (OUTPATIENT)
Dept: SCHEDULING | Facility: HOME HEALTH | Age: 78
End: 2018-12-04
Payer: MEDICARE

## 2018-12-04 VITALS
OXYGEN SATURATION: 99 % | HEART RATE: 72 BPM | TEMPERATURE: 98.2 F | DIASTOLIC BLOOD PRESSURE: 66 MMHG | SYSTOLIC BLOOD PRESSURE: 138 MMHG | RESPIRATION RATE: 20 BRPM

## 2018-12-04 LAB
INR BLD: 1.9 (ref 0.9–1.1)
PT POC: 22.5 SECONDS (ref 11.8–14.9)

## 2018-12-04 PROCEDURE — 3331090002 HH PPS REVENUE DEBIT

## 2018-12-04 PROCEDURE — G0300 HHS/HOSPICE OF LPN EA 15 MIN: HCPCS

## 2018-12-04 PROCEDURE — 3331090001 HH PPS REVENUE CREDIT

## 2018-12-04 NOTE — TELEPHONE ENCOUNTER
Abel Blackwell from [facility] called to report INR is 1.9 and takes 4mg daily. Abel Blackwell said patient cannot afford Santyl as it is 200dollars. Asking if she can use mesalt gauze. Per Dr. Maia Miranda pt can use mesalt, and continue 4mg daily and recheck inr in 1 week. Abel Blackwell Verbalized understanding.

## 2018-12-05 PROCEDURE — 3331090001 HH PPS REVENUE CREDIT

## 2018-12-05 PROCEDURE — 3331090002 HH PPS REVENUE DEBIT

## 2018-12-06 ENCOUNTER — HOME CARE VISIT (OUTPATIENT)
Dept: SCHEDULING | Facility: HOME HEALTH | Age: 78
End: 2018-12-06
Payer: MEDICARE

## 2018-12-06 VITALS
HEART RATE: 68 BPM | OXYGEN SATURATION: 98 % | SYSTOLIC BLOOD PRESSURE: 120 MMHG | TEMPERATURE: 99.8 F | DIASTOLIC BLOOD PRESSURE: 58 MMHG | RESPIRATION RATE: 18 BRPM

## 2018-12-06 PROCEDURE — 3331090001 HH PPS REVENUE CREDIT

## 2018-12-06 PROCEDURE — 3331090002 HH PPS REVENUE DEBIT

## 2018-12-06 PROCEDURE — G0300 HHS/HOSPICE OF LPN EA 15 MIN: HCPCS

## 2018-12-07 PROCEDURE — 3331090002 HH PPS REVENUE DEBIT

## 2018-12-07 PROCEDURE — 3331090001 HH PPS REVENUE CREDIT

## 2018-12-08 ENCOUNTER — HOME CARE VISIT (OUTPATIENT)
Dept: SCHEDULING | Facility: HOME HEALTH | Age: 78
End: 2018-12-08
Payer: MEDICARE

## 2018-12-08 PROCEDURE — 3331090002 HH PPS REVENUE DEBIT

## 2018-12-08 PROCEDURE — G0300 HHS/HOSPICE OF LPN EA 15 MIN: HCPCS

## 2018-12-08 PROCEDURE — 3331090001 HH PPS REVENUE CREDIT

## 2018-12-09 PROCEDURE — 3331090002 HH PPS REVENUE DEBIT

## 2018-12-09 PROCEDURE — 3331090001 HH PPS REVENUE CREDIT

## 2018-12-10 VITALS
SYSTOLIC BLOOD PRESSURE: 132 MMHG | DIASTOLIC BLOOD PRESSURE: 76 MMHG | HEART RATE: 60 BPM | RESPIRATION RATE: 20 BRPM | OXYGEN SATURATION: 98 % | TEMPERATURE: 98.4 F

## 2018-12-10 PROCEDURE — 3331090002 HH PPS REVENUE DEBIT

## 2018-12-10 PROCEDURE — 3331090001 HH PPS REVENUE CREDIT

## 2018-12-11 ENCOUNTER — HOME CARE VISIT (OUTPATIENT)
Dept: SCHEDULING | Facility: HOME HEALTH | Age: 78
End: 2018-12-11
Payer: MEDICARE

## 2018-12-11 ENCOUNTER — TELEPHONE (OUTPATIENT)
Dept: FAMILY MEDICINE CLINIC | Age: 78
End: 2018-12-11

## 2018-12-11 ENCOUNTER — HOME CARE VISIT (OUTPATIENT)
Dept: HOME HEALTH SERVICES | Facility: HOME HEALTH | Age: 78
End: 2018-12-11
Payer: MEDICARE

## 2018-12-11 VITALS
TEMPERATURE: 98.8 F | DIASTOLIC BLOOD PRESSURE: 62 MMHG | SYSTOLIC BLOOD PRESSURE: 126 MMHG | OXYGEN SATURATION: 99 % | RESPIRATION RATE: 20 BRPM | HEART RATE: 68 BPM

## 2018-12-11 LAB
INR BLD: 1.7 (ref 0.9–1.1)
PT POC: 29.5 SECONDS (ref 11.8–14.9)

## 2018-12-11 PROCEDURE — 3331090001 HH PPS REVENUE CREDIT

## 2018-12-11 PROCEDURE — G0300 HHS/HOSPICE OF LPN EA 15 MIN: HCPCS

## 2018-12-11 PROCEDURE — 3331090002 HH PPS REVENUE DEBIT

## 2018-12-11 NOTE — TELEPHONE ENCOUNTER
Ofe called to report INR 1.7 and patient is taking 4mg  Coumadin daily. Per Dr Jose Manuel Garcia patient can rotate 5mg coumadin and 4mg and recheck in 1 week. Patient needs 5mg sent to pharmacy [facility] on [address]. Bart Kendall will let patient know. Thank you.

## 2018-12-12 PROCEDURE — 3331090002 HH PPS REVENUE DEBIT

## 2018-12-12 PROCEDURE — 3331090001 HH PPS REVENUE CREDIT

## 2018-12-13 ENCOUNTER — HOME CARE VISIT (OUTPATIENT)
Dept: SCHEDULING | Facility: HOME HEALTH | Age: 78
End: 2018-12-13
Payer: MEDICARE

## 2018-12-13 VITALS
HEART RATE: 80 BPM | SYSTOLIC BLOOD PRESSURE: 136 MMHG | TEMPERATURE: 98.2 F | RESPIRATION RATE: 20 BRPM | OXYGEN SATURATION: 99 % | DIASTOLIC BLOOD PRESSURE: 70 MMHG

## 2018-12-13 PROCEDURE — 3331090001 HH PPS REVENUE CREDIT

## 2018-12-13 PROCEDURE — G0300 HHS/HOSPICE OF LPN EA 15 MIN: HCPCS

## 2018-12-13 PROCEDURE — 3331090002 HH PPS REVENUE DEBIT

## 2018-12-14 PROCEDURE — 3331090002 HH PPS REVENUE DEBIT

## 2018-12-14 PROCEDURE — 3331090001 HH PPS REVENUE CREDIT

## 2018-12-15 ENCOUNTER — HOME CARE VISIT (OUTPATIENT)
Dept: SCHEDULING | Facility: HOME HEALTH | Age: 78
End: 2018-12-15
Payer: MEDICARE

## 2018-12-15 PROCEDURE — 3331090001 HH PPS REVENUE CREDIT

## 2018-12-15 PROCEDURE — 3331090002 HH PPS REVENUE DEBIT

## 2018-12-15 PROCEDURE — G0299 HHS/HOSPICE OF RN EA 15 MIN: HCPCS

## 2018-12-16 VITALS
OXYGEN SATURATION: 97 % | DIASTOLIC BLOOD PRESSURE: 64 MMHG | SYSTOLIC BLOOD PRESSURE: 124 MMHG | TEMPERATURE: 97 F | HEART RATE: 63 BPM | RESPIRATION RATE: 20 BRPM

## 2018-12-16 PROCEDURE — 3331090002 HH PPS REVENUE DEBIT

## 2018-12-16 PROCEDURE — 3331090001 HH PPS REVENUE CREDIT

## 2018-12-17 PROCEDURE — 3331090002 HH PPS REVENUE DEBIT

## 2018-12-17 PROCEDURE — 3331090001 HH PPS REVENUE CREDIT

## 2018-12-18 ENCOUNTER — TELEPHONE (OUTPATIENT)
Dept: FAMILY MEDICINE CLINIC | Age: 78
End: 2018-12-18

## 2018-12-18 ENCOUNTER — HOME CARE VISIT (OUTPATIENT)
Dept: SCHEDULING | Facility: HOME HEALTH | Age: 78
End: 2018-12-18
Payer: MEDICARE

## 2018-12-18 ENCOUNTER — HOME CARE VISIT (OUTPATIENT)
Dept: HOME HEALTH SERVICES | Facility: HOME HEALTH | Age: 78
End: 2018-12-18
Payer: MEDICARE

## 2018-12-18 VITALS
HEART RATE: 72 BPM | SYSTOLIC BLOOD PRESSURE: 126 MMHG | TEMPERATURE: 98.8 F | OXYGEN SATURATION: 98 % | RESPIRATION RATE: 20 BRPM | DIASTOLIC BLOOD PRESSURE: 54 MMHG

## 2018-12-18 DIAGNOSIS — J43.8 OTHER EMPHYSEMA (HCC): ICD-10-CM

## 2018-12-18 LAB
INR BLD: 2.4 (ref 0.9–1.1)
PT POC: 29.3 SECONDS (ref 11.8–14.9)

## 2018-12-18 PROCEDURE — 3331090001 HH PPS REVENUE CREDIT

## 2018-12-18 PROCEDURE — G0300 HHS/HOSPICE OF LPN EA 15 MIN: HCPCS

## 2018-12-18 PROCEDURE — 3331090002 HH PPS REVENUE DEBIT

## 2018-12-18 RX ORDER — WARFARIN SODIUM 5 MG/1
5 TABLET ORAL DAILY
Qty: 30 TAB | Refills: 3 | Status: SHIPPED | OUTPATIENT
Start: 2018-12-18 | End: 2019-04-29

## 2018-12-18 RX ORDER — ALBUTEROL SULFATE 0.83 MG/ML
2.5 SOLUTION RESPIRATORY (INHALATION)
Qty: 100 EACH | Refills: 1 | Status: SHIPPED | OUTPATIENT
Start: 2018-12-18 | End: 2018-12-19 | Stop reason: SDUPTHER

## 2018-12-18 NOTE — TELEPHONE ENCOUNTER
Pt INR 2.4, PTT 29.3. Previous order, 5 mg requested to be called into the pharmacy, patient only has 4 mg on hand. Patient attempted to equal tablets to 5 mgs. Please send 5 mg of coumadin to pharmacy if needed in future. Please advise on current INR result.

## 2018-12-18 NOTE — TELEPHONE ENCOUNTER
Katrina from [employer] called and was wanting to speak with [facility] CLAUDIO JOHN about Proton results. Number she can be reached at is [phone]. Please advise.

## 2018-12-18 NOTE — TELEPHONE ENCOUNTER
Lisa called requesting to speak with nurse Justus Sotelo regarding orders for wound.  Please return call ar [phone]

## 2018-12-18 NOTE — TELEPHONE ENCOUNTER
Per Dr. Gil Ayala, continue current dosage Coumadin 4mg, alternate 5 mg daily. Recheck in 1 week. Lvm for [place] on [phone]. This encounter will be closed. Nurse sent medication request for 5 mg.

## 2018-12-18 NOTE — TELEPHONE ENCOUNTER
Please send Coumadin 5 mg to pharmacy. Please send albuterol 2.5 mg/ 3ml to pharmacy.  (VBO given to send to local pharmacy)

## 2018-12-19 PROCEDURE — A6446 CONFORM BAND S W>=3" <5"/YD: HCPCS

## 2018-12-19 PROCEDURE — A6456 ZINC PASTE BAND W >=3"<5"/YD: HCPCS

## 2018-12-19 PROCEDURE — A6454 SELF-ADHER BAND W>=3" <5"/YD: HCPCS

## 2018-12-19 PROCEDURE — 3331090001 HH PPS REVENUE CREDIT

## 2018-12-19 PROCEDURE — 3331090002 HH PPS REVENUE DEBIT

## 2018-12-19 RX ORDER — ALBUTEROL SULFATE 0.83 MG/ML
2.5 SOLUTION RESPIRATORY (INHALATION)
Qty: 1 PACKAGE | Refills: 1 | Status: SHIPPED | OUTPATIENT
Start: 2018-12-19 | End: 2019-01-04 | Stop reason: SDUPTHER

## 2018-12-19 NOTE — TELEPHONE ENCOUNTER
Pharmacy needed prescription to say vials, unable to change to vials, package or each is only options. VBO 1 package sent. This encounter will be closed.

## 2018-12-20 ENCOUNTER — HOME CARE VISIT (OUTPATIENT)
Dept: SCHEDULING | Facility: HOME HEALTH | Age: 78
End: 2018-12-20
Payer: MEDICARE

## 2018-12-20 VITALS
TEMPERATURE: 99.4 F | SYSTOLIC BLOOD PRESSURE: 136 MMHG | RESPIRATION RATE: 20 BRPM | DIASTOLIC BLOOD PRESSURE: 68 MMHG | OXYGEN SATURATION: 98 % | HEART RATE: 76 BPM

## 2018-12-20 PROCEDURE — 3331090001 HH PPS REVENUE CREDIT

## 2018-12-20 PROCEDURE — 3331090002 HH PPS REVENUE DEBIT

## 2018-12-20 PROCEDURE — G0300 HHS/HOSPICE OF LPN EA 15 MIN: HCPCS

## 2018-12-20 NOTE — TELEPHONE ENCOUNTER
Nurse Joshua Gallardo called, stating that blister on heel is open and that she will dress it and apply santyl. Nurse will send message back to provider requesting abx, would drainage is pus. Please note that patient is not feeling well, could not describe how he was feeling well to hh but overall not feeling well.

## 2018-12-20 NOTE — TELEPHONE ENCOUNTER
As long it does not look infected, the blister could be covered with a gauze. I understand it is the rt foot. If there is any pus like drainage , I will send abx.

## 2018-12-21 PROCEDURE — 3331090002 HH PPS REVENUE DEBIT

## 2018-12-21 PROCEDURE — 3331090001 HH PPS REVENUE CREDIT

## 2018-12-21 NOTE — TELEPHONE ENCOUNTER
If the pt is not feeling well , he needs to go to ER or atleast come to office for follow up. It is difficult for me to assess pt over phone.

## 2018-12-22 ENCOUNTER — HOME CARE VISIT (OUTPATIENT)
Dept: SCHEDULING | Facility: HOME HEALTH | Age: 78
End: 2018-12-22
Payer: MEDICARE

## 2018-12-22 PROCEDURE — 3331090002 HH PPS REVENUE DEBIT

## 2018-12-22 PROCEDURE — 3331090001 HH PPS REVENUE CREDIT

## 2018-12-22 PROCEDURE — G0299 HHS/HOSPICE OF RN EA 15 MIN: HCPCS

## 2018-12-23 PROCEDURE — 3331090001 HH PPS REVENUE CREDIT

## 2018-12-23 PROCEDURE — 3331090002 HH PPS REVENUE DEBIT

## 2018-12-24 ENCOUNTER — HOME CARE VISIT (OUTPATIENT)
Dept: HOME HEALTH SERVICES | Facility: HOME HEALTH | Age: 78
End: 2018-12-24
Payer: MEDICARE

## 2018-12-24 ENCOUNTER — HOME CARE VISIT (OUTPATIENT)
Dept: SCHEDULING | Facility: HOME HEALTH | Age: 78
End: 2018-12-24
Payer: MEDICARE

## 2018-12-24 DIAGNOSIS — M79.89 LEG SWELLING: ICD-10-CM

## 2018-12-24 PROCEDURE — 3331090002 HH PPS REVENUE DEBIT

## 2018-12-24 PROCEDURE — 3331090001 HH PPS REVENUE CREDIT

## 2018-12-24 PROCEDURE — G0299 HHS/HOSPICE OF RN EA 15 MIN: HCPCS

## 2018-12-24 NOTE — TELEPHONE ENCOUNTER
Requested Prescriptions     Pending Prescriptions Disp Refills    furosemide (LASIX) 40 mg tablet 30 Tab 0     Sig: Take 1 Tab by mouth daily.  warfarin (COUMADIN) 4 mg tablet 120 Tab 3     Sig: Take 1 Tab by mouth daily. Requesting medication sent to [address] [place] [address]  Please advise, thank you.

## 2018-12-25 VITALS
TEMPERATURE: 97.7 F | SYSTOLIC BLOOD PRESSURE: 118 MMHG | DIASTOLIC BLOOD PRESSURE: 60 MMHG | RESPIRATION RATE: 16 BRPM | HEART RATE: 64 BPM | OXYGEN SATURATION: 99 %

## 2018-12-25 PROCEDURE — 3331090001 HH PPS REVENUE CREDIT

## 2018-12-25 PROCEDURE — 3331090002 HH PPS REVENUE DEBIT

## 2018-12-26 ENCOUNTER — TELEPHONE (OUTPATIENT)
Dept: FAMILY MEDICINE CLINIC | Age: 78
End: 2018-12-26

## 2018-12-26 VITALS
TEMPERATURE: 99.1 F | OXYGEN SATURATION: 98 % | SYSTOLIC BLOOD PRESSURE: 138 MMHG | HEART RATE: 68 BPM | RESPIRATION RATE: 18 BRPM | DIASTOLIC BLOOD PRESSURE: 80 MMHG

## 2018-12-26 LAB
INR BLD: 2.8 (ref 0.9–1.1)
PT POC: 33.6 SECONDS (ref 11.8–14.9)

## 2018-12-26 PROCEDURE — 3331090002 HH PPS REVENUE DEBIT

## 2018-12-26 PROCEDURE — 3331090001 HH PPS REVENUE CREDIT

## 2018-12-26 NOTE — TELEPHONE ENCOUNTER
[facility] nurse called with PT/ INR. INR 2.8   PT 33.6 patient taking 4 mg coumadin alternating with 6 mg. Made Dr. India Moreira aware order given to recheck PT/ INR  in a week.

## 2018-12-27 ENCOUNTER — HOME CARE VISIT (OUTPATIENT)
Dept: SCHEDULING | Facility: HOME HEALTH | Age: 78
End: 2018-12-27
Payer: MEDICARE

## 2018-12-27 VITALS
HEART RATE: 72 BPM | RESPIRATION RATE: 20 BRPM | DIASTOLIC BLOOD PRESSURE: 64 MMHG | TEMPERATURE: 99.1 F | OXYGEN SATURATION: 99 % | SYSTOLIC BLOOD PRESSURE: 126 MMHG

## 2018-12-27 PROCEDURE — 3331090001 HH PPS REVENUE CREDIT

## 2018-12-27 PROCEDURE — G0300 HHS/HOSPICE OF LPN EA 15 MIN: HCPCS

## 2018-12-27 PROCEDURE — 3331090002 HH PPS REVENUE DEBIT

## 2018-12-27 NOTE — TELEPHONE ENCOUNTER
Patient called in stating that he is almost out of the warfarin and he put this request in last week so he was wondering if there was anyone else that could fill the prescription for him. Told him I would put a message back for him.  Please assist.

## 2018-12-28 PROCEDURE — 3331090001 HH PPS REVENUE CREDIT

## 2018-12-28 PROCEDURE — 3331090002 HH PPS REVENUE DEBIT

## 2018-12-28 RX ORDER — FUROSEMIDE 40 MG/1
40 TABLET ORAL DAILY
Qty: 30 TAB | Refills: 0 | Status: SHIPPED | OUTPATIENT
Start: 2018-12-28 | End: 2019-01-09 | Stop reason: DRUGHIGH

## 2018-12-28 RX ORDER — WARFARIN 4 MG/1
4 TABLET ORAL DAILY
Qty: 120 TAB | Refills: 3 | Status: ON HOLD | OUTPATIENT
Start: 2018-12-28 | End: 2019-04-24 | Stop reason: SDUPTHER

## 2018-12-29 ENCOUNTER — HOME CARE VISIT (OUTPATIENT)
Dept: SCHEDULING | Facility: HOME HEALTH | Age: 78
End: 2018-12-29
Payer: MEDICARE

## 2018-12-29 PROCEDURE — 3331090001 HH PPS REVENUE CREDIT

## 2018-12-29 PROCEDURE — G0299 HHS/HOSPICE OF RN EA 15 MIN: HCPCS

## 2018-12-29 PROCEDURE — 3331090002 HH PPS REVENUE DEBIT

## 2018-12-30 VITALS
TEMPERATURE: 97.7 F | DIASTOLIC BLOOD PRESSURE: 60 MMHG | SYSTOLIC BLOOD PRESSURE: 120 MMHG | OXYGEN SATURATION: 97 % | RESPIRATION RATE: 18 BRPM | HEART RATE: 64 BPM

## 2018-12-30 PROCEDURE — 3331090002 HH PPS REVENUE DEBIT

## 2018-12-30 PROCEDURE — 3331090001 HH PPS REVENUE CREDIT

## 2018-12-31 PROCEDURE — 3331090002 HH PPS REVENUE DEBIT

## 2018-12-31 PROCEDURE — 3331090001 HH PPS REVENUE CREDIT

## 2019-01-01 ENCOUNTER — HOME CARE VISIT (OUTPATIENT)
Dept: SCHEDULING | Facility: HOME HEALTH | Age: 79
End: 2019-01-01
Payer: MEDICARE

## 2019-01-01 VITALS
HEART RATE: 72 BPM | OXYGEN SATURATION: 98 % | SYSTOLIC BLOOD PRESSURE: 126 MMHG | RESPIRATION RATE: 20 BRPM | DIASTOLIC BLOOD PRESSURE: 62 MMHG | TEMPERATURE: 99.7 F

## 2019-01-01 PROCEDURE — 3331090001 HH PPS REVENUE CREDIT

## 2019-01-01 PROCEDURE — G0300 HHS/HOSPICE OF LPN EA 15 MIN: HCPCS

## 2019-01-01 PROCEDURE — 3331090002 HH PPS REVENUE DEBIT

## 2019-01-02 PROCEDURE — 3331090001 HH PPS REVENUE CREDIT

## 2019-01-02 PROCEDURE — 3331090002 HH PPS REVENUE DEBIT

## 2019-01-03 ENCOUNTER — HOME CARE VISIT (OUTPATIENT)
Dept: SCHEDULING | Facility: HOME HEALTH | Age: 79
End: 2019-01-03
Payer: MEDICARE

## 2019-01-03 VITALS
RESPIRATION RATE: 20 BRPM | SYSTOLIC BLOOD PRESSURE: 128 MMHG | HEART RATE: 72 BPM | TEMPERATURE: 99.4 F | DIASTOLIC BLOOD PRESSURE: 78 MMHG | OXYGEN SATURATION: 98 %

## 2019-01-03 PROCEDURE — 3331090001 HH PPS REVENUE CREDIT

## 2019-01-03 PROCEDURE — G0300 HHS/HOSPICE OF LPN EA 15 MIN: HCPCS

## 2019-01-03 PROCEDURE — 3331090002 HH PPS REVENUE DEBIT

## 2019-01-04 DIAGNOSIS — J43.8 OTHER EMPHYSEMA (HCC): ICD-10-CM

## 2019-01-04 PROCEDURE — 3331090002 HH PPS REVENUE DEBIT

## 2019-01-04 PROCEDURE — 3331090001 HH PPS REVENUE CREDIT

## 2019-01-04 NOTE — TELEPHONE ENCOUNTER
Requested Prescriptions     Pending Prescriptions Disp Refills    albuterol (PROVENTIL VENTOLIN) 2.5 mg /3 mL (0.083 %) nebulizer solution 1 Package 1     Sig: 3 mL by Nebulization route every four (4) hours as needed for Wheezing. Please advise, thank you.

## 2019-01-05 ENCOUNTER — HOME CARE VISIT (OUTPATIENT)
Dept: SCHEDULING | Facility: HOME HEALTH | Age: 79
End: 2019-01-05
Payer: MEDICARE

## 2019-01-05 PROCEDURE — G0299 HHS/HOSPICE OF RN EA 15 MIN: HCPCS

## 2019-01-05 PROCEDURE — 3331090002 HH PPS REVENUE DEBIT

## 2019-01-05 PROCEDURE — 3331090001 HH PPS REVENUE CREDIT

## 2019-01-06 PROCEDURE — 3331090002 HH PPS REVENUE DEBIT

## 2019-01-06 PROCEDURE — 3331090001 HH PPS REVENUE CREDIT

## 2019-01-07 ENCOUNTER — TELEPHONE (OUTPATIENT)
Dept: FAMILY MEDICINE CLINIC | Age: 79
End: 2019-01-07

## 2019-01-07 ENCOUNTER — HOME CARE VISIT (OUTPATIENT)
Dept: SCHEDULING | Facility: HOME HEALTH | Age: 79
End: 2019-01-07
Payer: MEDICARE

## 2019-01-07 ENCOUNTER — HOME CARE VISIT (OUTPATIENT)
Dept: HOME HEALTH SERVICES | Facility: HOME HEALTH | Age: 79
End: 2019-01-07
Payer: MEDICARE

## 2019-01-07 VITALS
SYSTOLIC BLOOD PRESSURE: 124 MMHG | RESPIRATION RATE: 18 BRPM | TEMPERATURE: 99.9 F | DIASTOLIC BLOOD PRESSURE: 70 MMHG | HEART RATE: 65 BPM | OXYGEN SATURATION: 99 %

## 2019-01-07 PROCEDURE — 3331090001 HH PPS REVENUE CREDIT

## 2019-01-07 PROCEDURE — G0299 HHS/HOSPICE OF RN EA 15 MIN: HCPCS

## 2019-01-07 PROCEDURE — 3331090002 HH PPS REVENUE DEBIT

## 2019-01-07 RX ORDER — ALBUTEROL SULFATE 0.83 MG/ML
2.5 SOLUTION RESPIRATORY (INHALATION)
Qty: 1 PACKAGE | Refills: 1 | Status: SHIPPED | OUTPATIENT
Start: 2019-01-07

## 2019-01-07 NOTE — TELEPHONE ENCOUNTER
INR 2.2  PTT 25.5    Pt taking 4 mg every day. Nurse spoke to provider, per Dr. West File. Continue current dose, 4 mg daily recheck in 2 weeks. Nurse spoke with [facility] for [facility] for skilled nursing visit, left voicemail detailing detailing above. This encounter will be closed.

## 2019-01-08 VITALS
DIASTOLIC BLOOD PRESSURE: 63 MMHG | OXYGEN SATURATION: 99 % | RESPIRATION RATE: 16 BRPM | HEART RATE: 64 BPM | SYSTOLIC BLOOD PRESSURE: 130 MMHG

## 2019-01-08 PROCEDURE — 3331090001 HH PPS REVENUE CREDIT

## 2019-01-08 PROCEDURE — 3331090002 HH PPS REVENUE DEBIT

## 2019-01-09 ENCOUNTER — OFFICE VISIT (OUTPATIENT)
Dept: FAMILY MEDICINE CLINIC | Age: 79
End: 2019-01-09

## 2019-01-09 ENCOUNTER — HOSPITAL ENCOUNTER (OUTPATIENT)
Dept: LAB | Age: 79
Discharge: HOME | End: 2019-01-09
Payer: MEDICARE

## 2019-01-09 VITALS
DIASTOLIC BLOOD PRESSURE: 44 MMHG | HEIGHT: 76 IN | SYSTOLIC BLOOD PRESSURE: 102 MMHG | BODY MASS INDEX: 34.08 KG/M2 | TEMPERATURE: 98.5 F | OXYGEN SATURATION: 99 % | RESPIRATION RATE: 16 BRPM | HEART RATE: 60 BPM

## 2019-01-09 DIAGNOSIS — N18.1 CONTROLLED TYPE 2 DIABETES MELLITUS WITH STAGE 1 CHRONIC KIDNEY DISEASE, WITHOUT LONG-TERM CURRENT USE OF INSULIN (HCC): ICD-10-CM

## 2019-01-09 DIAGNOSIS — T14.8XXA WOUND INFECTION: ICD-10-CM

## 2019-01-09 DIAGNOSIS — R26.81 GAIT INSTABILITY: ICD-10-CM

## 2019-01-09 DIAGNOSIS — M79.89 LEG SWELLING: Primary | ICD-10-CM

## 2019-01-09 DIAGNOSIS — L08.9 WOUND INFECTION: ICD-10-CM

## 2019-01-09 DIAGNOSIS — E11.22 CONTROLLED TYPE 2 DIABETES MELLITUS WITH STAGE 1 CHRONIC KIDNEY DISEASE, WITHOUT LONG-TERM CURRENT USE OF INSULIN (HCC): ICD-10-CM

## 2019-01-09 DIAGNOSIS — G62.9 NEUROPATHY: ICD-10-CM

## 2019-01-09 DIAGNOSIS — B35.1 TINEA UNGUIUM: ICD-10-CM

## 2019-01-09 DIAGNOSIS — I42.9 CARDIOMYOPATHY, UNSPECIFIED TYPE (HCC): ICD-10-CM

## 2019-01-09 LAB
INR BLD: 2
PT POC: 24.1 SECONDS
VALID INTERNAL CONTROL?: YES

## 2019-01-09 PROCEDURE — 87186 SC STD MICRODIL/AGAR DIL: CPT

## 2019-01-09 PROCEDURE — 87077 CULTURE AEROBIC IDENTIFY: CPT

## 2019-01-09 PROCEDURE — 36415 COLL VENOUS BLD VENIPUNCTURE: CPT

## 2019-01-09 PROCEDURE — 3331090002 HH PPS REVENUE DEBIT

## 2019-01-09 PROCEDURE — 87070 CULTURE OTHR SPECIMN AEROBIC: CPT

## 2019-01-09 PROCEDURE — 3331090001 HH PPS REVENUE CREDIT

## 2019-01-09 RX ORDER — TERBINAFINE HYDROCHLORIDE 250 MG/1
250 TABLET ORAL DAILY
Qty: 30 TAB | Refills: 3 | Status: SHIPPED | OUTPATIENT
Start: 2019-01-09 | End: 2019-03-20 | Stop reason: SDUPTHER

## 2019-01-09 RX ORDER — POTASSIUM CHLORIDE 20 MEQ/1
20 TABLET, EXTENDED RELEASE ORAL 2 TIMES DAILY
Qty: 30 TAB | Refills: 3 | Status: SHIPPED | OUTPATIENT
Start: 2019-01-09 | End: 2021-05-24 | Stop reason: SDUPTHER

## 2019-01-09 RX ORDER — FUROSEMIDE 40 MG/1
40 TABLET ORAL 2 TIMES DAILY
Qty: 60 TAB | Refills: 3 | Status: SHIPPED | OUTPATIENT
Start: 2019-01-09 | End: 2019-03-20 | Stop reason: SDUPTHER

## 2019-01-09 RX ORDER — GABAPENTIN 600 MG/1
TABLET ORAL
Qty: 120 TAB | Refills: 3 | Status: SHIPPED | OUTPATIENT
Start: 2019-01-09 | End: 2019-04-02 | Stop reason: SDUPTHER

## 2019-01-09 NOTE — PROGRESS NOTES
Kg Cabrera is a [de-identified] y.o.  male and presents with     Chief Complaint   Patient presents with    Diabetes    Leg Swelling    Nail Problem    Peripheral Neuropathy    Wound Check    Dressing Change    Irregular Heart Beat       Pt is here for follow up. Pt is being monitored for his PT, INR and wounds on his leg by home health. Pt has wound on left leg and ha developed a new one on rt heel. Pt does have neuropathy and bumps int oobkects and gets new lesions  Pt has bilateral leg swelling  Pt says he cannot stop Neurontin due to his neuropathy. He had PVR that did not show any PAD. He is not monitoring his sugars. He also ha sulcer on dorsum of left foot . All his wound are covered in dressing. There is disachrge from the wounds. He has fungal infetion  On his toes. Past Medical History:   Diagnosis Date    Asthma     BPH (benign prostatic hyperplasia)     Herniated disc, cervical     Knee pain     Pacemaker 2011    [facility]    PAF (paroxysmal atrial fibrillation) ([facility])     During Pacer interogation     SSS (sick sinus syndrome) ([facility])     S/P Dual chamber [facility] Pacemaker     Past Surgical History:   Procedure Laterality Date    HX HERNIA REPAIR Bilateral 1989    HX HIP REPLACEMENT  2006    right    HX KNEE REPLACEMENT Bilateral 1992/1993/2006/2008/2011/2012/2013    HX PACEMAKER  2011     Current Outpatient Medications   Medication Sig    gabapentin (NEURONTIN) 600 mg tablet One po in am , one at noon and 2 at hs    furosemide (LASIX) 40 mg tablet Take 1 Tab by mouth two (2) times a day.  terbinafine HCl (LAMISIL) 250 mg tablet Take 1 Tab by mouth daily.  potassium chloride (K-DUR, KLOR-CON) 20 mEq tablet Take 1 Tab by mouth two (2) times a day.  albuterol (PROVENTIL VENTOLIN) 2.5 mg /3 mL (0.083 %) nebulizer solution 3 mL by Nebulization route every four (4) hours as needed for Wheezing.  warfarin (COUMADIN) 4 mg tablet Take 1 Tab by mouth daily.     warfarin (COUMADIN) 5 mg tablet Take 1 Tab by mouth daily.  metFORMIN (GLUCOPHAGE) 500 mg tablet Take 1 Tab by mouth two (2) times daily (with meals).  metoprolol succinate (TOPROL-XL) 25 mg XL tablet Take 1 Tab by mouth daily.  cholecalciferol (VITAMIN D3) 1,000 unit tablet Take 1,000 Units by mouth daily.  acetaminophen (TYLENOL) 500 mg tablet Take 500 mg by mouth every six (6) hours as needed for Pain.  cyanocobalamin 1,000 mcg tablet Take 1,000 mcg by mouth daily.  loratadine (CLARITIN) 10 mg tablet Take 1 Tab by mouth daily. (Patient taking differently: Take 1 Tab by mouth daily as needed for Allergies.)    warfarin (COUMADIN) 5 mg tablet Take 5 mg by mouth as needed for Other (per md orders ).  mupirocin (BACTROBAN) 2 % ointment Apply  to affected area daily.  predniSONE (DELTASONE) 10 mg tablet Take 5 tabs each 13,7, and 1 hour prior to study,( CT leg )    diphenhydrAMINE (BENADRYL) 50 mg capsule One hour prior to study , CT leg    beta-carotene,A,-vits C,E/mins (EYE HEALTH PO) Take 1 Tab by mouth daily.  collagenase (SANTYL) 250 unit/gram ointment Apply  to affected area daily. Apply to left lwoer ext wound once daily    miconazole (ZEASORB, MICONAZOLE,) 2 % topical powder Apply  to affected area two (2) times a day. Apply localy to the groin twice daily    raNITIdine (ZANTAC) 75 mg tablet Take 75 mg by mouth daily.  prasterone, dhea, (DHEA) 50 mg tab Take 50 mg by mouth daily.  SODIUM CHLORIDE (AFRIN SALINE NASAL MIST NA) 1 [place] by IntraNASal route as needed (congestion/allergies). No current facility-administered medications for this visit.       Health Maintenance   Topic Date Due    FOOT EXAM Q1  08/28/1950    EYE EXAM RETINAL OR DILATED  08/28/1950    Shingrix Vaccine Age 50> (1 of 2) 08/28/1988    GLAUCOMA SCREENING Q2Y  08/28/2003    MICROALBUMIN Q1  07/13/2018    Influenza Age 5 to Adult  08/01/2018    Pneumococcal 65+ Low/Medium Risk (2 of 2 - PCV13) 09/07/2018    MEDICARE YEARLY EXAM  09/08/2018    HEMOGLOBIN A1C Q6M  05/14/2019    LIPID PANEL Q1  11/14/2019    DTaP/Tdap/Td series (2 - Td) 07/16/2024     Immunization History   Administered Date(s) Administered    Influenza High Dose Vaccine PF 09/07/2017    Pneumococcal Polysaccharide (PPSV-23) 09/07/2017     No LMP for male patient. Allergies and Intolerances: Allergies   Allergen Reactions    Iodinated Contrast- Oral And Iv Dye Hives    Raisin Flavor      raisins/rash    Sulfa (Sulfonamide Antibiotics) Hives and Rash    Blueberry Rash       Family History:   No family history on file. Social History:   He  reports that he has been smoking cigars. he has never used smokeless tobacco.  He  reports that he drinks alcohol.             Review of Systems:   General: negative for - chills, fatigue, fever, weight change  Psych: negative for - anxiety, depression, irritability or mood swings  ENT: negative for - headaches, hearing change, nasal congestion, oral lesions, sneezing or sore throat  Heme/ Lymph: negative for - bleeding problems, bruising, pallor or swollen lymph nodes  Endo: negative for - hot flashes, polydipsia/polyuria or temperature intolerance  Resp: negative for - cough, shortness of breath or wheezing  CV: negative for - chest pain, edema or palpitations  GI: negative for - abdominal pain, change in bowel habits, constipation, diarrhea or nausea/vomiting  : negative for - dysuria, hematuria, incontinence, pelvic pain or vulvar/vaginal symptoms  MSK: negative for - joint pain, joint swelling or muscle pain  Neuro: negative for - confusion, headaches, seizures or weakness  Derm: negative for - dry skin, hair changes, rash or skin lesion changes          Physical:   Vitals:   Vitals:    01/09/19 1425   BP: 102/44   Pulse: 60   Resp: 16   Temp: 98.5 °F (36.9 °C)   TempSrc: Oral   SpO2: 99%   Height: 6' 4\" (1.93 m)           Exam:   HEENT- atraumatic,normocephalic, awake, oriented, well nourished  Neck - supple,no enlarged lymph nodes, no JVD, no thyromegaly  Chest- CTA, no rhonchi, no crackles  Heart- rrr, no murmurs / gallop/rub  Abdomen- soft,BS+,NT, no hepatosplenomegaly  Ext - no c/c/edema   Neuro- no focal deficits. Power 5/5 all extremities  Skin - warm,dry, no obvious rashes. In wheelchair  Wound on left leg - calf region seems to be clean , there is shalow ulcer on dorsum of left foot, odor ++, some exudate on the ulcer. RT  Foot - - superficial ulcer on heel of rt foot  BIlateral leg swelling ++., tinea unguium of the toes. Review of Data:   LABS:   Lab Results   Component Value Date/Time    WBC 6.3 11/14/2018 03:55 PM    HGB 12.3 (L) 11/14/2018 03:55 PM    HCT 38.0 11/14/2018 03:55 PM    PLATELET 255 52/61/8201 03:55 PM     Lab Results   Component Value Date/Time    Sodium 136 11/14/2018 03:55 PM    Potassium 4.5 11/14/2018 03:55 PM    Chloride 103 11/14/2018 03:55 PM    CO2 24 11/14/2018 03:55 PM    Glucose 111 (H) 11/14/2018 03:55 PM    BUN 17 11/14/2018 03:55 PM    Creatinine 0.85 11/14/2018 03:55 PM     Lab Results   Component Value Date/Time    Cholesterol, total 132 11/14/2018 03:55 PM    HDL Cholesterol 28 (L) 11/14/2018 03:55 PM    LDL, calculated 67.4 11/14/2018 03:55 PM    Triglyceride 183 (H) 11/14/2018 03:55 PM     No results found for: GPT        Impression / Plan:        ICD-10-CM ICD-9-CM    1. Leg swelling M79.89 729.81 gabapentin (NEURONTIN) 600 mg tablet      furosemide (LASIX) 40 mg tablet   2. Neuropathy G62.9 355.9 gabapentin (NEURONTIN) 600 mg tablet   3. Gait instability R26.81 781.2 gabapentin (NEURONTIN) 600 mg tablet   4. Controlled type 2 diabetes mellitus with stage 1 chronic kidney disease, without long-term current use of insulin (HCC) E11.22 250.40     N18.1 585.1    5. Cardiomyopathy, unspecified type (HCC) I42.9 425.4 AMB POC PT/INR   6. Tinea unguium B35.1 110.1 terbinafine HCl (LAMISIL) 250 mg tablet   7.  Wound infection T14. 8XXA 958. 3 potassium chloride (K-DUR, KLOR-CON) 20 mEq tablet    L08.9  CULTURE, WOUND W GRAM STAIN     Wounds - dressing changed and new dressing applied. Explained to patient risk benefits of the medications. Advised patient to stop meds if having any side effects. Pt verbalized understanding of the instructions. I have discussed the diagnosis with the patient and the intended plan as seen in the above orders. The patient has received an after-visit summary and questions were answered concerning future plans. I have discussed medication side effects and warnings with the patient as well. I have reviewed the plan of care with the patient, accepted their input and they are in agreement with the treatment goals. Reviewed plan of care. Patient has provided input and agrees with goals. Follow-up Disposition:  Return in about 2 months (around 3/9/2019).     Patricia Pa MD

## 2019-01-09 NOTE — PROGRESS NOTES
1. Have you been to the ER, urgent care clinic since your last visit? Hospitalized since your last visit? No    2. Have you seen or consulted any other health care providers outside of the [address] [place] [address] since your last visit? Include any pap smears or colon screening.  No

## 2019-01-10 ENCOUNTER — HOME CARE VISIT (OUTPATIENT)
Dept: SCHEDULING | Facility: HOME HEALTH | Age: 79
End: 2019-01-10
Payer: MEDICARE

## 2019-01-10 VITALS
DIASTOLIC BLOOD PRESSURE: 64 MMHG | HEART RATE: 76 BPM | RESPIRATION RATE: 20 BRPM | SYSTOLIC BLOOD PRESSURE: 132 MMHG | TEMPERATURE: 99.3 F | OXYGEN SATURATION: 99 %

## 2019-01-10 PROCEDURE — G0300 HHS/HOSPICE OF LPN EA 15 MIN: HCPCS

## 2019-01-10 PROCEDURE — 3331090002 HH PPS REVENUE DEBIT

## 2019-01-10 PROCEDURE — 3331090001 HH PPS REVENUE CREDIT

## 2019-01-11 PROCEDURE — 3331090002 HH PPS REVENUE DEBIT

## 2019-01-11 PROCEDURE — 3331090001 HH PPS REVENUE CREDIT

## 2019-01-12 ENCOUNTER — HOME CARE VISIT (OUTPATIENT)
Dept: SCHEDULING | Facility: HOME HEALTH | Age: 79
End: 2019-01-12
Payer: MEDICARE

## 2019-01-12 LAB
BACTERIA SPEC CULT: ABNORMAL
GRAM STN SPEC: ABNORMAL
GRAM STN SPEC: ABNORMAL
SERVICE CMNT-IMP: ABNORMAL

## 2019-01-12 PROCEDURE — 3331090001 HH PPS REVENUE CREDIT

## 2019-01-12 PROCEDURE — G0299 HHS/HOSPICE OF RN EA 15 MIN: HCPCS

## 2019-01-12 PROCEDURE — 3331090002 HH PPS REVENUE DEBIT

## 2019-01-13 ENCOUNTER — TELEPHONE (OUTPATIENT)
Dept: FAMILY MEDICINE CLINIC | Age: 79
End: 2019-01-13

## 2019-01-13 DIAGNOSIS — T14.8XXA WOUND INFECTION: Primary | ICD-10-CM

## 2019-01-13 DIAGNOSIS — L08.9 WOUND INFECTION: Primary | ICD-10-CM

## 2019-01-13 PROCEDURE — 3331090002 HH PPS REVENUE DEBIT

## 2019-01-13 PROCEDURE — 3331090001 HH PPS REVENUE CREDIT

## 2019-01-13 RX ORDER — CEPHALEXIN 500 MG/1
500 CAPSULE ORAL 4 TIMES DAILY
Qty: 40 CAP | Refills: 0 | Status: SHIPPED | OUTPATIENT
Start: 2019-01-13 | End: 2019-01-23

## 2019-01-13 RX ORDER — LEVOFLOXACIN 500 MG/1
500 TABLET, FILM COATED ORAL DAILY
Qty: 10 TAB | Refills: 0 | Status: SHIPPED | OUTPATIENT
Start: 2019-01-13 | End: 2019-01-23

## 2019-01-14 VITALS
SYSTOLIC BLOOD PRESSURE: 120 MMHG | TEMPERATURE: 98.8 F | DIASTOLIC BLOOD PRESSURE: 60 MMHG | RESPIRATION RATE: 16 BRPM | OXYGEN SATURATION: 98 % | HEART RATE: 68 BPM

## 2019-01-14 DIAGNOSIS — E11.9 DM TYPE 2, GOAL HBA1C < 7% (HCC): ICD-10-CM

## 2019-01-14 PROCEDURE — 3331090001 HH PPS REVENUE CREDIT

## 2019-01-14 PROCEDURE — 3331090002 HH PPS REVENUE DEBIT

## 2019-01-14 NOTE — TELEPHONE ENCOUNTER
Wound on left foot shows infection with multiple bacteria. Will send levoflox and keflex to pharmacy. Will ask pt to take coumadin everyother day for the next 10 days and will ask home health to monitor PT,INR every 3 rd day for the next 10 days.

## 2019-01-15 ENCOUNTER — HOME CARE VISIT (OUTPATIENT)
Dept: SCHEDULING | Facility: HOME HEALTH | Age: 79
End: 2019-01-15
Payer: MEDICARE

## 2019-01-15 VITALS
SYSTOLIC BLOOD PRESSURE: 118 MMHG | DIASTOLIC BLOOD PRESSURE: 66 MMHG | HEART RATE: 72 BPM | TEMPERATURE: 98.6 F | RESPIRATION RATE: 20 BRPM | OXYGEN SATURATION: 98 %

## 2019-01-15 PROCEDURE — G0300 HHS/HOSPICE OF LPN EA 15 MIN: HCPCS

## 2019-01-15 PROCEDURE — 3331090002 HH PPS REVENUE DEBIT

## 2019-01-15 PROCEDURE — 3331090001 HH PPS REVENUE CREDIT

## 2019-01-15 RX ORDER — METFORMIN HYDROCHLORIDE 500 MG/1
500 TABLET ORAL 2 TIMES DAILY WITH MEALS
Qty: 60 TAB | Refills: 3 | Status: SHIPPED | OUTPATIENT
Start: 2019-01-15 | End: 2019-03-20 | Stop reason: SDUPTHER

## 2019-01-15 NOTE — TELEPHONE ENCOUNTER
Called and spoke with patient and informed him 2 abx was sent to pharmacy, advised him to take coumadin 4mg every other day while on abx for 10 days. He verbalized understanding.

## 2019-01-16 PROCEDURE — 3331090001 HH PPS REVENUE CREDIT

## 2019-01-16 PROCEDURE — 3331090002 HH PPS REVENUE DEBIT

## 2019-01-17 ENCOUNTER — TELEPHONE (OUTPATIENT)
Dept: FAMILY MEDICINE CLINIC | Age: 79
End: 2019-01-17

## 2019-01-17 ENCOUNTER — HOME CARE VISIT (OUTPATIENT)
Dept: SCHEDULING | Facility: HOME HEALTH | Age: 79
End: 2019-01-17
Payer: MEDICARE

## 2019-01-17 VITALS
TEMPERATURE: 99.5 F | SYSTOLIC BLOOD PRESSURE: 122 MMHG | OXYGEN SATURATION: 98 % | HEART RATE: 72 BPM | DIASTOLIC BLOOD PRESSURE: 60 MMHG | RESPIRATION RATE: 20 BRPM

## 2019-01-17 PROCEDURE — G0300 HHS/HOSPICE OF LPN EA 15 MIN: HCPCS

## 2019-01-17 PROCEDURE — 3331090001 HH PPS REVENUE CREDIT

## 2019-01-17 PROCEDURE — 3331090002 HH PPS REVENUE DEBIT

## 2019-01-17 NOTE — TELEPHONE ENCOUNTER
[facility] called, requesting dates faxed to [phone]. Please write an order stating to check INR 18, 21, 24 and 28. Once completed nurse will fax.

## 2019-01-18 PROCEDURE — 3331090002 HH PPS REVENUE DEBIT

## 2019-01-18 PROCEDURE — 3331090001 HH PPS REVENUE CREDIT

## 2019-01-19 ENCOUNTER — HOME CARE VISIT (OUTPATIENT)
Dept: SCHEDULING | Facility: HOME HEALTH | Age: 79
End: 2019-01-19
Payer: MEDICARE

## 2019-01-19 VITALS
DIASTOLIC BLOOD PRESSURE: 68 MMHG | RESPIRATION RATE: 18 BRPM | OXYGEN SATURATION: 99 % | HEART RATE: 58 BPM | TEMPERATURE: 99 F | SYSTOLIC BLOOD PRESSURE: 124 MMHG

## 2019-01-19 PROCEDURE — 3331090001 HH PPS REVENUE CREDIT

## 2019-01-19 PROCEDURE — G0162 HHC RN E&M PLAN SVS, 15 MIN: HCPCS

## 2019-01-19 PROCEDURE — 3331090002 HH PPS REVENUE DEBIT

## 2019-01-20 PROCEDURE — 3331090003 HH PPS REVENUE ADJ

## 2019-01-20 PROCEDURE — 3331090001 HH PPS REVENUE CREDIT

## 2019-01-20 PROCEDURE — 3331090002 HH PPS REVENUE DEBIT

## 2019-01-21 ENCOUNTER — HOME CARE VISIT (OUTPATIENT)
Dept: SCHEDULING | Facility: HOME HEALTH | Age: 79
End: 2019-01-21
Payer: MEDICARE

## 2019-01-21 ENCOUNTER — HOME CARE VISIT (OUTPATIENT)
Dept: HOME HEALTH SERVICES | Facility: HOME HEALTH | Age: 79
End: 2019-01-21
Payer: MEDICARE

## 2019-01-21 VITALS
SYSTOLIC BLOOD PRESSURE: 128 MMHG | TEMPERATURE: 99.7 F | OXYGEN SATURATION: 99 % | DIASTOLIC BLOOD PRESSURE: 60 MMHG | HEART RATE: 72 BPM | RESPIRATION RATE: 20 BRPM

## 2019-01-21 LAB
INR BLD: 1.6 (ref 0.9–1.1)
PT POC: 19.1 SECONDS (ref 11.8–14.9)

## 2019-01-21 PROCEDURE — G0300 HHS/HOSPICE OF LPN EA 15 MIN: HCPCS

## 2019-01-21 PROCEDURE — 3331090001 HH PPS REVENUE CREDIT

## 2019-01-21 PROCEDURE — 3331090002 HH PPS REVENUE DEBIT

## 2019-01-21 NOTE — TELEPHONE ENCOUNTER
Order sent,   INR 1.6  PTT 19.1  Pt was taking 4 mg every other day. Per Matthias Treadwell, alternate, take 4 mg and alternate with 2 mg, recheck in 3 days. Laura Rubi [phone] notified. This encounter will be closed.

## 2019-01-22 PROCEDURE — 3331090002 HH PPS REVENUE DEBIT

## 2019-01-22 PROCEDURE — 3331090001 HH PPS REVENUE CREDIT

## 2019-01-23 PROCEDURE — 3331090002 HH PPS REVENUE DEBIT

## 2019-01-23 PROCEDURE — 3331090001 HH PPS REVENUE CREDIT

## 2019-01-24 ENCOUNTER — HOME CARE VISIT (OUTPATIENT)
Dept: HOME HEALTH SERVICES | Facility: HOME HEALTH | Age: 79
End: 2019-01-24
Payer: MEDICARE

## 2019-01-24 ENCOUNTER — HOME CARE VISIT (OUTPATIENT)
Dept: SCHEDULING | Facility: HOME HEALTH | Age: 79
End: 2019-01-24
Payer: MEDICARE

## 2019-01-24 ENCOUNTER — TELEPHONE (OUTPATIENT)
Dept: FAMILY MEDICINE CLINIC | Age: 79
End: 2019-01-24

## 2019-01-24 VITALS
DIASTOLIC BLOOD PRESSURE: 64 MMHG | TEMPERATURE: 99.6 F | SYSTOLIC BLOOD PRESSURE: 130 MMHG | RESPIRATION RATE: 20 BRPM | HEART RATE: 72 BPM | OXYGEN SATURATION: 99 %

## 2019-01-24 LAB
INR BLD: 1.5 (ref 0.9–1.1)
PT POC: 18.3 SECONDS (ref 11.8–14.9)

## 2019-01-24 PROCEDURE — 3331090002 HH PPS REVENUE DEBIT

## 2019-01-24 PROCEDURE — 400014 HH F/U

## 2019-01-24 PROCEDURE — 3331090001 HH PPS REVENUE CREDIT

## 2019-01-24 PROCEDURE — G0300 HHS/HOSPICE OF LPN EA 15 MIN: HCPCS

## 2019-01-24 NOTE — TELEPHONE ENCOUNTER
[facility] called with patients PT/INR. PT 18.3  INR 1.5    Patient taking 4mg alternating 2 mg. Patient still on antibiotics  Until Sat. Jarrod Marcelino - [phone].

## 2019-01-24 NOTE — TELEPHONE ENCOUNTER
Called and spoke with [place] and informed her per Dr. Vandana Starkey, patient is to take 4mg daily and recheck inr on Monday, she verbalized understanding.

## 2019-01-25 PROCEDURE — 3331090001 HH PPS REVENUE CREDIT

## 2019-01-25 PROCEDURE — 3331090002 HH PPS REVENUE DEBIT

## 2019-01-26 ENCOUNTER — HOME CARE VISIT (OUTPATIENT)
Dept: SCHEDULING | Facility: HOME HEALTH | Age: 79
End: 2019-01-26
Payer: MEDICARE

## 2019-01-26 VITALS
DIASTOLIC BLOOD PRESSURE: 70 MMHG | SYSTOLIC BLOOD PRESSURE: 110 MMHG | RESPIRATION RATE: 20 BRPM | TEMPERATURE: 97 F | HEART RATE: 67 BPM | OXYGEN SATURATION: 97 %

## 2019-01-26 PROCEDURE — G0299 HHS/HOSPICE OF RN EA 15 MIN: HCPCS

## 2019-01-26 PROCEDURE — 3331090001 HH PPS REVENUE CREDIT

## 2019-01-26 PROCEDURE — 3331090002 HH PPS REVENUE DEBIT

## 2019-01-27 PROCEDURE — 3331090001 HH PPS REVENUE CREDIT

## 2019-01-27 PROCEDURE — 3331090002 HH PPS REVENUE DEBIT

## 2019-01-28 ENCOUNTER — TELEPHONE (OUTPATIENT)
Dept: FAMILY MEDICINE CLINIC | Age: 79
End: 2019-01-28

## 2019-01-28 ENCOUNTER — HOME CARE VISIT (OUTPATIENT)
Dept: SCHEDULING | Facility: HOME HEALTH | Age: 79
End: 2019-01-28
Payer: MEDICARE

## 2019-01-28 VITALS
RESPIRATION RATE: 20 BRPM | OXYGEN SATURATION: 97 % | HEART RATE: 64 BPM | TEMPERATURE: 98.7 F | DIASTOLIC BLOOD PRESSURE: 72 MMHG | SYSTOLIC BLOOD PRESSURE: 128 MMHG

## 2019-01-28 PROCEDURE — 3331090002 HH PPS REVENUE DEBIT

## 2019-01-28 PROCEDURE — 3331090001 HH PPS REVENUE CREDIT

## 2019-01-28 PROCEDURE — G0300 HHS/HOSPICE OF LPN EA 15 MIN: HCPCS

## 2019-01-28 NOTE — TELEPHONE ENCOUNTER
INR 1.3  PTT 15.8    Pt is taking 4 mg daily. Nurse is waiting for reply from provider.      Epifanio Graham [phone]

## 2019-01-29 ENCOUNTER — HOME CARE VISIT (OUTPATIENT)
Dept: HOME HEALTH SERVICES | Facility: HOME HEALTH | Age: 79
End: 2019-01-29
Payer: MEDICARE

## 2019-01-29 PROCEDURE — 3331090002 HH PPS REVENUE DEBIT

## 2019-01-29 PROCEDURE — 3331090001 HH PPS REVENUE CREDIT

## 2019-01-29 NOTE — TELEPHONE ENCOUNTER
Called and spoke with [facility], patient Is to alternate 4mg and 5mg coumadin and recheck on 2/5/19 per Dr. Niels Knapp read back directions.

## 2019-01-30 PROCEDURE — 3331090002 HH PPS REVENUE DEBIT

## 2019-01-30 PROCEDURE — 3331090001 HH PPS REVENUE CREDIT

## 2019-01-31 ENCOUNTER — HOME CARE VISIT (OUTPATIENT)
Dept: SCHEDULING | Facility: HOME HEALTH | Age: 79
End: 2019-01-31
Payer: MEDICARE

## 2019-01-31 VITALS
SYSTOLIC BLOOD PRESSURE: 108 MMHG | DIASTOLIC BLOOD PRESSURE: 60 MMHG | TEMPERATURE: 99 F | RESPIRATION RATE: 20 BRPM | HEART RATE: 72 BPM | OXYGEN SATURATION: 98 %

## 2019-01-31 PROCEDURE — 3331090001 HH PPS REVENUE CREDIT

## 2019-01-31 PROCEDURE — G0300 HHS/HOSPICE OF LPN EA 15 MIN: HCPCS

## 2019-01-31 PROCEDURE — 3331090002 HH PPS REVENUE DEBIT

## 2019-02-01 PROCEDURE — 3331090002 HH PPS REVENUE DEBIT

## 2019-02-01 PROCEDURE — 3331090001 HH PPS REVENUE CREDIT

## 2019-02-02 ENCOUNTER — HOME CARE VISIT (OUTPATIENT)
Dept: SCHEDULING | Facility: HOME HEALTH | Age: 79
End: 2019-02-02
Payer: MEDICARE

## 2019-02-02 PROCEDURE — 3331090002 HH PPS REVENUE DEBIT

## 2019-02-02 PROCEDURE — 3331090001 HH PPS REVENUE CREDIT

## 2019-02-02 PROCEDURE — G0299 HHS/HOSPICE OF RN EA 15 MIN: HCPCS

## 2019-02-03 VITALS
RESPIRATION RATE: 20 BRPM | SYSTOLIC BLOOD PRESSURE: 110 MMHG | HEART RATE: 63 BPM | DIASTOLIC BLOOD PRESSURE: 60 MMHG | OXYGEN SATURATION: 97 % | TEMPERATURE: 96.8 F

## 2019-02-03 PROCEDURE — 3331090001 HH PPS REVENUE CREDIT

## 2019-02-03 PROCEDURE — 3331090002 HH PPS REVENUE DEBIT

## 2019-02-04 PROCEDURE — 3331090002 HH PPS REVENUE DEBIT

## 2019-02-04 PROCEDURE — 3331090001 HH PPS REVENUE CREDIT

## 2019-02-05 ENCOUNTER — TELEPHONE (OUTPATIENT)
Dept: FAMILY MEDICINE CLINIC | Age: 79
End: 2019-02-05

## 2019-02-05 ENCOUNTER — HOME CARE VISIT (OUTPATIENT)
Dept: SCHEDULING | Facility: HOME HEALTH | Age: 79
End: 2019-02-05
Payer: MEDICARE

## 2019-02-05 ENCOUNTER — HOME CARE VISIT (OUTPATIENT)
Dept: HOME HEALTH SERVICES | Facility: HOME HEALTH | Age: 79
End: 2019-02-05
Payer: MEDICARE

## 2019-02-05 VITALS
OXYGEN SATURATION: 98 % | HEART RATE: 62 BPM | TEMPERATURE: 99.1 F | RESPIRATION RATE: 20 BRPM | DIASTOLIC BLOOD PRESSURE: 66 MMHG | SYSTOLIC BLOOD PRESSURE: 120 MMHG

## 2019-02-05 LAB
INR BLD: 1.5 (ref 0.9–1.1)
PT POC: 18.1 SECONDS (ref 11.8–14.9)

## 2019-02-05 PROCEDURE — 3331090001 HH PPS REVENUE CREDIT

## 2019-02-05 PROCEDURE — G0300 HHS/HOSPICE OF LPN EA 15 MIN: HCPCS

## 2019-02-05 PROCEDURE — 3331090002 HH PPS REVENUE DEBIT

## 2019-02-05 NOTE — TELEPHONE ENCOUNTER
Per Dr. Leesa Crespo,  Take extra 5 mg today, 5 mg daily and recheck in 1 week. LVM with O[facility]. Asked to call back if needed. This encounter will be closed.

## 2019-02-06 PROCEDURE — 3331090002 HH PPS REVENUE DEBIT

## 2019-02-06 PROCEDURE — 3331090001 HH PPS REVENUE CREDIT

## 2019-02-07 ENCOUNTER — HOME CARE VISIT (OUTPATIENT)
Dept: SCHEDULING | Facility: HOME HEALTH | Age: 79
End: 2019-02-07
Payer: MEDICARE

## 2019-02-07 VITALS
TEMPERATURE: 98.7 F | OXYGEN SATURATION: 98 % | SYSTOLIC BLOOD PRESSURE: 115 MMHG | DIASTOLIC BLOOD PRESSURE: 64 MMHG | RESPIRATION RATE: 20 BRPM | HEART RATE: 72 BPM

## 2019-02-07 PROCEDURE — G0300 HHS/HOSPICE OF LPN EA 15 MIN: HCPCS

## 2019-02-07 PROCEDURE — 3331090002 HH PPS REVENUE DEBIT

## 2019-02-07 PROCEDURE — 3331090001 HH PPS REVENUE CREDIT

## 2019-02-08 PROCEDURE — 3331090001 HH PPS REVENUE CREDIT

## 2019-02-08 PROCEDURE — 3331090002 HH PPS REVENUE DEBIT

## 2019-02-09 ENCOUNTER — HOME CARE VISIT (OUTPATIENT)
Dept: SCHEDULING | Facility: HOME HEALTH | Age: 79
End: 2019-02-09
Payer: MEDICARE

## 2019-02-09 VITALS
SYSTOLIC BLOOD PRESSURE: 132 MMHG | OXYGEN SATURATION: 98 % | DIASTOLIC BLOOD PRESSURE: 60 MMHG | RESPIRATION RATE: 20 BRPM | HEART RATE: 72 BPM | TEMPERATURE: 98.9 F

## 2019-02-09 PROCEDURE — G0300 HHS/HOSPICE OF LPN EA 15 MIN: HCPCS

## 2019-02-09 PROCEDURE — 3331090002 HH PPS REVENUE DEBIT

## 2019-02-09 PROCEDURE — 3331090001 HH PPS REVENUE CREDIT

## 2019-02-10 PROCEDURE — 3331090001 HH PPS REVENUE CREDIT

## 2019-02-10 PROCEDURE — 3331090002 HH PPS REVENUE DEBIT

## 2019-02-11 PROCEDURE — 3331090002 HH PPS REVENUE DEBIT

## 2019-02-11 PROCEDURE — 3331090001 HH PPS REVENUE CREDIT

## 2019-02-12 ENCOUNTER — HOME CARE VISIT (OUTPATIENT)
Dept: HOME HEALTH SERVICES | Facility: HOME HEALTH | Age: 79
End: 2019-02-12
Payer: MEDICARE

## 2019-02-12 ENCOUNTER — HOME CARE VISIT (OUTPATIENT)
Dept: SCHEDULING | Facility: HOME HEALTH | Age: 79
End: 2019-02-12
Payer: MEDICARE

## 2019-02-12 ENCOUNTER — TELEPHONE (OUTPATIENT)
Dept: FAMILY MEDICINE CLINIC | Age: 79
End: 2019-02-12

## 2019-02-12 VITALS
RESPIRATION RATE: 20 BRPM | OXYGEN SATURATION: 98 % | HEART RATE: 72 BPM | DIASTOLIC BLOOD PRESSURE: 60 MMHG | TEMPERATURE: 99.3 F | SYSTOLIC BLOOD PRESSURE: 126 MMHG

## 2019-02-12 LAB
INR BLD: 2.2 (ref 0.9–1.1)
PT POC: 26.1 SECONDS (ref 11.8–14.9)

## 2019-02-12 PROCEDURE — 3331090002 HH PPS REVENUE DEBIT

## 2019-02-12 PROCEDURE — 3331090001 HH PPS REVENUE CREDIT

## 2019-02-12 PROCEDURE — G0300 HHS/HOSPICE OF LPN EA 15 MIN: HCPCS

## 2019-02-12 NOTE — TELEPHONE ENCOUNTER
[facility] called  PTT 26.1  INR 2.2  Took 10 mg Monday and the rest of the days 5 mgs    Per Dr. Mason Rodriguez continue on 5 mg daily recheck in 1 week. [facility] notified [phone]. This encounter will be closed.

## 2019-02-13 PROCEDURE — 3331090001 HH PPS REVENUE CREDIT

## 2019-02-13 PROCEDURE — 3331090002 HH PPS REVENUE DEBIT

## 2019-02-14 ENCOUNTER — HOME CARE VISIT (OUTPATIENT)
Dept: SCHEDULING | Facility: HOME HEALTH | Age: 79
End: 2019-02-14
Payer: MEDICARE

## 2019-02-14 ENCOUNTER — TELEPHONE (OUTPATIENT)
Dept: FAMILY MEDICINE CLINIC | Age: 79
End: 2019-02-14

## 2019-02-14 VITALS
HEART RATE: 72 BPM | TEMPERATURE: 99.2 F | RESPIRATION RATE: 20 BRPM | OXYGEN SATURATION: 98 % | SYSTOLIC BLOOD PRESSURE: 115 MMHG | DIASTOLIC BLOOD PRESSURE: 62 MMHG

## 2019-02-14 PROCEDURE — 3331090001 HH PPS REVENUE CREDIT

## 2019-02-14 PROCEDURE — 3331090002 HH PPS REVENUE DEBIT

## 2019-02-14 PROCEDURE — G0300 HHS/HOSPICE OF LPN EA 15 MIN: HCPCS

## 2019-02-14 NOTE — TELEPHONE ENCOUNTER
Nayeli Thomas from [address] [place] [address] stated pt. Has wounds on anterior left foot and left calf and shin. She stated  pt. Has green drainage. If Dr. Cezar Moore would like to send any orders, please fax to [phone].

## 2019-02-15 PROCEDURE — 3331090001 HH PPS REVENUE CREDIT

## 2019-02-15 PROCEDURE — 3331090002 HH PPS REVENUE DEBIT

## 2019-02-16 ENCOUNTER — HOME CARE VISIT (OUTPATIENT)
Dept: SCHEDULING | Facility: HOME HEALTH | Age: 79
End: 2019-02-16
Payer: MEDICARE

## 2019-02-16 PROCEDURE — 3331090001 HH PPS REVENUE CREDIT

## 2019-02-16 PROCEDURE — G0299 HHS/HOSPICE OF RN EA 15 MIN: HCPCS

## 2019-02-16 PROCEDURE — 3331090002 HH PPS REVENUE DEBIT

## 2019-02-17 PROCEDURE — 3331090001 HH PPS REVENUE CREDIT

## 2019-02-17 PROCEDURE — 3331090002 HH PPS REVENUE DEBIT

## 2019-02-18 ENCOUNTER — TELEPHONE (OUTPATIENT)
Dept: FAMILY MEDICINE CLINIC | Age: 79
End: 2019-02-18

## 2019-02-18 DIAGNOSIS — L08.9 WOUND INFECTION: Primary | ICD-10-CM

## 2019-02-18 DIAGNOSIS — T14.8XXA WOUND INFECTION: Primary | ICD-10-CM

## 2019-02-18 PROCEDURE — 3331090002 HH PPS REVENUE DEBIT

## 2019-02-18 PROCEDURE — 3331090001 HH PPS REVENUE CREDIT

## 2019-02-18 RX ORDER — LEVOFLOXACIN 500 MG/1
500 TABLET, FILM COATED ORAL DAILY
Qty: 10 TAB | Refills: 0 | Status: SHIPPED | OUTPATIENT
Start: 2019-02-18 | End: 2019-02-28

## 2019-02-19 ENCOUNTER — HOME CARE VISIT (OUTPATIENT)
Dept: SCHEDULING | Facility: HOME HEALTH | Age: 79
End: 2019-02-19
Payer: MEDICARE

## 2019-02-19 VITALS
DIASTOLIC BLOOD PRESSURE: 87 MMHG | HEART RATE: 63 BPM | SYSTOLIC BLOOD PRESSURE: 132 MMHG | TEMPERATURE: 97.8 F | OXYGEN SATURATION: 98 %

## 2019-02-19 VITALS
SYSTOLIC BLOOD PRESSURE: 120 MMHG | OXYGEN SATURATION: 98 % | DIASTOLIC BLOOD PRESSURE: 62 MMHG | TEMPERATURE: 99.3 F | HEART RATE: 72 BPM | RESPIRATION RATE: 20 BRPM

## 2019-02-19 PROCEDURE — G0300 HHS/HOSPICE OF LPN EA 15 MIN: HCPCS

## 2019-02-19 PROCEDURE — 3331090001 HH PPS REVENUE CREDIT

## 2019-02-19 PROCEDURE — 3331090002 HH PPS REVENUE DEBIT

## 2019-02-19 NOTE — TELEPHONE ENCOUNTER
Advised patient and [facility]) that levofloxacin sent to [facility], and an order to check INR every 3 rd day will be faxed. [facility] will call back with INR and additional request for orders.

## 2019-02-19 NOTE — TELEPHONE ENCOUNTER
Based on the description of the wound  and previous cult report I am sending Levoflox to pharmacy. PT,INR will have to be monitored every 3rd day by home health.  during abx treatment,

## 2019-02-20 PROCEDURE — 3331090001 HH PPS REVENUE CREDIT

## 2019-02-20 PROCEDURE — 3331090002 HH PPS REVENUE DEBIT

## 2019-02-20 NOTE — TELEPHONE ENCOUNTER
Late Entry 02/19/2019    INR 1.5  PTT 18.4  Pt taking 5 mg daily  Per Dr. Mitra Camara, continue same dosage recheck in three days. As of 02/20/2019 pt has not picked up antibiotics. Quintin Sims [phone] called on 02/20/2019 for follow up. Nurse advised of continued regimen. This encounter will be closed.

## 2019-02-21 ENCOUNTER — HOME CARE VISIT (OUTPATIENT)
Dept: SCHEDULING | Facility: HOME HEALTH | Age: 79
End: 2019-02-21
Payer: MEDICARE

## 2019-02-21 VITALS
SYSTOLIC BLOOD PRESSURE: 115 MMHG | RESPIRATION RATE: 20 BRPM | OXYGEN SATURATION: 98 % | TEMPERATURE: 98.7 F | HEART RATE: 72 BPM | DIASTOLIC BLOOD PRESSURE: 72 MMHG

## 2019-02-21 PROCEDURE — 3331090002 HH PPS REVENUE DEBIT

## 2019-02-21 PROCEDURE — G0300 HHS/HOSPICE OF LPN EA 15 MIN: HCPCS

## 2019-02-21 PROCEDURE — 3331090001 HH PPS REVENUE CREDIT

## 2019-02-22 ENCOUNTER — TELEPHONE (OUTPATIENT)
Dept: FAMILY MEDICINE CLINIC | Age: 79
End: 2019-02-22

## 2019-02-22 ENCOUNTER — HOME CARE VISIT (OUTPATIENT)
Dept: SCHEDULING | Facility: HOME HEALTH | Age: 79
End: 2019-02-22
Payer: MEDICARE

## 2019-02-22 LAB
INR BLD: 1.6 (ref 0.9–1.1)
PT POC: 19.5 SECONDS (ref 11.8–14.9)

## 2019-02-22 PROCEDURE — 3331090002 HH PPS REVENUE DEBIT

## 2019-02-22 PROCEDURE — 3331090001 HH PPS REVENUE CREDIT

## 2019-02-22 NOTE — TELEPHONE ENCOUNTER
Patient is currently on antibiotics. Dima Sandoval called to report INR 1.6 and PT is 19.5 . He is currently taking 5mg, coumadin will be rechecked on Monday.     Dima Sandoval:  [phone]

## 2019-02-23 ENCOUNTER — HOME CARE VISIT (OUTPATIENT)
Dept: SCHEDULING | Facility: HOME HEALTH | Age: 79
End: 2019-02-23
Payer: MEDICARE

## 2019-02-23 PROCEDURE — 3331090001 HH PPS REVENUE CREDIT

## 2019-02-23 PROCEDURE — G0299 HHS/HOSPICE OF RN EA 15 MIN: HCPCS

## 2019-02-23 PROCEDURE — 3331090002 HH PPS REVENUE DEBIT

## 2019-02-24 PROCEDURE — 3331090002 HH PPS REVENUE DEBIT

## 2019-02-24 PROCEDURE — 3331090001 HH PPS REVENUE CREDIT

## 2019-02-25 ENCOUNTER — TELEPHONE (OUTPATIENT)
Dept: FAMILY MEDICINE CLINIC | Age: 79
End: 2019-02-25

## 2019-02-25 ENCOUNTER — HOME CARE VISIT (OUTPATIENT)
Dept: HOME HEALTH SERVICES | Facility: HOME HEALTH | Age: 79
End: 2019-02-25
Payer: MEDICARE

## 2019-02-25 ENCOUNTER — HOME CARE VISIT (OUTPATIENT)
Dept: SCHEDULING | Facility: HOME HEALTH | Age: 79
End: 2019-02-25
Payer: MEDICARE

## 2019-02-25 LAB
INR BLD: 1.9 (ref 0.9–1.1)
PT POC: 22.8 SECONDS (ref 11.8–14.9)

## 2019-02-25 PROCEDURE — 3331090001 HH PPS REVENUE CREDIT

## 2019-02-25 PROCEDURE — G0299 HHS/HOSPICE OF RN EA 15 MIN: HCPCS

## 2019-02-25 PROCEDURE — 3331090002 HH PPS REVENUE DEBIT

## 2019-02-25 NOTE — TELEPHONE ENCOUNTER
[facility] called to report INR 1.9 PT 22.8    Patient is currently on abx, they will recheck it on 2/28/19.

## 2019-02-26 VITALS
DIASTOLIC BLOOD PRESSURE: 67 MMHG | HEART RATE: 62 BPM | RESPIRATION RATE: 16 BRPM | SYSTOLIC BLOOD PRESSURE: 95 MMHG | TEMPERATURE: 98.4 F | OXYGEN SATURATION: 97 %

## 2019-02-26 PROCEDURE — 3331090001 HH PPS REVENUE CREDIT

## 2019-02-26 PROCEDURE — 3331090002 HH PPS REVENUE DEBIT

## 2019-02-27 VITALS
HEART RATE: 64 BPM | TEMPERATURE: 98.7 F | RESPIRATION RATE: 16 BRPM | OXYGEN SATURATION: 95 % | SYSTOLIC BLOOD PRESSURE: 118 MMHG | DIASTOLIC BLOOD PRESSURE: 70 MMHG

## 2019-02-27 PROCEDURE — 3331090002 HH PPS REVENUE DEBIT

## 2019-02-27 PROCEDURE — 3331090001 HH PPS REVENUE CREDIT

## 2019-02-28 ENCOUNTER — TELEPHONE (OUTPATIENT)
Dept: FAMILY MEDICINE CLINIC | Age: 79
End: 2019-02-28

## 2019-02-28 ENCOUNTER — HOME CARE VISIT (OUTPATIENT)
Dept: SCHEDULING | Facility: HOME HEALTH | Age: 79
End: 2019-02-28
Payer: MEDICARE

## 2019-02-28 ENCOUNTER — HOME CARE VISIT (OUTPATIENT)
Dept: HOME HEALTH SERVICES | Facility: HOME HEALTH | Age: 79
End: 2019-02-28
Payer: MEDICARE

## 2019-02-28 VITALS
DIASTOLIC BLOOD PRESSURE: 56 MMHG | TEMPERATURE: 98.8 F | HEART RATE: 72 BPM | SYSTOLIC BLOOD PRESSURE: 112 MMHG | OXYGEN SATURATION: 98 % | RESPIRATION RATE: 20 BRPM

## 2019-02-28 LAB
INR BLD: 2.1 (ref 0.9–1.1)
PT POC: 25.1 SECONDS (ref 11.8–14.9)

## 2019-02-28 PROCEDURE — 3331090001 HH PPS REVENUE CREDIT

## 2019-02-28 PROCEDURE — G0300 HHS/HOSPICE OF LPN EA 15 MIN: HCPCS

## 2019-02-28 PROCEDURE — 3331090002 HH PPS REVENUE DEBIT

## 2019-02-28 NOTE — TELEPHONE ENCOUNTER
[facility] ([phone]) called  PT 25.4  INR 2.1  Pt is taking 5 mg daily, he has 1 day of Levaquin left. Please advise. Pt has 4 mg and 5 mg of coumadin on hand.

## 2019-02-28 NOTE — TELEPHONE ENCOUNTER
[facility] call to check status, advised to continue 5 mg daily, repeat INR one week (off levofloxacin). This encounter will be closed.

## 2019-03-01 PROCEDURE — 3331090001 HH PPS REVENUE CREDIT

## 2019-03-01 PROCEDURE — 3331090002 HH PPS REVENUE DEBIT

## 2019-03-02 ENCOUNTER — HOME CARE VISIT (OUTPATIENT)
Dept: SCHEDULING | Facility: HOME HEALTH | Age: 79
End: 2019-03-02
Payer: MEDICARE

## 2019-03-02 VITALS
OXYGEN SATURATION: 99 % | TEMPERATURE: 98 F | HEART RATE: 60 BPM | DIASTOLIC BLOOD PRESSURE: 72 MMHG | RESPIRATION RATE: 16 BRPM | SYSTOLIC BLOOD PRESSURE: 130 MMHG

## 2019-03-02 PROCEDURE — 3331090002 HH PPS REVENUE DEBIT

## 2019-03-02 PROCEDURE — G0299 HHS/HOSPICE OF RN EA 15 MIN: HCPCS

## 2019-03-02 PROCEDURE — 3331090001 HH PPS REVENUE CREDIT

## 2019-03-03 PROCEDURE — 3331090001 HH PPS REVENUE CREDIT

## 2019-03-03 PROCEDURE — 3331090002 HH PPS REVENUE DEBIT

## 2019-03-04 PROCEDURE — 3331090001 HH PPS REVENUE CREDIT

## 2019-03-04 PROCEDURE — 3331090002 HH PPS REVENUE DEBIT

## 2019-03-05 ENCOUNTER — HOME CARE VISIT (OUTPATIENT)
Dept: SCHEDULING | Facility: HOME HEALTH | Age: 79
End: 2019-03-05
Payer: MEDICARE

## 2019-03-05 VITALS
HEART RATE: 72 BPM | DIASTOLIC BLOOD PRESSURE: 66 MMHG | OXYGEN SATURATION: 99 % | SYSTOLIC BLOOD PRESSURE: 128 MMHG | TEMPERATURE: 68 F | RESPIRATION RATE: 20 BRPM

## 2019-03-05 PROCEDURE — 3331090001 HH PPS REVENUE CREDIT

## 2019-03-05 PROCEDURE — G0300 HHS/HOSPICE OF LPN EA 15 MIN: HCPCS

## 2019-03-05 PROCEDURE — 3331090002 HH PPS REVENUE DEBIT

## 2019-03-06 PROCEDURE — 3331090002 HH PPS REVENUE DEBIT

## 2019-03-06 PROCEDURE — 3331090001 HH PPS REVENUE CREDIT

## 2019-03-07 ENCOUNTER — HOME CARE VISIT (OUTPATIENT)
Dept: SCHEDULING | Facility: HOME HEALTH | Age: 79
End: 2019-03-07
Payer: MEDICARE

## 2019-03-07 VITALS
TEMPERATURE: 98.3 F | OXYGEN SATURATION: 98 % | RESPIRATION RATE: 20 BRPM | DIASTOLIC BLOOD PRESSURE: 62 MMHG | HEART RATE: 68 BPM | SYSTOLIC BLOOD PRESSURE: 124 MMHG

## 2019-03-07 PROCEDURE — 3331090001 HH PPS REVENUE CREDIT

## 2019-03-07 PROCEDURE — 3331090002 HH PPS REVENUE DEBIT

## 2019-03-07 PROCEDURE — G0300 HHS/HOSPICE OF LPN EA 15 MIN: HCPCS

## 2019-03-08 PROCEDURE — 3331090001 HH PPS REVENUE CREDIT

## 2019-03-08 PROCEDURE — 3331090002 HH PPS REVENUE DEBIT

## 2019-03-09 ENCOUNTER — HOME CARE VISIT (OUTPATIENT)
Dept: SCHEDULING | Facility: HOME HEALTH | Age: 79
End: 2019-03-09
Payer: MEDICARE

## 2019-03-09 PROCEDURE — G0299 HHS/HOSPICE OF RN EA 15 MIN: HCPCS

## 2019-03-09 PROCEDURE — 3331090001 HH PPS REVENUE CREDIT

## 2019-03-09 PROCEDURE — 3331090002 HH PPS REVENUE DEBIT

## 2019-03-10 PROCEDURE — 3331090001 HH PPS REVENUE CREDIT

## 2019-03-10 PROCEDURE — 3331090002 HH PPS REVENUE DEBIT

## 2019-03-11 ENCOUNTER — TELEPHONE (OUTPATIENT)
Dept: FAMILY MEDICINE CLINIC | Age: 79
End: 2019-03-11

## 2019-03-11 ENCOUNTER — HOME CARE VISIT (OUTPATIENT)
Dept: SCHEDULING | Facility: HOME HEALTH | Age: 79
End: 2019-03-11
Payer: MEDICARE

## 2019-03-11 VITALS
TEMPERATURE: 97.6 F | RESPIRATION RATE: 16 BRPM | OXYGEN SATURATION: 98 % | SYSTOLIC BLOOD PRESSURE: 130 MMHG | HEART RATE: 64 BPM | DIASTOLIC BLOOD PRESSURE: 72 MMHG

## 2019-03-11 PROCEDURE — G0299 HHS/HOSPICE OF RN EA 15 MIN: HCPCS

## 2019-03-11 PROCEDURE — 3331090001 HH PPS REVENUE CREDIT

## 2019-03-11 PROCEDURE — 3331090002 HH PPS REVENUE DEBIT

## 2019-03-11 NOTE — TELEPHONE ENCOUNTER
Home health called. INR is 1.9. Patient has been taking 5mg once daily. Advise to start taking 7.5mg (1.5 tab) twice a week (Monday and Thursday), and 5mg during the rest of the week. Repeat INR in 2 weeks.

## 2019-03-11 NOTE — TELEPHONE ENCOUNTER
Micah with [employer] contacted the office with the PT/INR results. Patient INR is: 1.9. Patient is currently taking coumadin 4 mg daily. Once response is received, will contact Micah @ [phone]. Please advise. Thank you.

## 2019-03-12 VITALS
SYSTOLIC BLOOD PRESSURE: 118 MMHG | DIASTOLIC BLOOD PRESSURE: 72 MMHG | OXYGEN SATURATION: 99 % | RESPIRATION RATE: 16 BRPM | HEART RATE: 68 BPM | TEMPERATURE: 98.9 F

## 2019-03-12 PROCEDURE — 3331090002 HH PPS REVENUE DEBIT

## 2019-03-12 PROCEDURE — 3331090001 HH PPS REVENUE CREDIT

## 2019-03-12 NOTE — TELEPHONE ENCOUNTER
Contacted Micah with Home Health and per Dr. Roxana Mariano advice, patient is to continue taking the coumadin 4 mg daily and recheck INR in one month. Micah verbalized understanding with read back and tolerated well. Recheck date scheduled for 4/12/2019.

## 2019-03-12 NOTE — TELEPHONE ENCOUNTER
Micah with [facility] contacted the office again with the PT/INR results. Patient INR is: 1.9. Patient is currently taking coumadin 4 mg daily. Once response is received, will contact Micah @ [phone]. Please advise. Thank you.

## 2019-03-13 PROCEDURE — 3331090002 HH PPS REVENUE DEBIT

## 2019-03-13 PROCEDURE — 3331090001 HH PPS REVENUE CREDIT

## 2019-03-14 ENCOUNTER — HOME CARE VISIT (OUTPATIENT)
Dept: SCHEDULING | Facility: HOME HEALTH | Age: 79
End: 2019-03-14
Payer: MEDICARE

## 2019-03-14 VITALS
OXYGEN SATURATION: 99 % | RESPIRATION RATE: 20 BRPM | TEMPERATURE: 99.5 F | HEART RATE: 76 BPM | DIASTOLIC BLOOD PRESSURE: 68 MMHG | SYSTOLIC BLOOD PRESSURE: 120 MMHG

## 2019-03-14 PROCEDURE — 3331090001 HH PPS REVENUE CREDIT

## 2019-03-14 PROCEDURE — G0300 HHS/HOSPICE OF LPN EA 15 MIN: HCPCS

## 2019-03-14 PROCEDURE — 3331090002 HH PPS REVENUE DEBIT

## 2019-03-15 PROCEDURE — 3331090001 HH PPS REVENUE CREDIT

## 2019-03-15 PROCEDURE — 3331090002 HH PPS REVENUE DEBIT

## 2019-03-16 ENCOUNTER — HOME CARE VISIT (OUTPATIENT)
Dept: SCHEDULING | Facility: HOME HEALTH | Age: 79
End: 2019-03-16
Payer: MEDICARE

## 2019-03-16 PROCEDURE — 3331090002 HH PPS REVENUE DEBIT

## 2019-03-16 PROCEDURE — 3331090001 HH PPS REVENUE CREDIT

## 2019-03-16 PROCEDURE — G0299 HHS/HOSPICE OF RN EA 15 MIN: HCPCS

## 2019-03-17 PROCEDURE — 3331090001 HH PPS REVENUE CREDIT

## 2019-03-17 PROCEDURE — 3331090002 HH PPS REVENUE DEBIT

## 2019-03-18 VITALS
TEMPERATURE: 97.4 F | HEART RATE: 64 BPM | DIASTOLIC BLOOD PRESSURE: 68 MMHG | SYSTOLIC BLOOD PRESSURE: 132 MMHG | OXYGEN SATURATION: 98 % | RESPIRATION RATE: 18 BRPM

## 2019-03-18 PROCEDURE — 3331090001 HH PPS REVENUE CREDIT

## 2019-03-18 PROCEDURE — 3331090002 HH PPS REVENUE DEBIT

## 2019-03-19 ENCOUNTER — HOME CARE VISIT (OUTPATIENT)
Dept: SCHEDULING | Facility: HOME HEALTH | Age: 79
End: 2019-03-19
Payer: MEDICARE

## 2019-03-19 PROCEDURE — G0162 HHC RN E&M PLAN SVS, 15 MIN: HCPCS

## 2019-03-19 PROCEDURE — 3331090001 HH PPS REVENUE CREDIT

## 2019-03-19 PROCEDURE — 3331090002 HH PPS REVENUE DEBIT

## 2019-03-20 DIAGNOSIS — M79.89 LEG SWELLING: ICD-10-CM

## 2019-03-20 DIAGNOSIS — E11.9 DM TYPE 2, GOAL HBA1C < 7% (HCC): ICD-10-CM

## 2019-03-20 DIAGNOSIS — B35.1 TINEA UNGUIUM: ICD-10-CM

## 2019-03-20 PROCEDURE — 3331090002 HH PPS REVENUE DEBIT

## 2019-03-20 PROCEDURE — 3331090001 HH PPS REVENUE CREDIT

## 2019-03-20 RX ORDER — FUROSEMIDE 40 MG/1
40 TABLET ORAL 2 TIMES DAILY
Qty: 60 TAB | Refills: 3 | Status: SHIPPED | OUTPATIENT
Start: 2019-03-20 | End: 2019-04-02 | Stop reason: SDUPTHER

## 2019-03-20 RX ORDER — TERBINAFINE HYDROCHLORIDE 250 MG/1
250 TABLET ORAL DAILY
Qty: 30 TAB | Refills: 3 | Status: SHIPPED | OUTPATIENT
Start: 2019-03-20 | End: 2019-04-02 | Stop reason: SDUPTHER

## 2019-03-20 RX ORDER — METFORMIN HYDROCHLORIDE 500 MG/1
500 TABLET ORAL 2 TIMES DAILY WITH MEALS
Qty: 60 TAB | Refills: 3 | Status: SHIPPED | OUTPATIENT
Start: 2019-03-20 | End: 2019-04-02 | Stop reason: SDUPTHER

## 2019-03-20 NOTE — TELEPHONE ENCOUNTER
Requested Prescriptions     Pending Prescriptions Disp Refills    furosemide (LASIX) 40 mg tablet 60 Tab 3     Sig: Take 1 Tab by mouth two (2) times a day.  terbinafine HCl (LAMISIL) 250 mg tablet 30 Tab 3     Sig: Take 1 Tab by mouth daily.  metFORMIN (GLUCOPHAGE) 500 mg tablet 60 Tab 3     Sig: Take 1 Tab by mouth two (2) times daily (with meals). Last seen by Dr. Angie Angela: 19  Upcomin19 with Dr. Mariel Carrizales  Please advise, thank you.

## 2019-03-21 ENCOUNTER — HOME CARE VISIT (OUTPATIENT)
Dept: SCHEDULING | Facility: HOME HEALTH | Age: 79
End: 2019-03-21
Payer: MEDICARE

## 2019-03-21 VITALS
DIASTOLIC BLOOD PRESSURE: 60 MMHG | HEART RATE: 76 BPM | OXYGEN SATURATION: 99 % | TEMPERATURE: 99.1 F | SYSTOLIC BLOOD PRESSURE: 112 MMHG | RESPIRATION RATE: 20 BRPM

## 2019-03-21 PROCEDURE — 3331090001 HH PPS REVENUE CREDIT

## 2019-03-21 PROCEDURE — G0300 HHS/HOSPICE OF LPN EA 15 MIN: HCPCS

## 2019-03-21 PROCEDURE — 3331090002 HH PPS REVENUE DEBIT

## 2019-03-21 PROCEDURE — 3331090003 HH PPS REVENUE ADJ

## 2019-03-22 PROCEDURE — 3331090002 HH PPS REVENUE DEBIT

## 2019-03-22 PROCEDURE — 3331090001 HH PPS REVENUE CREDIT

## 2019-03-23 ENCOUNTER — HOME CARE VISIT (OUTPATIENT)
Dept: SCHEDULING | Facility: HOME HEALTH | Age: 79
End: 2019-03-23
Payer: MEDICARE

## 2019-03-23 PROCEDURE — 400014 HH F/U

## 2019-03-23 PROCEDURE — 3331090001 HH PPS REVENUE CREDIT

## 2019-03-23 PROCEDURE — 3331090002 HH PPS REVENUE DEBIT

## 2019-03-23 PROCEDURE — G0300 HHS/HOSPICE OF LPN EA 15 MIN: HCPCS

## 2019-03-24 VITALS
OXYGEN SATURATION: 97 % | RESPIRATION RATE: 18 BRPM | SYSTOLIC BLOOD PRESSURE: 114 MMHG | TEMPERATURE: 96.2 F | HEART RATE: 64 BPM | DIASTOLIC BLOOD PRESSURE: 68 MMHG

## 2019-03-24 VITALS
TEMPERATURE: 98.4 F | SYSTOLIC BLOOD PRESSURE: 119 MMHG | RESPIRATION RATE: 20 BRPM | HEART RATE: 60 BPM | OXYGEN SATURATION: 98 % | DIASTOLIC BLOOD PRESSURE: 67 MMHG

## 2019-03-24 PROCEDURE — 3331090002 HH PPS REVENUE DEBIT

## 2019-03-24 PROCEDURE — 3331090001 HH PPS REVENUE CREDIT

## 2019-03-25 PROCEDURE — 3331090001 HH PPS REVENUE CREDIT

## 2019-03-25 PROCEDURE — 3331090002 HH PPS REVENUE DEBIT

## 2019-03-26 ENCOUNTER — HOME CARE VISIT (OUTPATIENT)
Dept: SCHEDULING | Facility: HOME HEALTH | Age: 79
End: 2019-03-26
Payer: MEDICARE

## 2019-03-26 VITALS
OXYGEN SATURATION: 99 % | HEART RATE: 72 BPM | SYSTOLIC BLOOD PRESSURE: 124 MMHG | RESPIRATION RATE: 20 BRPM | TEMPERATURE: 99.5 F | DIASTOLIC BLOOD PRESSURE: 60 MMHG

## 2019-03-26 PROCEDURE — 3331090002 HH PPS REVENUE DEBIT

## 2019-03-26 PROCEDURE — G0300 HHS/HOSPICE OF LPN EA 15 MIN: HCPCS

## 2019-03-26 PROCEDURE — 3331090001 HH PPS REVENUE CREDIT

## 2019-03-27 PROCEDURE — 3331090001 HH PPS REVENUE CREDIT

## 2019-03-27 PROCEDURE — 3331090002 HH PPS REVENUE DEBIT

## 2019-03-28 ENCOUNTER — HOME CARE VISIT (OUTPATIENT)
Dept: SCHEDULING | Facility: HOME HEALTH | Age: 79
End: 2019-03-28
Payer: MEDICARE

## 2019-03-28 VITALS
DIASTOLIC BLOOD PRESSURE: 70 MMHG | TEMPERATURE: 99 F | SYSTOLIC BLOOD PRESSURE: 102 MMHG | RESPIRATION RATE: 20 BRPM | OXYGEN SATURATION: 99 % | HEART RATE: 72 BPM

## 2019-03-28 PROCEDURE — G0300 HHS/HOSPICE OF LPN EA 15 MIN: HCPCS

## 2019-03-28 PROCEDURE — 3331090001 HH PPS REVENUE CREDIT

## 2019-03-28 PROCEDURE — 3331090002 HH PPS REVENUE DEBIT

## 2019-03-29 PROCEDURE — 3331090002 HH PPS REVENUE DEBIT

## 2019-03-29 PROCEDURE — 3331090001 HH PPS REVENUE CREDIT

## 2019-03-30 ENCOUNTER — HOME CARE VISIT (OUTPATIENT)
Dept: SCHEDULING | Facility: HOME HEALTH | Age: 79
End: 2019-03-30
Payer: MEDICARE

## 2019-03-30 VITALS
SYSTOLIC BLOOD PRESSURE: 102 MMHG | RESPIRATION RATE: 20 BRPM | TEMPERATURE: 99.5 F | HEART RATE: 72 BPM | DIASTOLIC BLOOD PRESSURE: 56 MMHG | OXYGEN SATURATION: 99 %

## 2019-03-30 PROCEDURE — 3331090001 HH PPS REVENUE CREDIT

## 2019-03-30 PROCEDURE — G0300 HHS/HOSPICE OF LPN EA 15 MIN: HCPCS

## 2019-03-30 PROCEDURE — 3331090002 HH PPS REVENUE DEBIT

## 2019-03-31 PROCEDURE — 3331090001 HH PPS REVENUE CREDIT

## 2019-03-31 PROCEDURE — 3331090002 HH PPS REVENUE DEBIT

## 2019-04-01 PROCEDURE — 3331090002 HH PPS REVENUE DEBIT

## 2019-04-01 PROCEDURE — 3331090001 HH PPS REVENUE CREDIT

## 2019-04-02 ENCOUNTER — HOME CARE VISIT (OUTPATIENT)
Dept: SCHEDULING | Facility: HOME HEALTH | Age: 79
End: 2019-04-02
Payer: MEDICARE

## 2019-04-02 ENCOUNTER — TELEPHONE (OUTPATIENT)
Dept: FAMILY MEDICINE CLINIC | Age: 79
End: 2019-04-02

## 2019-04-02 VITALS
DIASTOLIC BLOOD PRESSURE: 66 MMHG | TEMPERATURE: 98.7 F | RESPIRATION RATE: 20 BRPM | SYSTOLIC BLOOD PRESSURE: 118 MMHG | OXYGEN SATURATION: 99 % | HEART RATE: 72 BPM

## 2019-04-02 DIAGNOSIS — B35.1 TINEA UNGUIUM: ICD-10-CM

## 2019-04-02 DIAGNOSIS — G62.9 NEUROPATHY: ICD-10-CM

## 2019-04-02 DIAGNOSIS — E11.9 DM TYPE 2, GOAL HBA1C < 7% (HCC): ICD-10-CM

## 2019-04-02 DIAGNOSIS — M79.89 LEG SWELLING: ICD-10-CM

## 2019-04-02 DIAGNOSIS — R26.81 GAIT INSTABILITY: ICD-10-CM

## 2019-04-02 PROCEDURE — 3331090001 HH PPS REVENUE CREDIT

## 2019-04-02 PROCEDURE — 3331090002 HH PPS REVENUE DEBIT

## 2019-04-02 PROCEDURE — G0300 HHS/HOSPICE OF LPN EA 15 MIN: HCPCS

## 2019-04-02 RX ORDER — METFORMIN HYDROCHLORIDE 500 MG/1
500 TABLET ORAL 2 TIMES DAILY WITH MEALS
Qty: 60 TAB | Refills: 3 | Status: SHIPPED | OUTPATIENT
Start: 2019-04-02 | End: 2019-09-12 | Stop reason: SDUPTHER

## 2019-04-02 RX ORDER — FUROSEMIDE 40 MG/1
40 TABLET ORAL 2 TIMES DAILY
Qty: 60 TAB | Refills: 3 | Status: SHIPPED | OUTPATIENT
Start: 2019-04-02 | End: 2021-05-24

## 2019-04-02 RX ORDER — GABAPENTIN 600 MG/1
TABLET ORAL
Qty: 120 TAB | Refills: 3 | Status: SHIPPED | OUTPATIENT
Start: 2019-04-02 | End: 2019-08-09 | Stop reason: SDUPTHER

## 2019-04-02 RX ORDER — TERBINAFINE HYDROCHLORIDE 250 MG/1
250 TABLET ORAL DAILY
Qty: 30 TAB | Refills: 3 | Status: SHIPPED | OUTPATIENT
Start: 2019-04-02 | End: 2021-05-24

## 2019-04-02 NOTE — TELEPHONE ENCOUNTER
Please see message below. Patient requesting medications due to changing pharmacies. Please advise.  Thank you

## 2019-04-02 NOTE — TELEPHONE ENCOUNTER
Patient called in [address] [place] [address] not to sent his scripts to [phone] [facility] [address] again because they keep giving him a hard time getting his scripts.  Please assist.

## 2019-04-02 NOTE — TELEPHONE ENCOUNTER
Pt. Would  Like for refills(furosemide, lamisil, and metformin) to be sent to [facility]  and would like to add gabapentin the the list of refills.  Please assist.

## 2019-04-03 PROCEDURE — 3331090002 HH PPS REVENUE DEBIT

## 2019-04-03 PROCEDURE — 3331090001 HH PPS REVENUE CREDIT

## 2019-04-04 ENCOUNTER — HOME CARE VISIT (OUTPATIENT)
Dept: SCHEDULING | Facility: HOME HEALTH | Age: 79
End: 2019-04-04
Payer: MEDICARE

## 2019-04-04 VITALS
DIASTOLIC BLOOD PRESSURE: 60 MMHG | RESPIRATION RATE: 20 BRPM | SYSTOLIC BLOOD PRESSURE: 118 MMHG | TEMPERATURE: 98.8 F | OXYGEN SATURATION: 99 % | HEART RATE: 72 BPM

## 2019-04-04 PROCEDURE — G0300 HHS/HOSPICE OF LPN EA 15 MIN: HCPCS

## 2019-04-04 PROCEDURE — 3331090002 HH PPS REVENUE DEBIT

## 2019-04-04 PROCEDURE — 3331090001 HH PPS REVENUE CREDIT

## 2019-04-05 PROCEDURE — 3331090002 HH PPS REVENUE DEBIT

## 2019-04-05 PROCEDURE — 3331090001 HH PPS REVENUE CREDIT

## 2019-04-06 ENCOUNTER — HOME CARE VISIT (OUTPATIENT)
Dept: SCHEDULING | Facility: HOME HEALTH | Age: 79
End: 2019-04-06
Payer: MEDICARE

## 2019-04-06 VITALS
RESPIRATION RATE: 20 BRPM | DIASTOLIC BLOOD PRESSURE: 72 MMHG | OXYGEN SATURATION: 98 % | HEART RATE: 76 BPM | SYSTOLIC BLOOD PRESSURE: 120 MMHG | TEMPERATURE: 98 F

## 2019-04-06 PROCEDURE — G0300 HHS/HOSPICE OF LPN EA 15 MIN: HCPCS

## 2019-04-06 PROCEDURE — 3331090002 HH PPS REVENUE DEBIT

## 2019-04-06 PROCEDURE — 3331090001 HH PPS REVENUE CREDIT

## 2019-04-06 NOTE — TELEPHONE ENCOUNTER
Home health nurse has called to service at about 2:00 on April 6, 2019 patient fell at home the nurse stated that due to poor mobility patient was transitioning from bed to wheelchair and he sustained abrasion on the back of his right leg with no excessive bleeding at about 7cm     Patient was not dizzy did not complain of chest pain or hip pain he just stating that the area with abrasion burns, the home health had already cleaned it and applied Aquacel dressing, similar dressing to his other wound. Home nurses stated that his vital signs were stable.     No further action or  orders at this time

## 2019-04-07 PROCEDURE — 3331090001 HH PPS REVENUE CREDIT

## 2019-04-07 PROCEDURE — 3331090002 HH PPS REVENUE DEBIT

## 2019-04-08 PROCEDURE — 3331090002 HH PPS REVENUE DEBIT

## 2019-04-08 PROCEDURE — 3331090001 HH PPS REVENUE CREDIT

## 2019-04-09 ENCOUNTER — HOME CARE VISIT (OUTPATIENT)
Dept: SCHEDULING | Facility: HOME HEALTH | Age: 79
End: 2019-04-09
Payer: MEDICARE

## 2019-04-09 VITALS
HEART RATE: 72 BPM | DIASTOLIC BLOOD PRESSURE: 68 MMHG | OXYGEN SATURATION: 98 % | RESPIRATION RATE: 20 BRPM | TEMPERATURE: 100 F | SYSTOLIC BLOOD PRESSURE: 142 MMHG

## 2019-04-09 PROCEDURE — 3331090002 HH PPS REVENUE DEBIT

## 2019-04-09 PROCEDURE — 3331090001 HH PPS REVENUE CREDIT

## 2019-04-09 PROCEDURE — G0300 HHS/HOSPICE OF LPN EA 15 MIN: HCPCS

## 2019-04-10 PROCEDURE — 3331090001 HH PPS REVENUE CREDIT

## 2019-04-10 PROCEDURE — 3331090002 HH PPS REVENUE DEBIT

## 2019-04-11 ENCOUNTER — TELEPHONE (OUTPATIENT)
Dept: FAMILY MEDICINE CLINIC | Age: 79
End: 2019-04-11

## 2019-04-11 ENCOUNTER — HOME CARE VISIT (OUTPATIENT)
Dept: SCHEDULING | Facility: HOME HEALTH | Age: 79
End: 2019-04-11
Payer: MEDICARE

## 2019-04-11 ENCOUNTER — HOME CARE VISIT (OUTPATIENT)
Dept: HOME HEALTH SERVICES | Facility: HOME HEALTH | Age: 79
End: 2019-04-11
Payer: MEDICARE

## 2019-04-11 VITALS
DIASTOLIC BLOOD PRESSURE: 60 MMHG | SYSTOLIC BLOOD PRESSURE: 132 MMHG | HEART RATE: 80 BPM | RESPIRATION RATE: 20 BRPM | TEMPERATURE: 98.2 F | OXYGEN SATURATION: 98 %

## 2019-04-11 LAB — INR BLD: 1.5 (ref 0.9–1.1)

## 2019-04-11 PROCEDURE — 3331090002 HH PPS REVENUE DEBIT

## 2019-04-11 PROCEDURE — G0300 HHS/HOSPICE OF LPN EA 15 MIN: HCPCS

## 2019-04-11 PROCEDURE — 3331090001 HH PPS REVENUE CREDIT

## 2019-04-11 NOTE — TELEPHONE ENCOUNTER
[facility] calling the office to report patients PT/INR. INR: 1.5   PT:17.8 . Currently taking 4mg of coumadin daily has 5mg of coumadin at home. Dr. Kuldeep Guido notified. Per Dr. Kuldeep Guido, patient is to take 5mg daily and recheck INR in 1 week, but patient is needed to be seen in office per previous encounter notes with the wound care nurse. Ofe verbalized understanding, read back given.

## 2019-04-12 ENCOUNTER — HOME CARE VISIT (OUTPATIENT)
Dept: HOME HEALTH SERVICES | Facility: HOME HEALTH | Age: 79
End: 2019-04-12
Payer: MEDICARE

## 2019-04-12 DIAGNOSIS — I48.0 PAROXYSMAL ATRIAL FIBRILLATION (HCC): ICD-10-CM

## 2019-04-12 DIAGNOSIS — I49.5 SICK SINUS SYNDROME (HCC): ICD-10-CM

## 2019-04-12 DIAGNOSIS — I42.9 CARDIOMYOPATHY, UNSPECIFIED TYPE (HCC): ICD-10-CM

## 2019-04-12 PROCEDURE — 3331090001 HH PPS REVENUE CREDIT

## 2019-04-12 PROCEDURE — 3331090002 HH PPS REVENUE DEBIT

## 2019-04-12 RX ORDER — METOPROLOL SUCCINATE 25 MG/1
25 TABLET, EXTENDED RELEASE ORAL DAILY
Qty: 90 TAB | Refills: 2 | Status: SHIPPED | OUTPATIENT
Start: 2019-04-12 | End: 2021-05-24 | Stop reason: SDUPTHER

## 2019-04-13 ENCOUNTER — HOME CARE VISIT (OUTPATIENT)
Dept: SCHEDULING | Facility: HOME HEALTH | Age: 79
End: 2019-04-13
Payer: MEDICARE

## 2019-04-13 ENCOUNTER — HOSPITAL ENCOUNTER (EMERGENCY)
Age: 79
Discharge: HOME | End: 2019-04-13
Attending: EMERGENCY MEDICINE
Payer: MEDICARE

## 2019-04-13 ENCOUNTER — APPOINTMENT (OUTPATIENT)
Dept: GENERAL RADIOLOGY | Age: 79
End: 2019-04-13
Attending: EMERGENCY MEDICINE
Payer: MEDICARE

## 2019-04-13 VITALS
RESPIRATION RATE: 20 BRPM | OXYGEN SATURATION: 98 % | SYSTOLIC BLOOD PRESSURE: 118 MMHG | HEART RATE: 84 BPM | TEMPERATURE: 99.3 F | DIASTOLIC BLOOD PRESSURE: 56 MMHG

## 2019-04-13 VITALS
DIASTOLIC BLOOD PRESSURE: 42 MMHG | TEMPERATURE: 98.5 F | HEART RATE: 83 BPM | SYSTOLIC BLOOD PRESSURE: 113 MMHG | BODY MASS INDEX: 36.26 KG/M2 | RESPIRATION RATE: 18 BRPM | OXYGEN SATURATION: 100 % | HEIGHT: 77 IN | WEIGHT: 307.1 LBS
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
## 2019-04-13 DIAGNOSIS — M25.562 ACUTE PAIN OF LEFT KNEE: ICD-10-CM

## 2019-04-13 LAB
ANION GAP SERPL CALC-SCNC: 9 MMOL/L (ref 3–18)
BASOPHILS # BLD: 0 K/UL (ref 0–0.1)
BASOPHILS NFR BLD: 0 % (ref 0–2)
BUN SERPL-MCNC: 27 MG/DL (ref 7–18)
BUN/CREAT SERPL: 27 (ref 12–20)
CALCIUM SERPL-MCNC: 8.4 MG/DL (ref 8.5–10.1)
CHLORIDE SERPL-SCNC: 96 MMOL/L (ref 100–108)
CO2 SERPL-SCNC: 29 MMOL/L (ref 21–32)
CREAT SERPL-MCNC: 1.01 MG/DL (ref 0.6–1.3)
DIFFERENTIAL METHOD BLD: ABNORMAL
EOSINOPHIL # BLD: 0 K/UL (ref 0–0.4)
EOSINOPHIL NFR BLD: 0 % (ref 0–5)
ERYTHROCYTE [DISTWIDTH] IN BLOOD BY AUTOMATED COUNT: 15.2 % (ref 11.6–14.5)
GLUCOSE SERPL-MCNC: 194 MG/DL (ref 74–99)
HCT VFR BLD AUTO: 35.1 % (ref 36–48)
HGB BLD-MCNC: 11.3 G/DL (ref 13–16)
LYMPHOCYTES # BLD: 1.5 K/UL (ref 0.9–3.6)
LYMPHOCYTES NFR BLD: 16 % (ref 21–52)
MCH RBC QN AUTO: 30.4 PG (ref 24–34)
MCHC RBC AUTO-ENTMCNC: 32.2 G/DL (ref 31–37)
MCV RBC AUTO: 94.4 FL (ref 74–97)
MONOCYTES # BLD: 1.1 K/UL (ref 0.05–1.2)
MONOCYTES NFR BLD: 11 % (ref 3–10)
NEUTS SEG # BLD: 7.2 K/UL (ref 1.8–8)
NEUTS SEG NFR BLD: 73 % (ref 40–73)
PLATELET # BLD AUTO: 137 K/UL (ref 135–420)
PMV BLD AUTO: 8.4 FL (ref 9.2–11.8)
POTASSIUM SERPL-SCNC: 3.6 MMOL/L (ref 3.5–5.5)
RBC # BLD AUTO: 3.72 M/UL (ref 4.7–5.5)
SODIUM SERPL-SCNC: 134 MMOL/L (ref 136–145)
WBC # BLD AUTO: 9.9 K/UL (ref 4.6–13.2)

## 2019-04-13 PROCEDURE — 3331090001 HH PPS REVENUE CREDIT

## 2019-04-13 PROCEDURE — 99285 EMERGENCY DEPT VISIT HI MDM: CPT

## 2019-04-13 PROCEDURE — 73564 X-RAY EXAM KNEE 4 OR MORE: CPT

## 2019-04-13 PROCEDURE — 80048 BASIC METABOLIC PNL TOTAL CA: CPT

## 2019-04-13 PROCEDURE — G0300 HHS/HOSPICE OF LPN EA 15 MIN: HCPCS

## 2019-04-13 PROCEDURE — 3331090002 HH PPS REVENUE DEBIT

## 2019-04-13 PROCEDURE — 85025 COMPLETE CBC W/AUTO DIFF WBC: CPT

## 2019-04-13 PROCEDURE — 93005 ELECTROCARDIOGRAM TRACING: CPT

## 2019-04-13 NOTE — ED NOTES
I have reviewed discharge instructions with the patient. The patient verbalized understanding. Patient awaiting transportation back home by transport company

## 2019-04-13 NOTE — ED TRIAGE NOTES
Pt arrived via EMS with complaint of fall, knee pain, pt reports that he takes blood thinner, warfarin

## 2019-04-13 NOTE — DISCHARGE INSTRUCTIONS
SPECIFIC PATIENT INSTRUCTIONS FROM THE PHYSICIAN WHO TREATED YOU IN THE ER TODAY:  1. Over-the-counter Tylenol for pain. Jean Jefferson for pain not controlled with the ibuprofen and flexeril. 2. Return if any concerns or worsening condition(s). 3. FOLLOW UP APPOINTMENT:  Your primary doctor in 1-2 days. Patient Education        Knee Pain or Injury: Care Instructions  Your Care Instructions    Injuries are a common cause of knee problems. Sudden (acute) injuries may be caused by a direct blow to the knee. They can also be caused by abnormal twisting, bending, or falling on the knee. Pain, bruising, or swelling may be severe, and may start within minutes of the injury. Overuse is another cause of knee pain. Other causes are climbing stairs, kneeling, and other activities that use the knee. Everyday wear and tear, especially as you get older, also can cause knee pain. Rest, along with home treatment, often relieves pain and allows your knee to heal. If you have a serious knee injury, you may need tests and treatment. Follow-up care is a key part of your treatment and safety. Be sure to make and go to all appointments, and call your doctor if you are having problems. It's also a good idea to know your test results and keep a list of the medicines you take. How can you care for yourself at home? · Be safe with medicines. Read and follow all instructions on the label. ? If the doctor gave you a prescription medicine for pain, take it as prescribed. ? If you are not taking a prescription pain medicine, ask your doctor if you can take an over-the-counter medicine. · Rest and protect your knee. Take a break from any activity that may cause pain. · Put ice or a cold pack on your knee for 10 to 20 minutes at a time. Put a thin cloth between the ice and your skin. · Prop up a sore knee on a pillow when you ice it or anytime you sit or lie down for the next 3 days. Try to keep it above the level of your heart.  This will help reduce swelling. · If your knee is not swollen, you can put moist heat, a heating pad, or a warm cloth on your knee. · If your doctor recommends an elastic bandage, sleeve, or other type of support for your knee, wear it as directed. · Follow your doctor's instructions about how much weight you can put on your leg. Use a cane, crutches, or a walker as instructed. · Follow your doctor's instructions about activity during your healing process. If you can do mild exercise, slowly increase your activity. · Reach and stay at a healthy weight. Extra weight can strain the joints, especially the knees and hips, and make the pain worse. Losing even a few pounds may help. When should you call for help? Call 911 anytime you think you may need emergency care. For example, call if:    · You have symptoms of a blood clot in your lung (called a pulmonary embolism). These may include:  ? Sudden chest pain. ? Trouble breathing. ? Coughing up blood.    Call your doctor now or seek immediate medical care if:    · You have severe or increasing pain.     · Your leg or foot turns cold or changes color.     · You cannot stand or put weight on your knee.     · Your knee looks twisted or bent out of shape.     · You cannot move your knee.     · You have signs of infection, such as:  ? Increased pain, swelling, warmth, or redness. ? Red streaks leading from the knee. ? Pus draining from a place on your knee. ? A fever.     · You have signs of a blood clot in your leg (called a deep vein thrombosis), such as:  ? Pain in your calf, back of the knee, thigh, or groin. ? Redness and swelling in your leg or groin.    Watch closely for changes in your health, and be sure to contact your doctor if:    · You have tingling, weakness, or numbness in your knee.     · You have any new symptoms, such as swelling.     · You have bruises from a knee injury that last longer than 2 weeks.     · You do not get better as expected.    Where can you learn more? Go to http://tristan-shaye.info/. Enter K195 in the search box to learn more about \"Knee Pain or Injury: Care Instructions. \"  Current as of: September 23, 2018  Content Version: 11.9  © 6044-2546 [email or web address]. Care instructions adapted under license by [employer] (which disclaims liability or warranty for this information). If you have questions about a medical condition or this instruction, always ask your healthcare professional. [address] any warranty or liability for your use of this information. Patient Education        Preventing Falls: Care Instructions  Your Care Instructions    Getting around your home safely can be a challenge if you have injuries or health problems that make it easy for you to fall. Loose rugs and furniture in walkways are among the dangers for many older people who have problems walking or who have poor eyesight. People who have conditions such as arthritis, osteoporosis, or dementia also have to be careful not to fall. You can make your home safer with a few simple measures. Follow-up care is a key part of your treatment and safety. Be sure to make and go to all appointments, and call your doctor if you are having problems. It's also a good idea to know your test results and keep a list of the medicines you take. How can you care for yourself at home? Taking care of yourself  · You may get dizzy if you do not drink enough water. To prevent dehydration, drink plenty of fluids, enough so that your urine is light yellow or clear like water. Choose water and other caffeine-free clear liquids. If you have kidney, heart, or liver disease and have to limit fluids, talk with your doctor before you increase the amount of fluids you drink. · Exercise regularly to improve your strength, muscle tone, and balance. Walk if you can. Swimming may be a good choice if you cannot walk easily.   · Have your vision and hearing checked each year or any time you notice a change. If you have trouble seeing and hearing, you might not be able to avoid objects and could lose your balance. · Know the side effects of the medicines you take. Ask your doctor or pharmacist whether the medicines you take can affect your balance. Sleeping pills or sedatives can affect your balance. · Limit the amount of alcohol you drink. Alcohol can impair your balance and other senses. · Ask your doctor whether calluses or corns on your feet need to be removed. If you wear loose-fitting shoes because of calluses or corns, you can lose your balance and fall. · Talk to your doctor if you have numbness in your feet. Preventing falls at home  · Remove raised doorway thresholds, throw rugs, and clutter. Repair loose carpet or raised areas in the floor. · Move furniture and electrical cords to keep them out of walking paths. · Use nonskid floor wax, and wipe up spills right away, especially on ceramic tile floors. · If you use a walker or cane, put rubber tips on it. If you use crutches, clean the bottoms of them regularly with an abrasive pad, such as steel wool. · Keep your house well lit, especially Tamera Allis, and outside walkways. Use night-lights in areas such as hallways and bathrooms. Add extra light switches or use remote switches (such as switches that go on or off when you clap your hands) to make it easier to turn lights on if you have to get up during the night. · Install sturdy handrails on stairways. · Move items in your cabinets so that the things you use a lot are on the lower shelves (about waist level). · Keep a cordless phone and a flashlight with new batteries by your bed. If possible, put a phone in each of the main rooms of your house, or carry a cell phone in case you fall and cannot reach a phone. Or, you can wear a device around your neck or wrist. You push a button that sends a signal for help.   · Wear low-heeled shoes that fit well and give your feet good support. Use footwear with nonskid soles. Check the heels and soles of your shoes for wear. Repair or replace worn heels or soles. · Do not wear socks without shoes on wood floors. · Walk on the grass when the sidewalks are slippery. If you live in an area that gets snow and ice in the winter, sprinkle salt on slippery steps and sidewalks. Preventing falls in the bath  · Install grab bars and nonskid mats inside and outside your shower or tub and near the toilet and sinks. · Use shower chairs and bath benches. · Use a hand-held shower head that will allow you to sit while showering. · Get into a tub or shower by putting the weaker leg in first. Get out of a tub or shower with your strong side first.  · Repair loose toilet seats and consider installing a raised toilet seat to make getting on and off the toilet easier. · Keep your bathroom door unlocked while you are in the shower. Where can you learn more? Go to http://tristanPlatform9 Systemsshaye.info/. Enter 0476 [id number] in the search box to learn more about \"Preventing Falls: Care Instructions. \"  Current as of: March 16, 2018  Content Version: 11.8  © 5465-1578 [employer]. Care instructions adapted under license by [employer] (which disclaims liability or warranty for this information). If you have questions about a medical condition or this instruction, always ask your healthcare professional. [place] [address] any warranty or liability for your use of this information. Patient Education        How to Get Up Safely After a Fall: Care Instructions  Your Care Instructions    If you have injuries, health problems, or other reasons that may make it easy for you to fall at home, it is a good idea to learn how to get up safely after a fall. Learning how to get up correctly can help you avoid making an injury worse.   Also, knowing what to do if you cannot get up can help you stay safe until help arrives. Follow-up care is a key part of your treatment and safety. Be sure to make and go to all appointments, and call your doctor if you are having problems. It's also a good idea to know your test results and keep a list of the medicines you take. How can you care for yourself after a fall? If you think you can get up  First lie still for a few minutes and think about how you feel. If your body feels okay and you think you can get up safely, follow the rest of the steps below:  1. Look for a chair or other piece of furniture that is close to you. 2. Roll onto your side and rest. Roll by turning your head in the direction you want to roll, move your shoulder and arm, then hip and leg in the same direction. 3. Lie still for a moment to let your blood pressure adjust.  4. Slowly push your upper body up, lift your head, and take a moment to rest.  5. Slowly get up on your hands and knees, and crawl to the chair or other stable piece of furniture. 6. Put your hands on the chair. 7. Move one foot forward, and place it flat on the floor. Your other leg should be bent with the knee on the floor. 8. Rise slowly, turn your body, and sit in the chair. Stay seated for a bit and think about how you feel. Call for help. Even if you feel okay, let someone know what happened to you. You might not know that you have a serious injury. If you cannot get up  1. If you think you are injured after a fall or you cannot get up, try not to panic. 2. Call out for help. 3. If you have a phone within reach or you have an emergency call device, use it to call for help. 4. If you do not have a phone within reach, try to slide yourself toward it. If you cannot get to the phone, try to slide toward a door or window or a place where you think you can be heard. 5. Mobile or use an object to make noise so someone might hear you.   6. If you can reach something that you can use for a pillow, place it under your head. Try to stay warm by covering yourself with a blanket or clothing while you wait for help. When should you call for help? Call 911 anytime you think you may need emergency care. For example, call if:    · You passed out (lost consciousness).     · You cannot get up after a fall.     · You have severe pain.    Call your doctor now or seek immediate medical care if:    · You have new or worse pain.     · You are dizzy or lightheaded.     · You hit your head.    Watch closely for changes in your health, and be sure to contact your doctor if:    · You do not get better as expected. Where can you learn more? Go to http://tristan-shaye.info/. Enter D197 in the search box to learn more about \"How to Get Up Safely After a Fall: Care Instructions. \"  Current as of: March 15, 2018  Content Version: 11.9  © 1781-9835 [employer]. Care instructions adapted under license by [employer] (which disclaims liability or warranty for this information). If you have questions about a medical condition or this instruction, always ask your healthcare professional. [address] any warranty or liability for your use of this information. [employer] Activation    Thank you for requesting access to [employer]. Please follow the instructions below to securely access and download your online medical record. [employer] allows you to send messages to your doctor, view your test results, renew your prescriptions, schedule appointments, and more. How Do I Sign Up? 1. In your internet browser, go to [email or web address] 2. Click on the First Time User? Click Here link in the Sign In box. You will see the New Member Sign Up page. 3. Enter your [employer] Access Code exactly as it appears below. You will not need to use this code after youve completed the sign-up process.  If you do not sign up before the expiration date, you must request a new code.    [facility] Access Code: Activation code not generated  Current [facility] Status: Active (This is the date your Ordorot access code will )    4. Enter the last four digits of your Social Security Number (xxxx) and Date of Birth (mm/dd/yyyy) as indicated and click Submit. You will be taken to the next sign-up page. 5. Create a Ordorot ID. This will be your Cont[facility]Fect login ID and cannot be changed, so think of one that is secure and easy to remember. 6. Create a Cont[facility]Fect password. You can change your password at any time. 7. Enter your Password Reset Question and Answer. This can be used at a later time if you forget your password. 8. Enter your e-mail address. You will receive e-mail notification when new information is available in [address] 9. Click Sign Up. You can now view and download portions of your medical record. 10. Click the Download Summary menu link to download a portable copy of your medical information. Additional Information    If you have questions, please visit the Frequently Asked Questions section of the ContraFect website at [email or web address] Remember, [facility] is NOT to be used for urgent needs. For medical emergencies, dial 911.

## 2019-04-13 NOTE — ED PROVIDER NOTES
[facility] EMERGENCY DEPT 
 
 
3:04 PM 
 
Date: 4/13/2019 Patient Name: Sadia Gillespie History of Presenting Illness Chief Complaint Patient presents with  Fall  Knee Injury [de-identified] y.o. male with noted past medical history who presents to the emergency department with left knee pain status post fall. The patient states he gets around his house in a wheelchair. He states that today when he is trying to transfer from his bed to his wheelchair that his left knee gave out and he fell to the ground. At this is wheelie on his left knee and has left knee pain at the present. He has no other pain or injuries in the fall. He does note that his left knee has been operated on 10 times. He does have bilateral lower leg just they are chronic in nature, with the oldest one being from May 2018. He does have wound care that he visits his home to help take care of those wounds. Patient denies any other associated signs or symptoms. Patient denies any other complaints. Nursing notes regarding the HPI and triage nursing notes were reviewed. Prior medical records were reviewed. Current Outpatient Medications Medication Sig Dispense Refill  metoprolol succinate (TOPROL-XL) 25 mg XL tablet Take 1 Tab by mouth daily. 90 Tab 2  
 gabapentin (NEURONTIN) 600 mg tablet One po in am , one at noon and 2 at hs 120 Tab 3  furosemide (LASIX) 40 mg tablet Take 1 Tab by mouth two (2) times a day. 60 Tab 3  
 metFORMIN (GLUCOPHAGE) 500 mg tablet Take 1 Tab by mouth two (2) times daily (with meals). 60 Tab 3  
 terbinafine HCl (LAMISIL) 250 mg tablet Take 1 Tab by mouth daily. 30 Tab 3  
 iron, carbonyl (FEOSOL) 45 mg tab Take 1 Tab by mouth daily.  menthol/camphor (BIOFREEZE EX) 20 mg by Apply Externally route two (2) times daily as needed for Pain.  LYCOPENE PO 20 mg by Buccal route daily.     
 potassium chloride (K-DUR, KLOR-CON) 20 mEq tablet Take 1 Tab by mouth two (2) times a day. 30 Tab 3  
 albuterol (PROVENTIL VENTOLIN) 2.5 mg /3 mL (0.083 %) nebulizer solution 3 mL by Nebulization route every four (4) hours as needed for Wheezing. 1 Package 1  
 warfarin (COUMADIN) 4 mg tablet Take 1 Tab by mouth daily. 120 Tab 3  warfarin (COUMADIN) 5 mg tablet Take 5 mg by mouth daily.  warfarin (COUMADIN) 5 mg tablet Take 1 Tab by mouth daily. 30 Tab 3  
 mupirocin (BACTROBAN) 2 % ointment Apply  to affected area daily. 22 g 0  
 predniSONE (DELTASONE) 10 mg tablet Take 5 tabs each 13,7, and 1 hour prior to study,( CT leg ) 15 Tab 0  
 diphenhydrAMINE (BENADRYL) 50 mg capsule One hour prior to study , CT leg 1 Cap 0  
 beta-carotene,A,-vits C,E/mins (EYE HEALTH PO) Take 1 Tab by mouth daily.  collagenase (SANTYL) 250 unit/gram ointment Apply  to affected area daily. Apply to left lwoer ext wound once daily 90 g 3  
 miconazole (ZEASORB, MICONAZOLE,) 2 % topical powder Apply  to affected area two (2) times a day. Apply localy to the groin twice daily 85 g 3  
 raNITIdine (ZANTAC) 75 mg tablet Take 75 mg by mouth daily.  prasterone, dhea, (DHEA) 50 mg tab Take 50 mg by mouth daily.  cholecalciferol (VITAMIN D3) 1,000 unit tablet Take 1,000 Units by mouth daily.  acetaminophen (TYLENOL) 500 mg tablet Take 500 mg by mouth every six (6) hours as needed for Pain.  SODIUM CHLORIDE (AFRIN SALINE NASAL MIST NA) 1 [place] by IntraNASal route two (2) times daily as needed (nasal congestion).  cyanocobalamin 1,000 mcg tablet Take 1,000 mcg by mouth daily.  loratadine (CLARITIN) 10 mg tablet Take 1 Tab by mouth daily. (Patient taking differently: Take 1 Tab by mouth daily as needed for Allergies.) 90 Tab 1 Past History Past Medical History: 
Past Medical History:  
Diagnosis Date  Asthma  BPH (benign prostatic hyperplasia)  Herniated disc, cervical   
 Knee pain Catrachito.Paganini Pacemaker 2011 [employer]  PAF (paroxysmal atrial fibrillation) ([facility] [address] During Pacer interogation  SSS (sick sinus syndrome) ([facility] [address] S/P Dual chamber [facility] Pacemaker Past Surgical History: 
Past Surgical History:  
Procedure Laterality Date [address]ilateral [address]  HX HIP REPLACEMENT  2006  
 right  HX KNEE REPLACEMENT Bilateral 1992/1993/2006/2008/2011/2012/2013  HX PACEMAKER  2011 Family History: No family history on file. Social History: 
Social History Tobacco Use  Smoking status: Current Some Day Smoker Types: Cigars  Smokeless tobacco: Never Used  Tobacco comment: rare cigar smoker Substance Use Topics  Alcohol use: Yes Alcohol/week: 0.0 oz  
  Comment: rarely drinks  Drug use: No  
 
 
Allergies: Allergies Allergen Reactions  Iodinated Contrast- Oral And Iv Dye Hives  Raisin Flavor   
  raisins/rash  Sulfa (Sulfonamide Antibiotics) Hives and Rash  Blueberry Rash Patient's primary care provider (as noted in EPIC):  Kit Johnson MD 
 
Review of Systems Constitutional: Negative for diaphoresis. HENT: Negative for congestion. Eyes: Negative for discharge. Respiratory: Negative for stridor. Cardiovascular: Negative for palpitations. Gastrointestinal: Negative for diarrhea. Endocrine: Negative for heat intolerance. Genitourinary: Negative for flank pain. Musculoskeletal: Negative for back pain, neck pain and neck stiffness. Neurological: Negative for weakness. Psychiatric/Behavioral: Negative for hallucinations. All other systems reviewed and are negative. Visit Vitals /62 (BP 1 Location: Right arm, BP Patient Position: At rest) Pulse 84 Temp 98.5 °F (36.9 °C) Resp 21 Ht 6' 5\" (1.956 m) Wt 139.3 kg (307 lb 1.6 oz) BMI 36.42 kg/m² PHYSICAL EXAM: 
 
CONSTITUTIONAL: Alert, in no apparent distress; well-developed; well-nourished. HEAD:  Normocephalic, atraumatic. No Battles sign. No Raccoons eyes. EYES:  EOM's intact. Normal conjunctiva. Anicteric sclera. ENTM: Nose: no rhinorrhea; Oropharynx:  mucous membranes moist 
 
Neck:  No JVD. No cervical vertebral bony point tenderness or step-off. RESP: Chest clear, equal breath sounds. CARDIOVASCULAR:  Regular rate and rhythm. No murmurs, rubs, or gallops. GI: Normal bowel sounds, abdomen soft and non-tender. No masses or organomegaly. : No costo-vertebral angle tenderness. BACK:  No TLS vertebral bony point tenderness or step-off. UPPER EXT:  Normal inspection. LOWER EXT: No edema, no calf tenderness. Distal pulses intact. Left knee exam:   
Normal anterior and posterior drawer tests. No pain or laxity with stressing of MCL and stressing of LCL. No effusion. No laceration. No rash, lesions. There is pain with passive and active range of motion of the knee. NEURO: Grossly normal motor and sensation. SKIN: No rashes; Normal for age and stage. PSYCH:  Alert and oriented, normal affect. DIFFERENTIAL DIAGNOSES/ MEDICAL DECISION MAKING: 
Contusion, sprain, dislocation, fracture, ligamentous tear/ disruption or a combination of the above. Diagnostic Study Results Abnormal lab results from this emergency department encounter: 
Labs Reviewed CBC WITH AUTOMATED DIFF - Abnormal; Notable for the following components:  
    Result Value RBC 3.72 (*) HGB 11.3 (*) HCT 35.1 (*)   
 RDW 15.2 (*) MPV 8.4 (*) LYMPHOCYTES 16 (*) MONOCYTES 11 (*) All other components within normal limits METABOLIC PANEL, BASIC - Abnormal; Notable for the following components:  
 Sodium 134 (*) Chloride 96 (*) Glucose 194 (*) BUN 27 (*)   
 BUN/Creatinine ratio 27 (*) Calcium 8.4 (*) All other components within normal limits Lab values for this patient within approximately the last 12 hours: Recent Results (from the past 12 hour(s)) EKG, 12 LEAD, INITIAL Collection Time: 04/13/19  2:02 PM  
Result Value Ref Range Ventricular Rate 85 BPM  
 Atrial Rate 67 BPM  
 QRS Duration 92 ms Q-T Interval 328 ms QTC Calculation (Bezet) 390 ms Calculated R Axis 108 degrees Calculated T Axis 2 degrees Diagnosis Poor data quality, interpretation may be adversely affected Atrial fibrillation Rightward axis Incomplete right bundle branch block Possible Anterior infarct , age undetermined Abnormal ECG When compared with ECG of 22-MAY-2018 15:50, 
Atrial fibrillation has replaced Electronic ventricular pacemaker CBC WITH AUTOMATED DIFF Collection Time: 04/13/19  2:16 PM  
Result Value Ref Range WBC 9.9 4.6 - 13.2 K/uL  
 RBC 3.72 (L) 4.70 - 5.50 M/uL  
 HGB 11.3 (L) 13.0 - 16.0 g/dL HCT 35.1 (L) 36.0 - 48.0 % MCV 94.4 74.0 - 97.0 FL  
 MCH 30.4 24.0 - 34.0 PG  
 MCHC 32.2 31.0 - 37.0 g/dL  
 RDW 15.2 (H) 11.6 - 14.5 % PLATELET 528 738 - 508 K/uL MPV 8.4 (L) 9.2 - 11.8 FL  
 NEUTROPHILS 73 40 - 73 % LYMPHOCYTES 16 (L) 21 - 52 % MONOCYTES 11 (H) 3 - 10 % EOSINOPHILS 0 0 - 5 % BASOPHILS 0 0 - 2 %  
 ABS. NEUTROPHILS 7.2 1.8 - 8.0 K/UL  
 ABS. LYMPHOCYTES 1.5 0.9 - 3.6 K/UL  
 ABS. MONOCYTES 1.1 0.05 - 1.2 K/UL  
 ABS. EOSINOPHILS 0.0 0.0 - 0.4 K/UL  
 ABS. BASOPHILS 0.0 0.0 - 0.1 K/UL  
 DF AUTOMATED METABOLIC PANEL, BASIC Collection Time: 04/13/19  2:16 PM  
Result Value Ref Range Sodium 134 (L) 136 - 145 mmol/L Potassium 3.6 3.5 - 5.5 mmol/L Chloride 96 (L) 100 - 108 mmol/L  
 CO2 29 21 - 32 mmol/L Anion gap 9 3.0 - 18 mmol/L Glucose 194 (H) 74 - 99 mg/dL BUN 27 (H) 7.0 - 18 MG/DL Creatinine 1.01 0.6 - 1.3 MG/DL  
 BUN/Creatinine ratio 27 (H) 12 - 20 GFR est AA >60 >60 ml/min/1.73m2 GFR est non-AA >60 >60 ml/min/1.73m2  Calcium 8.4 (L) 8.5 - 10.1 MG/DL  
 
 
 Radiologist and cardiologist interpretations if available at time of this note: Xr Knee Lt Min 4 V Result Date: 4/13/2019 EXAM: Knee Series Indication: Fall with left knee pain. Technique: Frontal, bilateral oblique, and crosstable lateral views of the left knee. Comparison studies: 02/07/2018. _________________ FINDINGS: Postsurgical changes with modular revision type total left knee arthroplasty, without periprosthetic fracture. Heterotopic ossification with significant remodeling patellar undersurface similar prior study. No knee effusion is present. Soft tissue prominence, with unchanged periprosthetic heterotopic ossifications _________________ IMPRESSION: 1. Total left knee arthroplasty without periprosthetic fracture. Left knee x-ray is interpreted by the emergency physician: 
 
Medication(s) ordered for patient during this emergency visit encounter: 
Medications - No data to display Medical Decision Making I am the first provider for this patient. I reviewed the vital signs, available nursing notes, past medical history, past surgical history, family history and social history. Vital Signs:  Reviewed the patient's vital signs. ED COURSE:   
 
IMPRESSION AND MEDICAL DECISION MAKING: 
Based upon the patients presentation with noted HPI and PE, along with the work up done in the emergency department, I believe that the patient is having noted contusion(s) from noted fall. DIAGNOSIS: 
1. Contusions, left knee 2. Fall SPECIFIC PATIENT INSTRUCTIONS FROM THE PHYSICIAN WHO TREATED YOU IN THE ER TODAY: 
1. Over-the-counter Tylenol for pain. Janice Pain for pain not controlled with the ibuprofen and flexeril. 2. Return if any concerns or worsening condition(s). 3. FOLLOW UP APPOINTMENT:  Your primary doctor in 1-2 days. Patient is improved, resting quietly and comfortably. The patient will be discharged home. The patient was reassured that these symptoms do not appear to represent a serious or life threatening condition at this time. Warning signs of worsening condition were discussed and understood by the patient. Based on patient's age, coexisting illness, exam, and the results of this ED evaluation, the decision to treat as an outpatient was made. Based on the information available at time of discharge, acute pathology requiring immediate intervention was deemed relative unlikely. While it is impossible to completely exclude the possibility of underlying serious disease or worsening of condition, I feel the relative likelihood is extremely low. I discussed this uncertainty with the patient, who understood ED evaluation and treatment and felt comfortable with the outpatient treatment plan. All questions regarding care, test results, and follow up were answered. The patient is stable and appropriate to discharge. They understand that they should return to the emergency department for any new or worsening symptoms. I stressed the importance of follow up for repeat assessment and possibly further evaluation/treatment. Dictation disclaimer:  Please note that this dictation was completed with [employer], the computer voice recognition software. Quite often unanticipated grammatical, syntax, homophones, and other interpretive errors are inadvertently transcribed by the computer software. Please disregard these errors. Please excuse any errors that have escaped final proofreading. Coding Diagnoses Clinical Impression: 1. Fall, initial encounter 2. Acute pain of left knee Disposition Disposition:  Home. Keyla Jerez M.D. ZBIGNIEW Board Certified Emergency Physician Provider Attestation: If a scribe was utilized in generation of this patient record, I personally performed the services described in the documentation, reviewed the documentation, as recorded by the scribe in my presence, and it accurately records the patient's history of presenting illness, review of systems, patient physical examination, and procedures performed by me as the attending physician. Mukesh Anne M.D. AB Board Certified Emergency Physician 4/13/2019. 
3:06 PM

## 2019-04-14 LAB
ATRIAL RATE: 67 BPM
CALCULATED R AXIS, ECG10: 108 DEGREES
CALCULATED T AXIS, ECG11: 2 DEGREES
DIAGNOSIS, 93000: NORMAL
Q-T INTERVAL, ECG07: 328 MS
QRS DURATION, ECG06: 92 MS
QTC CALCULATION (BEZET), ECG08: 390 MS
VENTRICULAR RATE, ECG03: 85 BPM

## 2019-04-14 PROCEDURE — 3331090001 HH PPS REVENUE CREDIT

## 2019-04-14 PROCEDURE — 3331090002 HH PPS REVENUE DEBIT

## 2019-04-15 PROCEDURE — 3331090001 HH PPS REVENUE CREDIT

## 2019-04-15 PROCEDURE — 3331090002 HH PPS REVENUE DEBIT

## 2019-04-16 ENCOUNTER — HOSPITAL ENCOUNTER (EMERGENCY)
Age: 79
Discharge: HOME | End: 2019-04-16
Attending: EMERGENCY MEDICINE
Payer: MEDICARE

## 2019-04-16 ENCOUNTER — HOME CARE VISIT (OUTPATIENT)
Dept: SCHEDULING | Facility: HOME HEALTH | Age: 79
End: 2019-04-16
Payer: MEDICARE

## 2019-04-16 ENCOUNTER — APPOINTMENT (OUTPATIENT)
Dept: CT IMAGING | Age: 79
End: 2019-04-16
Attending: EMERGENCY MEDICINE
Payer: MEDICARE

## 2019-04-16 ENCOUNTER — TELEPHONE (OUTPATIENT)
Dept: FAMILY MEDICINE CLINIC | Age: 79
End: 2019-04-16

## 2019-04-16 VITALS
SYSTOLIC BLOOD PRESSURE: 118 MMHG | OXYGEN SATURATION: 99 % | DIASTOLIC BLOOD PRESSURE: 47 MMHG | RESPIRATION RATE: 20 BRPM | HEART RATE: 83 BPM | TEMPERATURE: 97.5 F

## 2019-04-16 VITALS
OXYGEN SATURATION: 96 % | DIASTOLIC BLOOD PRESSURE: 66 MMHG | SYSTOLIC BLOOD PRESSURE: 118 MMHG | RESPIRATION RATE: 20 BRPM | TEMPERATURE: 99 F | HEART RATE: 72 BPM

## 2019-04-16 DIAGNOSIS — R19.7 DIARRHEA, UNSPECIFIED TYPE: Primary | ICD-10-CM

## 2019-04-16 DIAGNOSIS — G89.29 CHRONIC PAIN OF LEFT KNEE: ICD-10-CM

## 2019-04-16 DIAGNOSIS — M25.562 CHRONIC PAIN OF LEFT KNEE: ICD-10-CM

## 2019-04-16 DIAGNOSIS — Z74.09 IMMOBILITY: ICD-10-CM

## 2019-04-16 LAB
ALBUMIN SERPL-MCNC: 3.2 G/DL (ref 3.4–5)
ALBUMIN/GLOB SERPL: 0.6 {RATIO} (ref 0.8–1.7)
ALP SERPL-CCNC: 76 U/L (ref 45–117)
ALT SERPL-CCNC: 47 U/L (ref 16–61)
ANION GAP SERPL CALC-SCNC: 7 MMOL/L (ref 3–18)
APPEARANCE UR: CLEAR
AST SERPL-CCNC: 37 U/L (ref 15–37)
BASOPHILS # BLD: 0 K/UL (ref 0–0.1)
BASOPHILS NFR BLD: 0 % (ref 0–2)
BILIRUB SERPL-MCNC: 0.5 MG/DL (ref 0.2–1)
BILIRUB UR QL: NEGATIVE
BUN SERPL-MCNC: 31 MG/DL (ref 7–18)
BUN/CREAT SERPL: 31 (ref 12–20)
CALCIUM SERPL-MCNC: 8.7 MG/DL (ref 8.5–10.1)
CHLORIDE SERPL-SCNC: 98 MMOL/L (ref 100–108)
CO2 SERPL-SCNC: 30 MMOL/L (ref 21–32)
COLOR UR: NORMAL
CREAT SERPL-MCNC: 1 MG/DL (ref 0.6–1.3)
DIFFERENTIAL METHOD BLD: ABNORMAL
EOSINOPHIL # BLD: 0.1 K/UL (ref 0–0.4)
EOSINOPHIL NFR BLD: 1 % (ref 0–5)
ERYTHROCYTE [DISTWIDTH] IN BLOOD BY AUTOMATED COUNT: 15 % (ref 11.6–14.5)
GLOBULIN SER CALC-MCNC: 5.2 G/DL (ref 2–4)
GLUCOSE SERPL-MCNC: 255 MG/DL (ref 74–99)
GLUCOSE UR STRIP.AUTO-MCNC: NEGATIVE MG/DL
HCT VFR BLD AUTO: 35.4 % (ref 36–48)
HGB BLD-MCNC: 11.4 G/DL (ref 13–16)
HGB UR QL STRIP: NEGATIVE
KETONES UR QL STRIP.AUTO: NEGATIVE MG/DL
LEUKOCYTE ESTERASE UR QL STRIP.AUTO: NEGATIVE
LYMPHOCYTES # BLD: 0.9 K/UL (ref 0.9–3.6)
LYMPHOCYTES NFR BLD: 11 % (ref 21–52)
MAGNESIUM SERPL-MCNC: 2.7 MG/DL (ref 1.6–2.6)
MCH RBC QN AUTO: 30.5 PG (ref 24–34)
MCHC RBC AUTO-ENTMCNC: 32.2 G/DL (ref 31–37)
MCV RBC AUTO: 94.7 FL (ref 74–97)
MONOCYTES # BLD: 0.6 K/UL (ref 0.05–1.2)
MONOCYTES NFR BLD: 8 % (ref 3–10)
NEUTS SEG # BLD: 6.3 K/UL (ref 1.8–8)
NEUTS SEG NFR BLD: 80 % (ref 40–73)
NITRITE UR QL STRIP.AUTO: NEGATIVE
PH UR STRIP: 5.5 [PH] (ref 5–8)
PLATELET # BLD AUTO: 201 K/UL (ref 135–420)
PMV BLD AUTO: 8.7 FL (ref 9.2–11.8)
POTASSIUM SERPL-SCNC: 3.6 MMOL/L (ref 3.5–5.5)
PROT SERPL-MCNC: 8.4 G/DL (ref 6.4–8.2)
PROT UR STRIP-MCNC: NEGATIVE MG/DL
RBC # BLD AUTO: 3.74 M/UL (ref 4.7–5.5)
SODIUM SERPL-SCNC: 135 MMOL/L (ref 136–145)
SP GR UR REFRACTOMETRY: 1.02 (ref 1–1.03)
UROBILINOGEN UR QL STRIP.AUTO: 1 EU/DL (ref 0.2–1)
WBC # BLD AUTO: 7.9 K/UL (ref 4.6–13.2)

## 2019-04-16 PROCEDURE — 96375 TX/PRO/DX INJ NEW DRUG ADDON: CPT

## 2019-04-16 PROCEDURE — G0300 HHS/HOSPICE OF LPN EA 15 MIN: HCPCS

## 2019-04-16 PROCEDURE — 3331090002 HH PPS REVENUE DEBIT

## 2019-04-16 PROCEDURE — 74176 CT ABD & PELVIS W/O CONTRAST: CPT

## 2019-04-16 PROCEDURE — 3331090001 HH PPS REVENUE CREDIT

## 2019-04-16 PROCEDURE — 83735 ASSAY OF MAGNESIUM: CPT

## 2019-04-16 PROCEDURE — A6253 ABSORPT DRG > 48 SQ IN W/O B: HCPCS

## 2019-04-16 PROCEDURE — 81003 URINALYSIS AUTO W/O SCOPE: CPT

## 2019-04-16 PROCEDURE — 96361 HYDRATE IV INFUSION ADD-ON: CPT

## 2019-04-16 PROCEDURE — 85025 COMPLETE CBC W/AUTO DIFF WBC: CPT

## 2019-04-16 PROCEDURE — 80053 COMPREHEN METABOLIC PANEL: CPT

## 2019-04-16 PROCEDURE — 99284 EMERGENCY DEPT VISIT MOD MDM: CPT

## 2019-04-16 PROCEDURE — 96374 THER/PROPH/DIAG INJ IV PUSH: CPT

## 2019-04-16 PROCEDURE — 74011250636 HC RX REV CODE- 250/636: Performed by: EMERGENCY MEDICINE

## 2019-04-16 RX ORDER — ONDANSETRON 2 MG/ML
4 INJECTION INTRAMUSCULAR; INTRAVENOUS
Status: COMPLETED | OUTPATIENT
Start: 2019-04-16 | End: 2019-04-16

## 2019-04-16 RX ORDER — MORPHINE SULFATE 2 MG/ML
4 INJECTION, SOLUTION INTRAMUSCULAR; INTRAVENOUS ONCE
Status: COMPLETED | OUTPATIENT
Start: 2019-04-16 | End: 2019-04-16

## 2019-04-16 RX ORDER — SODIUM CHLORIDE 9 MG/ML
100 INJECTION, SOLUTION INTRAVENOUS ONCE
Status: COMPLETED | OUTPATIENT
Start: 2019-04-16 | End: 2019-04-16

## 2019-04-16 RX ADMIN — ONDANSETRON 4 MG: 2 INJECTION INTRAMUSCULAR; INTRAVENOUS at 16:44

## 2019-04-16 RX ADMIN — MORPHINE SULFATE 4 MG: 2 INJECTION, SOLUTION INTRAMUSCULAR; INTRAVENOUS at 16:44

## 2019-04-16 RX ADMIN — SODIUM CHLORIDE 1000 ML: 900 INJECTION, SOLUTION INTRAVENOUS at 16:44

## 2019-04-16 RX ADMIN — SODIUM CHLORIDE 100 ML/HR: 900 INJECTION, SOLUTION INTRAVENOUS at 18:11

## 2019-04-16 NOTE — ED NOTES
Vitals: 
Patient Vitals for the past 12 hrs: 
 Temp Pulse Resp BP SpO2  
04/16/19 1830  85 20 117/51 100 % 04/16/19 1807  84 21  99 % 04/16/19 1805    126/45 99 % 04/16/19 1646  80 17  100 % 04/16/19 1645  84 20 114/43   
04/16/19 1630  79 16 130/48 98 % 04/16/19 1615  88 19 130/53 100 % 04/16/19 1612 97.5 °F (36.4 °C) 85 19 139/48 98 % Medications ordered:  
Medications  
sodium chloride 0.9 % bolus infusion 1,000 mL (0 mL IntraVENous IV Completed 4/16/19 1811)  
0.9% sodium chloride infusion (100 mL/hr IntraVENous New Bag 4/16/19 1811) morphine injection 4 mg (4 mg IntraVENous Given 4/16/19 1644) ondansetron (ZOFRAN) injection 4 mg (4 mg IntraVENous Given 4/16/19 1644) Lab findings: 
Recent Results (from the past 12 hour(s)) CBC WITH AUTOMATED DIFF Collection Time: 04/16/19  4:20 PM  
Result Value Ref Range WBC 7.9 4.6 - 13.2 K/uL  
 RBC 3.74 (L) 4.70 - 5.50 M/uL  
 HGB 11.4 (L) 13.0 - 16.0 g/dL HCT 35.4 (L) 36.0 - 48.0 % MCV 94.7 74.0 - 97.0 FL  
 MCH 30.5 24.0 - 34.0 PG  
 MCHC 32.2 31.0 - 37.0 g/dL  
 RDW 15.0 (H) 11.6 - 14.5 % PLATELET 601 707 - 589 K/uL MPV 8.7 (L) 9.2 - 11.8 FL  
 NEUTROPHILS 80 (H) 40 - 73 % LYMPHOCYTES 11 (L) 21 - 52 % MONOCYTES 8 3 - 10 % EOSINOPHILS 1 0 - 5 % BASOPHILS 0 0 - 2 %  
 ABS. NEUTROPHILS 6.3 1.8 - 8.0 K/UL  
 ABS. LYMPHOCYTES 0.9 0.9 - 3.6 K/UL  
 ABS. MONOCYTES 0.6 0.05 - 1.2 K/UL  
 ABS. EOSINOPHILS 0.1 0.0 - 0.4 K/UL  
 ABS. BASOPHILS 0.0 0.0 - 0.1 K/UL  
 DF AUTOMATED METABOLIC PANEL, COMPREHENSIVE Collection Time: 04/16/19  4:20 PM  
Result Value Ref Range Sodium 135 (L) 136 - 145 mmol/L Potassium 3.6 3.5 - 5.5 mmol/L Chloride 98 (L) 100 - 108 mmol/L  
 CO2 30 21 - 32 mmol/L Anion gap 7 3.0 - 18 mmol/L Glucose 255 (H) 74 - 99 mg/dL BUN 31 (H) 7.0 - 18 MG/DL Creatinine 1.00 0.6 - 1.3 MG/DL  
 BUN/Creatinine ratio 31 (H) 12 - 20 GFR est AA >60 >60 ml/min/1.73m2 GFR est non-AA >60 >60 ml/min/1.73m2 Calcium 8.7 8.5 - 10.1 MG/DL Bilirubin, total 0.5 0.2 - 1.0 MG/DL  
 ALT (SGPT) 47 16 - 61 U/L  
 AST (SGOT) 37 15 - 37 U/L Alk. phosphatase 76 45 - 117 U/L Protein, total 8.4 (H) 6.4 - 8.2 g/dL Albumin 3.2 (L) 3.4 - 5.0 g/dL Globulin 5.2 (H) 2.0 - 4.0 g/dL A-G Ratio 0.6 (L) 0.8 - 1.7 MAGNESIUM Collection Time: 04/16/19  4:20 PM  
Result Value Ref Range Magnesium 2.7 (H) 1.6 - 2.6 mg/dL URINALYSIS W/ RFLX MICROSCOPIC Collection Time: 04/16/19  4:53 PM  
Result Value Ref Range Color DARK YELLOW Appearance CLEAR Specific gravity 1.021 1.005 - 1.030    
 pH (UA) 5.5 5.0 - 8.0 Protein NEGATIVE  NEG mg/dL Glucose NEGATIVE  NEG mg/dL Ketone NEGATIVE  NEG mg/dL Bilirubin NEGATIVE  NEG Blood NEGATIVE  NEG Urobilinogen 1.0 0.2 - 1.0 EU/dL Nitrites NEGATIVE  NEG Leukocyte Esterase NEGATIVE  NEG    
 
 
EKG interpretation by ED Physician: X-Ray, CT or other radiology findings or impressions: 
CT ABD PELV WO CONT Final Result IMPRESSION:  
  
Minimal fluid in the rectum possibly representing gastroenteritis otherwise no  
acute process. 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  
T/o from dr Emmanuel Contreras to f/u on case management Seen by case management. No indication for admission, other work up here I have discussed with patient and/or family/sig other the results, interpretation of any imaging if performed, suspected diagnosis and treatment plan to include instructions regarding the diagnoses listed to which understanding was expressed with all questions answered Reevaluation of patient:  
stable Disposition: 
Diagnosis: 1. Diarrhea, unspecified type 2. Chronic pain of left knee 3. Immobility Disposition: home Follow-up Information None Patient's Medications Start Taking No medications on file Continue Taking ACETAMINOPHEN (TYLENOL) 500 MG TABLET    Take 500 mg by mouth every six (6) hours as needed for Pain. ALBUTEROL (PROVENTIL VENTOLIN) 2.5 MG /3 ML (0.083 %) NEBULIZER SOLUTION    3 mL by Nebulization route every four (4) hours as needed for Wheezing. BETA-CAROTENE,A,-VITS C,E/MINS (EYE HEALTH PO)    Take 1 Tab by mouth daily. CHOLECALCIFEROL (VITAMIN D3) 1,000 UNIT TABLET    Take 1,000 Units by mouth daily. COLLAGENASE (SANTYL) 250 UNIT/GRAM OINTMENT    Apply  to affected area daily. Apply to left lwoer ext wound once daily CYANOCOBALAMIN 1,000 MCG TABLET    Take 1,000 mcg by mouth daily. DIPHENHYDRAMINE (BENADRYL) 50 MG CAPSULE    One hour prior to study , CT leg FUROSEMIDE (LASIX) 40 MG TABLET    Take 1 Tab by mouth two (2) times a day. GABAPENTIN (NEURONTIN) 600 MG TABLET    One po in am , one at noon and 2 at hs  
 IRON, CARBONYL (FEOSOL) 45 MG TAB    Take 1 Tab by mouth daily. LORATADINE (CLARITIN) 10 MG TABLET    Take 1 Tab by mouth daily. LYCOPENE PO    20 mg by Buccal route daily. MENTHOL/CAMPHOR (BIOFREEZE EX)    20 mg by Apply Externally route two (2) times daily as needed for Pain. METFORMIN (GLUCOPHAGE) 500 MG TABLET    Take 1 Tab by mouth two (2) times daily (with meals). METOPROLOL SUCCINATE (TOPROL-XL) 25 MG XL TABLET    Take 1 Tab by mouth daily. MICONAZOLE (ZEASORB, MICONAZOLE,) 2 % TOPICAL POWDER    Apply  to affected area two (2) times a day. Apply localy to the groin twice daily MUPIROCIN (BACTROBAN) 2 % OINTMENT    Apply  to affected area daily. POTASSIUM CHLORIDE (K-DUR, KLOR-CON) 20 MEQ TABLET    Take 1 Tab by mouth two (2) times a day. PRASTERONE, DHEA, (DHEA) 50 MG TAB    Take 50 mg by mouth daily. PREDNISONE (DELTASONE) 10 MG TABLET    Take 5 tabs each 13,7, and 1 hour prior to study,( CT leg ) RANITIDINE (ZANTAC) 75 MG TABLET    Take 75 mg by mouth daily. SODIUM CHLORIDE (AFRIN SALINE NASAL MIST NA)    1 [place] by IntraNASal route two (2) times daily as needed (nasal congestion). TERBINAFINE HCL (LAMISIL) 250 MG TABLET    Take 1 Tab by mouth daily. WARFARIN (COUMADIN) 4 MG TABLET    Take 1 Tab by mouth daily. WARFARIN (COUMADIN) 5 MG TABLET    Take 1 Tab by mouth daily. WARFARIN (COUMADIN) 5 MG TABLET    Take 5 mg by mouth daily. These Medications have changed No medications on file Stop Taking No medications on file

## 2019-04-16 NOTE — TELEPHONE ENCOUNTER
Lisa from [address] [place] [address] is requesting to get a call back to discuss the patient's health. She explained that patient's health is deteriorating. Patient is no longer able to get out of bed and have been having diarrhea. No further information was given when asked. Asking to be called back.

## 2019-04-16 NOTE — ED PROVIDER NOTES
EMERGENCY DEPARTMENT HISTORY AND PHYSICAL EXAM 
 
4:53 PM 
 
 
Date: 4/16/2019 Patient Name: Gloria Hernadez History of Presenting Illness Chief Complaint Patient presents with  Fatigue  Diarrhea  Leg Pain L leg pain History Provided By: Patient Additional History (Context): Gloria Hernadez is a [de-identified] y.o. male who presents with increased diarrhea patient was seen initially on Saturday treated in the emergency department and discharged however he is continued to have increased diarrhea since that time now has increased abdominal pain distention [place] any blood in his diarrhea denies any nausea vomiting fevers or chills states he feels like his urine has gotten darker as well. He denies any syncope states he always has shortness of breath on exertion which has not changed has a history of bilateral lower extremity cellulitis and ulcers which have not changed. PCP: Kelli Del Angel MD 
 
 
Current Outpatient Medications Medication Sig Dispense Refill  metoprolol succinate (TOPROL-XL) 25 mg XL tablet Take 1 Tab by mouth daily. 90 Tab 2  
 gabapentin (NEURONTIN) 600 mg tablet One po in am , one at noon and 2 at hs 120 Tab 3  furosemide (LASIX) 40 mg tablet Take 1 Tab by mouth two (2) times a day. 60 Tab 3  
 metFORMIN (GLUCOPHAGE) 500 mg tablet Take 1 Tab by mouth two (2) times daily (with meals). 60 Tab 3  
 terbinafine HCl (LAMISIL) 250 mg tablet Take 1 Tab by mouth daily. 30 Tab 3  
 iron, carbonyl (FEOSOL) 45 mg tab Take 1 Tab by mouth daily.  menthol/camphor (BIOFREEZE EX) 20 mg by Apply Externally route two (2) times daily as needed for Pain.  LYCOPENE PO 20 mg by Buccal route daily.  potassium chloride (K-DUR, KLOR-CON) 20 mEq tablet Take 1 Tab by mouth two (2) times a day. 30 Tab 3  
 albuterol (PROVENTIL VENTOLIN) 2.5 mg /3 mL (0.083 %) nebulizer solution 3 mL by Nebulization route every four (4) hours as needed for Wheezing. 1 Package 1  warfarin (COUMADIN) 4 mg tablet Take 1 Tab by mouth daily. 120 Tab 3  warfarin (COUMADIN) 5 mg tablet Take 5 mg by mouth daily.  warfarin (COUMADIN) 5 mg tablet Take 1 Tab by mouth daily. 30 Tab 3  
 mupirocin (BACTROBAN) 2 % ointment Apply  to affected area daily. 22 g 0  
 predniSONE (DELTASONE) 10 mg tablet Take 5 tabs each 13,7, and 1 hour prior to study,( CT leg ) 15 Tab 0  
 diphenhydrAMINE (BENADRYL) 50 mg capsule One hour prior to study , CT leg 1 Cap 0  
 beta-carotene,A,-vits C,E/mins (EYE HEALTH PO) Take 1 Tab by mouth daily.  collagenase (SANTYL) 250 unit/gram ointment Apply  to affected area daily. Apply to left lwoer ext wound once daily 90 g 3  
 miconazole (ZEASORB, MICONAZOLE,) 2 % topical powder Apply  to affected area two (2) times a day. Apply localy to the groin twice daily 85 g 3  
 raNITIdine (ZANTAC) 75 mg tablet Take 75 mg by mouth daily.  prasterone, dhea, (DHEA) 50 mg tab Take 50 mg by mouth daily.  cholecalciferol (VITAMIN D3) 1,000 unit tablet Take 1,000 Units by mouth daily.  acetaminophen (TYLENOL) 500 mg tablet Take 500 mg by mouth every six (6) hours as needed for Pain.  SODIUM CHLORIDE (AFRIN SALINE NASAL MIST NA) 1 [place] by IntraNASal route two (2) times daily as needed (nasal congestion).  cyanocobalamin 1,000 mcg tablet Take 1,000 mcg by mouth daily.  loratadine (CLARITIN) 10 mg tablet Take 1 Tab by mouth daily. (Patient taking differently: Take 1 Tab by mouth daily as needed for Allergies.) 90 Tab 1 Past History Past Medical History: 
Past Medical History:  
Diagnosis Date  Asthma  BPH (benign prostatic hyperplasia)  Herniated disc, cervical   
 Knee pain [facility] Pacemaker 2011 [address] [place]  PAF (paroxysmal atrial fibrillation) ([facility] [address] During Pacer interogation  SSS (sick sinus syndrome) ([address] S/P Dual chamber [facility] Pacemaker Past Surgical History: 
Past Surgical History: Procedure Laterality Date 42 [facility] Bilateral 1989  HX HIP REPLACEMENT  2006  
 right  HX KNEE REPLACEMENT Bilateral 1992/1993/2006/2008/2011/2012/2013  HX PACEMAKER  2011 Family History: 
History reviewed. No pertinent family history. Social History: 
Social History Tobacco Use  Smoking status: Current Some Day Smoker Types: Cigars  Smokeless tobacco: Never Used  Tobacco comment: rare cigar smoker Substance Use Topics  Alcohol use: Yes Alcohol/week: 0.0 oz  
  Comment: rarely drinks  Drug use: No  
 
 
Allergies: Allergies Allergen Reactions  Iodinated Contrast- Oral And Iv Dye Hives  Raisin Flavor   
  raisins/rash  Sulfa (Sulfonamide Antibiotics) Hives and Rash  Blueberry Rash Review of Systems Review of Systems Constitutional: Positive for activity change and fatigue. Negative for chills and fever. HENT:  
     Dry Mouth Respiratory: Positive for shortness of breath. Cardiovascular: Negative for chest pain. Gastrointestinal: Positive for abdominal pain and diarrhea. Negative for nausea and vomiting. Genitourinary: Positive for decreased urine volume and urgency. Musculoskeletal: Positive for joint swelling and myalgias. Skin: Negative for rash. Neurological: Positive for light-headedness. Negative for dizziness, syncope and weakness. All other systems reviewed and are negative. Physical Exam  
 
Visit Vitals /51 Pulse 85 Temp 97.5 °F (36.4 °C) Resp 20 SpO2 100% Physical Exam  
Constitutional: He is oriented to person, place, and time. He appears well-developed and well-nourished. No distress. Morbidly obese chronically ill-appearing HENT:  
Head: Normocephalic and atraumatic. Mouth/Throat: Oropharynx is clear and moist.  
Eyes: Pupils are equal, round, and reactive to light. Conjunctivae and EOM are normal. No scleral icterus. Neck: Normal range of motion. Neck supple. Cardiovascular: Normal rate, regular rhythm and normal heart sounds. No murmur heard. Pulmonary/Chest: Effort normal and breath sounds normal. No respiratory distress. Abdominal: Soft. Bowel sounds are normal. He exhibits distension. There is tenderness (minimal ). Abdomen is distended increased bowel sounds minimal tenderness tympanitic Musculoskeletal: He exhibits edema. Bilateral lower extremity bandages due to chronic ulcers Lymphadenopathy:  
  He has no cervical adenopathy. Neurological: He is alert and oriented to person, place, and time. Coordination normal.  
Skin: Skin is warm and dry. No rash noted. Psychiatric: He has a normal mood and affect. His behavior is normal.  
Nursing note and vitals reviewed. Diagnostic Study Results Labs - Recent Results (from the past 12 hour(s)) CBC WITH AUTOMATED DIFF Collection Time: 04/16/19  4:20 PM  
Result Value Ref Range WBC 7.9 4.6 - 13.2 K/uL  
 RBC 3.74 (L) 4.70 - 5.50 M/uL  
 HGB 11.4 (L) 13.0 - 16.0 g/dL HCT 35.4 (L) 36.0 - 48.0 % MCV 94.7 74.0 - 97.0 FL  
 MCH 30.5 24.0 - 34.0 PG  
 MCHC 32.2 31.0 - 37.0 g/dL  
 RDW 15.0 (H) 11.6 - 14.5 % PLATELET 226 066 - 950 K/uL MPV 8.7 (L) 9.2 - 11.8 FL  
 NEUTROPHILS 80 (H) 40 - 73 % LYMPHOCYTES 11 (L) 21 - 52 % MONOCYTES 8 3 - 10 % EOSINOPHILS 1 0 - 5 % BASOPHILS 0 0 - 2 %  
 ABS. NEUTROPHILS 6.3 1.8 - 8.0 K/UL  
 ABS. LYMPHOCYTES 0.9 0.9 - 3.6 K/UL  
 ABS. MONOCYTES 0.6 0.05 - 1.2 K/UL  
 ABS. EOSINOPHILS 0.1 0.0 - 0.4 K/UL  
 ABS. BASOPHILS 0.0 0.0 - 0.1 K/UL  
 DF AUTOMATED METABOLIC PANEL, COMPREHENSIVE Collection Time: 04/16/19  4:20 PM  
Result Value Ref Range Sodium 135 (L) 136 - 145 mmol/L Potassium 3.6 3.5 - 5.5 mmol/L Chloride 98 (L) 100 - 108 mmol/L  
 CO2 30 21 - 32 mmol/L Anion gap 7 3.0 - 18 mmol/L Glucose 255 (H) 74 - 99 mg/dL BUN 31 (H) 7.0 - 18 MG/DL  Creatinine 1.00 0.6 - 1.3 MG/DL  
 BUN/Creatinine ratio 31 (H) 12 - 20 GFR est AA >60 >60 ml/min/1.73m2 GFR est non-AA >60 >60 ml/min/1.73m2 Calcium 8.7 8.5 - 10.1 MG/DL Bilirubin, total 0.5 0.2 - 1.0 MG/DL  
 ALT (SGPT) 47 16 - 61 U/L  
 AST (SGOT) 37 15 - 37 U/L Alk. phosphatase 76 45 - 117 U/L Protein, total 8.4 (H) 6.4 - 8.2 g/dL Albumin 3.2 (L) 3.4 - 5.0 g/dL Globulin 5.2 (H) 2.0 - 4.0 g/dL A-G Ratio 0.6 (L) 0.8 - 1.7 MAGNESIUM Collection Time: 04/16/19  4:20 PM  
Result Value Ref Range Magnesium 2.7 (H) 1.6 - 2.6 mg/dL URINALYSIS W/ RFLX MICROSCOPIC Collection Time: 04/16/19  4:53 PM  
Result Value Ref Range Color DARK YELLOW Appearance CLEAR Specific gravity 1.021 1.005 - 1.030    
 pH (UA) 5.5 5.0 - 8.0 Protein NEGATIVE  NEG mg/dL Glucose NEGATIVE  NEG mg/dL Ketone NEGATIVE  NEG mg/dL Bilirubin NEGATIVE  NEG Blood NEGATIVE  NEG Urobilinogen 1.0 0.2 - 1.0 EU/dL Nitrites NEGATIVE  NEG Leukocyte Esterase NEGATIVE  NEG Radiologic Studies -  
CT ABD PELV WO CONT Final Result IMPRESSION:  
  
Minimal fluid in the rectum possibly representing gastroenteritis otherwise no  
acute process. 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 I reviewed the vital signs, available nursing notes, past medical history, past surgical history, family history and social history. Vital Signs-Reviewed the patient's vital signs. EKG: 
 
Records Reviewed: Nursing Notes and Old Medical Records (Time of Review: 4:53 PM) ED Course: Progress Notes, Reevaluation, and Consults: 
 
 
Provider Notes (Medical Decision Making): MDM Number of Diagnoses or Management Options Chronic pain of left knee:  
Diarrhea, unspecified type:  
Immobility: Diagnosis management comments: Diarrhea now increased distention starting for colitis, doubt SBO or ileus. C. difficile precautions have been taken labs fluid ordered will CT without contrast due to allergy to iodinated contrast 
 
Labs and CT reviewed patient does not meet criteria for admission at this time however patient states that because of his chronic knee pain and increased immobility he has not been able to shower get up and get out of the house. Patient says he has been in assisted living facilities before and does not want to return is requesting case management for home assistance. Case management consult has been ordered care transferred to Dr. Armani Villa awaiting case management consult Amount and/or Complexity of Data Reviewed Clinical lab tests: ordered and reviewed Tests in the radiology section of CPT®: ordered and reviewed Independent visualization of images, tracings, or specimens: yes Risk of Complications, Morbidity, and/or Mortality Presenting problems: high Diagnostic procedures: moderate Management options: moderate Diagnosis Clinical Impression: 1. Diarrhea, unspecified type 2. Chronic pain of left knee 3. Immobility Disposition: home Follow-up Information None Patient's Medications Start Taking No medications on file Continue Taking ACETAMINOPHEN (TYLENOL) 500 MG TABLET    Take 500 mg by mouth every six (6) hours as needed for Pain. ALBUTEROL (PROVENTIL VENTOLIN) 2.5 MG /3 ML (0.083 %) NEBULIZER SOLUTION    3 mL by Nebulization route every four (4) hours as needed for Wheezing. BETA-CAROTENE,A,-VITS C,E/MINS (EYE HEALTH PO)    Take 1 Tab by mouth daily. CHOLECALCIFEROL (VITAMIN D3) 1,000 UNIT TABLET    Take 1,000 Units by mouth daily. COLLAGENASE (SANTYL) 250 UNIT/GRAM OINTMENT    Apply  to affected area daily. Apply to left lwoer ext wound once daily CYANOCOBALAMIN 1,000 MCG TABLET    Take 1,000 mcg by mouth daily. DIPHENHYDRAMINE (BENADRYL) 50 MG CAPSULE    One hour prior to study , CT leg FUROSEMIDE (LASIX) 40 MG TABLET    Take 1 Tab by mouth two (2) times a day. GABAPENTIN (NEURONTIN) 600 MG TABLET    One po in am , one at noon and 2 at hs  
 IRON, CARBONYL (FEOSOL) 45 MG TAB    Take 1 Tab by mouth daily. LORATADINE (CLARITIN) 10 MG TABLET    Take 1 Tab by mouth daily. LYCOPENE PO    20 mg by Buccal route daily. MENTHOL/CAMPHOR (BIOFREEZE EX)    20 mg by Apply Externally route two (2) times daily as needed for Pain. METFORMIN (GLUCOPHAGE) 500 MG TABLET    Take 1 Tab by mouth two (2) times daily (with meals). METOPROLOL SUCCINATE (TOPROL-XL) 25 MG XL TABLET    Take 1 Tab by mouth daily. MICONAZOLE (ZEASORB, MICONAZOLE,) 2 % TOPICAL POWDER    Apply  to affected area two (2) times a day. Apply localy to the groin twice daily MUPIROCIN (BACTROBAN) 2 % OINTMENT    Apply  to affected area daily. POTASSIUM CHLORIDE (K-DUR, KLOR-CON) 20 MEQ TABLET    Take 1 Tab by mouth two (2) times a day. PRASTERONE, DHEA, (DHEA) 50 MG TAB    Take 50 mg by mouth daily. PREDNISONE (DELTASONE) 10 MG TABLET    Take 5 tabs each 13,7, and 1 hour prior to study,( CT leg ) RANITIDINE (ZANTAC) 75 MG TABLET    Take 75 mg by mouth daily. SODIUM CHLORIDE (AFRIN SALINE NASAL MIST NA)    1 [place] by IntraNASal route two (2) times daily as needed (nasal congestion). TERBINAFINE HCL (LAMISIL) 250 MG TABLET    Take 1 Tab by mouth daily. WARFARIN (COUMADIN) 4 MG TABLET    Take 1 Tab by mouth daily. WARFARIN (COUMADIN) 5 MG TABLET    Take 1 Tab by mouth daily. WARFARIN (COUMADIN) 5 MG TABLET    Take 5 mg by mouth daily. These Medications have changed No medications on file Stop Taking No medications on file  
 
_______________________________ Please note that this dictation was completed with [employer], the computer voice recognition software. Quite often unanticipated grammatical, syntax, homophones, and other interpretive errors are inadvertently transcribed by the computer software. Please disregard these errors. Please excuse any errors that have escaped final proofreading.

## 2019-04-16 NOTE — TELEPHONE ENCOUNTER
[facility] nurse lisa calling to report that patients condition is declining. He has been incontinent of bowel and bladder which has lead to his bottom being \"excoriated\" Lisa states the patient reports dark urine with odor,headache, body aches, chills and low grade temp of 99. Patient is taking imodium and tylenol. She reports he cannot make it to his appointment tomorrow because he can't get out of his wheelchair, he can't stand. The patient is crying. Nurse advised [facility] nurse to call 911 so patient can be evaluated asap.  [facility] nurse advised me to call [facility] to advise patient on incoming arrival. Nurse called [facility] advised ER on patients symptoms and arrival. [facility] awaiting patient arrival.

## 2019-04-17 ENCOUNTER — PATIENT OUTREACH (OUTPATIENT)
Dept: FAMILY MEDICINE CLINIC | Age: 79
End: 2019-04-17

## 2019-04-17 PROCEDURE — 3331090001 HH PPS REVENUE CREDIT

## 2019-04-17 PROCEDURE — 3331090002 HH PPS REVENUE DEBIT

## 2019-04-17 NOTE — ANCILLARY DISCHARGE INSTRUCTIONS
Spoke with patient, patient states he would like home PT to come to work on his knee. Patient states that he has a [facility] nurse that comes to change his bandages. Advised patient to talk with his pcp about ordering PT. Patient states that he will call [employer] to see if they can help also. Email sent to nurse navigator for assistance.

## 2019-04-17 NOTE — PROGRESS NOTES
Patient in [facility] ED yesterday for change in condition, weak, loose stools and unable to get OOB.  from ED emailed NN requesting an order for PT in addition to SN that is currently coming out to house to do dressing changes. Request sent to Dr. Johnie Rosales.

## 2019-04-18 ENCOUNTER — TELEPHONE (OUTPATIENT)
Dept: FAMILY MEDICINE CLINIC | Age: 79
End: 2019-04-18

## 2019-04-18 ENCOUNTER — HOME CARE VISIT (OUTPATIENT)
Dept: HOME HEALTH SERVICES | Facility: HOME HEALTH | Age: 79
End: 2019-04-18
Payer: MEDICARE

## 2019-04-18 ENCOUNTER — HOME CARE VISIT (OUTPATIENT)
Dept: SCHEDULING | Facility: HOME HEALTH | Age: 79
End: 2019-04-18
Payer: MEDICARE

## 2019-04-18 ENCOUNTER — DOCUMENTATION ONLY (OUTPATIENT)
Dept: FAMILY MEDICINE CLINIC | Age: 79
End: 2019-04-18

## 2019-04-18 VITALS
SYSTOLIC BLOOD PRESSURE: 120 MMHG | DIASTOLIC BLOOD PRESSURE: 60 MMHG | HEART RATE: 68 BPM | RESPIRATION RATE: 20 BRPM | TEMPERATURE: 99.4 F | OXYGEN SATURATION: 98 %

## 2019-04-18 LAB — INR BLD: 1.9 (ref 0.9–1.1)

## 2019-04-18 PROCEDURE — 3331090002 HH PPS REVENUE DEBIT

## 2019-04-18 PROCEDURE — 3331090001 HH PPS REVENUE CREDIT

## 2019-04-18 PROCEDURE — G0300 HHS/HOSPICE OF LPN EA 15 MIN: HCPCS

## 2019-04-18 NOTE — PROGRESS NOTES
Patient did not arrive to their scheduled appointment on 4/17/19. No show letter #1 sent on 04/18/19. Thank you.

## 2019-04-18 NOTE — TELEPHONE ENCOUNTER
Lisa from [facility] called and stated that she needed Chetna to call her back at [phone]. She did not specify what for. Please advise.

## 2019-04-18 NOTE — TELEPHONE ENCOUNTER
[place] calling reporting patient PT/INR    PT:22.7  INR:1.9    Patient currently taking 5mg of coumadin daily. Dr. Reji Cunningham notified. Verbal order and read back per Richard Yeh MD     Patient to continue 5mg of coumadin daily and to recheck PT/INR in 2 weeks. Ofe verbalized understanding, read back given. No further questions asked.

## 2019-04-19 ENCOUNTER — HOME CARE VISIT (OUTPATIENT)
Dept: HOME HEALTH SERVICES | Facility: HOME HEALTH | Age: 79
End: 2019-04-19
Payer: MEDICARE

## 2019-04-19 PROCEDURE — 3331090002 HH PPS REVENUE DEBIT

## 2019-04-19 PROCEDURE — 3331090001 HH PPS REVENUE CREDIT

## 2019-04-19 NOTE — TELEPHONE ENCOUNTER
Lisa calling the office reporting that the patients stomach is distended and taught. He denies N/V/ diarrhea. She reports he is unable to get in the bed and has not had a bath in a very long time. She is requesting for the patient to have a nurse aide, and an order for a hospital bed with heena.  Please advise, thank you

## 2019-04-20 ENCOUNTER — HOME CARE VISIT (OUTPATIENT)
Dept: SCHEDULING | Facility: HOME HEALTH | Age: 79
End: 2019-04-20
Payer: MEDICARE

## 2019-04-20 DIAGNOSIS — G62.9 NEUROPATHY: Primary | ICD-10-CM

## 2019-04-20 DIAGNOSIS — I63.9 CEREBROVASCULAR ACCIDENT (CVA), UNSPECIFIED MECHANISM (HCC): ICD-10-CM

## 2019-04-20 DIAGNOSIS — R26.81 GAIT INSTABILITY: ICD-10-CM

## 2019-04-20 PROCEDURE — G0299 HHS/HOSPICE OF RN EA 15 MIN: HCPCS

## 2019-04-20 PROCEDURE — 3331090001 HH PPS REVENUE CREDIT

## 2019-04-20 PROCEDURE — 3331090002 HH PPS REVENUE DEBIT

## 2019-04-21 VITALS
OXYGEN SATURATION: 97 % | RESPIRATION RATE: 18 BRPM | SYSTOLIC BLOOD PRESSURE: 112 MMHG | HEART RATE: 67 BPM | TEMPERATURE: 99 F | DIASTOLIC BLOOD PRESSURE: 60 MMHG

## 2019-04-21 PROCEDURE — 3331090001 HH PPS REVENUE CREDIT

## 2019-04-21 PROCEDURE — 3331090002 HH PPS REVENUE DEBIT

## 2019-04-22 ENCOUNTER — HOME CARE VISIT (OUTPATIENT)
Dept: HOME HEALTH SERVICES | Facility: HOME HEALTH | Age: 79
End: 2019-04-22
Payer: MEDICARE

## 2019-04-22 PROCEDURE — 3331090002 HH PPS REVENUE DEBIT

## 2019-04-22 PROCEDURE — 3331090001 HH PPS REVENUE CREDIT

## 2019-04-22 PROCEDURE — A6253 ABSORPT DRG > 48 SQ IN W/O B: HCPCS

## 2019-04-22 NOTE — Clinical Note
Due to patient decline would you be willing to give orders for PT and OT for muscle strengthening and transfer training.

## 2019-04-23 ENCOUNTER — HOME CARE VISIT (OUTPATIENT)
Dept: HOME HEALTH SERVICES | Facility: HOME HEALTH | Age: 79
End: 2019-04-23
Payer: MEDICARE

## 2019-04-23 ENCOUNTER — HOME CARE VISIT (OUTPATIENT)
Dept: SCHEDULING | Facility: HOME HEALTH | Age: 79
End: 2019-04-23
Payer: MEDICARE

## 2019-04-23 VITALS
RESPIRATION RATE: 20 BRPM | DIASTOLIC BLOOD PRESSURE: 74 MMHG | HEART RATE: 68 BPM | OXYGEN SATURATION: 98 % | TEMPERATURE: 98.7 F | SYSTOLIC BLOOD PRESSURE: 132 MMHG

## 2019-04-23 DIAGNOSIS — R53.81 PHYSICAL DECONDITIONING: ICD-10-CM

## 2019-04-23 DIAGNOSIS — G62.9 NEUROPATHY: Primary | ICD-10-CM

## 2019-04-23 DIAGNOSIS — R26.81 GAIT INSTABILITY: ICD-10-CM

## 2019-04-23 PROCEDURE — G0300 HHS/HOSPICE OF LPN EA 15 MIN: HCPCS

## 2019-04-23 PROCEDURE — 3331090002 HH PPS REVENUE DEBIT

## 2019-04-23 PROCEDURE — 3331090001 HH PPS REVENUE CREDIT

## 2019-04-24 ENCOUNTER — APPOINTMENT (OUTPATIENT)
Dept: CT IMAGING | Age: 79
End: 2019-04-24
Attending: EMERGENCY MEDICINE
Payer: MEDICARE

## 2019-04-24 ENCOUNTER — APPOINTMENT (OUTPATIENT)
Dept: GENERAL RADIOLOGY | Age: 79
End: 2019-04-24
Attending: EMERGENCY MEDICINE
Payer: MEDICARE

## 2019-04-24 ENCOUNTER — HOSPITAL ENCOUNTER (OUTPATIENT)
Age: 79
Setting detail: OBSERVATION
Discharge: NURSING, REHAB OR LONG-TERM CARE | End: 2019-04-29
Attending: EMERGENCY MEDICINE | Admitting: HOSPITALIST
Payer: MEDICARE

## 2019-04-24 DIAGNOSIS — N30.00 ACUTE CYSTITIS WITHOUT HEMATURIA: ICD-10-CM

## 2019-04-24 DIAGNOSIS — M62.82 NON-TRAUMATIC RHABDOMYOLYSIS: ICD-10-CM

## 2019-04-24 DIAGNOSIS — K52.9 COLITIS: Primary | ICD-10-CM

## 2019-04-24 PROBLEM — I87.2 CHRONIC VENOUS STASIS DERMATITIS OF BOTH LOWER EXTREMITIES: Status: ACTIVE | Noted: 2019-04-24

## 2019-04-24 PROBLEM — N39.0 UTI (URINARY TRACT INFECTION): Status: ACTIVE | Noted: 2019-04-24

## 2019-04-24 LAB
ALBUMIN SERPL-MCNC: 2.2 G/DL (ref 3.4–5)
ALBUMIN/GLOB SERPL: 0.4 {RATIO} (ref 0.8–1.7)
ALP SERPL-CCNC: 67 U/L (ref 45–117)
ALT SERPL-CCNC: 53 U/L (ref 16–61)
AMMONIA PLAS-SCNC: 10 UMOL/L (ref 11–32)
ANION GAP SERPL CALC-SCNC: 6 MMOL/L (ref 3–18)
APPEARANCE UR: CLEAR
APTT PPP: 33.9 SEC (ref 23–36.4)
AST SERPL-CCNC: 125 U/L (ref 15–37)
BACTERIA URNS QL MICRO: ABNORMAL /HPF
BASOPHILS # BLD: 0 K/UL (ref 0–0.1)
BASOPHILS NFR BLD: 0 % (ref 0–2)
BILIRUB SERPL-MCNC: 0.6 MG/DL (ref 0.2–1)
BILIRUB UR QL: NEGATIVE
BUN SERPL-MCNC: 19 MG/DL (ref 7–18)
BUN/CREAT SERPL: 22 (ref 12–20)
CALCIUM SERPL-MCNC: 8.6 MG/DL (ref 8.5–10.1)
CHLORIDE SERPL-SCNC: 101 MMOL/L (ref 100–108)
CK MB CFR SERPL CALC: 0.8 % (ref 0–4)
CK MB SERPL-MCNC: 7.4 NG/ML (ref 5–25)
CK SERPL-CCNC: 982 U/L (ref 39–308)
CO2 SERPL-SCNC: 28 MMOL/L (ref 21–32)
COLOR UR: YELLOW
CREAT SERPL-MCNC: 0.88 MG/DL (ref 0.6–1.3)
DIFFERENTIAL METHOD BLD: ABNORMAL
EOSINOPHIL # BLD: 0.1 K/UL (ref 0–0.4)
EOSINOPHIL NFR BLD: 1 % (ref 0–5)
EPITH CASTS URNS QL MICRO: ABNORMAL /LPF (ref 0–5)
ERYTHROCYTE [DISTWIDTH] IN BLOOD BY AUTOMATED COUNT: 14.9 % (ref 11.6–14.5)
EST. AVERAGE GLUCOSE BLD GHB EST-MCNC: 194 MG/DL
GLOBULIN SER CALC-MCNC: 5.3 G/DL (ref 2–4)
GLUCOSE BLD STRIP.AUTO-MCNC: 196 MG/DL (ref 70–110)
GLUCOSE BLD STRIP.AUTO-MCNC: 326 MG/DL (ref 70–110)
GLUCOSE SERPL-MCNC: 241 MG/DL (ref 74–99)
GLUCOSE UR STRIP.AUTO-MCNC: 250 MG/DL
HBA1C MFR BLD: 8.4 % (ref 4.2–5.6)
HCT VFR BLD AUTO: 32.8 % (ref 36–48)
HGB BLD-MCNC: 10.3 G/DL (ref 13–16)
HGB UR QL STRIP: NEGATIVE
HYALINE CASTS URNS QL MICRO: ABNORMAL /LPF (ref 0–2)
INR PPP: 1.7 (ref 0.8–1.2)
KETONES UR QL STRIP.AUTO: NEGATIVE MG/DL
LEUKOCYTE ESTERASE UR QL STRIP.AUTO: ABNORMAL
LIPASE SERPL-CCNC: 84 U/L (ref 73–393)
LYMPHOCYTES # BLD: 1.3 K/UL (ref 0.9–3.6)
LYMPHOCYTES NFR BLD: 9 % (ref 21–52)
MCH RBC QN AUTO: 29.4 PG (ref 24–34)
MCHC RBC AUTO-ENTMCNC: 31.4 G/DL (ref 31–37)
MCV RBC AUTO: 93.7 FL (ref 74–97)
MONOCYTES # BLD: 1 K/UL (ref 0.05–1.2)
MONOCYTES NFR BLD: 7 % (ref 3–10)
MUCOUS THREADS URNS QL MICRO: ABNORMAL /LPF
NEUTS SEG # BLD: 12.4 K/UL (ref 1.8–8)
NEUTS SEG NFR BLD: 83 % (ref 40–73)
NITRITE UR QL STRIP.AUTO: NEGATIVE
PH UR STRIP: 5.5 [PH] (ref 5–8)
PLATELET # BLD AUTO: 329 K/UL (ref 135–420)
PMV BLD AUTO: 8.1 FL (ref 9.2–11.8)
POTASSIUM SERPL-SCNC: 3.7 MMOL/L (ref 3.5–5.5)
PROT SERPL-MCNC: 7.5 G/DL (ref 6.4–8.2)
PROT UR STRIP-MCNC: NEGATIVE MG/DL
PROTHROMBIN TIME: 19.5 SEC (ref 11.5–15.2)
RBC # BLD AUTO: 3.5 M/UL (ref 4.7–5.5)
RBC #/AREA URNS HPF: ABNORMAL /HPF (ref 0–5)
SODIUM SERPL-SCNC: 135 MMOL/L (ref 136–145)
SP GR UR REFRACTOMETRY: 1.02 (ref 1–1.03)
TROPONIN I SERPL-MCNC: <0.02 NG/ML (ref 0–0.04)
UROBILINOGEN UR QL STRIP.AUTO: 1 EU/DL (ref 0.2–1)
WBC # BLD AUTO: 14.8 K/UL (ref 4.6–13.2)
WBC URNS QL MICRO: ABNORMAL /HPF (ref 0–4)

## 2019-04-24 PROCEDURE — 87086 URINE CULTURE/COLONY COUNT: CPT

## 2019-04-24 PROCEDURE — 93005 ELECTROCARDIOGRAM TRACING: CPT

## 2019-04-24 PROCEDURE — 3331090001 HH PPS REVENUE CREDIT

## 2019-04-24 PROCEDURE — 99218 HC RM OBSERVATION: CPT

## 2019-04-24 PROCEDURE — 70450 CT HEAD/BRAIN W/O DYE: CPT

## 2019-04-24 PROCEDURE — 74011250636 HC RX REV CODE- 250/636: Performed by: EMERGENCY MEDICINE

## 2019-04-24 PROCEDURE — 99285 EMERGENCY DEPT VISIT HI MDM: CPT

## 2019-04-24 PROCEDURE — 85730 THROMBOPLASTIN TIME PARTIAL: CPT

## 2019-04-24 PROCEDURE — 82140 ASSAY OF AMMONIA: CPT

## 2019-04-24 PROCEDURE — 87077 CULTURE AEROBIC IDENTIFY: CPT

## 2019-04-24 PROCEDURE — 94761 N-INVAS EAR/PLS OXIMETRY MLT: CPT

## 2019-04-24 PROCEDURE — 74011636637 HC RX REV CODE- 636/637: Performed by: HOSPITALIST

## 2019-04-24 PROCEDURE — 83690 ASSAY OF LIPASE: CPT

## 2019-04-24 PROCEDURE — 96365 THER/PROPH/DIAG IV INF INIT: CPT

## 2019-04-24 PROCEDURE — 81001 URINALYSIS AUTO W/SCOPE: CPT

## 2019-04-24 PROCEDURE — 82962 GLUCOSE BLOOD TEST: CPT

## 2019-04-24 PROCEDURE — 74176 CT ABD & PELVIS W/O CONTRAST: CPT

## 2019-04-24 PROCEDURE — 82550 ASSAY OF CK (CPK): CPT

## 2019-04-24 PROCEDURE — 87186 SC STD MICRODIL/AGAR DIL: CPT

## 2019-04-24 PROCEDURE — 96361 HYDRATE IV INFUSION ADD-ON: CPT

## 2019-04-24 PROCEDURE — 85025 COMPLETE CBC W/AUTO DIFF WBC: CPT

## 2019-04-24 PROCEDURE — 97602 WOUND(S) CARE NON-SELECTIVE: CPT

## 2019-04-24 PROCEDURE — 3331090002 HH PPS REVENUE DEBIT

## 2019-04-24 PROCEDURE — 80053 COMPREHEN METABOLIC PANEL: CPT

## 2019-04-24 PROCEDURE — 83036 HEMOGLOBIN GLYCOSYLATED A1C: CPT

## 2019-04-24 PROCEDURE — 74011250637 HC RX REV CODE- 250/637: Performed by: HOSPITALIST

## 2019-04-24 PROCEDURE — 71045 X-RAY EXAM CHEST 1 VIEW: CPT

## 2019-04-24 PROCEDURE — 85610 PROTHROMBIN TIME: CPT

## 2019-04-24 PROCEDURE — 74011250636 HC RX REV CODE- 250/636: Performed by: HOSPITALIST

## 2019-04-24 PROCEDURE — 74011000258 HC RX REV CODE- 258: Performed by: HOSPITALIST

## 2019-04-24 RX ORDER — ACETAMINOPHEN 325 MG/1
650 TABLET ORAL
Status: DISCONTINUED | OUTPATIENT
Start: 2019-04-24 | End: 2019-04-29 | Stop reason: HOSPADM

## 2019-04-24 RX ORDER — CIPROFLOXACIN 2 MG/ML
400 INJECTION, SOLUTION INTRAVENOUS
Status: DISCONTINUED | OUTPATIENT
Start: 2019-04-24 | End: 2019-04-24

## 2019-04-24 RX ORDER — MAGNESIUM SULFATE 100 %
4 CRYSTALS MISCELLANEOUS AS NEEDED
Status: DISCONTINUED | OUTPATIENT
Start: 2019-04-24 | End: 2019-04-29 | Stop reason: HOSPADM

## 2019-04-24 RX ORDER — INSULIN LISPRO 100 [IU]/ML
INJECTION, SOLUTION INTRAVENOUS; SUBCUTANEOUS
Status: DISCONTINUED | OUTPATIENT
Start: 2019-04-24 | End: 2019-04-29 | Stop reason: HOSPADM

## 2019-04-24 RX ORDER — CIPROFLOXACIN 500 MG/1
500 TABLET ORAL EVERY 12 HOURS
Status: DISCONTINUED | OUTPATIENT
Start: 2019-04-24 | End: 2019-04-24 | Stop reason: ALTCHOICE

## 2019-04-24 RX ORDER — ACETAMINOPHEN 500 MG
500 TABLET ORAL
Status: DISCONTINUED | OUTPATIENT
Start: 2019-04-24 | End: 2019-04-29 | Stop reason: HOSPADM

## 2019-04-24 RX ORDER — CIPROFLOXACIN 2 MG/ML
400 INJECTION, SOLUTION INTRAVENOUS
Status: COMPLETED | OUTPATIENT
Start: 2019-04-24 | End: 2019-04-24

## 2019-04-24 RX ORDER — GABAPENTIN 300 MG/1
600 CAPSULE ORAL 3 TIMES DAILY
Status: DISCONTINUED | OUTPATIENT
Start: 2019-04-24 | End: 2019-04-29 | Stop reason: HOSPADM

## 2019-04-24 RX ORDER — DEXTROSE MONOHYDRATE 25 G/50ML
25-50 INJECTION, SOLUTION INTRAVENOUS AS NEEDED
Status: DISCONTINUED | OUTPATIENT
Start: 2019-04-24 | End: 2019-04-29 | Stop reason: HOSPADM

## 2019-04-24 RX ORDER — METRONIDAZOLE 500 MG/100ML
500 INJECTION, SOLUTION INTRAVENOUS
Status: DISCONTINUED | OUTPATIENT
Start: 2019-04-24 | End: 2019-04-24

## 2019-04-24 RX ORDER — SODIUM CHLORIDE 9 MG/ML
75 INJECTION, SOLUTION INTRAVENOUS CONTINUOUS
Status: DISCONTINUED | OUTPATIENT
Start: 2019-04-24 | End: 2019-04-28

## 2019-04-24 RX ORDER — ALBUTEROL SULFATE 0.83 MG/ML
2.5 SOLUTION RESPIRATORY (INHALATION)
Status: DISCONTINUED | OUTPATIENT
Start: 2019-04-24 | End: 2019-04-29 | Stop reason: HOSPADM

## 2019-04-24 RX ORDER — AMOXICILLIN 250 MG
1 CAPSULE ORAL DAILY
Status: DISCONTINUED | OUTPATIENT
Start: 2019-04-24 | End: 2019-04-24

## 2019-04-24 RX ORDER — AMOXICILLIN 250 MG
1 CAPSULE ORAL 2 TIMES DAILY
Status: DISCONTINUED | OUTPATIENT
Start: 2019-04-24 | End: 2019-04-28

## 2019-04-24 RX ORDER — FAMOTIDINE 20 MG/1
20 TABLET, FILM COATED ORAL 2 TIMES DAILY
Status: DISCONTINUED | OUTPATIENT
Start: 2019-04-24 | End: 2019-04-29 | Stop reason: HOSPADM

## 2019-04-24 RX ORDER — POLYETHYLENE GLYCOL 3350 17 G/17G
17 POWDER, FOR SOLUTION ORAL DAILY
Status: DISCONTINUED | OUTPATIENT
Start: 2019-04-24 | End: 2019-04-28

## 2019-04-24 RX ORDER — METOPROLOL SUCCINATE 25 MG/1
25 TABLET, EXTENDED RELEASE ORAL DAILY
Status: DISCONTINUED | OUTPATIENT
Start: 2019-04-25 | End: 2019-04-29 | Stop reason: HOSPADM

## 2019-04-24 RX ORDER — WARFARIN SODIUM 5 MG/1
5 TABLET ORAL EVERY EVENING
Status: DISCONTINUED | OUTPATIENT
Start: 2019-04-24 | End: 2019-04-27

## 2019-04-24 RX ADMIN — SODIUM CHLORIDE 1000 ML: 900 INJECTION, SOLUTION INTRAVENOUS at 11:32

## 2019-04-24 RX ADMIN — INSULIN LISPRO 2 UNITS: 100 INJECTION, SOLUTION INTRAVENOUS; SUBCUTANEOUS at 22:14

## 2019-04-24 RX ADMIN — CEFTRIAXONE 1 G: 1 INJECTION, POWDER, FOR SOLUTION INTRAMUSCULAR; INTRAVENOUS at 20:30

## 2019-04-24 RX ADMIN — WARFARIN SODIUM 5 MG: 5 TABLET ORAL at 17:21

## 2019-04-24 RX ADMIN — POLYETHYLENE GLYCOL 3350 17 G: 17 POWDER, FOR SOLUTION ORAL at 17:20

## 2019-04-24 RX ADMIN — INSULIN LISPRO 8 UNITS: 100 INJECTION, SOLUTION INTRAVENOUS; SUBCUTANEOUS at 17:21

## 2019-04-24 RX ADMIN — SODIUM CHLORIDE 75 ML/HR: 900 INJECTION, SOLUTION INTRAVENOUS at 18:19

## 2019-04-24 RX ADMIN — GABAPENTIN 600 MG: 300 CAPSULE ORAL at 22:14

## 2019-04-24 RX ADMIN — GABAPENTIN 600 MG: 300 CAPSULE ORAL at 17:21

## 2019-04-24 RX ADMIN — SENNOSIDES AND DOCUSATE SODIUM 1 TABLET: 8.6; 5 TABLET ORAL at 17:21

## 2019-04-24 RX ADMIN — SODIUM PHOSPHATE, DIBASIC AND SODIUM PHOSPHATE, MONOBASIC 1 ENEMA: 7; 19 ENEMA RECTAL at 19:15

## 2019-04-24 RX ADMIN — CIPROFLOXACIN 400 MG: 2 INJECTION, SOLUTION INTRAVENOUS at 12:40

## 2019-04-24 RX ADMIN — FAMOTIDINE 20 MG: 20 TABLET ORAL at 17:21

## 2019-04-24 NOTE — PROGRESS NOTES
1955: Bedside and Verbal shift change report given to ROZINA Macedo RN (oncoming nurse) by Donnie Live RN and SIL Hinson (offgoing nurse). Report included the following information SBAR, Kardex, Intake/Output, MAR and Recent Results. 2100: Incontinence care provided. 0240: Pt has not urinated since 2000. Pt states that he has been having a hard time urinating at home (lately). Bladder scanned pt, retaining 696 mL of urine. 80: Order to straight cath patient per Dr. Heath Other 
 
0300: 750 ml of urine collected via straight cath 
 
0720: Bedside and Verbal shift change report given to Donnie Live RN and [employer], RN (oncoming nurse) by Miley Macedo RN (offgoing nurse). Report included the following information SBAR, Kardex, Intake/Output, MAR and Recent Results.

## 2019-04-24 NOTE — ED TRIAGE NOTES
Per EMS patient reports he slid out of his wheelchair, about 5 hours ago. Pt called them to help him get up. EMS reports when they arrived patient was covered in dark feces and where concerned patient may have blood in his stool. Patient also reports orange urine. Pt has chronic lrg wounds and swelling.

## 2019-04-24 NOTE — ED PROVIDER NOTES
EMERGENCY DEPARTMENT HISTORY AND PHYSICAL EXAM 
 
10:17 AM 
 
 
Date: 4/24/2019 Patient Name: Melany Núñez History of Presenting Illness Chief Complaint Patient presents with  Fall  Other HISTORY: Melany Núñez is a [de-identified] y.o. male with paroxysmal A. fib, chronic knee pain who presents with falling out of his wheelchair earlier today. Patient reports having increased diarrhea recently. So he put a plastic sheet on his wheelchair. He slid out of his wheelchair and onto the floor. Per EMS he was covered in stool. He states the stool has been dark colored. His urine is also been orange. He did hit his head with the fall but denies losing consciousness. He denies chest pain. He reports having baseline shortness of breath which is not changed. He has chronic wounds to his lower extremities which [facility] has been treating. PCP: Jacky Shafer MD 
 
Current Outpatient Medications Medication Sig Dispense Refill  diphenoxylate-atropine (LOMOTIL) 2.5-0.025 mg per tablet Take 1 Tab by mouth four (4) times daily as needed for Diarrhea.  metoprolol succinate (TOPROL-XL) 25 mg XL tablet Take 1 Tab by mouth daily. 90 Tab 2  
 gabapentin (NEURONTIN) 600 mg tablet One po in am , one at noon and 2 at hs 120 Tab 3  furosemide (LASIX) 40 mg tablet Take 1 Tab by mouth two (2) times a day. 60 Tab 3  
 metFORMIN (GLUCOPHAGE) 500 mg tablet Take 1 Tab by mouth two (2) times daily (with meals). 60 Tab 3  
 terbinafine HCl (LAMISIL) 250 mg tablet Take 1 Tab by mouth daily. 30 Tab 3  
 iron, carbonyl (FEOSOL) 45 mg tab Take 1 Tab by mouth daily.  menthol/camphor (BIOFREEZE EX) 20 mg by Apply Externally route two (2) times daily as needed for Pain.  LYCOPENE PO 20 mg by Buccal route daily.  potassium chloride (K-DUR, KLOR-CON) 20 mEq tablet Take 1 Tab by mouth two (2) times a day.  30 Tab 3  
  albuterol (PROVENTIL VENTOLIN) 2.5 mg /3 mL (0.083 %) nebulizer solution 3 mL by Nebulization route every four (4) hours as needed for Wheezing. 1 Package 1  
 warfarin (COUMADIN) 4 mg tablet Take 1 Tab by mouth daily. 120 Tab 3  warfarin (COUMADIN) 5 mg tablet Take 5 mg by mouth daily.  warfarin (COUMADIN) 5 mg tablet Take 1 Tab by mouth daily. 30 Tab 3  
 mupirocin (BACTROBAN) 2 % ointment Apply  to affected area daily. 22 g 0  
 predniSONE (DELTASONE) 10 mg tablet Take 5 tabs each 13,7, and 1 hour prior to study,( CT leg ) 15 Tab 0  
 diphenhydrAMINE (BENADRYL) 50 mg capsule One hour prior to study , CT leg 1 Cap 0  
 beta-carotene,A,-vits C,E/mins (EYE HEALTH PO) Take 1 Tab by mouth daily.  collagenase (SANTYL) 250 unit/gram ointment Apply  to affected area daily. Apply to left lwoer ext wound once daily 90 g 3  
 miconazole (ZEASORB, MICONAZOLE,) 2 % topical powder Apply  to affected area two (2) times a day. Apply localy to the groin twice daily 85 g 3  
 raNITIdine (ZANTAC) 75 mg tablet Take 75 mg by mouth daily.  prasterone, dhea, (DHEA) 50 mg tab Take 50 mg by mouth daily.  cholecalciferol (VITAMIN D3) 1,000 unit tablet Take 1,000 Units by mouth daily.  acetaminophen (TYLENOL) 500 mg tablet Take 500 mg by mouth every six (6) hours as needed for Pain.  SODIUM CHLORIDE (AFRIN SALINE NASAL MIST NA) 1 [place] by IntraNASal route two (2) times daily as needed (nasal congestion).  cyanocobalamin 1,000 mcg tablet Take 1,000 mcg by mouth daily.  loratadine (CLARITIN) 10 mg tablet Take 1 Tab by mouth daily. (Patient taking differently: Take 1 Tab by mouth daily as needed for Allergies.) 90 Tab 1 Past History Past Medical History: 
Past Medical History:  
Diagnosis Date  Asthma  BPH (benign prostatic hyperplasia)  Herniated disc, cervical   
 Knee pain [facility] Pacemaker 2011 [employer]  PAF (paroxysmal atrial fibrillation) ([facility] [address] During Pacer interogation  SSS (sick sinus syndrome) ([facility] [address] S/P Dual chamber [facility] Pacemaker Past Surgical History: 
Past Surgical History:  
Procedure Laterality Date [address] Bilateral 1989  HX HIP REPLACEMENT  2006  
 right  HX KNEE REPLACEMENT Bilateral 1992/1993/2006/2008/2011/2012/2013  HX PACEMAKER  2011 Family History: 
History reviewed. No pertinent family history. Social History: 
Social History Tobacco Use  Smoking status: Current Some Day Smoker Types: Cigars  Smokeless tobacco: Never Used  Tobacco comment: rare cigar smoker Substance Use Topics  Alcohol use: Yes Alcohol/week: 0.0 oz  
  Comment: rarely drinks  Drug use: No  
 
 
Allergies: Allergies Allergen Reactions  Iodinated Contrast- Oral And Iv Dye Hives  Raisin Flavor   
  raisins/rash  Sulfa (Sulfonamide Antibiotics) Hives and Rash  Blueberry Rash Review of Systems Review of Systems Constitutional: Negative for chills. HENT: Negative for congestion and sore throat. Respiratory: Positive for shortness of breath. Negative for cough. Cardiovascular: Negative for chest pain. Gastrointestinal: Positive for diarrhea. Negative for abdominal pain, nausea and vomiting. Genitourinary: Negative for dysuria. Musculoskeletal: Negative for back pain. Skin: Positive for wound. Negative for rash. Neurological: Positive for weakness. Negative for dizziness and headaches. All other systems reviewed and are negative. Physical Exam  
 
Visit Vitals /53 Pulse 73 Temp 97.8 °F (36.6 °C) Resp 14 Ht 6' 5\" (1.956 m) Wt 136.1 kg (300 lb) SpO2 95% BMI 35.57 kg/m² Physical Exam 
General Exam: Patient is a well developed and well nourished in no distress. Patient does not appear acutely ill or toxic.  
Eye Exam: Lids and conjunctiva are normal 
ENT Exam: The general head and facial exam is normal.  The neck is supple without meningeal signs. No significant adenopathy. Pulmonary Exam: No respiratory distress. The respiratory rate is normal.  No stridor. The breath sounds are equal bilaterally. There are no wheezes, rales, or rhonchi noted. Cardiac Exam: The cardiac rate and rhythm are normal.  No significant murmurs, rubs, or gallops. The peripheral pulses of the upper extremities are normal. 
Abdominal Exam: Abdomen is soft and non-distended. No pulsatile masses. Lower abdominal tenderness. There is no rebound or guarding noted. Rectal; chaperoned by Javy Bennett, significant skin breakdown to the buttocks with surrounding redness, normal rectal tone, brown colored stools, Hemoccult negative Skin and Soft Tissue: Bilateral lower extremity edema with redness and superficial wounds to bilateral lower extremities Musculoskeletal Exam: Bilateral lower extremity edema Neuro: No focal weakness Psychiatric: Normal adult with appropriate demeanor and interpersonal interaction. Is oriented to person, place, and time. Diagnostic Study Results Labs - Recent Results (from the past 12 hour(s)) METABOLIC PANEL, COMPREHENSIVE Collection Time: 04/24/19  9:45 AM  
Result Value Ref Range Sodium 135 (L) 136 - 145 mmol/L Potassium 3.7 3.5 - 5.5 mmol/L Chloride 101 100 - 108 mmol/L  
 CO2 28 21 - 32 mmol/L Anion gap 6 3.0 - 18 mmol/L Glucose 241 (H) 74 - 99 mg/dL BUN 19 (H) 7.0 - 18 MG/DL Creatinine 0.88 0.6 - 1.3 MG/DL  
 BUN/Creatinine ratio 22 (H) 12 - 20 GFR est AA >60 >60 ml/min/1.73m2 GFR est non-AA >60 >60 ml/min/1.73m2 Calcium 8.6 8.5 - 10.1 MG/DL Bilirubin, total PENDING MG/DL  
 ALT (SGPT) 53 16 - 61 U/L  
 AST (SGOT) 125 (H) 15 - 37 U/L Alk. phosphatase PENDING U/L Protein, total PENDING g/dL Albumin 2.2 (L) 3.4 - 5.0 g/dL Globulin PENDING g/dL A-G Ratio PENDING    
LIPASE Collection Time: 04/24/19  9:45 AM  
Result Value Ref Range  Lipase 84 73 - 393 U/L  
 AMMONIA Collection Time: 04/24/19  9:45 AM  
Result Value Ref Range Ammonia 10 (L) 11 - 32 UMOL/L  
PROTHROMBIN TIME + INR Collection Time: 04/24/19  9:45 AM  
Result Value Ref Range Prothrombin time 19.5 (H) 11.5 - 15.2 sec INR 1.7 (H) 0.8 - 1.2 PTT Collection Time: 04/24/19  9:45 AM  
Result Value Ref Range aPTT 33.9 23.0 - 36.4 SEC  
EKG, 12 LEAD, INITIAL Collection Time: 04/24/19  9:48 AM  
Result Value Ref Range Ventricular Rate 70 BPM  
 Atrial Rate 53 BPM  
 QRS Duration 168 ms Q-T Interval 480 ms QTC Calculation (Bezet) 518 ms Calculated R Axis -83 degrees Calculated T Axis 87 degrees Diagnosis Ventricular-paced rhythm Abnormal ECG When compared with ECG of 13-APR-2019 14:02, Electronic ventricular pacemaker has replaced Atrial fibrillation Radiologic Studies -  
CT HEAD WO CONT Final Result IMPRESSION:  
  
1. No evidence for acute intracranial injury, infarct, hemorrhage, or mass  
effect. CT can be negative in acute setting. 2. Mild atrophy demonstrating nonspecific white matter hypodensities, likely  
chronic microvascular disease. XR CHEST PORT    (Results Pending) Medical Decision Making I am the first provider for this patient. I reviewed the vital signs, available nursing notes, past medical history, past surgical history, family history and social history. Vital Signs-Reviewed the patient's vital signs. EKG: Interpreted by the EP. EKG performed at 948. Interpretation rate of 70, ventricular paced rhythm, no STEMI Records Reviewed: Nursing Notes (Time of Review: 10:17 AM) ED Course: Progress Notes, Reevaluation, and Consults: 
 
 
Provider Notes (Medical Decision Making): Pt with multiple complaints including weakness and abdominal pain with a fall today. Mild leukocytosis.  No signs of cardiac ischemia but elevated CK suggesting mild rhabdo. Chronic wounds to b/l lower extremities and skin breakdown to buttocks - areas reddened but no abnormal drainage to suggest infection. CT head neg. Ct abd/pelv with colitis. U/A with WBC and bacteria. After discussion with hospitalist, will cover only with cipro. Pt not appropriate at this time for discharge. Consult:  Discussed care with Dr. Jose Alejandro Cordero. Standard discussion; including history of patients chief complaint, available diagnostic results, and treatment course. Will see the pt. 
12:40 PM, 4/24/2019 Diagnosis Clinical Impression: ICD-10-CM ICD-9-CM 1. Colitis K52.9 558.9 2. Non-traumatic rhabdomyolysis M62.82 728.88  
3. Acute cystitis without hematuria N30.00 595.0 Disposition: ADMIT Follow-up Information None Patient's Medications Start Taking No medications on file Continue Taking ACETAMINOPHEN (TYLENOL) 500 MG TABLET    Take 500 mg by mouth every six (6) hours as needed for Pain. ALBUTEROL (PROVENTIL VENTOLIN) 2.5 MG /3 ML (0.083 %) NEBULIZER SOLUTION    3 mL by Nebulization route every four (4) hours as needed for Wheezing. BETA-CAROTENE,A,-VITS C,E/MINS (EYE HEALTH PO)    Take 1 Tab by mouth daily. CHOLECALCIFEROL (VITAMIN D3) 1,000 UNIT TABLET    Take 1,000 Units by mouth daily. COLLAGENASE (SANTYL) 250 UNIT/GRAM OINTMENT    Apply  to affected area daily. Apply to left lwoer ext wound once daily CYANOCOBALAMIN 1,000 MCG TABLET    Take 1,000 mcg by mouth daily. DIPHENHYDRAMINE (BENADRYL) 50 MG CAPSULE    One hour prior to study , CT leg DIPHENOXYLATE-ATROPINE (LOMOTIL) 2.5-0.025 MG PER TABLET    Take 1 Tab by mouth four (4) times daily as needed for Diarrhea. FUROSEMIDE (LASIX) 40 MG TABLET    Take 1 Tab by mouth two (2) times a day. GABAPENTIN (NEURONTIN) 600 MG TABLET    One po in am , one at noon and 2 at hs  
 IRON, CARBONYL (FEOSOL) 45 MG TAB    Take 1 Tab by mouth daily. LORATADINE (CLARITIN) 10 MG TABLET    Take 1 Tab by mouth daily. LYCOPENE PO    20 mg by Buccal route daily. MENTHOL/CAMPHOR (BIOFREEZE EX)    20 mg by Apply Externally route two (2) times daily as needed for Pain. METFORMIN (GLUCOPHAGE) 500 MG TABLET    Take 1 Tab by mouth two (2) times daily (with meals). METOPROLOL SUCCINATE (TOPROL-XL) 25 MG XL TABLET    Take 1 Tab by mouth daily. MICONAZOLE (ZEASORB, MICONAZOLE,) 2 % TOPICAL POWDER    Apply  to affected area two (2) times a day. Apply localy to the groin twice daily MUPIROCIN (BACTROBAN) 2 % OINTMENT    Apply  to affected area daily. POTASSIUM CHLORIDE (K-DUR, KLOR-CON) 20 MEQ TABLET    Take 1 Tab by mouth two (2) times a day. PRASTERONE, DHEA, (DHEA) 50 MG TAB    Take 50 mg by mouth daily. PREDNISONE (DELTASONE) 10 MG TABLET    Take 5 tabs each 13,7, and 1 hour prior to study,( CT leg ) RANITIDINE (ZANTAC) 75 MG TABLET    Take 75 mg by mouth daily. SODIUM CHLORIDE (AFRIN SALINE NASAL MIST NA)    1 [place] by IntraNASal route two (2) times daily as needed (nasal congestion). TERBINAFINE HCL (LAMISIL) 250 MG TABLET    Take 1 Tab by mouth daily. WARFARIN (COUMADIN) 4 MG TABLET    Take 1 Tab by mouth daily. WARFARIN (COUMADIN) 5 MG TABLET    Take 1 Tab by mouth daily. WARFARIN (COUMADIN) 5 MG TABLET    Take 5 mg by mouth daily. These Medications have changed No medications on file Stop Taking No medications on file  
 
_______________________________ Please note that this dictation was completed with [employer], the [employer] voice recognition software. Quite often unanticipated grammatical, syntax, homophones, and other interpretive errors are inadvertently transcribed by the computer software. Please disregard these errors. Please excuse any errors that have escaped final proofreading.

## 2019-04-24 NOTE — ED NOTES
Pt reports he lays in his urine and feces sometimes until his friend helps him change. Pt reports his son lives with him but is often upstairs and wears headphones.

## 2019-04-24 NOTE — H&P
History and Physical 
 
Patient: Bradly Herald Hover Romayne Leap               Sex: male          DOA: 4/24/2019 YOB: 1940      Age:  [de-identified] y.o.        LOS:  LOS: 0 days CHIEF COMPLAINT: Fall and Other HPI:  
 
Gloria Kothari is a [de-identified] y.o. male who presents with fall, diarrhea, generalized weakness and multiple complaints. He is chronically wheelchair bound. He presented after he slid out of wheelchair at home. He called EMS who found him covered in feces. He presented to the ED for evaluation. He has multiple complaints. He complains of diarrhea, abdominal distension and pain. He complains of LE wounds. He c/o mild burning with urination. He also reports subjective fevers this week. He currently has wound care nursing 3x a week for chronic LE wounds. He underwent CT a/p which shows large burden of formed stool throughout the distal sigmoid colon and rectum and mild associated stercoral colitis, no bowel obstruction or free intraperitoneal gas, no diverticulitis. He was placed under observation for possible colitis and UTI. Past Medical History:  
Diagnosis Date  Asthma  BPH (benign prostatic hyperplasia)  Herniated disc, cervical   
 Knee pain [facility] Pacemaker 2011 [address]  PAF (paroxysmal atrial fibrillation) ([facility] [address] During Pacer interogation  SSS (sick sinus syndrome) ([facility] [address] S/P Dual chamber [facility] Pacemaker Social History: 
Social History Socioeconomic History  Marital status:  Spouse name: Not on file  Number of children: Not on file  Years of education: Not on file  Highest education level: Not on file Tobacco Use  Smoking status: Current Some Day Smoker Types: Cigars  Smokeless tobacco: Never Used  Tobacco comment: rare cigar smoker Substance and Sexual Activity  Alcohol use: Yes Alcohol/week: 0.0 oz  
  Comment: rarely drinks  Drug use: No  
 Sexual activity: Not Currently Partners: Female Family History: 
History reviewed. No pertinent family history. Review of Systems Review of Systems Constitutional: Positive for fever and malaise/fatigue. Negative for chills. HENT: Negative for congestion and hearing loss. Eyes: Negative for blurred vision and double vision. Respiratory: Negative for cough and shortness of breath. Cardiovascular: Negative for chest pain and palpitations. Gastrointestinal: Positive for abdominal pain and diarrhea. Negative for nausea and vomiting. Genitourinary: Positive for dysuria (mild). Negative for hematuria. Musculoskeletal: Positive for falls. Negative for myalgias. Skin: Negative for rash. Neurological: Positive for weakness (generalized). Negative for dizziness and focal weakness. Psychiatric/Behavioral: Negative. Objective:  
  
Visit Vitals /64 (BP 1 Location: Right arm, BP Patient Position: Head of bed elevated (Comment degrees)) Pulse 78 Temp 98.3 °F (36.8 °C) Resp 16 Ht 6' 5\" (1.956 m) Wt 136.1 kg (300 lb) SpO2 97% BMI 35.57 kg/m² Physical Exam: 
Ears: hearing is intact Eyes: Icterus is not present Lungs: clear to auscultation bilaterally Heart: regular rate and rhythm, S1, S2 normal, no murmur, click, rub or gallop Gastrointestinal: soft, mild tenderness to LLQ. Bowel sounds normal. No masses,  no organomegaly Neurological:  New Focal Deficits are not present Psychiatric:  Mood is stable Skin: LE chronic venous stasis, erythematous left leg, blanching (see pictures in Media tab) Laboratory Studies: 
CMP:  
Lab Results Component Value Date/Time   (L) 04/24/2019 09:45 AM  
 K 3.7 04/24/2019 09:45 AM  
  04/24/2019 09:45 AM  
 CO2 28 04/24/2019 09:45 AM  
 AGAP 6 04/24/2019 09:45 AM  
  (H) 04/24/2019 09:45 AM  
 BUN 19 (H) 04/24/2019 09:45 AM  
 CREA 0.88 04/24/2019 09:45 AM  
 GFRAA >60 04/24/2019 09:45 AM  
 GFRNA >60 04/24/2019 09:45 AM  
 CA 8.6 04/24/2019 09:45 AM  
 ALB 2.2 (L) 04/24/2019 09:45 AM  
 TP 7.5 04/24/2019 09:45 AM  
 GLOB 5.3 (H) 04/24/2019 09:45 AM  
 AGRAT 0.4 (L) 04/24/2019 09:45 AM  
 SGOT 125 (H) 04/24/2019 09:45 AM  
 ALT 53 04/24/2019 09:45 AM  
 
CBC:  
Lab Results Component Value Date/Time WBC 14.8 (H) 04/24/2019 09:45 AM  
 HGB 10.3 (L) 04/24/2019 09:45 AM  
 HCT 32.8 (L) 04/24/2019 09:45 AM  
  04/24/2019 09:45 AM  
 
 
Imaging: 
CT ABD PELV WO CONT Final Result IMPRESSION:  
  
1. Large burden of formed stool throughout the distal sigmoid colon and rectum. Mild rectal wall thickening and subtle perirectal fat stranding may suggest a  
mild associated stercoral colitis. No bowel obstruction. No free intraperitoneal  
gas. 2. Diverticulosis of the colon without findings to suggest diverticulitis. 3. Normal appendix. 4. Small hiatal hernia. 5. 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 XR CHEST PORT Final Result IMPRESSION:  
  
Rotated projection of the chest without superimposed acute radiographic  
cardiopulmonary abnormality. CT HEAD WO CONT Final Result IMPRESSION:  
  
1. No evidence for acute intracranial injury, infarct, hemorrhage, or mass  
effect. CT can be negative in acute setting. 2. Mild atrophy demonstrating nonspecific white matter hypodensities, likely  
chronic microvascular disease. Assessment:  
 
Principal Problem: 
  UTI (urinary tract infection) (4/24/2019) Active Problems: 
  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 
· ?UTI: D/w ID Dr. Merary Mantilla. Cont atbx for UTI. F/u urine culture. Cipro changed to rocephin per pharmacy d/t prolonged QTc · Stercoral colitis: Aggressive bowel regimen to evacuate bowel. · Wound care consult for LE and sacral wounds 
· PT/OT consult · Blood sugar and BP control · Cont acceptable home medications for chronic conditions · DVT protocol I have personally reviewed all pertinent labs and films that have officially resulted over the last 24 hours. I have personally checked for all pending labs that are awaiting final results. Signed By: Arturo Mejias MD   
 April 24, 2019

## 2019-04-24 NOTE — ED NOTES
Pt was cleaned up he was laying in feces. He has pressure and moisture wounds on sacrum and buttocks, some are bleeding lightly.

## 2019-04-24 NOTE — PROGRESS NOTES
attempted to complete the initial Spiritual Assessment of the patient in bed 11 of the emergency room  and offer Pastoral Care support but found patient resting peacefully at present with no family present. Patient does not have any Sikh/cultural needs that will affect patients preferences in health care. Chaplains will continue to follow and will provide pastoral care on an as needed/requested basis. Gomez 3 Board Certified [employer] Spiritual Care Department [phone]

## 2019-04-24 NOTE — ED NOTES
TRANSFER - OUT REPORT: 
 
Verbal report given to [employer] RN (name) on Phylicia Carver  being transferred to [address] [place] [address] (unit) for routine progression of care Report consisted of patients Situation, Background, Assessment and  
Recommendations(SBAR). Information from the following report(s) SBAR, ED Summary and MAR was reviewed with the receiving nurse. Lines:  
Peripheral IV 04/24/19 Left Antecubital (Active) Site Assessment Clean, dry, & intact 4/24/2019  9:53 AM  
Phlebitis Assessment 0 4/24/2019  9:53 AM  
Infiltration Assessment 0 4/24/2019  9:53 AM  
Dressing Status Clean, dry, & intact 4/24/2019  9:53 AM  
Dressing Type Transparent 4/24/2019  9:53 AM  
Action Taken Blood drawn 4/24/2019  9:53 AM  
  
 
Opportunity for questions and clarification was provided. Patient transported with: 
 [employer]

## 2019-04-24 NOTE — CONSULTS
Infectious Disease Consultation Note    Date of Admission: 4/24/2019    Date of Consultation: 4/24/2019    Referred by: Dr. Carito Ortiz       Reason for Referral: possible leg cellulitis, UTI       Current Antimicrobials: Prior Antimicrobials   cipro 4/24 - 0      Assessment / Plan:     Pyuria  - reports some dysuria when using urinal improperly but not consistent. Denies burning dysuria at each voiding  - urinalysis 4/24 natrh24-35 WBC. urcx pending  - not clearly UTI but empiric abx is appropriate -> continue Cipro pending urine culture. If no growth or non-pathogenic isolate. Dc Cipro   Chronic venous stasis   - bilateral legs  - with ulcerations left leg and foot  - dependent erythema -> local wound care per WC team   Constipation/diarrhea  - large amount of stool on CT in distal colon, rectum    DM    PAF    SSS    s/p pacemaker    CHF    HTN    CVA    asthma      Microbiology:       Lines / Catheters:       HPI:     [de-identified]year-old  male chronically wheelchair bound w/ h/o DM,  PAF, SSS, s/p pacemaker, CHF, HTN, CVA, asthma, LE venous stasis admitted to [facility] after a fall and found by EMS incontinent of feces. He was last admitted to [facility] one year PTA with bullous cellulitis on the dorsum of his left foot and lower extremity venous stasis. Since then he has been attended to by home health and has had some improvement in the left dorsal left foot ulcer but still no complete healing. He fell around 1 week PTA and scraped his left leg with resulting bleeding. He came to the ED 4/16 and was sent home but had another fall from his wheelchair today and when seen by EMS at home was covered in feces. Taken to [facility] ED, he was afebrile but WBC was 14.8. Urinalysis showed 11-20 WBC and 3+ bacteria.  Glucose was [address] Problems    Diagnosis Date Noted    Colitis 04/24/2019     Past Medical History:   Diagnosis Date    Asthma     BPH (benign prostatic hyperplasia)     Herniated disc, cervical  Knee pain     Pacemaker 2011    [employer]    PAF (paroxysmal atrial fibrillation) ([address]     During Pacer interogation     SSS [facility]sick sinus syndrome) ([facility])     S/P Dual chamber [employer] Pacemaker     Past Surgical History:   Procedure Laterality Date    HX HERNIA REPAIR Bilateral 1989    HX HIP REPLACEMENT  2006    right    HX KNEE REPLACEMENT Bilateral 1992/1993/2006/2008/2011/2012/2013    HX PACEMAKER  2011     History reviewed. No pertinent family history. Social History     Socioeconomic History    Marital status:      Spouse name: Not on file    Number of children: Not on file    Years of education: Not on file    Highest education level: Not on file   Occupational History    Not on file   Social Needs    Financial resource strain: Not on file    Food insecurity:     Worry: Not on file     Inability: Not on file    Transportation needs:     Medical: Not on file     Non-medical: Not on file   Tobacco Use    Smoking status: Current Some Day Smoker     Types: Cigars    Smokeless tobacco: Never Used    Tobacco comment: rare cigar smoker   Substance and Sexual Activity    Alcohol use:  Yes     Alcohol/week: 0.0 oz     Comment: rarely drinks    Drug use: No    Sexual activity: Not Currently     Partners: Female   Lifestyle    Physical activity:     Days per week: Not on file     Minutes per session: Not on file    Stress: Not on file   Relationships    Social connections:     Talks on phone: Not on file     Gets together: Not on file     Attends Rastafari service: Not on file     Active member of club or organization: Not on file     Attends meetings of clubs or organizations: Not on file     Relationship status: Not on file    Intimate partner violence:     Fear of current or ex partner: Not on file     Emotionally abused: Not on file     Physically abused: Not on file     Forced sexual activity: Not on file   Other Topics Concern    Not on file   Social History Narrative    Not on file       Allergies:    Iodinated contrast- oral and iv dye; Raisin flavor; Sulfa (sulfonamide antibiotics); and Blueberry . Medications:     Current Facility-Administered Medications   Medication Dose Route Frequency    acetaminophen (TYLENOL) tablet 650 mg  650 mg Oral Q4H PRN    albuterol (PROVENTIL VENTOLIN) nebulizer solution 2.5 mg  2.5 mg Nebulization Q4H PRN    acetaminophen (TYLENOL) tablet 500 mg  500 mg Oral Q6H PRN    gabapentin (NEURONTIN) capsule 600 mg  600 mg Oral TID    [START ON 2019] metoprolol succinate (TOPROL-XL) XL tablet 25 mg  25 mg Oral DAILY    famotidine (PEPCID) tablet 20 mg  20 mg Oral BID    warfarin (COUMADIN) tablet 5 mg  5 mg Oral QPM    polyethylene glycol (MIRALAX) packet 17 g  17 g Oral DAILY    sodium phosphate (FLEET'S) enema 1 Enema  1 Enema Rectal NOW    WARFARIN INFORMATION NOTE (COUMADIN)   Other Rx Dosing/Monitoring    0.9% sodium chloride infusion  75 mL/hr IntraVENous CONTINUOUS       ROS:   A comprehensive review of systems was negative except for that written in the History of Present Illness. Physical Exam:     Temp (24hrs), Av.1 °F (36.7 °C), Min:97.8 °F (36.6 °C), Max:98.3 °F (36.8 °C)    Visit Vitals  /64 (BP 1 Location: Right arm, BP Patient Position: Head of bed elevated (Comment degrees))   Pulse 78   Temp 98.3 °F (36.8 °C)   Resp 16   Ht 6' 5\" (1.956 m)   Wt 136.1 kg (300 lb)   SpO2 97%   BMI 35.57 kg/m²       General: Well developed, well nourished [de-identified] y.o.  male in no acute distress.   ENT: ENT exam normal, no neck nodes or sinus tenderness  Head: normocephalic, without obvious abnormality  Mouth:  mucous membranes moist, pharynx normal without lesions  Neck: supple, symmetrical, trachea midline   Cardio:  regular rate and rhythm, S1, S2 normal, no murmur, click, rub or gallop  Chest: inspection normal - no chest wall deformities or tenderness, respiratory effort normal  Lungs: no wheezes or rales  Abdomen: soft, non-tender. Bowel sounds normal. No masses, no organomegaly. Extremities:  Stasis hyperpigmentation both legs multiple ulcers shallow, faint pink erythema blanches with leg elevation (see photos)  Neuro: alert, oriented. Moves upper extremities. Lower extremities very weak - not tested    Lateral left leg    Left foot    Posterior L leg          Lab results:     Chemistry  Recent Labs     04/24/19  0945   *   *   K 3.7      CO2 28   BUN 19*   CREA 0.88   CA 8.6   AGAP 6   BUCR 22*   AP 67   TP 7.5   ALB 2.2*   GLOB 5.3*   AGRAT 0.4*       CBC w/ Diff  Recent Labs     04/24/19  0945   WBC 14.8*   RBC 3.50*   HGB 10.3*   HCT 32.8*      GRANS 83*   LYMPH 9*   EOS 1       Microbiology  No results for input(s): SDES, CULT in the last 72 hours.       Jh Gutierrez MD, [facility]  Infectious Disease Specialist  Pager [phone]

## 2019-04-25 ENCOUNTER — DOCUMENTATION ONLY (OUTPATIENT)
Dept: FAMILY MEDICINE CLINIC | Age: 79
End: 2019-04-25

## 2019-04-25 LAB
ANION GAP SERPL CALC-SCNC: 8 MMOL/L (ref 3–18)
ATRIAL RATE: 78 BPM
BASOPHILS # BLD: 0 K/UL (ref 0–0.1)
BASOPHILS NFR BLD: 0 % (ref 0–2)
BUN SERPL-MCNC: 12 MG/DL (ref 7–18)
BUN/CREAT SERPL: 20 (ref 12–20)
CALCIUM SERPL-MCNC: 7.8 MG/DL (ref 8.5–10.1)
CALCULATED R AXIS, ECG10: -37 DEGREES
CALCULATED T AXIS, ECG11: 65 DEGREES
CHLORIDE SERPL-SCNC: 105 MMOL/L (ref 100–108)
CO2 SERPL-SCNC: 25 MMOL/L (ref 21–32)
CREAT SERPL-MCNC: 0.59 MG/DL (ref 0.6–1.3)
DIAGNOSIS, 93000: NORMAL
DIFFERENTIAL METHOD BLD: ABNORMAL
EOSINOPHIL # BLD: 0.1 K/UL (ref 0–0.4)
EOSINOPHIL NFR BLD: 1 % (ref 0–5)
ERYTHROCYTE [DISTWIDTH] IN BLOOD BY AUTOMATED COUNT: 15 % (ref 11.6–14.5)
GLUCOSE BLD STRIP.AUTO-MCNC: 167 MG/DL (ref 70–110)
GLUCOSE BLD STRIP.AUTO-MCNC: 173 MG/DL (ref 70–110)
GLUCOSE BLD STRIP.AUTO-MCNC: 186 MG/DL (ref 70–110)
GLUCOSE BLD STRIP.AUTO-MCNC: 191 MG/DL (ref 70–110)
GLUCOSE BLD STRIP.AUTO-MCNC: 211 MG/DL (ref 70–110)
GLUCOSE SERPL-MCNC: 149 MG/DL (ref 74–99)
HCT VFR BLD AUTO: 28.3 % (ref 36–48)
HGB BLD-MCNC: 8.8 G/DL (ref 13–16)
INR PPP: 1.9 (ref 0.8–1.2)
LYMPHOCYTES # BLD: 1.3 K/UL (ref 0.9–3.6)
LYMPHOCYTES NFR BLD: 14 % (ref 21–52)
MAGNESIUM SERPL-MCNC: 2.3 MG/DL (ref 1.6–2.6)
MCH RBC QN AUTO: 29.2 PG (ref 24–34)
MCHC RBC AUTO-ENTMCNC: 31.1 G/DL (ref 31–37)
MCV RBC AUTO: 94 FL (ref 74–97)
MONOCYTES # BLD: 0.7 K/UL (ref 0.05–1.2)
MONOCYTES NFR BLD: 8 % (ref 3–10)
NEUTS SEG # BLD: 6.9 K/UL (ref 1.8–8)
NEUTS SEG NFR BLD: 77 % (ref 40–73)
PLATELET # BLD AUTO: 264 K/UL (ref 135–420)
PMV BLD AUTO: 8.3 FL (ref 9.2–11.8)
POTASSIUM SERPL-SCNC: 3.4 MMOL/L (ref 3.5–5.5)
PROTHROMBIN TIME: 21.6 SEC (ref 11.5–15.2)
Q-T INTERVAL, ECG07: 394 MS
QRS DURATION, ECG06: 100 MS
QTC CALCULATION (BEZET), ECG08: 422 MS
RBC # BLD AUTO: 3.01 M/UL (ref 4.7–5.5)
SODIUM SERPL-SCNC: 138 MMOL/L (ref 136–145)
VENTRICULAR RATE, ECG03: 69 BPM
WBC # BLD AUTO: 9.1 K/UL (ref 4.6–13.2)

## 2019-04-25 PROCEDURE — 3331090002 HH PPS REVENUE DEBIT

## 2019-04-25 PROCEDURE — 85610 PROTHROMBIN TIME: CPT

## 2019-04-25 PROCEDURE — 74011636637 HC RX REV CODE- 636/637: Performed by: HOSPITALIST

## 2019-04-25 PROCEDURE — 96361 HYDRATE IV INFUSION ADD-ON: CPT

## 2019-04-25 PROCEDURE — 74011250637 HC RX REV CODE- 250/637: Performed by: HOSPITALIST

## 2019-04-25 PROCEDURE — 80048 BASIC METABOLIC PNL TOTAL CA: CPT

## 2019-04-25 PROCEDURE — 85025 COMPLETE CBC W/AUTO DIFF WBC: CPT

## 2019-04-25 PROCEDURE — 77030011256 HC DRSG MEPILEX <16IN NO BORD MOLN -A

## 2019-04-25 PROCEDURE — 74011250636 HC RX REV CODE- 250/636: Performed by: HOSPITALIST

## 2019-04-25 PROCEDURE — 74011000250 HC RX REV CODE- 250: Performed by: HOSPITALIST

## 2019-04-25 PROCEDURE — 3331090001 HH PPS REVENUE CREDIT

## 2019-04-25 PROCEDURE — 82962 GLUCOSE BLOOD TEST: CPT

## 2019-04-25 PROCEDURE — 77030011943

## 2019-04-25 PROCEDURE — 74011000258 HC RX REV CODE- 258: Performed by: HOSPITALIST

## 2019-04-25 PROCEDURE — 77030037878 HC DRSG MEPILEX >48IN BORD MOLN -B

## 2019-04-25 PROCEDURE — 97602 WOUND(S) CARE NON-SELECTIVE: CPT

## 2019-04-25 PROCEDURE — 97162 PT EVAL MOD COMPLEX 30 MIN: CPT

## 2019-04-25 PROCEDURE — 36415 COLL VENOUS BLD VENIPUNCTURE: CPT

## 2019-04-25 PROCEDURE — 51798 US URINE CAPACITY MEASURE: CPT

## 2019-04-25 PROCEDURE — 97530 THERAPEUTIC ACTIVITIES: CPT

## 2019-04-25 PROCEDURE — 99218 HC RM OBSERVATION: CPT

## 2019-04-25 PROCEDURE — 83735 ASSAY OF MAGNESIUM: CPT

## 2019-04-25 RX ORDER — POTASSIUM CHLORIDE 750 MG/1
20 TABLET, FILM COATED, EXTENDED RELEASE ORAL
Status: COMPLETED | OUTPATIENT
Start: 2019-04-25 | End: 2019-04-25

## 2019-04-25 RX ADMIN — SENNOSIDES AND DOCUSATE SODIUM 1 TABLET: 8.6; 5 TABLET ORAL at 17:47

## 2019-04-25 RX ADMIN — GABAPENTIN 600 MG: 300 CAPSULE ORAL at 15:59

## 2019-04-25 RX ADMIN — POTASSIUM CHLORIDE 20 MEQ: 750 TABLET, EXTENDED RELEASE ORAL at 16:01

## 2019-04-25 RX ADMIN — ALBUTEROL SULFATE 2.5 MG: 2.5 SOLUTION RESPIRATORY (INHALATION) at 22:00

## 2019-04-25 RX ADMIN — POLYETHYLENE GLYCOL 3350 17 G: 17 POWDER, FOR SOLUTION ORAL at 09:40

## 2019-04-25 RX ADMIN — WARFARIN SODIUM 5 MG: 5 TABLET ORAL at 17:24

## 2019-04-25 RX ADMIN — SODIUM CHLORIDE 75 ML/HR: 900 INJECTION, SOLUTION INTRAVENOUS at 19:26

## 2019-04-25 RX ADMIN — INSULIN LISPRO 3 UNITS: 100 INJECTION, SOLUTION INTRAVENOUS; SUBCUTANEOUS at 17:25

## 2019-04-25 RX ADMIN — POTASSIUM CHLORIDE 20 MEQ: 750 TABLET, EXTENDED RELEASE ORAL at 17:47

## 2019-04-25 RX ADMIN — FAMOTIDINE 20 MG: 20 TABLET ORAL at 09:40

## 2019-04-25 RX ADMIN — INSULIN LISPRO 6 UNITS: 100 INJECTION, SOLUTION INTRAVENOUS; SUBCUTANEOUS at 12:57

## 2019-04-25 RX ADMIN — INSULIN LISPRO 3 UNITS: 100 INJECTION, SOLUTION INTRAVENOUS; SUBCUTANEOUS at 21:51

## 2019-04-25 RX ADMIN — METOPROLOL SUCCINATE 25 MG: 25 TABLET, EXTENDED RELEASE ORAL at 09:40

## 2019-04-25 RX ADMIN — CEFTRIAXONE 1 G: 1 INJECTION, POWDER, FOR SOLUTION INTRAMUSCULAR; INTRAVENOUS at 21:25

## 2019-04-25 RX ADMIN — GABAPENTIN 600 MG: 300 CAPSULE ORAL at 21:28

## 2019-04-25 RX ADMIN — SENNOSIDES AND DOCUSATE SODIUM 1 TABLET: 8.6; 5 TABLET ORAL at 09:40

## 2019-04-25 RX ADMIN — FAMOTIDINE 20 MG: 20 TABLET ORAL at 17:24

## 2019-04-25 RX ADMIN — INSULIN LISPRO 2 UNITS: 100 INJECTION, SOLUTION INTRAVENOUS; SUBCUTANEOUS at 09:40

## 2019-04-25 RX ADMIN — SODIUM CHLORIDE 75 ML/HR: 900 INJECTION, SOLUTION INTRAVENOUS at 05:32

## 2019-04-25 RX ADMIN — POTASSIUM CHLORIDE 20 MEQ: 750 TABLET, EXTENDED RELEASE ORAL at 12:56

## 2019-04-25 RX ADMIN — GABAPENTIN 600 MG: 300 CAPSULE ORAL at 09:40

## 2019-04-25 NOTE — PROGRESS NOTES
Problem: Risk for Spread of Infection Goal: Prevent transmission of infectious organism to others Description Prevent the transmission of infectious organisms to other patients, staff members, and visitors. Outcome: Progressing Towards Goal 
  
Problem: Patient Education:  Go to Education Activity Goal: Patient/Family Education Outcome: Progressing Towards Goal 
  
Problem: Falls - Risk of 
Goal: *Absence of Falls Description Document Yanna Montanez Fall Risk and appropriate interventions in the flowsheet. Outcome: Progressing Towards Goal 
  
Problem: Patient Education: Go to Patient Education Activity Goal: Patient/Family Education Outcome: Progressing Towards Goal 
  
Problem: Pressure Injury - Risk of 
Goal: *Prevention of pressure injury Description Document Mendel Scale and appropriate interventions in the flowsheet. Outcome: Progressing Towards Goal 
  
Problem: Patient Education: Go to Patient Education Activity Goal: Patient/Family Education Outcome: Progressing Towards Goal

## 2019-04-25 NOTE — PROGRESS NOTES
Problem: Discharge Planning Goal: *Discharge to safe environment Outcome: Progressing Towards Goal 
 Plan: snf vs home health

## 2019-04-25 NOTE — ROUTINE PROCESS
Assumed care of patient at 1950 report received from [address] ,RN with SBAR, Intake & Output. Received patient alert & oriented and in NAD. Denies nausea & distress. Dressing to BLE intact. Resp even & unlabored. Bed locked in lowest position, call bell within reach. 4/26/19 
0056 -  Chayito care provided patient had large stools, repositioned for comfort.

## 2019-04-25 NOTE — DIABETES MGMT
NUTRITIONAL ASSESSMENT GLYCEMIC CONTROL/ PLAN OF CARE Annie Solano Bre LANCE           [de-identified] y.o.           4/24/2019 1. Colitis 2. Non-traumatic rhabdomyolysis 3. Acute cystitis without hematuria T2DM INTERVENTIONS/PLAN:  
1. On-going diabetes, consistent Vitamin K education. See diabetes education record for details. 2.  Adjsut diet to 2200 calorie consistent CHO to better meet nutrient needs. 3.  Monitor po intake, labs and weights. ASSESSMENT:  
Nutritional Status:  Pt is 144% ideal weight. Pt appears well nourished but is at nutrition risk with history of chronic wounds. Pt with hypoalbuminemia as evidenced by albumin of 2.2. Current po intake is good but recommend higher calorie diet to better meet energy needs. Home diet history reveals reasonably balanced po intake but reviewed ways to include adequate vegetables. Also noted pt consumes excess fruit juice and reviewed appropriate beverages. Nutrition diagnosis: 
Altered nutrition related labs due to diabetes as evidenced by A1C of 8.4%. Increased nutrient needs due to wound healing as evidenced by multiple wounds (BLE,s and stage 3 wound on buttocks); current diet order providing < 75% estimated energy needs. Nutrient-drug interactions due to medications as evidenced by pt taking coumadin. Obesity due to excess energy intake/decreased activity level (w/c bound) as evidenced by BMI of 35.6 kg/m2. Diabetes Management: Increased A1C seen since 11/2018 but pt reports no change in his home DM medications and that he has been on the same dose of metformin \"for a long time\". Recent blood glucose:   
4/25/19:  167, 173 
4/24/19:  326, 196 - received 10 units corrective lispro Within target range (non-ICU: <140; ICU<180): [] Yes   [x]  No 
Current Insulin regimen: corrective lispro, normal insulin sensitivity ACHS Home medication/insulin regimen: Metformin 500 mg BID 
 HbA1c: 8.4 % - ave BG has been ~ 194 mg/dl over past 2 months. Adequate glycemic control PTA:  [] Yes  [] No       X  fair control considering age and co-morbidities SUBJECTIVE/OBJECTIVE: Information obtained from: chart review, pt Pt with PMHx of T2DM, HTN, PAF, CHF, CVA, chronic venous stasis, wheelchair bound admitted following 2 recent falls from wheelchair. Has chronic ulcerations/wounds LE's and stage 3        wound on buttocks (followed by [facility]). Pt with recent diarrhea and abd pain,  chronic (large amount of stool on CT in distal colon, rectum was reported). 4/25/19:  Pt reports his appetite is good. He denies having food allergies but noted allergies (documented in chart) to blueberries and \"raisin flavor\" in chart - pt states he eats blueberries. He reports his weight was stable PTA. He denies issues with chewing or swallowing. He lives with his son who prepares most meals but pt reports being able to do some food preparation. Pt states he avoids all sweetened beverages. Stating diet history. Pt with questions about appropriate food choices in consideration of taking coumadin, managing his BG and normal garcia function. Diet: consistent CHO 4002-2869 calories, 2 gram Na Pt consumed 100% lunch meal today and reports good tolerance. Patient Vitals for the past 100 hrs: 
 % Diet Eaten 04/24/19 1716 100 % Medications: [x]                Reviewed Pertinent:  Miralax, Pericolace, Coumadin IVF:  NS at 75 ml/hr Most Recent POC Glucose:  
Recent Labs  
  04/25/19 
0535 04/24/19 
0945 * 241*  
  
  
abs:  
Lab Results Component Value Date/Time Hemoglobin A1c 8.4 (H) 04/24/2019 03:15 PM  
 
Lab Results Component Value Date/Time Hemoglobin A1c 8.4 (H) 04/24/2019 03:15 PM  
 Hemoglobin A1c 6.8 (H) 11/14/2018 03:55 PM  
 Hemoglobin A1c 6.4 (H) 05/22/2018 02:55 PM  
 
Lab Results Component Value Date/Time  Sodium 138 04/25/2019 05:35 AM  
 Potassium 3.4 (L) 04/25/2019 05:35 AM  
 Chloride 105 04/25/2019 05:35 AM  
 CO2 25 04/25/2019 05:35 AM  
 Anion gap 8 04/25/2019 05:35 AM  
 Glucose 149 (H) 04/25/2019 05:35 AM  
 BUN 12 04/25/2019 05:35 AM  
 Creatinine 0.59 (L) 04/25/2019 05:35 AM  
 Calcium 7.8 (L) 04/25/2019 05:35 AM  
 Magnesium 2.3 04/25/2019 05:35 AM  
 Albumin 2.2 (L) 04/24/2019 09:45 AM  
 
Anthropometrics: IBW : 94.3 kg (208 lb), % IBW (Calculated): 144.23 %, BMI (calculated): 35.6 Wt Readings from Last 1 Encounters:  
04/24/19 136.1 kg (300 lb) Ht Readings from Last 1 Encounters:  
04/24/19 6' 5\" (1.956 m) Last Weight Metrics: 
Weight Loss Metrics 4/24/2019 4/13/2019 1/9/2019 11/14/2018 5/26/2018 5/22/2018 5/7/2018 Today's Wt 300 lb 307 lb 1.6 oz - 280 lb 280 lb 280 lb -  
BMI 35.57 kg/m2 36.42 kg/m2 34.08 kg/m2 34.08 kg/m2 34.08 kg/m2 34.08 kg/m2 -  
 
 
 
Estimated Nutrition Needs:  2629 Kcals/day, Protein (g): 141 g Fluid (ml): 2600 ml Based on:   [x]          Actual BW    []          ABW   []            Adjusted BW   
Nutrition Diagnoses: see above Nutrition Interventions: 
Diabetes and diet/consistent Vitamin K education Goal:  
Blood glucose will be within target range of  mg/dL by 4/28/19. Pt will be able to state 3 new strategies for healthy eating by 4/30/19. Nutrition Monitoring and Evaluation   
 
[x]     Monitor po intake on meal rounds 
[x]     Continue inpatient monitoring and intervention 
[]     Other: 
 
 
Nutrition Risk:  []   High     [x]  Moderate    []  Minimal/Uncompromised Hillary Martin RD, CDE Office:  [phone] Long Range Pager:  [phone]

## 2019-04-25 NOTE — PROGRESS NOTES
Progress Note Patient: Anneliese Mederos Ion               Sex: male          DOA: 4/24/2019 YOB: 1940      Age:  [de-identified] y.o.        LOS:  LOS: 0 days CHIEF COMPLAINT:  Fall and Other Subjective:  
 
Pt said he feels better today. He had BM after enema yesterday. He wanted to advance diet Objective:  
 
Visit Vitals /59 Pulse 65 Temp 97.7 °F (36.5 °C) Resp 18 Ht 6' 5\" (1.956 m) Wt 136.1 kg (300 lb) SpO2 98% BMI 35.57 kg/m² Physical Exam: 
Ears: hearing is intact Eyes: Icterus is not present Lungs: clear to auscultation bilaterally Heart: regular rate and rhythm, S1, S2 normal, no murmur, click, rub or gallop Gastrointestinal: soft, mild tenderness to LLQ. Bowel sounds normal. No masses,  no organomegaly Neurological:  New Focal Deficits are not present Psychiatric:  Mood is stable Skin: LE chronic venous stasis, covered in dressing Lab/Data Reviewed: 
CMP:  
Lab Results Component Value Date/Time  04/25/2019 05:35 AM  
 K 3.4 (L) 04/25/2019 05:35 AM  
  04/25/2019 05:35 AM  
 CO2 25 04/25/2019 05:35 AM  
 AGAP 8 04/25/2019 05:35 AM  
  (H) 04/25/2019 05:35 AM  
 BUN 12 04/25/2019 05:35 AM  
 CREA 0.59 (L) 04/25/2019 05:35 AM  
 GFRAA >60 04/25/2019 05:35 AM  
 GFRNA >60 04/25/2019 05:35 AM  
 CA 7.8 (L) 04/25/2019 05:35 AM  
 MG 2.3 04/25/2019 05:35 AM  
 
CBC:  
Lab Results Component Value Date/Time WBC 9.1 04/25/2019 05:35 AM  
 HGB 8.8 (L) 04/25/2019 05:35 AM  
 HCT 28.3 (L) 04/25/2019 05:35 AM  
  04/25/2019 05:35 AM  
 
 
Imaging Reviewed: 
Ct Abd Pelv Wo Cont Result Date: 4/24/2019 EXAM: CT of the abdomen and pelvis INDICATION: [de-identified]year-old patient with diarrhea and heme positive stool COMPARISON: CT of the abdomen and pelvis dated 4/16/2019 TECHNIQUE: Axial CT imaging of the abdomen and pelvis was performed without oral or intravenous contrast. Multiplanar reformats were generated. One or more dose reduction techniques were used on this CT: automated exposure control, adjustment of the mAs and/or kVp according to patient size, and iterative reconstruction techniques. The specific techniques used on this CT exam have been documented in the patient's electronic medical record. Digital Imaging and Communications in Medicine (DICOM) format image data are available to nonaffiliated external healthcare facilities or entities on a secure, media free, reciprocally searchable basis with patient authorization for at least a 12-month period after this study. _______________ FINDINGS: LOWER CHEST: Lung bases are clear. No pleural effusion. Cardiac size upper limits of normal. Transvenous pacemaker wires demonstrated. LIVER, BILIARY: Unenhanced appearance of the liver demonstrates no focal abnormality. No biliary dilation. Gallbladder is unremarkable. PANCREAS: Normal unenhanced appearance. SPLEEN: Normal. ADRENALS: Adrenal gland is normal. Low attenuating left adrenal gland nodule unchanged from prior exams, with features most suggesting a small adenoma. KIDNEYS/URETERS/BLADDER: No hydronephrosis. No urolithiasis. Medial upper pole right renal cyst. PELVIC ORGANS: Unremarkable. VASCULATURE: Diffuse aortobiiliac atherosclerotic calcification redemonstrated with aneurysmal dilatation of the infrarenal abdominal aorta measuring 3.3 cm. LYMPH NODES: There are scattered subcentimeter mesenteric and retroperitoneal lymph nodes demonstrated. Additional prominent subcentimeter short axis left external iliac lymph nodes noted. Left inguinal adenopathy unchanged from prior, with largest lymph node is estimated at approximately 1.7 cm in short axis diameter. GASTROINTESTINAL TRACT: There is a large burden of stool throughout the distal sigmoid colon and rectum. There is mild rectal wall thickening with trace perirectal fat stranding noted.  Diverticulosis of the colon is noted without findings on this unenhanced exam to suggest acute diverticulitis. Normal appendix. Normal caliber loops of small bowel. No findings of bowel obstruction. Small hiatal hernia. BONES: No acute or aggressive osseous abnormalities identified. Multilevel lower thoracic and lumbar spondylosis along with lower lumbar predominant facet joint osteoarthrosis unchanged from prior. No acute osseous abnormality or suspicious osseous lesion. Included portions of a right hip arthroplasty demonstrate no acute abnormality. OTHER: None. _______________ IMPRESSION: 1. Large burden of formed stool throughout the distal sigmoid colon and rectum. Mild rectal wall thickening and subtle perirectal fat stranding may suggest a mild associated stercoral colitis. No bowel obstruction. No free intraperitoneal gas. 2. Diverticulosis of the colon without findings to suggest diverticulitis. 3. Normal appendix. 4. Small hiatal hernia. 5. 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 Assessment:  
 
Principal Problem: 
  UTI (urinary tract infection) (4/24/2019) Active Problems: 
  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 
 
 
  PLAN: 
· ?UTI: D/w ID Dr. Elma Potts. Cont atbx for UTI. F/u urine culture. Cipro changed to rocephin per pharmacy d/t prolonged QTc · Stercoral colitis: Aggressive bowel regimen to evacuate bowel. · Wound care consult for LE and sacral wounds · PAF: rate control, cont warfarin, monitor INR 
· PT/OT consult: recommending SNF. D/w case manger. · Blood sugar and BP control · Cont acceptable home medications for chronic conditions · DVT protocol 
  
 
I have personally reviewed all pertinent labs and films that have officially resulted over the last 24 hours. I have personally checked for all pending labs that are awaiting final results. Signed By: Caty Rothman MD   
 April 25, 2019

## 2019-04-25 NOTE — PROGRESS NOTES
Infectious Disease Follow-up Note Date of Admission: 4/24/2019     Date of Note:  4/25/2019 Summary:  
 
[de-identified] y/o CM wheelchair bound, DM, HTN, PAF, CHF, CVA, chronic venous stasis admitted following 2 recent falls from wheelchair. Has chronic ulcerations left leg and dorsum left foot, not infected appearing. Questionable dysuria but ua w/ pyuria. Urcx pending Interval History:  
 
Says he feels better. No new complaints. Remains afebrile Current Antimicrobials: Prior Antimicrobials Ceftriaxone 4/24 - 1  cipro 4/24 - 0  
  
Assessment / Plan:  
  
Pyuria 
- reports some dysuria when using urinal improperly but not consistent. Denies burning dysuria at each voiding 
- urinalysis 4/24 ypeat12-19 WBC. urcx pending 
- not clearly UTI but empiric abx is appropriate - Cipro changed to Ceftriaxone due to prolonged QT -> continue Ceftriaxone pending urine culture. If no growth or non-pathogenic isolate. Dc  
Chronic venous stasis  
- bilateral legs 
- with ulcerations left leg and foot 
- dependent erythema -> local wound care per [facility] team  
Constipation/diarrhea - large amount of stool on CT in distal colon, rectum    
DM    
PAF    
SSS    
s/p pacemaker    
CHF    
HTN    
CVA    
asthma    
  
Microbiology:  
 
4/24 urcx IP Lines / Catheters:  
piv Patient Active Problem List  
Diagnosis Code  Cardiomyopathy ([facility] I42.9  Asthma J45.909  Peripheral neuropathy G62.9  Other allergic rhinitis J30.89  
 Nocturia R35.1  Knee pain M25.569  Sick sinus syndrome (HCC) I49.5  Monoclonal gammopathy D47.2  Tinea corporis B35.4  Actinic keratosis L57.0  Idiopathic peripheral neuropathy G60.9  Abnormal SPEP R77.8  Stroke ([facility] I63.9  
 PAF (paroxysmal atrial fibrillation) ([facility]) I48.0  Weakness of left upper extremity R29.898  
 BPH (benign prostatic hyperplasia) N40.0  Cellulitis L03.90  
 Diabetes mellitus type 2, controlled ([facility] E11.9  
 Colitis K52.9  UTI (urinary tract infection) N39.0  Chronic venous stasis dermatitis of both lower extremities I87.2 Current Facility-Administered Medications Medication Dose Route Frequency  acetaminophen (TYLENOL) tablet 650 mg  650 mg Oral Q4H PRN  
 albuterol (PROVENTIL VENTOLIN) nebulizer solution 2.5 mg  2.5 mg Nebulization Q4H PRN  
 acetaminophen (TYLENOL) tablet 500 mg  500 mg Oral Q6H PRN  
 gabapentin (NEURONTIN) capsule 600 mg  600 mg Oral TID  metoprolol succinate (TOPROL-XL) XL tablet 25 mg  25 mg Oral DAILY  famotidine (PEPCID) tablet 20 mg  20 mg Oral BID  warfarin (COUMADIN) tablet 5 mg  5 mg Oral QPM  
 polyethylene glycol (MIRALAX) packet 17 g  17 g Oral DAILY  WARFARIN INFORMATION NOTE (COUMADIN)   Other Rx Dosing/Monitoring  0.9% sodium chloride infusion  75 mL/hr IntraVENous CONTINUOUS  
 insulin lispro (HUMALOG) injection   SubCUTAneous AC&HS  
 glucose chewable tablet 16 g  4 Tab Oral PRN  
 glucagon (GLUCAGEN) injection 1 mg  1 mg IntraMUSCular PRN  
 dextrose (D50) infusion 12.5-25 g  25-50 mL IntraVENous PRN  
 cefTRIAXone (ROCEPHIN) 1 g in 0.9% sodium chloride (MBP/ADV) 50 mL MBP  1 g IntraVENous Q24H  
 senna-docusate (PERICOLACE) 8.6-50 mg per tablet 1 Tab  1 Tab Oral BID Objective:  
 
Visit Vitals /59 Pulse 65 Temp 97.7 °F (36.5 °C) Resp 18 Ht 6' 5\" (1.956 m) Wt 136.1 kg (300 lb) SpO2 98% BMI 35.57 kg/m² Temp (24hrs), Av.6 °F (37 °C), Min:97.7 °F (36.5 °C), Max:99.6 °F (37.6 °C) GEN: well developed [de-identified]year-old white male lying in bed in no distress HEENT: no scleral  Icterus, no oral lesions CHEST: symmetrical expansion CVS:regular, no murmurs ABD: soft, non-tender EXT: chronic venous stasis ulcers left leg, less edema since yesterday. Darker erythema (seen with wound care) Lab results Chemistry Recent Labs  
  19 
0535 19 
0945 * 241*  135* K 3.4* 3.7  101 CO2 25 28 BUN 12 19* CREA 0.59* 0.88 CA 7.8* 8.6 AGAP 8 6 BUCR 20 22* AP  --  67  
TP  --  7.5 ALB  --  2.2*  
GLOB  --  5.3* AGRAT  --  0.4* CBC w/ Diff Recent Labs  
  04/25/19 
0535 04/24/19 
0945 WBC 9.1 14.8*  
RBC 3.01* 3.50* HGB 8.8* 10.3* HCT 28.3* 32.8*  329 GRANS 77* 83* LYMPH 14* 9* EOS 1 1 Microbiology All Micro Results Procedure Component Value Units Date/Time CULTURE, URINE [[id number]] Collected:  04/24/19 1515 Order Status:  Completed Specimen:  Urine from Clean catch Updated:  04/24/19 2241 CULTURE, URINE [[id number]] Collected:  04/24/19 1515 Order Status:  Canceled Specimen:  Urine from Clean catch Caitlyn Arcelia [[id number]] Order Status:  Sent Specimen:  Stool C. DIFFICILE AG & TOXIN A/B [[id number]] Order Status:  Canceled Specimen:  Stool Tonya Oropeza MD, [facility] Infectious Disease Specialist 
Pager [phone]

## 2019-04-25 NOTE — PROGRESS NOTES
DOCUMENTATION ONLY: Supply Order and [employer] order for the hospital bed with dry pressure mattress and back cushion for wheelchair has been signed and faxed to [phone]. Fax confirmation was received and sent to central scanning.

## 2019-04-25 NOTE — PROGRESS NOTES
Problem: Mobility Impaired (Adult and Pediatric) Goal: *Acute Goals and Plan of Care (Insert Text) Description Physical Therapy Goals Initiated 4/25/2019 and to be accomplished within 3 day trial.  
If appropriate continue per plan of care to accomplish goals within 7 days. 1.  Patient will move from supine to sit and sit to supine in bed with minimal assistance/contact guard assist.    
2.  Patient will maintain seated at edge of bed for 8 min with minimal assistance/contact guard assist to prepare for out of bed activity. 3.  Patient will perform sit to stand with minimal assistance/contact guard assist. 
4.  Patient will transfer from bed to chair and chair to bed with minimal assistance/contact guard assist using the least restrictive device. 5.  Patient will perform wheelchair mobility with supervision/set-up for 50 feet with the least restrictive device. Outcome: Progressing Towards Goal 
  
PHYSICAL THERAPY EVALUATION Patient: Roscoe Alfred ([de-identified] y.o. male) Date: 4/25/2019 Primary Diagnosis: Colitis [K52.9] Precautions: Fall, Skin PLOF: Non-ambulatory since 2017; Transferring to w/c up until 2 weeks prior to admission ASSESSMENT : 
Based on the objective data described below, the patient presents with decreased strength, range of motion, and functional mobility. Agreeable to trial mobility with PT. Noted to be incontinent of bowels. Max/Total A x2 for rolling x6 for clean-up. Declined seated EOB d/t max/total A for rolling and fatigued post rolling. Total A x2 for scooting to [facility]. BLE strength 2/5 grossly. Decreased ROM BLE; LLE 25% of full AROM, RLE 50% of full AROM. Wounds noted to BLE, sacrum; documented in chart. Education provided on bed mobility, transfers, ADLs, balance, amb, safety, exercise, role of PT, plan of care, cognition, skin integrity, vitals as indicated.  Educated on need for RN assistance with mobility; verbalized understanding. Call bell in reach. Per patient, non-ambulatory since 2017. Was transferring to wheelchair until 2 weeks ago. Will follow for 3 day trial to better determine active participation in skilled treatment and prior level of function. If appropriate, continue per plan of care 3-5x/week to address goals. Patient will benefit from skilled intervention to address the above impairments. Patient's rehabilitation potential is considered to be Guarded Factors which may influence rehabilitation potential include:  
? None noted ? Mental ability/status ? Medical condition ? Home/family situation and support systems ? Safety awareness 
? Pain tolerance/management 
? Other: PLAN : 
Recommendations and Planned Interventions:  
?           Bed Mobility Training             ? Neuromuscular Re-Education ? Transfer Training                   ? Orthotic/Prosthetic Training 
? Gait Training                          ? Modalities ? Therapeutic Exercises           ? Edema Management/Control ? Therapeutic Activities            ? Family Training/Education ? Patient Education ? Other (comment): Frequency/Duration: Patient will be followed by physical therapy for 3 day trial to better determine active participation in skilled treatment and prior level of function. If appropriate, continue per plan of care 3-5x/week to address goals. to address goals. Discharge Recommendations: Karson Cavazos Further Equipment Recommendations for Discharge: TBD with OOB SUBJECTIVE:  
Patient stated ? You owe me coffee. ? OBJECTIVE DATA SUMMARY:  
 
Past Medical History:  
Diagnosis Date Asthma   
 BPH (benign prostatic hyperplasia) Herniated disc, cervical   
 Knee pain Pacemaker 2011 [address] PAF (paroxysmal atrial fibrillation) ([facility] During Pacer interogation SSS (sick sinus syndrome) ([facility] S/P Dual chamber [facility] Pacemaker Past Surgical History:  
Procedure Laterality Date HX HERNIA REPAIR Bilateral 1989 HX HIP REPLACEMENT  2006  
 right HX KNEE REPLACEMENT Bilateral 1992/1993/2006/2008/2011/2012/2013 HX PACEMAKER  2011 Barriers to Learning/Limitations: yes;  cognitive Compensate with: Visual Cues, Verbal Cues, Tactile Cues and Kinesthetic Cues Home Situation: 
Home Situation Home Environment: Private residence # Steps to Enter: (7) One/Two Story Residence: Two story, live on 1st floor # of Interior Steps: (unknown) Height of Each Step (in): (standard) Interior Rails: (unknown) Lift Chair Available: No 
Living Alone: No 
Support Systems: Child(ezekiel)(son) Patient Expects to be Discharged to[de-identified] Private residence Current DME Used/Available at Home: Wheelchair Critical Behavior: 
Neurologic State: Alert Orientation Level: Oriented to person Cognition: Follows commands Safety/Judgement: Fall prevention Psychosocial 
Patient Behaviors: Cooperative Strength: BLE grossly 2/5 Tone & Sensation: BLE normal tone Range Of Motion: BLE AROM 25% Functional Mobility: 
Bed Mobility: 
Rolling: Total assistance;Maximum assistance;Assist x2 Pain: 
Pain level pre-treatment: 3/10 Pain level post-treatment:3/10 Pain Scale 1: Numeric (0 - 10) Pain Intensity 1: 3 Activity Tolerance:  
Poor After treatment:  
?         Patient left in no apparent distress sitting up in chair ? Patient left in no apparent distress in bed 
? Call bell left within reach ? Nursing notified ? Caregiver present ? Bed alarm activated ? SCDs applied COMMUNICATION/EDUCATION:  
?         Role of physical therapy and plan of care in the acute care setting. ?         Fall prevention education was provided and the patient/caregiver indicated understanding. ?         Patient/family have participated as able in goal setting and plan of care. ?         Patient/family agree to work toward stated goals and plan of care. ?         Patient understands intent and goals of therapy, but is neutral about his/her participation. ? Patient is unable to participate in goal setting/plan of care: ongoing with therapy staff. Thank you for this referral. 
Reddy Patel, PT Time Calculation: 30 mins Eval Complexity: History: MEDIUM  Complexity : 1-2 comorbidities / personal factors will impact the outcome/ POC Exam:MEDIUM Complexity : 3 Standardized tests and measures addressing body structure, function, activity limitation and / or participation in recreation  Presentation: MEDIUM Complexity : Evolving with changing characteristics  Clinical Decision Making:Medium Complexity clinical judgement; ROM, MMT, functional mobility  Overall Complexity:MEDIUM

## 2019-04-25 NOTE — WOUND CARE
Wound/Ostomy Nurse Progress Note Patient: Nba Díaz Misty Door QBY:[birth year] MRN: [id number] Situation: wound consult for multiple wounds on BLE and buttocks. Background: Patient is in observation status for UTI and stercoral colitis. Assessment: The patient was sitting up in bed and requesting a bedpan when wound care arrived for consult. His nurse, Shruthi, brought a bedpan and helped him onto it. His lower leg dressing were removed and his wounds were cleansed. He has multiple open wounds on BLE. (See wound doc flow sheet for details). All wounds were dressed with Aquacel Ag, ABDs, and roll gauze. With the assistance of his aide, the patient was helped off the bedpan and cleaned up. His buttock wounds were assessed. There is a large area of excoriation with a stage 3 pressure injury, measuring 17.5 x 16 cm, mostly on his right buttocks and extending across to his left. He states he drags himself across the floor on his buttocks to get to the bathroom. A heavy layer of zinc ointment was applied to the entire area. Recommendation: Continue daily dressing changes with Aquacel Ag and gauze. Keep buttocks area clean and protected with frequent applications of zinc barrier ointment. The patient would benefit from compression wraps for his lower leg ulcers. He stated \"I've had them before, I couldn't tolerate them. \" He was offered an out patient appointment at the wound clinic once he is discharged. He stated \"I can't get there, I have seven steps to get down from my house. \"

## 2019-04-25 NOTE — PROGRESS NOTES
Reason for Admission:   UTI, stercoral colitis RRAT Score:  22 Resources/supports as identified by patient/family:  Family, home health Top Challenges facing patient (as identified by patient/family and CM): Finances/Medication cost?   Has [employer] Transportation? Son or wheelchair transportation Support system or lack thereof? As above Living arrangements? Son lives with pt Self-care/ADLs/Cognition? Alert & oriented x 4. Pt not able to perform ADL's. States his son helps with toileting (transfer to bsc). Pt incontinent at times and unable to get to bsc if son not present Current Advanced Directive/Advance Care Plan:  Not on file Plan for utilizing home health:   CM to follow and assess Likelihood of readmission: high/red due to co morbidities, barriers to care Transition of Care Plan: Interviewed pt and verified all face sheet info. Pt states that his son lives with him in a 2 story home, but that pt lives downstairs only. He reports that he has been non-ambulatory since Jan 2017, and up until about 2 weeks ago he was able to self transfer bed to w/c, w/c to toilet, etc.  Has had 2 sliding falls from wheelchair recently, the last just PTA. Pt reports he has had [address] coming 3x/wk to treat wounds on legs since June 2018. Pt also has decub wounds on buttocks and sacrum. Pt has wheelchair, bedside commode at home and states \"they were supposed to be getting me a hospital bed. I really need one because my bed is too high and its really hard to get into\". Pt states he has had 3 previous SNF stays as well. Pt reports that he does not have wheelchair ramp at home and it is difficult for him to get into/out of home. Discussed SNF stay, informed pt that PT has recommended SNF, and his Dr agreed. Pt states he is agreeable to SNF. Pt given SNF list.  He has chosen the followin) [facility] 2) [facility] and 3) [facility]. Pt matched out to those facilities. [facility] signed for SNF as well as [facility] in case pt disposition is home with home health. Pt states that [employer] provides medication home delivery. Patient has designated _son_______________________ to participate in his/her discharge plan and to receive any needed information. Name: Miranda Monroy Address: 
Phone number: [phone] Care Management Interventions PCP Verified by CM: Yes Last Visit to PCP: 18 Mode of Transport at Discharge: [place] [address] Transition of Care Consult (CM Consult): Discharge Planning Physical Therapy Consult: Yes Occupational Therapy Consult: Yes Current Support Network: Own Home(son lives with him) Confirm Follow Up Transport: Self(pt may need wheelchair or stretcher transport) Plan discussed with Pt/Family/Caregiver: Yes Freedom of Choice Offered: Yes Discharge Location Discharge Placement: Other:(snf vs home health ( pt rec snf)) Patient and/or next of kin has been given and has signed the [facility] Outpatient Observation  Notification letter and all questions answered. Copy of this notice given to patient and copy placed on chart. Patient and/or next of kin has been given the Outpatient Observation Information and Notification letter and all questions answered. [facility] & [address] Provider list has been given to the patient and/or patient representative. Patient and/or patient representative has signed the [place] of Choice selecting ___[facility]______as their preference agency and a copy given. Both Home Health Provider list and Freedom of Choice have been placed on the chart.

## 2019-04-25 NOTE — ROUTINE PROCESS
[address] Report given by ED nurse Frederic Desai; pt transferred to unit via stretcher  
 
1721 Medications given; no new changes; assessment completed; cleaned pt from incontinence 1819 Fluids started; no new changes 1900 Enema given; lower extremity wounds redressed and cleaned; no new changes 1935 -Bedside shift change report given to Erika (oncoming nurse) by Joya (offgoing nurse). Report included the following information SBAR, Kardex, ED Summary, Intake/Output, MAR, Accordion, Recent Results and Med Rec Status.

## 2019-04-25 NOTE — DIABETES MGMT
Diabetes Patient/Family Education RecordFactors That  May Influence Patients Ability  to Learn or  Comply with Recommendations []   Language barrier    []   Cultural needs   []   Motivation  
 []   Cognitive limitation    [x]   Physical - w/c bound   []   Education  
 []   Physiological factors   []   Hearing/vision/speaking impairment   []   Ep[employer]alian beliefs []   Financial factors   []  Other:   []  No factors identified at this time. Person Instructed: [x]   Patient   []   Family   []  Other Preference for Learning: 
 [x]   Verbal   [x]   Written A1C and target BG 
DM class flyer CHO counting guidelines and sample menus Vitamin K consistent diet /Coumadin   []  Demonstration Level of Comprehension & Competence:   
[x]  Good                                      [] Fair                                     []  Poor                             []  Needs Reinforcement  
[x]  Teachback completed Education Component:  
[x]  Medication management, including how to administer insulin (if appropriate) and potential medication interactions [x]  Nutritional management -obtain usual meal pattern  
[]  Exercise  
[]  Signs, symptoms, and treatment of hyperglycemia and hypoglycemia  
[] Prevention, recognition and treatment of hyperglycemia and hypoglycemia  
[]  Importance of blood glucose monitoring and how to obtain a blood glucose meter   
[]  Instruction on use of the blood glucose meter [x]  Discuss the importance of HbA1C monitoring   
[]  Sick day guidelines  
[]  Proper use and disposal of lancets, needles, syringes or insulin pens (if appropriate) [x]  Potential long-term complications (retinopathy, kidney disease, neuropathy, foot care) [x] Information about whom to contact in case of emergency or for more information   
[]  Goal:  Patient/family will demonstrate understanding of Diabetes Self Management Skills by: (date) _______ Plan for post-discharge education or self-management support: 
  [x] Outpatient class schedule provided            [] Patient Declined 
  [] Scheduled for outpatient classes (date) _______ Verify: 
Does patient understand how diabetes medications work? __no- reviewed__________________________ Does patient know what their most recent A1c is? ______was not familiar with it - reviewed_____________________________ Does patient monitor glucose at home? ___unknown________________________________________ Does patient have difficulty obtaining diabetes medications or testing supplies? ___no______________ Saint Rising, RD, CDE Office:  [phone] [place] Pager:  [phone]

## 2019-04-25 NOTE — PROGRESS NOTES
OT order received, chart reviewed. 3 attempts for OT evaluation: 
 
0903: pt just completed working with PT; requesting to eat breakfast  
1050: care manager at bedside 1143: dietician at bedside; wound care preparing to assess pt. Will follow up on 4/26/19 Leonardo Acosta MS OTR/L Office Ext: [phone]

## 2019-04-25 NOTE — ROUTINE PROCESS
0730 Beside report given by Erika; no new changes 5238 PT / OT in room 0935 Medications; assessment completed; no new changes 200 Pt talking with dietary; no new changes; wound care will be in shortly 1254 Assessment completed; no new changes; pt eating; medications given; wound care changed pt bandages and checked wounds; pt had two BM 
 
1600 Assessment completed; no new changes 1721 No new changes; medications given 1758 Medications given; pt eating; bladder scan performed and 641ml; tried to get pt to urinate in urinal 
 
1915 Straight cath preformed and 900ml of urine drained; urine was ryan, malodorous with no sediment 1945 -Bedside shift change report given to Daxa Gallo (oncoming nurse) by Ryan (offgoing nurse). Report included the following information SBAR, Kardex, ED Summary, Intake/Output, MAR, Accordion, Recent Results and Med Rec Status.

## 2019-04-26 LAB
ANION GAP SERPL CALC-SCNC: 7 MMOL/L (ref 3–18)
APPEARANCE UR: CLEAR
BACTERIA URNS QL MICRO: ABNORMAL /HPF
BASOPHILS # BLD: 0 K/UL (ref 0–0.1)
BASOPHILS NFR BLD: 0 % (ref 0–2)
BILIRUB UR QL: ABNORMAL
BUN SERPL-MCNC: 13 MG/DL (ref 7–18)
BUN/CREAT SERPL: 19 (ref 12–20)
CALCIUM SERPL-MCNC: 7.5 MG/DL (ref 8.5–10.1)
CHLORIDE SERPL-SCNC: 108 MMOL/L (ref 100–108)
CO2 SERPL-SCNC: 23 MMOL/L (ref 21–32)
COLOR UR: ABNORMAL
CREAT SERPL-MCNC: 0.7 MG/DL (ref 0.6–1.3)
DIFFERENTIAL METHOD BLD: ABNORMAL
EOSINOPHIL # BLD: 0.1 K/UL (ref 0–0.4)
EOSINOPHIL NFR BLD: 2 % (ref 0–5)
EPITH CASTS URNS QL MICRO: ABNORMAL /LPF (ref 0–5)
ERYTHROCYTE [DISTWIDTH] IN BLOOD BY AUTOMATED COUNT: 15.2 % (ref 11.6–14.5)
GLUCOSE BLD STRIP.AUTO-MCNC: 142 MG/DL (ref 70–110)
GLUCOSE BLD STRIP.AUTO-MCNC: 212 MG/DL (ref 70–110)
GLUCOSE BLD STRIP.AUTO-MCNC: 228 MG/DL (ref 70–110)
GLUCOSE BLD STRIP.AUTO-MCNC: 232 MG/DL (ref 70–110)
GLUCOSE SERPL-MCNC: 132 MG/DL (ref 74–99)
GLUCOSE UR STRIP.AUTO-MCNC: NEGATIVE MG/DL
HCT VFR BLD AUTO: 28 % (ref 36–48)
HGB BLD-MCNC: 8.5 G/DL (ref 13–16)
HGB UR QL STRIP: NEGATIVE
INR PPP: 1.7 (ref 0.8–1.2)
KETONES UR QL STRIP.AUTO: ABNORMAL MG/DL
LEUKOCYTE ESTERASE UR QL STRIP.AUTO: ABNORMAL
LYMPHOCYTES # BLD: 1.4 K/UL (ref 0.9–3.6)
LYMPHOCYTES NFR BLD: 19 % (ref 21–52)
MAGNESIUM SERPL-MCNC: 2.4 MG/DL (ref 1.6–2.6)
MCH RBC QN AUTO: 28.9 PG (ref 24–34)
MCHC RBC AUTO-ENTMCNC: 30.4 G/DL (ref 31–37)
MCV RBC AUTO: 95.2 FL (ref 74–97)
MONOCYTES # BLD: 0.5 K/UL (ref 0.05–1.2)
MONOCYTES NFR BLD: 7 % (ref 3–10)
NEUTS SEG # BLD: 5.1 K/UL (ref 1.8–8)
NEUTS SEG NFR BLD: 72 % (ref 40–73)
NITRITE UR QL STRIP.AUTO: NEGATIVE
PH UR STRIP: 6 [PH] (ref 5–8)
PLATELET # BLD AUTO: 246 K/UL (ref 135–420)
PMV BLD AUTO: 8.2 FL (ref 9.2–11.8)
POTASSIUM SERPL-SCNC: 3.7 MMOL/L (ref 3.5–5.5)
PROT UR STRIP-MCNC: NEGATIVE MG/DL
PROTHROMBIN TIME: 20.1 SEC (ref 11.5–15.2)
RBC # BLD AUTO: 2.94 M/UL (ref 4.7–5.5)
RBC #/AREA URNS HPF: ABNORMAL /HPF (ref 0–5)
SODIUM SERPL-SCNC: 138 MMOL/L (ref 136–145)
SP GR UR REFRACTOMETRY: 1.02 (ref 1–1.03)
UROBILINOGEN UR QL STRIP.AUTO: 1 EU/DL (ref 0.2–1)
WBC # BLD AUTO: 7.2 K/UL (ref 4.6–13.2)
WBC URNS QL MICRO: ABNORMAL /HPF (ref 0–4)

## 2019-04-26 PROCEDURE — 3331090001 HH PPS REVENUE CREDIT

## 2019-04-26 PROCEDURE — 3331090002 HH PPS REVENUE DEBIT

## 2019-04-26 PROCEDURE — 83735 ASSAY OF MAGNESIUM: CPT

## 2019-04-26 PROCEDURE — 77030020186 HC BOOT HL PROTCT SAGE -B

## 2019-04-26 PROCEDURE — 77030034849

## 2019-04-26 PROCEDURE — 74011250637 HC RX REV CODE- 250/637: Performed by: HOSPITALIST

## 2019-04-26 PROCEDURE — 74011636637 HC RX REV CODE- 636/637: Performed by: HOSPITALIST

## 2019-04-26 PROCEDURE — 80048 BASIC METABOLIC PNL TOTAL CA: CPT

## 2019-04-26 PROCEDURE — 36415 COLL VENOUS BLD VENIPUNCTURE: CPT

## 2019-04-26 PROCEDURE — 81001 URINALYSIS AUTO W/SCOPE: CPT

## 2019-04-26 PROCEDURE — 97110 THERAPEUTIC EXERCISES: CPT

## 2019-04-26 PROCEDURE — 82962 GLUCOSE BLOOD TEST: CPT

## 2019-04-26 PROCEDURE — 77030011256 HC DRSG MEPILEX <16IN NO BORD MOLN -A

## 2019-04-26 PROCEDURE — 99218 HC RM OBSERVATION: CPT

## 2019-04-26 PROCEDURE — 85025 COMPLETE CBC W/AUTO DIFF WBC: CPT

## 2019-04-26 PROCEDURE — 85610 PROTHROMBIN TIME: CPT

## 2019-04-26 PROCEDURE — 74011250636 HC RX REV CODE- 250/636: Performed by: HOSPITALIST

## 2019-04-26 PROCEDURE — 97166 OT EVAL MOD COMPLEX 45 MIN: CPT

## 2019-04-26 PROCEDURE — 97602 WOUND(S) CARE NON-SELECTIVE: CPT

## 2019-04-26 PROCEDURE — 51798 US URINE CAPACITY MEASURE: CPT

## 2019-04-26 PROCEDURE — 96361 HYDRATE IV INFUSION ADD-ON: CPT

## 2019-04-26 PROCEDURE — 77030037878 HC DRSG MEPILEX >48IN BORD MOLN -B

## 2019-04-26 RX ORDER — TAMSULOSIN HYDROCHLORIDE 0.4 MG/1
0.4 CAPSULE ORAL DAILY
Status: DISCONTINUED | OUTPATIENT
Start: 2019-04-26 | End: 2019-04-29 | Stop reason: HOSPADM

## 2019-04-26 RX ORDER — AMOXICILLIN 250 MG/1
500 CAPSULE ORAL EVERY 8 HOURS
Status: COMPLETED | OUTPATIENT
Start: 2019-04-26 | End: 2019-04-29

## 2019-04-26 RX ADMIN — AMOXICILLIN 500 MG: 250 CAPSULE ORAL at 17:12

## 2019-04-26 RX ADMIN — TAMSULOSIN HYDROCHLORIDE 0.4 MG: 0.4 CAPSULE ORAL at 12:49

## 2019-04-26 RX ADMIN — GABAPENTIN 600 MG: 300 CAPSULE ORAL at 09:20

## 2019-04-26 RX ADMIN — WARFARIN SODIUM 5 MG: 5 TABLET ORAL at 17:10

## 2019-04-26 RX ADMIN — INSULIN LISPRO 6 UNITS: 100 INJECTION, SOLUTION INTRAVENOUS; SUBCUTANEOUS at 21:55

## 2019-04-26 RX ADMIN — GABAPENTIN 600 MG: 300 CAPSULE ORAL at 17:11

## 2019-04-26 RX ADMIN — AMOXICILLIN 500 MG: 250 CAPSULE ORAL at 21:56

## 2019-04-26 RX ADMIN — INSULIN LISPRO 6 UNITS: 100 INJECTION, SOLUTION INTRAVENOUS; SUBCUTANEOUS at 17:10

## 2019-04-26 RX ADMIN — INSULIN LISPRO 6 UNITS: 100 INJECTION, SOLUTION INTRAVENOUS; SUBCUTANEOUS at 12:49

## 2019-04-26 RX ADMIN — FAMOTIDINE 20 MG: 20 TABLET ORAL at 09:24

## 2019-04-26 RX ADMIN — FAMOTIDINE 20 MG: 20 TABLET ORAL at 17:11

## 2019-04-26 RX ADMIN — ACETAMINOPHEN 650 MG: 325 TABLET, FILM COATED ORAL at 00:26

## 2019-04-26 RX ADMIN — SODIUM CHLORIDE 75 ML/HR: 900 INJECTION, SOLUTION INTRAVENOUS at 10:46

## 2019-04-26 RX ADMIN — METOPROLOL SUCCINATE 25 MG: 25 TABLET, EXTENDED RELEASE ORAL at 09:20

## 2019-04-26 RX ADMIN — GABAPENTIN 600 MG: 300 CAPSULE ORAL at 21:56

## 2019-04-26 NOTE — PROGRESS NOTES
1330: PT tx session attempted, patient declining to participate. States that he just finished working with OT. Requesting PT f/u tomorrow. Will f/u with patient. Laura Farah PT, DPT Office extension: [phone] Pager #: [phone]

## 2019-04-26 NOTE — PROGRESS NOTES
Problem: Risk for Spread of Infection Goal: Prevent transmission of infectious organism to others Description Prevent the transmission of infectious organisms to other patients, staff members, and visitors. Outcome: Progressing Towards Goal 
  
Problem: Patient Education:  Go to Education Activity Goal: Patient/Family Education Outcome: Progressing Towards Goal 
  
Problem: Falls - Risk of 
Goal: *Absence of Falls Description Document Micki Heredia Fall Risk and appropriate interventions in the flowsheet. Outcome: Progressing Towards Goal 
  
Problem: Patient Education: Go to Patient Education Activity Goal: Patient/Family Education Outcome: Progressing Towards Goal 
  
Problem: Pressure Injury - Risk of 
Goal: *Prevention of pressure injury Description Document Mendel Scale and appropriate interventions in the flowsheet. Outcome: Progressing Towards Goal 
  
Problem: Patient Education: Go to Patient Education Activity Goal: Patient/Family Education Outcome: Progressing Towards Goal 
  
Problem: Diabetes Self-Management Goal: *Disease process and treatment process Description Define diabetes and identify own type of diabetes; list 3 options for treating diabetes. Outcome: Progressing Towards Goal 
Goal: *Incorporating nutritional management into lifestyle Description Describe effect of type, amount and timing of food on blood glucose; list 3 methods for planning meals. Outcome: Progressing Towards Goal 
Goal: *Incorporating physical activity into lifestyle Description State effect of exercise on blood glucose levels. Outcome: Progressing Towards Goal 
Goal: *Developing strategies to promote health/change behavior Description Define the ABC's of diabetes; identify appropriate screenings, schedule and personal plan for screenings. Outcome: Progressing Towards Goal 
Goal: *Using medications safely Description State effect of diabetes medications on diabetes; name diabetes medication taking, action and side effects. Outcome: Progressing Towards Goal 
Goal: *Monitoring blood glucose, interpreting and using results Description Identify recommended blood glucose targets  and personal targets. Outcome: Progressing Towards Goal 
Goal: *Prevention, detection, treatment of acute complications Description List symptoms of hyper- and hypoglycemia; describe how to treat low blood sugar and actions for lowering  high blood glucose level. Outcome: Progressing Towards Goal 
Goal: *Prevention, detection and treatment of chronic complications Description Define the natural course of diabetes and describe the relationship of blood glucose levels to long term complications of diabetes. Outcome: Progressing Towards Goal 
Goal: *Developing strategies to address psychosocial issues Description Describe feelings about living with diabetes; identify support needed and support network Outcome: Progressing Towards Goal 
Goal: *Insulin pump training Outcome: Progressing Towards Goal 
Goal: *Sick day guidelines Outcome: Progressing Towards Goal 
Goal: *Patient Specific Goal (EDIT GOAL, INSERT TEXT) Outcome: Progressing Towards Goal 
  
Problem: Patient Education: Go to Patient Education Activity Goal: Patient/Family Education Outcome: Progressing Towards Goal 
  
Problem: Patient Education: Go to Patient Education Activity Goal: Patient/Family Education Outcome: Progressing Towards Goal 
  
Problem: Discharge Planning Goal: *Discharge to safe environment Outcome: Progressing Towards Goal 
  
Problem: Lower Extremity Wound Care Goal: *Non-infected wound: Improvement of existing wound, absence of infection, and maintenance of skin integrity Outcome: Progressing Towards Goal 
Goal: *Infected Wound: Prevention of further infection and promotion of healing Description Infection control procedures (eg: clean dressings, clean gloves, hand washing, precautions to isolate wound from contamination, sterile instruments used for wound debridement) should be implemented. Outcome: Progressing Towards Goal 
Goal: Interventions Outcome: Progressing Towards Goal 
  
Problem: Patient Education: Go to Patient Education Activity Goal: Patient/Family Education Outcome: Progressing Towards Goal 
  
Problem: Impaired Skin Integrity/Pressure Injury Treatment Goal: *Improvement of Existing Pressure Injury Outcome: Progressing Towards Goal 
Goal: *Prevention of pressure injury Description Document Mendel Scale and appropriate interventions in the flowsheet. Outcome: Progressing Towards Goal 
  
Problem: Patient Education: Go to Patient Education Activity Goal: Patient/Family Education Outcome: Progressing Towards Goal 
  
Problem: Nutrition Deficit Goal: *Optimize nutritional status Outcome: Progressing Towards Goal

## 2019-04-26 NOTE — ROUTINE PROCESS
1549 Assessment completed; took pt off bed pan and cleaned up; had two BM 
 
0916 Pt eating breakfast 
 
1045 Spoke with security to bring pt wallet up per pt request; hung new bag of NS 
 
1100 Pt resting; no new changes 1250 Medications given; assessment completed; no new changes 1334 Bladder scan preformed, 851 ml urine in bladder, MD notified 1356 Roque inserted in pt; wound care dressings preformed 1535 Pt resting 
 
1654 Pt resting; food tray came; no complaints or distress; assessment completed; no new changes; roque still draining 1935 -Bedside shift change report given to Sister ROMA (oncoming nurse) by Joya (offgoing nurse). Report included the following information SBAR, Kardex, ED Summary, Intake/Output, MAR, Accordion, Recent Results and Med Rec Status.

## 2019-04-26 NOTE — PROGRESS NOTES
Infectious Disease Follow-up Note Date of Admission: 4/24/2019     Date of Note:  4/26/2019 Summary:  
 
[de-identified] y/o CM wheelchair bound, DM, HTN, PAF, CHF, CVA, chronic venous stasis admitted following 2 recent falls from wheelchair. Has chronic ulcerations left leg and dorsum left foot, not infected appearing. Questionable dysuria but ua w/ pyuria. Urcx pending Interval History:  
 
Eating breakfast in bed. Says he feels better than yesterday. Afebrile Current Antimicrobials: Prior Antimicrobials Ceftriaxone 4/24 - 2  cipro 4/24 - 0  
  
Assessment / Plan:  
  
Pyuria 
- reports some dysuria when using urinal improperly but not consistent. Denies burning dysuria at each voiding 
- urinalysis 4/24 [id number] WBC. urcx > 100,000 poss [facility] (?Enterococcus) 
- not clearly UTI but empiric abx is appropriate - Cipro changed to Ceftriaxone due to prolonged QT -> dc Ceftriaxone  
-> Amoxicillin 500 mg tid x 3 days Chronic venous stasis  
- bilateral legs 
- with ulcerations left leg and foot 
- dependent erythema -> local wound care per [facility] team  
Constipation/diarrhea - large amount of stool on CT in distal colon, rectum    
DM    
PAF    
SSS    
s/p pacemaker    
CHF    
HTN    
CVA    
asthma    
  
Microbiology:  
 
4/24 urcx IP Lines / Catheters:  
piv Patient Active Problem List  
Diagnosis Code  Cardiomyopathy ([facility] I42.9  Asthma J45.909  Peripheral neuropathy G62.9  Other allergic rhinitis J30.89  
 Nocturia R35.1  Knee pain M25.569  Sick sinus syndrome (HCC) I49.5  Monoclonal gammopathy D47.2  Tinea corporis B35.4  Actinic keratosis L57.0  Idiopathic peripheral neuropathy G60.9  Abnormal SPEP R77.8  Stroke ([facility] I63.9  
 PAF (paroxysmal atrial fibrillation) ([facility]) I48.0  Weakness of left upper extremity R29.898  
 BPH (benign prostatic hyperplasia) N40.0  Cellulitis L03.90  
 Diabetes mellitus type 2, controlled ([facility] [address] E11.9  Colitis K52.9  
 UTI (urinary tract infection) N39.0  Chronic venous stasis dermatitis of both lower extremities I87.2 Current Facility-Administered Medications Medication Dose Route Frequency  acetaminophen (TYLENOL) tablet 650 mg  650 mg Oral Q4H PRN  
 albuterol (PROVENTIL VENTOLIN) nebulizer solution 2.5 mg  2.5 mg Nebulization Q4H PRN  
 acetaminophen (TYLENOL) tablet 500 mg  500 mg Oral Q6H PRN  
 gabapentin (NEURONTIN) capsule 600 mg  600 mg Oral TID  metoprolol succinate (TOPROL-XL) XL tablet 25 mg  25 mg Oral DAILY  famotidine (PEPCID) tablet 20 mg  20 mg Oral BID  warfarin (COUMADIN) tablet 5 mg  5 mg Oral QPM  
 polyethylene glycol (MIRALAX) packet 17 g  17 g Oral DAILY  WARFARIN INFORMATION NOTE (COUMADIN)   Other Rx Dosing/Monitoring  0.9% sodium chloride infusion  75 mL/hr IntraVENous CONTINUOUS  
 insulin lispro (HUMALOG) injection   SubCUTAneous AC&HS  
 glucose chewable tablet 16 g  4 Tab Oral PRN  
 glucagon (GLUCAGEN) injection 1 mg  1 mg IntraMUSCular PRN  
 dextrose (D50) infusion 12.5-25 g  25-50 mL IntraVENous PRN  
 cefTRIAXone (ROCEPHIN) 1 g in 0.9% sodium chloride (MBP/ADV) 50 mL MBP  1 g IntraVENous Q24H  
 senna-docusate (PERICOLACE) 8.6-50 mg per tablet 1 Tab  1 Tab Oral BID Objective:  
 
Visit Vitals /65 (BP 1 Location: Left arm, BP Patient Position: Supine) Pulse 60 Temp 98 °F (36.7 °C) Resp 20 Ht 6' 5\" (1.956 m) Wt 136.1 kg (300 lb) SpO2 99% BMI 35.57 kg/m² Temp (24hrs), Av °F (36.7 °C), Min:98 °F (36.7 °C), Max:98 °F (36.7 °C) GEN: well developed [de-identified]year-old white male lying in bed in no distress HEENT: no scleral  Icterus, no oral lesions CHEST: symmetrical expansion CVS:regular, no murmurs ABD: soft, non-tender EXT: chronic venous stasis ulcers left leg, less edema since yesterday. Darker erythema (seen with wound care) Lab results Chemistry Recent Labs  
  19 
0533 19 6354 04/24/19 
0945 * 149* 241*  138 135* K 3.7 3.4* 3.7  105 101 CO2 23 25 28 BUN 13 12 19* CREA 0.70 0.59* 0.88 CA 7.5* 7.8* 8.6 AGAP 7 8 6 BUCR 19 20 22* AP  --   --  67  
TP  --   --  7.5 ALB  --   --  2.2*  
GLOB  --   --  5.3* AGRAT  --   --  0.4* CBC w/ Diff Recent Labs  
  04/26/19 
0533 04/25/19 
0535 04/24/19 
0945 WBC 7.2 9.1 14.8*  
RBC 2.94* 3.01* 3.50* HGB 8.5* 8.8* 10.3* HCT 28.0* 28.3* 32.8*  264 329 GRANS 72 77* 83* LYMPH 19* 14* 9*  
EOS 2 1 1 Microbiology All Micro Results Procedure Component Value Units Date/Time CULTURE, URINE [[id number]] Collected:  04/24/19 1515 Order Status:  Completed Specimen:  Urine from Clean catch Updated:  04/25/19 1127 CULTURE, URINE [[id number]] Collected:  04/24/19 1515 Order Status:  Canceled Specimen:  Urine from Clean catch Logan Ho [[id number]] Order Status:  Sent Specimen:  Stool C. DIFFICILE AG & TOXIN A/B [[id number]] Order Status:  Canceled Specimen:  Stool Munira Alaniz MD, [facility] Infectious Disease Specialist 
Pager [phone]

## 2019-04-26 NOTE — PROGRESS NOTES
Pharmacy Monitoring Warfarin Indication:   Atrial Fibrillation INR Goal:  2 - 3 (unless specified) DDIs: 
Drugs that may increase INR: Ceftriaxone (moderate) Drugs that may decrease INR: None Other current anticoagulants/ drugs that may increase bleeding risk: None Recent Labs  
  04/26/19 
0533 04/25/19 
0535 04/24/19 
0945 HGB 8.5* 8.8* 10.3* INR 1.7* 1.9* 1.7* Daily PT/INR order in place?: YES Daily dose ordered: 5 mg PO daily (4/24) Plan: INR fluctuating between 1.7 - 1.9, recommend increasing dose to 6 mg daily.

## 2019-04-26 NOTE — PROGRESS NOTES
Problem: Self Care Deficits Care Plan (Adult) Goal: *Acute Goals and Plan of Care (Insert Text) Description Occupational Therapy Goals Initiated 4/26/2019 within 7 day(s). Seen at bed level secondary to pt c/o pain & refusal for EOB activity. Will re-assess to determine OOB function. 1.  Patient will perform grooming tasks with modified independence. 2.  Patient will perform lower body dressing utilizing AE and adaptive strategies, prn with moderate assistance. 3.  Patient will perform functional task at EOB for 8 minutes with moderate assistance for balance to increase activity tolerance and fair dynamic sitting balance in prep for ADLs. 4.  Patient will participate in upper extremity therapeutic exercise/activities for 8 minutes with supervision to increase BUE strength for functional transfers and ADLs. 5.  Patient will utilize energy conservation techniques during functional activities with minimal verbal cues. 4/26/2019 1228 by Margarita Corrales OT Outcome: Progressing Towards Goal 
  
OCCUPATIONAL THERAPY EVALUATION Patient: Wai Solano ([de-identified] y.o. male) Date: 4/26/2019 Primary Diagnosis: Colitis [K52.9] Precautions:  Fall, Skin PLOF: Pt reports independence with most ADLs, WC for functional mobility. ASSESSMENT : 
Based on the objective data described below, the patient presents with impairments with regard to bed mobility, activity tolerance, BUE strength and independence in ADLs. Pt reports independence with most ADLs PTA; utilizes WC for functional mobility. Pt supine on arrival, c/o 3/10 buttock pain due to constant bowel movements & pericare. Pt declines to roll or maneuver to EOB 2/2 pain. Bilateral heels soft to touch; L heel boggy. Prevlon boots applied for skin integrity. Max A x2 to scoot towards [facility] in prep for TherEx and self-feeding. Full AROM of BUEs, generally decreased strength.  BUE TherEx x10 each set with minimal to no vc's required t/o TherEx; good pacing, positioning & safety awareness noted. Pt demo's motivation to progress with therapy; recommend SNF upon d/c. Pt left with HOB elevated and needs within reach. [employer], RN aware. Patient will benefit from skilled intervention to address the above impairments. Patient's rehabilitation potential is considered to be Fair Factors which may influence rehabilitation potential include: ? None noted ? Mental ability/status ? Medical condition ? Home/family situation and support systems ? Safety awareness ? Pain tolerance/management ? Other: PLAN : 
Recommendations and Planned Interventions:  
?               Self Care Training                  ? Therapeutic Activities ? Functional Mobility Training   ? Cognitive Retraining 
? Therapeutic Exercises           ? Endurance Activities ? Balance Training                    ? Neuromuscular Re-Education ? Visual/Perceptual Training     ? Home Safety Training 
? Patient Education                   ? Family Training/Education ? Other (comment): Frequency/Duration: Patient will be followed by occupational therapy 3-5 times a week to address goals. Discharge Recommendations: Karson Cavazos Further Equipment Recommendations for Discharge: TBD at next level of care SUBJECTIVE:  
Patient stated ? I want to try to sit at the edge of bed tomorrow. ? OBJECTIVE DATA SUMMARY:  
 
Past Medical History:  
Diagnosis Date Asthma   
 BPH (benign prostatic hyperplasia) Herniated disc, cervical   
 Knee pain Pacemaker 2011 [address] PAF (paroxysmal atrial fibrillation) ([facility] [address] During Pacer interogation SSS (sick sinus syndrome) ([facility] [address] S/P Dual chamber [employer] Pacemaker Past Surgical History: Procedure Laterality Date HX HERNIA REPAIR Bilateral 1989 HX HIP REPLACEMENT  2006  
 right HX KNEE REPLACEMENT Bilateral 1992/1993/2006/2008/2011/2012/2013 HX PACEMAKER  2011 Barriers to Learning/Limitations: None Compensate with: visual, verbal, tactile, kinesthetic cues/model Home Situation:  
Home Situation Home Environment: Private residence # Steps to Enter: (7) One/Two Story Residence: One story # of Interior Steps: (unknown) Height of Each Step (in): (standard) Interior Rails: (unknown) Lift Chair Available: No 
Living Alone: No 
Support Systems: Family member(s) Patient Expects to be Discharged to[de-identified] Private residence Current DME Used/Available at Home: Wheelchair Tub or Shower Type: (washes up at EOB) ? Right hand dominant   ? Left hand dominant Cognitive/Behavioral Status: 
Neurologic State: Alert Orientation Level: Oriented X4 Cognition: Appropriate decision making; Follows commands Safety/Judgement: Awareness of environment; Fall prevention; Insight into deficits Skin: Intact (BUEs) Edema: None noted (BUEs) Vision/Perceptual:   
Acuity: Impaired far vision Coordination: BUE Coordination: Within functional limits Fine Motor Skills-Upper: Right Intact; Left Intact Gross Motor Skills-Upper: Right Intact; Left Intact Balance: 
Sitting: (not assessed; pt refused) Standing: (not assessed; pt refused) Strength: BUE Strength: Generally decreased, functional(approx 4/5) Range of Motion: BUE 
AROM: Within functional limits Functional Mobility and Transfers for ADLs: 
Bed Mobility: 
Rolling: (n/t) Supine to Sit: (n/t) Scooting: Maximum assistance;Assist x2(towards HOB) Transfers: 
Sit to Stand: (not tested) ADL Assessment:  
Feeding: Setup;Supervision Oral Facial Hygiene/Grooming: Setup;Supervision Bathing: Maximum assistance Upper Body Dressing: Moderate assistance Lower Body Dressing: Maximum assistance Toileting: Maximum assistance Cognitive Retraining Safety/Judgement: Awareness of environment; Fall prevention; Insight into deficits Therapeutic Exercise: BUE TherEx x10 each (shoulder presses, chest press, bicep curls & \"rows\") with minimal to no verbal cueing t/o TherEx; good pacing, positioning & safety awareness noted Pain: 
Pain level pre-treatment: 3/10 Pain level post-treatment: 3/10 (back & buttocks) Pain Intervention(s): Medication (see MAR); Rest, Ice, Repositioning Activity Tolerance: Fair- 
Please refer to the flowsheet for vital signs taken during this treatment. After treatment:  
? Patient left in no apparent distress sitting up in chair ? Patient left in no apparent distress in bed 
? Call bell left within reach ? Nursing notified ? Caregiver present ? Bed alarm activated COMMUNICATION/EDUCATION:  
? Role of Occupational Therapy in the acute care setting 
? Home safety education was provided and the patient/caregiver indicated understanding. ? Patient/family have participated as able in goal setting and plan of care. ? Patient/family agree to work toward stated goals and plan of care. ? Patient understands intent and goals of therapy, but is neutral about his/her participation. ? Patient is unable to participate in goal setting and plan of care. Thank you for this referral. 
Leonardo Acosta, MS OTR/L Time Calculation: 27 mins Eval Complexity: History: MEDIUM Complexity : Expanded review of history including physical, cognitive and psychosocial  history ; Examination: MEDIUM Complexity : 3-5 performance deficits relating to physical, cognitive , or psychosocial skils that result in activity limitations and / or participation restrictions; Decision Making:HIGH Complexity : Patient presents with comorbidities that affect occupational performance. Signifigant modification of tasks or assistance (eg, physical or verbal) with assessment (s) is necessary to enable patient to complete evaluation

## 2019-04-26 NOTE — PROGRESS NOTES
Spoke with patient and he is agreeable to to be matched out to [facility] and [facility]. He stated that he only wants short term SNF. Patient has been matched out further. Patient has begun the Medicaid application process with [employer]. Patient stated that his son has been caring for him and made him aware that that he may need additional personal care in the home to care for him as his ultimate goal is to return home if no SNF is found. He will have to pay out of pocket until Medicaid is approved.

## 2019-04-26 NOTE — ROUTINE PROCESS
0600 -  Incontinent care 2X provided to patient. Bladder scan performed obtained 283 ml. Notified Dr. Jun Galvez, orders received just wait, no need to straight cath right now, do bladder scan later this morning. 4/26/19 
 
8665 - Bedside and Verbal shift change report given to [facility] (oncoming nurse) by Porfirio Huerta RN BSN ( off going Nurse ) inclusive of SBAR and plan of care, Intake & Output.

## 2019-04-26 NOTE — PROGRESS NOTES
Progress Note Patient: Judith Gambino               Sex: male          DOA: 4/24/2019 YOB: 1940      Age:  [de-identified] y.o.        LOS:  LOS: 0 days CHIEF COMPLAINT:  Fall and Other Subjective:  
 
Patient having difficulties with urinary retention Objective:  
 
Visit Vitals /65 (BP 1 Location: Left arm, BP Patient Position: Supine) Pulse 67 Temp 98 °F (36.7 °C) Resp 20 Ht 6' 5\" (1.956 m) Wt 136.1 kg (300 lb) SpO2 99% BMI 35.57 kg/m² Physical Exam: 
Ears: hearing is intact Eyes: Icterus is not present Lungs: clear to auscultation bilaterally Heart: regular rate and rhythm, S1, S2 normal, no murmur, click, rub or gallop Gastrointestinal: soft, mild tenderness to LLQ. Bowel sounds normal. No masses,  no organomegaly Neurological:  New Focal Deficits are not present Psychiatric:  Mood is stable Skin: LE chronic venous stasis, covered in dressing Lab/Data Reviewed: 
CMP:  
Lab Results Component Value Date/Time  04/26/2019 05:33 AM  
 K 3.7 04/26/2019 05:33 AM  
  04/26/2019 05:33 AM  
 CO2 23 04/26/2019 05:33 AM  
 AGAP 7 04/26/2019 05:33 AM  
  (H) 04/26/2019 05:33 AM  
 BUN 13 04/26/2019 05:33 AM  
 CREA 0.70 04/26/2019 05:33 AM  
 GFRAA >60 04/26/2019 05:33 AM  
 GFRNA >60 04/26/2019 05:33 AM  
 CA 7.5 (L) 04/26/2019 05:33 AM  
 MG 2.4 04/26/2019 05:33 AM  
 
CBC:  
Lab Results Component Value Date/Time WBC 7.2 04/26/2019 05:33 AM  
 HGB 8.5 (L) 04/26/2019 05:33 AM  
 HCT 28.0 (L) 04/26/2019 05:33 AM  
  04/26/2019 05:33 AM  
 
 
Imaging Reviewed: 
No results found. Assessment:  
 
Principal Problem: 
  UTI (urinary tract infection) (4/24/2019) Active Problems: 
  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 
 
 
  PLAN: 
· UTI: Cont atbx for UTI. F/u urine culture. · Stercoral colitis: Aggressive bowel regimen to evacuate bowel. · Wound care consult for LE and sacral wounds · PAF: rate control, cont warfarin, monitor INR 
· PT/OT consult: recommending SNF. D/w case manger. · Blood sugar and BP control · Cont acceptable home medications for chronic conditions · DVT protocol · Urinary retention --if >400 will place roque 
  
 
I have personally reviewed all pertinent labs and films that have officially resulted over the last 24 hours. I have personally checked for all pending labs that are awaiting final results. Signed By: Denver Lares, MD   
 April 26, 2019

## 2019-04-26 NOTE — PROGRESS NOTES
Spoke with Cathy Handy at [facility] and they have accepted patient. Spoke with Patient and he is in agreement with this facility. Will start Auth. Cheko Agustin has been started and clinicals faxed to [facility]

## 2019-04-26 NOTE — PROGRESS NOTES
Problem: Risk for Spread of Infection Goal: Prevent transmission of infectious organism to others Description Prevent the transmission of infectious organisms to other patients, staff members, and visitors. Outcome: Progressing Towards Goal 
  
Problem: Patient Education:  Go to Education Activity Goal: Patient/Family Education Outcome: Progressing Towards Goal 
  
Problem: Falls - Risk of 
Goal: *Absence of Falls Description Document Sharlene Fishman Fall Risk and appropriate interventions in the flowsheet. Outcome: Progressing Towards Goal 
  
Problem: Pressure Injury - Risk of 
Goal: *Prevention of pressure injury Description Document Mendel Scale and appropriate interventions in the flowsheet. Outcome: Progressing Towards Goal 
  
Problem: Diabetes Self-Management Goal: *Disease process and treatment process Description Define diabetes and identify own type of diabetes; list 3 options for treating diabetes. Outcome: Progressing Towards Goal 
  
Problem: Diabetes Self-Management Goal: *Monitoring blood glucose, interpreting and using results Description Identify recommended blood glucose targets  and personal targets. Outcome: Progressing Towards Goal 
  
Problem: Patient Education: Go to Patient Education Activity Goal: Patient/Family Education Outcome: Progressing Towards Goal

## 2019-04-26 NOTE — MANAGEMENT PLAN
Discharge/Transition Planning [facility] for Medicaid services completed and submitted in EPAS. Pt has completed application for Medicaid. Can fax to destination when processed. Krista Gamez RN BSN Outcomes Manager Pager # [phone]

## 2019-04-26 NOTE — PROGRESS NOTES
Patient will need accepting Facility and completed PT and OT therapy notes to be submitted to [facility] for auth as the tentative plan is for SNF. Spoke with Marcela Jones TCC she will review and let us know.

## 2019-04-27 LAB
ANION GAP SERPL CALC-SCNC: 6 MMOL/L (ref 3–18)
ANION GAP SERPL CALC-SCNC: 6 MMOL/L (ref 3–18)
BACTERIA SPEC CULT: ABNORMAL
BASOPHILS # BLD: 0 K/UL (ref 0–0.1)
BASOPHILS NFR BLD: 0 % (ref 0–2)
BUN SERPL-MCNC: 10 MG/DL (ref 7–18)
BUN SERPL-MCNC: 13 MG/DL (ref 7–18)
BUN/CREAT SERPL: 15 (ref 12–20)
BUN/CREAT SERPL: 18 (ref 12–20)
CALCIUM SERPL-MCNC: 7.3 MG/DL (ref 8.5–10.1)
CALCIUM SERPL-MCNC: 7.4 MG/DL (ref 8.5–10.1)
CHLORIDE SERPL-SCNC: 108 MMOL/L (ref 100–108)
CHLORIDE SERPL-SCNC: 109 MMOL/L (ref 100–108)
CO2 SERPL-SCNC: 23 MMOL/L (ref 21–32)
CO2 SERPL-SCNC: 23 MMOL/L (ref 21–32)
CREAT SERPL-MCNC: 0.56 MG/DL (ref 0.6–1.3)
CREAT SERPL-MCNC: 0.87 MG/DL (ref 0.6–1.3)
DIFFERENTIAL METHOD BLD: ABNORMAL
EOSINOPHIL # BLD: 0.1 K/UL (ref 0–0.4)
EOSINOPHIL NFR BLD: 2 % (ref 0–5)
ERYTHROCYTE [DISTWIDTH] IN BLOOD BY AUTOMATED COUNT: 15.3 % (ref 11.6–14.5)
GLUCOSE BLD STRIP.AUTO-MCNC: 137 MG/DL (ref 70–110)
GLUCOSE BLD STRIP.AUTO-MCNC: 213 MG/DL (ref 70–110)
GLUCOSE BLD STRIP.AUTO-MCNC: 242 MG/DL (ref 70–110)
GLUCOSE BLD STRIP.AUTO-MCNC: 249 MG/DL (ref 70–110)
GLUCOSE BLD STRIP.AUTO-MCNC: 311 MG/DL (ref 70–110)
GLUCOSE SERPL-MCNC: 136 MG/DL (ref 74–99)
GLUCOSE SERPL-MCNC: 253 MG/DL (ref 74–99)
HCT VFR BLD AUTO: 27.4 % (ref 36–48)
HGB BLD-MCNC: 8.6 G/DL (ref 13–16)
INR PPP: 1.5 (ref 0.8–1.2)
LYMPHOCYTES # BLD: 0.9 K/UL (ref 0.9–3.6)
LYMPHOCYTES NFR BLD: 15 % (ref 21–52)
MAGNESIUM SERPL-MCNC: 2.2 MG/DL (ref 1.6–2.6)
MAGNESIUM SERPL-MCNC: 2.3 MG/DL (ref 1.6–2.6)
MCH RBC QN AUTO: 29.6 PG (ref 24–34)
MCHC RBC AUTO-ENTMCNC: 31.4 G/DL (ref 31–37)
MCV RBC AUTO: 94.2 FL (ref 74–97)
MONOCYTES # BLD: 0.5 K/UL (ref 0.05–1.2)
MONOCYTES NFR BLD: 8 % (ref 3–10)
NEUTS SEG # BLD: 4.8 K/UL (ref 1.8–8)
NEUTS SEG NFR BLD: 75 % (ref 40–73)
PLATELET # BLD AUTO: 268 K/UL (ref 135–420)
PMV BLD AUTO: 8.5 FL (ref 9.2–11.8)
POTASSIUM SERPL-SCNC: 3.6 MMOL/L (ref 3.5–5.5)
POTASSIUM SERPL-SCNC: 4 MMOL/L (ref 3.5–5.5)
PROTHROMBIN TIME: 18 SEC (ref 11.5–15.2)
RBC # BLD AUTO: 2.91 M/UL (ref 4.7–5.5)
SERVICE CMNT-IMP: ABNORMAL
SODIUM SERPL-SCNC: 137 MMOL/L (ref 136–145)
SODIUM SERPL-SCNC: 138 MMOL/L (ref 136–145)
WBC # BLD AUTO: 6.4 K/UL (ref 4.6–13.2)

## 2019-04-27 PROCEDURE — 74011250637 HC RX REV CODE- 250/637: Performed by: HOSPITALIST

## 2019-04-27 PROCEDURE — 82962 GLUCOSE BLOOD TEST: CPT

## 2019-04-27 PROCEDURE — 83735 ASSAY OF MAGNESIUM: CPT

## 2019-04-27 PROCEDURE — 97602 WOUND(S) CARE NON-SELECTIVE: CPT

## 2019-04-27 PROCEDURE — 77030011256 HC DRSG MEPILEX <16IN NO BORD MOLN -A

## 2019-04-27 PROCEDURE — 80048 BASIC METABOLIC PNL TOTAL CA: CPT

## 2019-04-27 PROCEDURE — 74011000250 HC RX REV CODE- 250: Performed by: HOSPITALIST

## 2019-04-27 PROCEDURE — 77030037878 HC DRSG MEPILEX >48IN BORD MOLN -B

## 2019-04-27 PROCEDURE — 74011250636 HC RX REV CODE- 250/636: Performed by: HOSPITALIST

## 2019-04-27 PROCEDURE — 74011636637 HC RX REV CODE- 636/637: Performed by: HOSPITALIST

## 2019-04-27 PROCEDURE — 77030037870 HC GLD SHT PREVALON SAGE -B

## 2019-04-27 PROCEDURE — 3331090002 HH PPS REVENUE DEBIT

## 2019-04-27 PROCEDURE — 85025 COMPLETE CBC W/AUTO DIFF WBC: CPT

## 2019-04-27 PROCEDURE — 85610 PROTHROMBIN TIME: CPT

## 2019-04-27 PROCEDURE — 99218 HC RM OBSERVATION: CPT

## 2019-04-27 PROCEDURE — 96361 HYDRATE IV INFUSION ADD-ON: CPT

## 2019-04-27 PROCEDURE — 87040 BLOOD CULTURE FOR BACTERIA: CPT

## 2019-04-27 PROCEDURE — 36415 COLL VENOUS BLD VENIPUNCTURE: CPT

## 2019-04-27 PROCEDURE — 94761 N-INVAS EAR/PLS OXIMETRY MLT: CPT

## 2019-04-27 PROCEDURE — 3331090001 HH PPS REVENUE CREDIT

## 2019-04-27 PROCEDURE — 94640 AIRWAY INHALATION TREATMENT: CPT

## 2019-04-27 RX ORDER — ACETAMINOPHEN 325 MG/1
325 TABLET ORAL ONCE
Status: COMPLETED | OUTPATIENT
Start: 2019-04-27 | End: 2019-04-27

## 2019-04-27 RX ADMIN — FAMOTIDINE 20 MG: 20 TABLET ORAL at 17:41

## 2019-04-27 RX ADMIN — GABAPENTIN 600 MG: 300 CAPSULE ORAL at 09:43

## 2019-04-27 RX ADMIN — GABAPENTIN 600 MG: 300 CAPSULE ORAL at 16:44

## 2019-04-27 RX ADMIN — INSULIN LISPRO 6 UNITS: 100 INJECTION, SOLUTION INTRAVENOUS; SUBCUTANEOUS at 12:20

## 2019-04-27 RX ADMIN — TAMSULOSIN HYDROCHLORIDE 0.4 MG: 0.4 CAPSULE ORAL at 09:43

## 2019-04-27 RX ADMIN — POLYETHYLENE GLYCOL 3350 17 G: 17 POWDER, FOR SOLUTION ORAL at 09:43

## 2019-04-27 RX ADMIN — FAMOTIDINE 20 MG: 20 TABLET ORAL at 09:43

## 2019-04-27 RX ADMIN — ACETAMINOPHEN 650 MG: 325 TABLET, FILM COATED ORAL at 04:04

## 2019-04-27 RX ADMIN — INSULIN LISPRO 6 UNITS: 100 INJECTION, SOLUTION INTRAVENOUS; SUBCUTANEOUS at 17:03

## 2019-04-27 RX ADMIN — METOPROLOL SUCCINATE 25 MG: 25 TABLET, EXTENDED RELEASE ORAL at 09:43

## 2019-04-27 RX ADMIN — SENNOSIDES AND DOCUSATE SODIUM 1 TABLET: 8.6; 5 TABLET ORAL at 17:41

## 2019-04-27 RX ADMIN — AMOXICILLIN 500 MG: 250 CAPSULE ORAL at 05:40

## 2019-04-27 RX ADMIN — INSULIN LISPRO 12 UNITS: 100 INJECTION, SOLUTION INTRAVENOUS; SUBCUTANEOUS at 22:06

## 2019-04-27 RX ADMIN — WARFARIN SODIUM 6 MG: 5 TABLET ORAL at 17:41

## 2019-04-27 RX ADMIN — GABAPENTIN 600 MG: 300 CAPSULE ORAL at 22:06

## 2019-04-27 RX ADMIN — SODIUM CHLORIDE 75 ML/HR: 900 INJECTION, SOLUTION INTRAVENOUS at 14:08

## 2019-04-27 RX ADMIN — SENNOSIDES AND DOCUSATE SODIUM 1 TABLET: 8.6; 5 TABLET ORAL at 09:43

## 2019-04-27 RX ADMIN — AMOXICILLIN 500 MG: 250 CAPSULE ORAL at 22:06

## 2019-04-27 RX ADMIN — ALBUTEROL SULFATE 2.5 MG: 2.5 SOLUTION RESPIRATORY (INHALATION) at 10:13

## 2019-04-27 RX ADMIN — SODIUM CHLORIDE 75 ML/HR: 900 INJECTION, SOLUTION INTRAVENOUS at 00:27

## 2019-04-27 RX ADMIN — ALBUTEROL SULFATE 2.5 MG: 2.5 SOLUTION RESPIRATORY (INHALATION) at 17:41

## 2019-04-27 RX ADMIN — ACETAMINOPHEN 325 MG: 325 TABLET, FILM COATED ORAL at 19:42

## 2019-04-27 RX ADMIN — AMOXICILLIN 500 MG: 250 CAPSULE ORAL at 14:12

## 2019-04-27 RX ADMIN — ACETAMINOPHEN 650 MG: 325 TABLET, FILM COATED ORAL at 17:41

## 2019-04-27 NOTE — PROGRESS NOTES
Problem: Falls - Risk of 
Goal: *Absence of Falls Description Document Katie Davis Fall Risk and appropriate interventions in the flowsheet. Outcome: Progressing Towards Goal 
  
Problem: Patient Education: Go to Patient Education Activity Goal: Patient/Family Education Outcome: Progressing Towards Goal 
  
Problem: Pressure Injury - Risk of 
Goal: *Prevention of pressure injury Description Document Mendel Scale and appropriate interventions in the flowsheet. Outcome: Progressing Towards Goal 
  
Problem: Patient Education: Go to Patient Education Activity Goal: Patient/Family Education Outcome: Progressing Towards Goal 
  
Problem: Diabetes Self-Management Goal: *Disease process and treatment process Description Define diabetes and identify own type of diabetes; list 3 options for treating diabetes. Outcome: Progressing Towards Goal 
Goal: *Incorporating nutritional management into lifestyle Description Describe effect of type, amount and timing of food on blood glucose; list 3 methods for planning meals. Outcome: Progressing Towards Goal 
Goal: *Incorporating physical activity into lifestyle Description State effect of exercise on blood glucose levels. Outcome: Progressing Towards Goal 
Goal: *Developing strategies to promote health/change behavior Description Define the ABC's of diabetes; identify appropriate screenings, schedule and personal plan for screenings. Outcome: Progressing Towards Goal 
Goal: *Using medications safely Description State effect of diabetes medications on diabetes; name diabetes medication taking, action and side effects. Outcome: Progressing Towards Goal 
Goal: *Monitoring blood glucose, interpreting and using results Description Identify recommended blood glucose targets  and personal targets. Outcome: Progressing Towards Goal 
Goal: *Prevention, detection, treatment of acute complications Description List symptoms of hyper- and hypoglycemia; describe how to treat low blood sugar and actions for lowering  high blood glucose level. Outcome: Progressing Towards Goal 
Goal: *Prevention, detection and treatment of chronic complications Description Define the natural course of diabetes and describe the relationship of blood glucose levels to long term complications of diabetes. Outcome: Progressing Towards Goal 
Goal: *Developing strategies to address psychosocial issues Description Describe feelings about living with diabetes; identify support needed and support network Outcome: Progressing Towards Goal 
Goal: *Sick day guidelines Outcome: Progressing Towards Goal 
Goal: *Patient Specific Goal (EDIT GOAL, INSERT TEXT) Outcome: Progressing Towards Goal 
  
Problem: Patient Education: Go to Patient Education Activity Goal: Patient/Family Education Outcome: Progressing Towards Goal 
  
Problem: Discharge Planning Goal: *Discharge to safe environment Outcome: Progressing Towards Goal 
  
Problem: Lower Extremity Wound Care Goal: *Non-infected wound: Improvement of existing wound, absence of infection, and maintenance of skin integrity Outcome: Progressing Towards Goal 
Goal: *Infected Wound: Prevention of further infection and promotion of healing Description Infection control procedures (eg: clean dressings, clean gloves, hand washing, precautions to isolate wound from contamination, sterile instruments used for wound debridement) should be implemented. Outcome: Progressing Towards Goal 
Goal: Interventions Outcome: Progressing Towards Goal 
  
Problem: Patient Education: Go to Patient Education Activity Goal: Patient/Family Education Outcome: Progressing Towards Goal 
  
Problem: Impaired Skin Integrity/Pressure Injury Treatment Goal: *Improvement of Existing Pressure Injury Outcome: Progressing Towards Goal 
Goal: *Prevention of pressure injury Description Document Mendel Scale and appropriate interventions in the flowsheet. Outcome: Progressing Towards Goal 
  
Problem: Patient Education: Go to Patient Education Activity Goal: Patient/Family Education Outcome: Progressing Towards Goal 
  
Problem: Nutrition Deficit Goal: *Optimize nutritional status Outcome: Progressing Towards Goal 
  
Problem: Pain Goal: *Control of Pain Outcome: Progressing Towards Goal 
  
Problem: Patient Education: Go to Patient Education Activity Goal: Patient/Family Education Outcome: Progressing Towards Goal 
  
Problem: Patient Education: Go to Patient Education Activity Goal: Patient/Family Education Outcome: Progressing Towards Goal

## 2019-04-27 NOTE — PROGRESS NOTES
19 - Assumed care of pt after bedside report from Shira Garcia. Pt in bed lying supine no s/s of distress noted. Pt denies pain. Assessment completed. Pt has multiple wound dressings to BLE clean dry and intact. Prevalon boots on. Pt turned and repositioned but prefer lying on his back. Pt educated on the benefits of changing positions while in bed. Pt needs reinforcement. Call light within reach, bed low and in locked position . Will continue with the care 
 
0400- Pt incontinent of stool bed bath given, bed linen changed, mepilex applied to sacral area. Pt has skin break down on on the sacral area and buttocks. Pt turned and repositioned in bed. HOB elevated, bed low and in locked position. Parson catheter in place draining IVF infusing well. 
 
0404- Pt given tylenol for pain. 2/10.

## 2019-04-27 NOTE — PROGRESS NOTES
Pharmacy Monitoring Warfarin Indication:   Atrial Fibrillation INR Goal:  2 - 3 (unless specified) DDIs: 
Drugs that may increase INR: Amoxicillin Drugs that may decrease INR: None Other current anticoagulants/ drugs that may increase bleeding risk: None Recent Labs  
  04/26/19 
0533 04/25/19 
0535 04/24/19 
0945 HGB 8.5* 8.8* 10.3* INR 1.7* 1.9* 1.7* Daily PT/INR order in place?: YES Daily dose ordered: 5 mg PO daily (4/24) Plan: INR today is 1.5. Not reaching goal as 2 days. INR fluctuating between 1.7 - 1.9, recommend increasing dose to 6 mg daily.

## 2019-04-27 NOTE — PROGRESS NOTES
Progress Note Patient: Venecia Wilks               Sex: male          DOA: 4/24/2019 YOB: 1940      Age:  [de-identified] y.o.        LOS:  LOS: 0 days CHIEF COMPLAINT:  Fall and Other Subjective: No complaints today, will likely go to SNF on monday Objective:  
 
Visit Vitals /50 (BP 1 Location: Right arm, BP Patient Position: At rest) Pulse 66 Temp 98 °F (36.7 °C) Resp 18 Ht 6' 5\" (1.956 m) Wt 136.1 kg (300 lb) SpO2 97% BMI 35.57 kg/m² Physical Exam: 
Ears: hearing is intact Eyes: Icterus is not present Lungs: clear to auscultation bilaterally Heart: regular rate and rhythm, S1, S2 normal, no murmur, click, rub or gallop Gastrointestinal: soft, mild tenderness to LLQ. Bowel sounds normal. No masses,  no organomegaly Neurological:  New Focal Deficits are not present Psychiatric:  Mood is stable Skin: LE chronic venous stasis, covered in dressing Lab/Data Reviewed: 
CMP:  
Lab Results Component Value Date/Time  04/27/2019 05:34 AM  
 K 3.6 04/27/2019 05:34 AM  
  (H) 04/27/2019 05:34 AM  
 CO2 23 04/27/2019 05:34 AM  
 AGAP 6 04/27/2019 05:34 AM  
  (H) 04/27/2019 05:34 AM  
 BUN 10 04/27/2019 05:34 AM  
 CREA 0.56 (L) 04/27/2019 05:34 AM  
 GFRAA >60 04/27/2019 05:34 AM  
 GFRNA >60 04/27/2019 05:34 AM  
 CA 7.4 (L) 04/27/2019 05:34 AM  
 MG 2.3 04/27/2019 05:34 AM  
 
CBC:  
Lab Results Component Value Date/Time WBC 6.4 04/27/2019 05:34 AM  
 HGB 8.6 (L) 04/27/2019 05:34 AM  
 HCT 27.4 (L) 04/27/2019 05:34 AM  
  04/27/2019 05:34 AM  
 
 
Imaging Reviewed: 
No results found. Assessment:  
 
Principal Problem: 
  UTI (urinary tract infection) (4/24/2019) Active Problems: 
  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 
 
 
  PLAN: 
· UTI: Cont atbx for UTI. F/u urine culture. · Stercoral colitis: Aggressive bowel regimen to evacuate bowel. · Wound care consult for LE and sacral wounds · PAF: rate control, cont warfarin, monitor INR- increased to 6 mg daily · PT/OT consult: recommending SNF. Need SNF on monday · Blood sugar and BP control · Cont acceptable home medications for chronic conditions · DVT protocol · Urinary retention --roque placed 
  
 
Signed By: Micah Rizo MD   
 April 27, 2019

## 2019-04-27 NOTE — PROGRESS NOTES
Problem: Risk for Spread of Infection Goal: Prevent transmission of infectious organism to others Description Prevent the transmission of infectious organisms to other patients, staff members, and visitors. Outcome: Progressing Towards Goal 
  
Problem: Patient Education:  Go to Education Activity Goal: Patient/Family Education Outcome: Progressing Towards Goal 
  
Problem: Falls - Risk of 
Goal: *Absence of Falls Description Document Tomas Willson Fall Risk and appropriate interventions in the flowsheet. Outcome: Progressing Towards Goal 
  
Problem: Patient Education: Go to Patient Education Activity Goal: Patient/Family Education Outcome: Progressing Towards Goal 
  
Problem: Pressure Injury - Risk of 
Goal: *Prevention of pressure injury Description Document Mendel Scale and appropriate interventions in the flowsheet. Outcome: Progressing Towards Goal 
  
Problem: Patient Education: Go to Patient Education Activity Goal: Patient/Family Education Outcome: Progressing Towards Goal 
  
Problem: Diabetes Self-Management Goal: *Disease process and treatment process Description Define diabetes and identify own type of diabetes; list 3 options for treating diabetes. Outcome: Progressing Towards Goal 
Goal: *Incorporating nutritional management into lifestyle Description Describe effect of type, amount and timing of food on blood glucose; list 3 methods for planning meals. Outcome: Progressing Towards Goal 
Goal: *Incorporating physical activity into lifestyle Description State effect of exercise on blood glucose levels. Outcome: Progressing Towards Goal 
Goal: *Developing strategies to promote health/change behavior Description Define the ABC's of diabetes; identify appropriate screenings, schedule and personal plan for screenings. Outcome: Progressing Towards Goal 
Goal: *Using medications safely Description State effect of diabetes medications on diabetes; name diabetes medication taking, action and side effects. Outcome: Progressing Towards Goal 
Goal: *Monitoring blood glucose, interpreting and using results Description Identify recommended blood glucose targets  and personal targets. Outcome: Progressing Towards Goal 
Goal: *Prevention, detection, treatment of acute complications Description List symptoms of hyper- and hypoglycemia; describe how to treat low blood sugar and actions for lowering  high blood glucose level. Outcome: Progressing Towards Goal 
Goal: *Prevention, detection and treatment of chronic complications Description Define the natural course of diabetes and describe the relationship of blood glucose levels to long term complications of diabetes. Outcome: Progressing Towards Goal 
Goal: *Developing strategies to address psychosocial issues Description Describe feelings about living with diabetes; identify support needed and support network Outcome: Progressing Towards Goal 
Goal: *Insulin pump training Outcome: Progressing Towards Goal 
Goal: *Sick day guidelines Outcome: Progressing Towards Goal 
Goal: *Patient Specific Goal (EDIT GOAL, INSERT TEXT) Outcome: Progressing Towards Goal 
  
Problem: Patient Education: Go to Patient Education Activity Goal: Patient/Family Education Outcome: Progressing Towards Goal 
  
Problem: Patient Education: Go to Patient Education Activity Goal: Patient/Family Education Outcome: Progressing Towards Goal 
  
Problem: Discharge Planning Goal: *Discharge to safe environment Outcome: Progressing Towards Goal 
  
Problem: Lower Extremity Wound Care Goal: *Non-infected wound: Improvement of existing wound, absence of infection, and maintenance of skin integrity Outcome: Progressing Towards Goal 
Goal: *Infected Wound: Prevention of further infection and promotion of healing Description Infection control procedures (eg: clean dressings, clean gloves, hand washing, precautions to isolate wound from contamination, sterile instruments used for wound debridement) should be implemented. Outcome: Progressing Towards Goal 
Goal: Interventions Outcome: Progressing Towards Goal 
  
Problem: Patient Education: Go to Patient Education Activity Goal: Patient/Family Education Outcome: Progressing Towards Goal 
  
Problem: Impaired Skin Integrity/Pressure Injury Treatment Goal: *Improvement of Existing Pressure Injury Outcome: Progressing Towards Goal 
Goal: *Prevention of pressure injury Description Document Mendel Scale and appropriate interventions in the flowsheet. Outcome: Progressing Towards Goal 
  
Problem: Patient Education: Go to Patient Education Activity Goal: Patient/Family Education Outcome: Progressing Towards Goal 
  
Problem: Nutrition Deficit Goal: *Optimize nutritional status Outcome: Progressing Towards Goal 
  
Problem: Pain Goal: *Control of Pain Outcome: Progressing Towards Goal 
  
Problem: Patient Education: Go to Patient Education Activity Goal: Patient/Family Education Outcome: Progressing Towards Goal 
  
Problem: Patient Education: Go to Patient Education Activity Goal: Patient/Family Education Outcome: Progressing Towards Goal

## 2019-04-27 NOTE — PROGRESS NOTES
3499: Bedside and Verbal shift change report given to Rohith RN and Emily MUJICA (oncoming nurse) by Keerthi Landin (offgoing nurse). Report included the following information SBAR, Kardex, Procedure Summary, Intake/Output, MAR and Cardiac Rhythm Paced. 1445: Patient tolerated dressing change well. 1740: Tylenol was given for pain. 5/10  
 
1841: Patient stated not feeling well. Took a new set of vital signs, blood glucose reading; glucose 249, and bladder scan; 2mL. I have paged Dr. Steven Rodriguez. Patient states the pain is still there and has described it like spasms. 1853: Dr. Steven Rodriguez returned page and order BMP, Blood Cultures, Magnesium and a one time dose of 325 Tylenol. 1935: Bedside and Verbal shift change report given to [facility] (oncoming nurse) by Tian Sandoval RN and [facility] Edison Nava (offgoing nurse). Report included the following information SBAR, Kardex, Procedure Summary, Intake/Output and MAR.

## 2019-04-27 NOTE — PROGRESS NOTES
Problem: Risk for Spread of Infection Goal: Prevent transmission of infectious organism to others Description Prevent the transmission of infectious organisms to other patients, staff members, and visitors. Outcome: Progressing Towards Goal 
  
Problem: Patient Education:  Go to Education Activity Goal: Patient/Family Education Outcome: Progressing Towards Goal 
  
Problem: Falls - Risk of 
Goal: *Absence of Falls Description Document Carlos Vallejo Fall Risk and appropriate interventions in the flowsheet. Outcome: Progressing Towards Goal 
  
Problem: Patient Education: Go to Patient Education Activity Goal: Patient/Family Education Outcome: Progressing Towards Goal 
  
Problem: Pressure Injury - Risk of 
Goal: *Prevention of pressure injury Description Document Mendel Scale and appropriate interventions in the flowsheet. Outcome: Progressing Towards Goal 
  
 
Problem: Patient Education: Go to Patient Education Activity Goal: Patient/Family Education Outcome: Progressing Towards Goal 
  
Problem: Diabetes Self-Management Goal: *Disease process and treatment process Description Define diabetes and identify own type of diabetes; list 3 options for treating diabetes. Outcome: Progressing Towards Goal 
  
Problem: Diabetes Self-Management Goal: *Incorporating nutritional management into lifestyle Description Describe effect of type, amount and timing of food on blood glucose; list 3 methods for planning meals. Outcome: Progressing Towards Goal 
  
Problem: Diabetes Self-Management Goal: *Incorporating physical activity into lifestyle Description State effect of exercise on blood glucose levels. Outcome: Progressing Towards Goal 
  
Problem: Diabetes Self-Management Goal: *Developing strategies to promote health/change behavior Description Define the ABC's of diabetes; identify appropriate screenings, schedule and personal plan for screenings.  
Outcome: Progressing Towards Goal 
  
 Problem: Diabetes Self-Management Goal: *Monitoring blood glucose, interpreting and using results Description Identify recommended blood glucose targets  and personal targets. Outcome: Progressing Towards Goal 
  
Problem: Diabetes Self-Management Goal: *Prevention, detection, treatment of acute complications Description List symptoms of hyper- and hypoglycemia; describe how to treat low blood sugar and actions for lowering  high blood glucose level. Outcome: Progressing Towards Goal 
  
Problem: Diabetes Self-Management Goal: *Prevention, detection and treatment of chronic complications Description Define the natural course of diabetes and describe the relationship of blood glucose levels to long term complications of diabetes. Outcome: Progressing Towards Goal 
  
Problem: Diabetes Self-Management Goal: *Developing strategies to address psychosocial issues Description Describe feelings about living with diabetes; identify support needed and support network Outcome: Progressing Towards Goal 
  
Problem: Diabetes Self-Management Goal: *Sick day guidelines Outcome: Progressing Towards Goal 
  
Problem: Patient Education: Go to Patient Education Activity Goal: Patient/Family Education Outcome: Progressing Towards Goal 
  
Problem: Patient Education: Go to Patient Education Activity Goal: Patient/Family Education Outcome: Progressing Towards Goal 
  
Problem: Discharge Planning Goal: *Discharge to safe environment Outcome: Progressing Towards Goal 
  
Problem: Lower Extremity Wound Care Goal: *Non-infected wound: Improvement of existing wound, absence of infection, and maintenance of skin integrity Outcome: Progressing Towards Goal 
  
Problem: Lower Extremity Wound Care Goal: *Infected Wound: Prevention of further infection and promotion of healing Description Infection control procedures (eg: clean dressings, clean gloves, hand washing, precautions to isolate wound from contamination, sterile instruments used for wound debridement) should be implemented. Outcome: Progressing Towards Goal 
  
Problem: Lower Extremity Wound Care Goal: Interventions Outcome: Progressing Towards Goal 
  
Problem: Patient Education: Go to Patient Education Activity Goal: Patient/Family Education Outcome: Progressing Towards Goal 
  
Problem: Impaired Skin Integrity/Pressure Injury Treatment Goal: *Improvement of Existing Pressure Injury Outcome: Progressing Towards Goal 
  
Problem: Impaired Skin Integrity/Pressure Injury Treatment Goal: *Prevention of pressure injury Description Document Mendel Scale and appropriate interventions in the flowsheet. Outcome: Progressing Towards Goal 
  
Problem: Patient Education: Go to Patient Education Activity Goal: Patient/Family Education Outcome: Progressing Towards Goal 
  
Problem: Nutrition Deficit Goal: *Optimize nutritional status Outcome: Progressing Towards Goal 
  
Problem: Pain Goal: *Control of Pain Outcome: Progressing Towards Goal 
  
Problem: Patient Education: Go to Patient Education Activity Goal: Patient/Family Education Outcome: Progressing Towards Goal 
  
Problem: Patient Education: Go to Patient Education Activity Goal: Patient/Family Education Outcome: Progressing Towards Goal

## 2019-04-27 NOTE — ROUTINE PROCESS
0740-Bedside and Verbal shift change report given to SIL Membreno (oncoming nurse) by Monica Zavaleta RN (offgoing nurse). Report included the following information SBAR, Kardex, Intake/Output, MAR and Recent Results.

## 2019-04-27 NOTE — PROGRESS NOTES
Infectious Disease Follow-up Note Date of Admission: 4/24/2019     Date of Note:  4/27/2019 Summary:  
 
[de-identified] y/o CM wheelchair bound, DM, HTN, PAF, CHF, CVA, chronic venous stasis admitted following 2 recent falls from wheelchair. Has chronic ulcerations left leg and dorsum left foot, not infected appearing. Questionable dysuria but ua w/ pyuria. Urcx pending Interval History:  
 
Resting comfortably in bed. No complaints. No problems with oral amoxicillin Current Antimicrobials: Prior Antimicrobials Amoxicillin 4/26 - 1  cipro 4/24 - 0 Ceftriaxone 4/24 - 2  
  
Assessment / Plan:  
  
Pyuria 
- reports some dysuria when using urinal improperly but not consistent. Denies burning dysuria at each voiding 
- urinalysis 4/24 [id number] WBC. urcx > 100,000 poss [facility] (?Enterococcus) 
- not clearly UTI but empiric abx is appropriate - Cipro changed to Ceftriaxone due to prolonged QT 
- now on amoxicillin -> continue Amoxicillin 2 more days 
-> will be available as needed Chronic venous stasis  
- bilateral legs 
- with ulcerations left leg and foot 
- dependent erythema -> local wound care per [facility] team  
Constipation/diarrhea - large amount of stool on CT in distal colon, rectum    
DM    
PAF    
SSS    
s/p pacemaker    
CHF    
HTN    
CVA    
asthma    
  
Microbiology:  
 
4/24 urcx IP Lines / Catheters:  
piv Patient Active Problem List  
Diagnosis Code  Cardiomyopathy ([facility] I42.9  Asthma J45.909  Peripheral neuropathy G62.9  Other allergic rhinitis J30.89  
 Nocturia R35.1  Knee pain M25.569  Sick sinus syndrome (HCC) I49.5  Monoclonal gammopathy D47.2  Tinea corporis B35.4  Actinic keratosis L57.0  Idiopathic peripheral neuropathy G60.9  Abnormal SPEP R77.8  Stroke ([facility] I63.9  
 PAF (paroxysmal atrial fibrillation) ([facility]) I48.0  Weakness of left upper extremity R29.898  
 BPH (benign prostatic hyperplasia) N40.0  Cellulitis L03.90  Diabetes mellitus type 2, controlled ([facility] E11.9  
 Colitis K52.9  
 UTI (urinary tract infection) N39.0  Chronic venous stasis dermatitis of both lower extremities I87.2 Current Facility-Administered Medications Medication Dose Route Frequency  warfarin (COUMADIN) tablet 6 mg  6 mg Oral QPM  
 tamsulosin (FLOMAX) capsule 0.4 mg  0.4 mg Oral DAILY  amoxicillin (AMOXIL) capsule 500 mg  500 mg Oral Q8H  
 acetaminophen (TYLENOL) tablet 650 mg  650 mg Oral Q4H PRN  
 albuterol (PROVENTIL VENTOLIN) nebulizer solution 2.5 mg  2.5 mg Nebulization Q4H PRN  
 acetaminophen (TYLENOL) tablet 500 mg  500 mg Oral Q6H PRN  
 gabapentin (NEURONTIN) capsule 600 mg  600 mg Oral TID  metoprolol succinate (TOPROL-XL) XL tablet 25 mg  25 mg Oral DAILY  famotidine (PEPCID) tablet 20 mg  20 mg Oral BID  polyethylene glycol (MIRALAX) packet 17 g  17 g Oral DAILY  WARFARIN INFORMATION NOTE (COUMADIN)   Other Rx Dosing/Monitoring  0.9% sodium chloride infusion  75 mL/hr IntraVENous CONTINUOUS  
 insulin lispro (HUMALOG) injection   SubCUTAneous AC&HS  
 glucose chewable tablet 16 g  4 Tab Oral PRN  
 glucagon (GLUCAGEN) injection 1 mg  1 mg IntraMUSCular PRN  
 dextrose (D50) infusion 12.5-25 g  25-50 mL IntraVENous PRN  
 senna-docusate (PERICOLACE) 8.6-50 mg per tablet 1 Tab  1 Tab Oral BID Objective:  
 
Visit Vitals /50 (BP 1 Location: Right arm, BP Patient Position: At rest) Pulse 66 Temp 98 °F (36.7 °C) Resp 18 Ht 6' 5\" (1.956 m) Wt 136.1 kg (300 lb) SpO2 97% BMI 35.57 kg/m² Temp (24hrs), Av.9 °F (36.6 °C), Min:97.3 °F (36.3 °C), Max:98.4 °F (36.9 °C) GEN: well developed [de-identified]year-old white male lying in bed in no distress HEENT: no scleral  Icterus, no oral lesions CHEST: symmetrical expansion CVS:regular, no murmurs ABD: soft, non-tender EXT: left leg dressing intact - did not remove Lab results Chemistry Recent Labs  
  19 5182 04/26/19 
0533 04/25/19 
1760 * 132* 149*  138 138  
K 3.6 3.7 3.4*  
* 108 105 CO2 23 23 25 BUN 10 13 12 CREA 0.56* 0.70 0.59* CA 7.4* 7.5* 7.8* AGAP 6 7 8 BUCR 18 19 20 CBC w/ Diff Recent Labs  
  04/27/19 
0534 04/26/19 
0533 04/25/19 
0535 WBC 6.4 7.2 9.1  
RBC 2.91* 2.94* 3.01* HGB 8.6* 8.5* 8.8* HCT 27.4* 28.0* 28.3*  
 246 264 GRANS 75* 72 77* LYMPH 15* 19* 14* EOS 2 2 1 Microbiology All Micro Results Procedure Component Value Units Date/Time CULTURE, URINE [[id number]]  (Abnormal)  (Susceptibility) Collected:  04/24/19 1515 Order Status:  Completed Specimen:  Urine from Clean catch Updated:  04/27/19 0740 Special Requests: NO SPECIAL REQUESTS Culture result:    
  >100,000 COLONIES/mL ENTEROCOCCUS FAECALIS GROUP D  
     
 CULTURE, URINE [[id number]] Collected:  04/24/19 1515 Order Status:  Canceled Specimen:  Urine from Clean catch Ovidio Severance [[id number]] Collected:  04/24/19 1115 Order Status:  Canceled Specimen:  Stool C. DIFFICILE AG & TOXIN A/B [[id number]] Order Status:  Canceled Specimen:  Stool Aleja Piña MD, [facility] Infectious Disease Specialist 
Pager [phone]

## 2019-04-28 LAB
ANION GAP SERPL CALC-SCNC: 5 MMOL/L (ref 3–18)
BASOPHILS # BLD: 0 K/UL (ref 0–0.1)
BASOPHILS NFR BLD: 0 % (ref 0–2)
BUN SERPL-MCNC: 10 MG/DL (ref 7–18)
BUN/CREAT SERPL: 17 (ref 12–20)
CALCIUM SERPL-MCNC: 7.5 MG/DL (ref 8.5–10.1)
CHLORIDE SERPL-SCNC: 109 MMOL/L (ref 100–108)
CO2 SERPL-SCNC: 24 MMOL/L (ref 21–32)
CREAT SERPL-MCNC: 0.58 MG/DL (ref 0.6–1.3)
DIFFERENTIAL METHOD BLD: ABNORMAL
EOSINOPHIL # BLD: 0.1 K/UL (ref 0–0.4)
EOSINOPHIL NFR BLD: 2 % (ref 0–5)
ERYTHROCYTE [DISTWIDTH] IN BLOOD BY AUTOMATED COUNT: 15.4 % (ref 11.6–14.5)
GLUCOSE BLD STRIP.AUTO-MCNC: 177 MG/DL (ref 70–110)
GLUCOSE BLD STRIP.AUTO-MCNC: 253 MG/DL (ref 70–110)
GLUCOSE BLD STRIP.AUTO-MCNC: 255 MG/DL (ref 70–110)
GLUCOSE BLD STRIP.AUTO-MCNC: 258 MG/DL (ref 70–110)
GLUCOSE SERPL-MCNC: 138 MG/DL (ref 74–99)
HCT VFR BLD AUTO: 29.9 % (ref 36–48)
HGB BLD-MCNC: 9.4 G/DL (ref 13–16)
INR PPP: 1.5 (ref 0.8–1.2)
LYMPHOCYTES # BLD: 1.1 K/UL (ref 0.9–3.6)
LYMPHOCYTES NFR BLD: 20 % (ref 21–52)
MCH RBC QN AUTO: 29.8 PG (ref 24–34)
MCHC RBC AUTO-ENTMCNC: 31.4 G/DL (ref 31–37)
MCV RBC AUTO: 94.9 FL (ref 74–97)
MONOCYTES # BLD: 0.5 K/UL (ref 0.05–1.2)
MONOCYTES NFR BLD: 10 % (ref 3–10)
NEUTS SEG # BLD: 3.6 K/UL (ref 1.8–8)
NEUTS SEG NFR BLD: 68 % (ref 40–73)
PLATELET # BLD AUTO: 270 K/UL (ref 135–420)
PMV BLD AUTO: 8.7 FL (ref 9.2–11.8)
POTASSIUM SERPL-SCNC: 3.8 MMOL/L (ref 3.5–5.5)
PROTHROMBIN TIME: 17.6 SEC (ref 11.5–15.2)
RBC # BLD AUTO: 3.15 M/UL (ref 4.7–5.5)
SODIUM SERPL-SCNC: 138 MMOL/L (ref 136–145)
WBC # BLD AUTO: 5.3 K/UL (ref 4.6–13.2)

## 2019-04-28 PROCEDURE — 97110 THERAPEUTIC EXERCISES: CPT

## 2019-04-28 PROCEDURE — 74011636637 HC RX REV CODE- 636/637: Performed by: HOSPITALIST

## 2019-04-28 PROCEDURE — 97535 SELF CARE MNGMENT TRAINING: CPT

## 2019-04-28 PROCEDURE — 3331090001 HH PPS REVENUE CREDIT

## 2019-04-28 PROCEDURE — 82962 GLUCOSE BLOOD TEST: CPT

## 2019-04-28 PROCEDURE — 74011250637 HC RX REV CODE- 250/637: Performed by: HOSPITALIST

## 2019-04-28 PROCEDURE — 74011000250 HC RX REV CODE- 250: Performed by: HOSPITALIST

## 2019-04-28 PROCEDURE — 94761 N-INVAS EAR/PLS OXIMETRY MLT: CPT

## 2019-04-28 PROCEDURE — 80048 BASIC METABOLIC PNL TOTAL CA: CPT

## 2019-04-28 PROCEDURE — 94640 AIRWAY INHALATION TREATMENT: CPT

## 2019-04-28 PROCEDURE — 85025 COMPLETE CBC W/AUTO DIFF WBC: CPT

## 2019-04-28 PROCEDURE — 85610 PROTHROMBIN TIME: CPT

## 2019-04-28 PROCEDURE — 96375 TX/PRO/DX INJ NEW DRUG ADDON: CPT

## 2019-04-28 PROCEDURE — 3331090002 HH PPS REVENUE DEBIT

## 2019-04-28 PROCEDURE — 74011250636 HC RX REV CODE- 250/636: Performed by: HOSPITALIST

## 2019-04-28 PROCEDURE — 36415 COLL VENOUS BLD VENIPUNCTURE: CPT

## 2019-04-28 PROCEDURE — 99218 HC RM OBSERVATION: CPT

## 2019-04-28 PROCEDURE — 96361 HYDRATE IV INFUSION ADD-ON: CPT

## 2019-04-28 RX ORDER — BUMETANIDE 0.25 MG/ML
1 INJECTION INTRAMUSCULAR; INTRAVENOUS ONCE
Status: COMPLETED | OUTPATIENT
Start: 2019-04-28 | End: 2019-04-28

## 2019-04-28 RX ORDER — LEVOFLOXACIN 5 MG/ML
500 INJECTION, SOLUTION INTRAVENOUS EVERY 24 HOURS
Status: DISCONTINUED | OUTPATIENT
Start: 2019-04-28 | End: 2019-04-28

## 2019-04-28 RX ADMIN — FAMOTIDINE 20 MG: 20 TABLET ORAL at 09:23

## 2019-04-28 RX ADMIN — AMOXICILLIN 500 MG: 250 CAPSULE ORAL at 14:13

## 2019-04-28 RX ADMIN — GABAPENTIN 600 MG: 300 CAPSULE ORAL at 17:16

## 2019-04-28 RX ADMIN — INSULIN LISPRO 9 UNITS: 100 INJECTION, SOLUTION INTRAVENOUS; SUBCUTANEOUS at 21:46

## 2019-04-28 RX ADMIN — ACETAMINOPHEN 650 MG: 325 TABLET, FILM COATED ORAL at 07:07

## 2019-04-28 RX ADMIN — AMOXICILLIN 500 MG: 250 CAPSULE ORAL at 07:00

## 2019-04-28 RX ADMIN — INSULIN LISPRO 3 UNITS: 100 INJECTION, SOLUTION INTRAVENOUS; SUBCUTANEOUS at 09:21

## 2019-04-28 RX ADMIN — GABAPENTIN 600 MG: 300 CAPSULE ORAL at 21:45

## 2019-04-28 RX ADMIN — INSULIN LISPRO 9 UNITS: 100 INJECTION, SOLUTION INTRAVENOUS; SUBCUTANEOUS at 12:44

## 2019-04-28 RX ADMIN — ALBUTEROL SULFATE 2.5 MG: 2.5 SOLUTION RESPIRATORY (INHALATION) at 11:08

## 2019-04-28 RX ADMIN — BUMETANIDE 1 MG: 0.25 INJECTION INTRAMUSCULAR; INTRAVENOUS at 11:24

## 2019-04-28 RX ADMIN — GABAPENTIN 600 MG: 300 CAPSULE ORAL at 09:23

## 2019-04-28 RX ADMIN — WARFARIN SODIUM 6 MG: 5 TABLET ORAL at 17:18

## 2019-04-28 RX ADMIN — TAMSULOSIN HYDROCHLORIDE 0.4 MG: 0.4 CAPSULE ORAL at 09:23

## 2019-04-28 RX ADMIN — ACETAMINOPHEN 650 MG: 325 TABLET, FILM COATED ORAL at 21:45

## 2019-04-28 RX ADMIN — FAMOTIDINE 20 MG: 20 TABLET ORAL at 17:18

## 2019-04-28 RX ADMIN — AMOXICILLIN 500 MG: 250 CAPSULE ORAL at 21:45

## 2019-04-28 RX ADMIN — METOPROLOL SUCCINATE 25 MG: 25 TABLET, EXTENDED RELEASE ORAL at 09:23

## 2019-04-28 RX ADMIN — INSULIN LISPRO 9 UNITS: 100 INJECTION, SOLUTION INTRAVENOUS; SUBCUTANEOUS at 17:14

## 2019-04-28 RX ADMIN — ACETAMINOPHEN 650 MG: 325 TABLET, FILM COATED ORAL at 18:35

## 2019-04-28 RX ADMIN — SODIUM CHLORIDE 75 ML/HR: 900 INJECTION, SOLUTION INTRAVENOUS at 07:34

## 2019-04-28 NOTE — PROGRESS NOTES
Problem: Self Care Deficits Care Plan (Adult) Goal: *Acute Goals and Plan of Care (Insert Text) Description Occupational Therapy Goals Initiated 4/26/2019 within 7 day(s). Seen at bed level secondary to pt c/o pain & refusal for EOB activity. Will re-assess to determine OOB function. 1.  Patient will perform grooming tasks with modified independence. 2.  Patient will perform lower body dressing utilizing AE and adaptive strategies, prn with moderate assistance. 3.  Patient will perform functional task at EOB for 8 minutes with moderate assistance for balance to increase activity tolerance and fair dynamic sitting balance in prep for ADLs. 4.  Patient will participate in upper extremity therapeutic exercise/activities for 8 minutes with supervision to increase BUE strength for functional transfers and ADLs. 5.  Patient will utilize energy conservation techniques during functional activities with minimal verbal cues. Outcome: Progressing Towards Goal 
 OCCUPATIONAL THERAPY TREATMENT Patient: Wilian Lagnua ([de-identified] y.o. male) Date: 4/28/2019 Diagnosis: Colitis [K52.9] UTI (urinary tract infection) Precautions: Fall, Skin Chart, occupational therapy assessment, plan of care, and goals were reviewed. ASSESSMENT: 
Day 1 of 3 day trial. Pt presented soiled in bed and agreeable to therapy. Pt requires Max A x2 for bed mobility to roll L to R with use of bed rails. Pt requires Total A for bowel hygiene. Pt performs LB bathing task with setup. Pt requires Max A x2 for supine to sit with assistance for LLE and UB. Pt reported feeling dizzy, resolved with 2 min seated rest break. Pt performs grooming and UB dressing tasks with SBA 2/2 decreased dynamic sitting balance. Pt performs UE TherEx seated EOB. Pt has limited ROM in L shoulder and pain in L elbow 2/2 IV site. Pt sat EOB for approx. 15-20mins.  Pt requires Max A x2 to return to supine and total A x2 to scoot towards the [facility]. Pt setup with lunch tray and all needs within reach. Pt educated on OOB, energy conservation, importance of UE TherEx, and role of Ot. Progression toward goals: 
?          Improving appropriately and progressing toward goals ? Improving slowly and progressing toward goals ? Not making progress toward goals and plan of care will be adjusted PLAN: 
Patient continues to benefit from skilled intervention to address the above impairments. Continue treatment per established plan of care. Discharge Recommendations:  Karson Cavazos Further Equipment Recommendations for Discharge:  bedside commode and shower chair SUBJECTIVE:  
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  OBJECTIVE DATA SUMMARY:  
Cognitive/Behavioral Status: 
Neurologic State: Alert Orientation Level: Oriented X4 Cognition: Follows commands Safety/Judgement: Awareness of environment, Fall prevention, Insight into deficits Functional Mobility and Transfers for ADLs: 
Bed Mobility: 
Rolling: Maximum assistance;Assist x2 Supine to Sit: Maximum assistance;Assist x2 Sit to Supine: Maximum assistance;Assist x2 Scooting: Total assistance;Assist x2 Balance: 
Sitting: Impaired Sitting - Static: Good (unsupported) Sitting - Dynamic: Fair (occasional) ADL Intervention: 
Grooming Washing Face: Stand-by assistance(seated EOB) Washing Hands: Stand-by assistance(seated EOB) Lower Body Bathing Perineal  : Supervision/set-up(long sitting in bed) Upper Body Dressing Assistance Hospital Gown: Stand-by assistance(seated EOB) Lower Body Dressing Assistance Socks: Total assistance (dependent) Toileting Toileting Assistance: Total assistance(dependent)(Parson cath ) Bowel Hygiene: Total assistance (dependent) Therapeutic Exercises: AROM for BUE shoulder flexion, internal rotation, elbow flexion, wrist flexion. Pain: Pt reports 5/10 pain or discomfort prior to treatment in buttock. Pt reports 5/10 pain or discomfort post treatment in buttock. Activity Tolerance:   
Fair Please refer to the flowsheet for vital signs taken during this treatment. After treatment:  
?  Patient left in no apparent distress sitting up in chair ? Patient left in no apparent distress in bed 
? Call bell left within reach ? Nursing notified ? Caregiver present ? Bed alarm activated COMMUNICATION/EDUCATION:  
? Home safety education was provided and the patient/caregiver indicated understanding. ? Patient/family have participated as able in goal setting and plan of care. ? Patient/family agree to work toward stated goals and plan of care. ? Patient understands intent and goals of therapy, but is neutral about his/her participation. ? Patient is unable to participate in goal setting and plan of care. Elzbieta Stamp Time Calculation: 45 mins

## 2019-04-28 NOTE — PROGRESS NOTES
Patient received in bed awake. Patient alert and oriented X4, denies pain and discomfort. Patient resting quietly. Frequent use items within reach. Bed locked in low position. Call bell within reach and patient verbalized understanding of use for assistance and needs. 1043:  Spoke with Dr. Lyndsay Palmer. Received order to discontinue Mirilax, Pericolace and fluids. Received order Bumex IV 1mg ONCE.  RBV. 1516:  Bedside and Verbal shift change report given to SIL Gaming (oncoming nurse) by Quan Saenz RN BSN (offgoing nurse). Report included the following information SBAR, Kardex, Intake/Output, MAR and Recent Results.

## 2019-04-28 NOTE — PROGRESS NOTES
Problem: Risk for Spread of Infection Goal: Prevent transmission of infectious organism to others Description Prevent the transmission of infectious organisms to other patients, staff members, and visitors. Outcome: Progressing Towards Goal 
  
Problem: Patient Education:  Go to Education Activity Goal: Patient/Family Education Outcome: Progressing Towards Goal 
  
Problem: Falls - Risk of 
Goal: *Absence of Falls Description Document Carlos Vallejo Fall Risk and appropriate interventions in the flowsheet. Outcome: Progressing Towards Goal 
  
Problem: Patient Education: Go to Patient Education Activity Goal: Patient/Family Education Outcome: Progressing Towards Goal 
  
Problem: Pressure Injury - Risk of 
Goal: *Prevention of pressure injury Description Document Mendel Scale and appropriate interventions in the flowsheet. Outcome: Progressing Towards Goal 
  
Problem: Patient Education: Go to Patient Education Activity Goal: Patient/Family Education Outcome: Progressing Towards Goal 
  
Problem: Diabetes Self-Management Goal: *Disease process and treatment process Description Define diabetes and identify own type of diabetes; list 3 options for treating diabetes. Outcome: Progressing Towards Goal 
Goal: *Incorporating nutritional management into lifestyle Description Describe effect of type, amount and timing of food on blood glucose; list 3 methods for planning meals. Outcome: Progressing Towards Goal 
Goal: *Incorporating physical activity into lifestyle Description State effect of exercise on blood glucose levels. Outcome: Progressing Towards Goal 
Goal: *Developing strategies to promote health/change behavior Description Define the ABC's of diabetes; identify appropriate screenings, schedule and personal plan for screenings. Outcome: Progressing Towards Goal 
Goal: *Using medications safely Description State effect of diabetes medications on diabetes; name diabetes medication taking, action and side effects. Outcome: Progressing Towards Goal 
Goal: *Monitoring blood glucose, interpreting and using results Description Identify recommended blood glucose targets  and personal targets. Outcome: Progressing Towards Goal 
Goal: *Prevention, detection, treatment of acute complications Description List symptoms of hyper- and hypoglycemia; describe how to treat low blood sugar and actions for lowering  high blood glucose level. Outcome: Progressing Towards Goal 
Goal: *Prevention, detection and treatment of chronic complications Description Define the natural course of diabetes and describe the relationship of blood glucose levels to long term complications of diabetes. Outcome: Progressing Towards Goal 
Goal: *Developing strategies to address psychosocial issues Description Describe feelings about living with diabetes; identify support needed and support network Outcome: Progressing Towards Goal 
Goal: *Insulin pump training Outcome: Progressing Towards Goal 
Goal: *Sick day guidelines Outcome: Progressing Towards Goal 
Goal: *Patient Specific Goal (EDIT GOAL, INSERT TEXT) Outcome: Progressing Towards Goal 
  
Problem: Patient Education: Go to Patient Education Activity Goal: Patient/Family Education Outcome: Progressing Towards Goal 
  
Problem: Patient Education: Go to Patient Education Activity Goal: Patient/Family Education Outcome: Progressing Towards Goal 
  
Problem: Discharge Planning Goal: *Discharge to safe environment Outcome: Progressing Towards Goal 
  
Problem: Lower Extremity Wound Care Goal: *Non-infected wound: Improvement of existing wound, absence of infection, and maintenance of skin integrity Outcome: Progressing Towards Goal 
Goal: *Infected Wound: Prevention of further infection and promotion of healing Description Infection control procedures (eg: clean dressings, clean gloves, hand washing, precautions to isolate wound from contamination, sterile instruments used for wound debridement) should be implemented. Outcome: Progressing Towards Goal 
Goal: Interventions Outcome: Progressing Towards Goal 
  
Problem: Patient Education: Go to Patient Education Activity Goal: Patient/Family Education Outcome: Progressing Towards Goal 
  
Problem: Impaired Skin Integrity/Pressure Injury Treatment Goal: *Improvement of Existing Pressure Injury Outcome: Progressing Towards Goal 
Goal: *Prevention of pressure injury Description Document Mendel Scale and appropriate interventions in the flowsheet. Outcome: Progressing Towards Goal 
  
Problem: Patient Education: Go to Patient Education Activity Goal: Patient/Family Education Outcome: Progressing Towards Goal 
  
Problem: Nutrition Deficit Goal: *Optimize nutritional status Outcome: Progressing Towards Goal 
  
Problem: Pain Goal: *Control of Pain Outcome: Progressing Towards Goal 
  
Problem: Patient Education: Go to Patient Education Activity Goal: Patient/Family Education Outcome: Progressing Towards Goal 
  
Problem: Patient Education: Go to Patient Education Activity Goal: Patient/Family Education Outcome: Progressing Towards Goal

## 2019-04-28 NOTE — PROGRESS NOTES
04/28/19 Human Authorization obtained. I was told By Sergio Worthy  that [facility] would not be able to accommodate patient until Monday. This was communicated by me  to [address] in [facility] [address]- has been approved per Angelina at [facility] [phone]. Place euceda ID [id number] , approved from 04/29/2019 to 05/01/2019 Rug level RVB   suggest 561 therapy minutes [address] Nini Chou,  please fax clinical updates on 05/01/2019 to  [phone] Carlos Veronica. Marley Carey RN, BSN [facility] [phone], Pager [phone]

## 2019-04-28 NOTE — PROGRESS NOTES
Progress Note Patient: Alana Ferrera               Sex: male          DOA: 4/24/2019 YOB: 1940      Age:  [de-identified] y.o.        LOS:  LOS: 0 days CHIEF COMPLAINT:  Fall and Other Subjective: No complaints today, will likely go to SNF on monday Objective:  
 
Visit Vitals /50 (BP 1 Location: Right arm, BP Patient Position: At rest) Pulse 65 Temp 98 °F (36.7 °C) Resp 16 Ht 6' 5\" (1.956 m) Wt 136.1 kg (300 lb) SpO2 99% BMI 35.57 kg/m² Physical Exam: 
Ears: hearing is intact Eyes: Icterus is not present Lungs: clear to auscultation bilaterally Heart: regular rate and rhythm, S1, S2 normal, no murmur, click, rub or gallop Gastrointestinal: soft, mild tenderness to LLQ. Bowel sounds normal. No masses,  no organomegaly Neurological:  New Focal Deficits are not present Psychiatric:  Mood is stable Skin: LE chronic venous stasis, covered in dressing Lab/Data Reviewed: 
CMP:  
Lab Results Component Value Date/Time  04/28/2019 04:41 AM  
 K 3.8 04/28/2019 04:41 AM  
  (H) 04/28/2019 04:41 AM  
 CO2 24 04/28/2019 04:41 AM  
 AGAP 5 04/28/2019 04:41 AM  
  (H) 04/28/2019 04:41 AM  
 BUN 10 04/28/2019 04:41 AM  
 CREA 0.58 (L) 04/28/2019 04:41 AM  
 GFRAA >60 04/28/2019 04:41 AM  
 GFRNA >60 04/28/2019 04:41 AM  
 CA 7.5 (L) 04/28/2019 04:41 AM  
 MG 2.2 04/27/2019 09:22 PM  
 
CBC:  
Lab Results Component Value Date/Time WBC 5.3 04/28/2019 04:41 AM  
 HGB 9.4 (L) 04/28/2019 04:41 AM  
 HCT 29.9 (L) 04/28/2019 04:41 AM  
  04/28/2019 04:41 AM  
 
 
Imaging Reviewed: 
No results found. Assessment:  
 
Principal Problem: 
  UTI (urinary tract infection) (4/24/2019) Active Problems: 
  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 
 
 
  PLAN: 
· UTI: Cont atbx for UTI. · Stercoral colitis: having diarrhea now. Stop bowel regimen · Wound care consult for LE and sacral wounds · PAF: rate control, cont warfarin, monitor INR- increased to 6 mg daily (4/27) · PT/OT consult: recommending SNF. Need SNF on monday · Blood sugar and BP control · Cont acceptable home medications for chronic conditions · DVT protocol · Urinary retention --roque placed 
  
 
Signed By: Brice Meigs, MD   
 April 28, 2019

## 2019-04-29 VITALS
BODY MASS INDEX: 35.42 KG/M2 | HEART RATE: 64 BPM | TEMPERATURE: 98.5 F | DIASTOLIC BLOOD PRESSURE: 52 MMHG | SYSTOLIC BLOOD PRESSURE: 127 MMHG | OXYGEN SATURATION: 98 % | RESPIRATION RATE: 18 BRPM | HEIGHT: 77 IN | WEIGHT: 300 LBS

## 2019-04-29 LAB
ANION GAP SERPL CALC-SCNC: 9 MMOL/L (ref 3–18)
BASOPHILS # BLD: 0 K/UL (ref 0–0.1)
BASOPHILS NFR BLD: 0 % (ref 0–2)
BUN SERPL-MCNC: 9 MG/DL (ref 7–18)
BUN/CREAT SERPL: 15 (ref 12–20)
CALCIUM SERPL-MCNC: 7.4 MG/DL (ref 8.5–10.1)
CHLORIDE SERPL-SCNC: 108 MMOL/L (ref 100–108)
CO2 SERPL-SCNC: 22 MMOL/L (ref 21–32)
CREAT SERPL-MCNC: 0.62 MG/DL (ref 0.6–1.3)
DIFFERENTIAL METHOD BLD: ABNORMAL
EOSINOPHIL # BLD: 0.1 K/UL (ref 0–0.4)
EOSINOPHIL NFR BLD: 2 % (ref 0–5)
ERYTHROCYTE [DISTWIDTH] IN BLOOD BY AUTOMATED COUNT: 15.4 % (ref 11.6–14.5)
GLUCOSE BLD STRIP.AUTO-MCNC: 166 MG/DL (ref 70–110)
GLUCOSE BLD STRIP.AUTO-MCNC: 235 MG/DL (ref 70–110)
GLUCOSE SERPL-MCNC: 126 MG/DL (ref 74–99)
HCT VFR BLD AUTO: 29.4 % (ref 36–48)
HGB BLD-MCNC: 9.1 G/DL (ref 13–16)
INR PPP: 1.5 (ref 0.8–1.2)
LYMPHOCYTES # BLD: 1 K/UL (ref 0.9–3.6)
LYMPHOCYTES NFR BLD: 19 % (ref 21–52)
MCH RBC QN AUTO: 29.4 PG (ref 24–34)
MCHC RBC AUTO-ENTMCNC: 31 G/DL (ref 31–37)
MCV RBC AUTO: 95.1 FL (ref 74–97)
MONOCYTES # BLD: 0.7 K/UL (ref 0.05–1.2)
MONOCYTES NFR BLD: 12 % (ref 3–10)
NEUTS SEG # BLD: 3.7 K/UL (ref 1.8–8)
NEUTS SEG NFR BLD: 67 % (ref 40–73)
PLATELET # BLD AUTO: 278 K/UL (ref 135–420)
PMV BLD AUTO: 8.6 FL (ref 9.2–11.8)
POTASSIUM SERPL-SCNC: 3.8 MMOL/L (ref 3.5–5.5)
PROTHROMBIN TIME: 18.3 SEC (ref 11.5–15.2)
RBC # BLD AUTO: 3.09 M/UL (ref 4.7–5.5)
SODIUM SERPL-SCNC: 139 MMOL/L (ref 136–145)
WBC # BLD AUTO: 5.5 K/UL (ref 4.6–13.2)

## 2019-04-29 PROCEDURE — A6253 ABSORPT DRG > 48 SQ IN W/O B: HCPCS

## 2019-04-29 PROCEDURE — 74011636637 HC RX REV CODE- 636/637: Performed by: HOSPITALIST

## 2019-04-29 PROCEDURE — 3331090002 HH PPS REVENUE DEBIT

## 2019-04-29 PROCEDURE — 74011250637 HC RX REV CODE- 250/637: Performed by: HOSPITALIST

## 2019-04-29 PROCEDURE — 82962 GLUCOSE BLOOD TEST: CPT

## 2019-04-29 PROCEDURE — 85610 PROTHROMBIN TIME: CPT

## 2019-04-29 PROCEDURE — 85025 COMPLETE CBC W/AUTO DIFF WBC: CPT

## 2019-04-29 PROCEDURE — 80048 BASIC METABOLIC PNL TOTAL CA: CPT

## 2019-04-29 PROCEDURE — 3331090001 HH PPS REVENUE CREDIT

## 2019-04-29 PROCEDURE — 36415 COLL VENOUS BLD VENIPUNCTURE: CPT

## 2019-04-29 PROCEDURE — 99218 HC RM OBSERVATION: CPT

## 2019-04-29 RX ORDER — WARFARIN 6 MG/1
6 TABLET ORAL EVERY EVENING
Qty: 30 TAB | Refills: 0 | Status: SHIPPED | OUTPATIENT
Start: 2019-04-29 | End: 2021-05-24

## 2019-04-29 RX ORDER — TAMSULOSIN HYDROCHLORIDE 0.4 MG/1
0.4 CAPSULE ORAL DAILY
Qty: 30 CAP | Refills: 0 | Status: SHIPPED | OUTPATIENT
Start: 2019-04-30 | End: 2021-05-24

## 2019-04-29 RX ADMIN — TAMSULOSIN HYDROCHLORIDE 0.4 MG: 0.4 CAPSULE ORAL at 08:19

## 2019-04-29 RX ADMIN — METOPROLOL SUCCINATE 25 MG: 25 TABLET, EXTENDED RELEASE ORAL at 08:19

## 2019-04-29 RX ADMIN — AMOXICILLIN 500 MG: 250 CAPSULE ORAL at 06:40

## 2019-04-29 RX ADMIN — GABAPENTIN 600 MG: 300 CAPSULE ORAL at 08:19

## 2019-04-29 RX ADMIN — INSULIN LISPRO 6 UNITS: 100 INJECTION, SOLUTION INTRAVENOUS; SUBCUTANEOUS at 11:31

## 2019-04-29 RX ADMIN — INSULIN LISPRO 3 UNITS: 100 INJECTION, SOLUTION INTRAVENOUS; SUBCUTANEOUS at 08:19

## 2019-04-29 RX ADMIN — FAMOTIDINE 20 MG: 20 TABLET ORAL at 08:19

## 2019-04-29 NOTE — PROGRESS NOTES
2030- pt called asking for bedpan. Went in room to place pt on bedpan, However pt had already had loose brown BM. Incontinence care performed. Partial bath given. Pads and blankets changed out. Mepalex changed on sacrum and buttocks area.

## 2019-04-29 NOTE — DISCHARGE SUMMARY
Internal Medicine Discharge Summary        Patient: Danielito Lowe    YOB: 1940    Age:  [de-identified] y.o.                       Admit Date: 4/24/2019    Discharge Date: 4/29/2019    LOS:  LOS: 0 days     Discharge To: SNF    Consults: Infectious Disease    Admission Diagnoses: Colitis [K52.9]    Discharge Diagnoses:    Problem List as of 4/29/2019 Date Reviewed: 11/14/2018          Codes Class Noted - Resolved    Colitis ICD-10-CM: K52.9  ICD-9-CM: 558.9  4/24/2019 - Present    Overview Signed 4/24/2019  3:39 PM by Maritza Ray MD     Stercoral colitis             * (Principal) UTI (urinary tract infection) ICD-10-CM: N39.0  ICD-9-CM: 599.0  4/24/2019 - Present        Chronic venous stasis dermatitis of both lower extremities ICD-10-CM: I87.2  ICD-9-CM: 454.1  4/24/2019 - Present        Diabetes mellitus type 2, controlled ([facility] ICD-10-CM: E11.9  ICD-9-CM: 250.00  5/23/2018 - Present        Cellulitis ICD-10-CM: L03.90  ICD-9-CM: 682.9  5/22/2018 - Present        Stroke ([facility] ICD-10-CM: I63.9  ICD-9-CM: 434.91  2/7/2018 - Present        PAF (paroxysmal atrial fibrillation) ([facility] ICD-10-CM: I48.0  ICD-9-CM: 427.31  2/7/2018 - Present        Weakness of left upper extremity ICD-10-CM: R29.898  ICD-9-CM: 729.89  2/7/2018 - Present        BPH (benign prostatic hyperplasia) ICD-10-CM: N40.0  ICD-9-CM: 600.00  2/7/2018 - Present        Abnormal SPEP ICD-10-CM: R77.8  ICD-9-CM: 790.99  10/19/2017 - Present        Idiopathic peripheral neuropathy ICD-10-CM: G60.9  ICD-9-CM: 356.9  5/12/2016 - Present        Monoclonal gammopathy ICD-10-CM: D47.2  ICD-9-CM: 273.1  3/7/2016 - Present        Tinea corporis ICD-10-CM: B35.4  ICD-9-CM: 110.5  3/7/2016 - Present        Actinic keratosis ICD-10-CM: L57.0  ICD-9-CM: 702.0  3/7/2016 - Present        Sick sinus syndrome ([facility] ICD-10-CM: I49.5  ICD-9-CM: 427.81  2/25/2016 - Present        Knee pain ICD-10-CM: M25.569  ICD-9-CM: 719.46  Unknown - Present Nocturia ICD-10-CM: R35.1  ICD-9-CM: 788.43  12/10/2015 - Present        Cardiomyopathy ([facility] ICD-10-CM: I42.9  ICD-9-CM: 425.4  12/3/2015 - Present        Asthma ICD-10-CM: J45.909  ICD-9-CM: 493.90  12/3/2015 - Present        Peripheral neuropathy ICD-10-CM: G62.9  ICD-9-CM: 356.9  12/3/2015 - Present        Other allergic rhinitis ICD-10-CM: J30.89  ICD-9-CM: 477.8  12/3/2015 - Present        RESOLVED: Type 2 diabetes mellitus without complication ([facility] XHC-50-GC: E11.9  ICD-9-CM: 250.00  12/3/2015 - 12/10/2015              Discharge Condition:  Improved    Procedures: None         HPI: Sheryl Verdugo is a [de-identified] y.o. male who presents with fall, diarrhea, generalized weakness and multiple complaints. He is chronically wheelchair bound. He presented after he slid out of wheelchair at home. He called EMS who found him covered in feces. He presented to the ED for evaluation. He has multiple complaints. He complains of diarrhea, abdominal distension and pain. He complains of LE wounds. He c/o mild burning with urination. He also reports subjective fevers this week. He currently has wound care nursing 3x a week for chronic LE wounds. He underwent CT a/p which shows large burden of formed stool throughout the distal sigmoid colon and rectum and mild associated stercoral colitis, no bowel obstruction or free intraperitoneal gas, no diverticulitis. He was placed under observation for possible colitis and UTI. Hospital Course: The patient was treated with IV antibiotics initially but transitioned to amoxicillin based off urine culture of [facility]. He completed full treatment based off ID recommendations. He had urinary retention on presentation and roque was placed as well as introduction of flomax.  He should have bladder rest for 1 week and follow up with urology for post voiding trial. His coumadin was increased to 6mg from 5mg for INR management given subtherapeutic levels and he should continue to have this done in the outpatient setting. He should follow up with wound care for his chronic lower extremity wounds. The rest of the patient's chronic conditions were managed appropriately during their admission. They were medically stable at the time of discharge. Visit Vitals  /52 (BP 1 Location: Right arm, BP Patient Position: At rest)   Pulse 64   Temp 98.5 °F (36.9 °C)   Resp 18   Ht 6' 5\" (1.956 m)   Wt 136.1 kg (300 lb)   SpO2 98%   BMI 35.57 kg/m²       Physical Exam at Discharge:  General Appearance: NAD, conversant  HENT: normocephalic/atraumatic, moist mucus membranes  Lungs: CTA with normal respiratory effort  CV: RRR, no m/r/g  Abdomen: soft, non-tender, normal bowel sounds  Extremities: no cyanosis, B/L LE dressings in place  Neuro: moves all extremities, no focal deficits  Psych: appropriate affect, alert and oriented to person, place and time    Labs Prior to Discharge:  Labs: Results:       Chemistry Recent Labs     04/29/19 0400 04/28/19 0441 04/27/19 2122   * 138* 253*    138 137   K 3.8 3.8 4.0    109* 108   CO2 22 24 23   BUN 9 10 13   CREA 0.62 0.58* 0.87   CA 7.4* 7.5* 7.3*   AGAP 9 5 6   BUCR 15 17 15      CBC w/Diff Recent Labs     04/29/19 0400 04/28/19 0441 04/27/19  0534   WBC 5.5 5.3 6.4   RBC 3.09* 3.15* 2.91*   HGB 9.1* 9.4* 8.6*   HCT 29.4* 29.9* 27.4*    270 268   GRANS 67 68 75*   LYMPH 19* 20* 15*   EOS 2 2 2      Cardiac Enzymes No results for input(s): CPK, CKND1, JOSE in the last 72 hours.     No lab exists for component: CKRMB, TROIP   Coagulation Recent Labs     04/29/19 0400 04/28/19 0441   PTP 18.3* 17.6*   INR 1.5* 1.5*       Lipid Panel Lab Results   Component Value Date/Time    Cholesterol, total 132 11/14/2018 03:55 PM    HDL Cholesterol 28 (L) 11/14/2018 03:55 PM    LDL, calculated 67.4 11/14/2018 03:55 PM    VLDL, calculated 36.6 11/14/2018 03:55 PM    Triglyceride 183 (H) 11/14/2018 03:55 PM    CHOL/HDL Ratio 4.7 11/14/2018 03:55 PM      BNP No results for input(s): BNPP in the last 72 hours. Liver Enzymes No results for input(s): TP, ALB, TBIL, AP, SGOT, GPT in the last 72 hours. No lab exists for component: DBIL   Thyroid Studies Lab Results   Component Value Date/Time    TSH 1.10 11/14/2018 03:55 PM    TSH 1.10 11/14/2018 03:55 PM            Significant Imaging:  Xr Knee Lt Min 4 V    Result Date: 4/13/2019  EXAM: Knee Series Indication: Fall with left knee pain. Technique: Frontal, bilateral oblique, and crosstable lateral views of the left knee. Comparison studies: 02/07/2018. _________________ FINDINGS: Postsurgical changes with modular revision type total left knee arthroplasty, without periprosthetic fracture. Heterotopic ossification with significant remodeling patellar undersurface similar prior study. No knee effusion is present. Soft tissue prominence, with unchanged periprosthetic heterotopic ossifications _________________    IMPRESSION: 1. Total left knee arthroplasty without periprosthetic fracture. Ct Head Wo Cont    Result Date: 4/24/2019  CT head without contrast INDICATION: Fall, traumatic injury. COMPARISON: CT 02/07/2018. TECHNIQUE: Axial CT imaging of the head was performed without intravenous contrast. One or more dose reduction techniques were used on this CT: automated exposure control, adjustment of the mAs and/or kVp according to patient size, and iterative reconstruction techniques. The specific techniques used on this CT exam have been documented in the patient's electronic medical record. Digital Imaging and Communications in Medicine (DICOM) format image data are available to nonaffiliated external healthcare facilities or entities on a secure, media free, reciprocally searchable basis with patient authorization for at least a 12-month period after this study.  _______________ FINDINGS: BRAIN AND POSTERIOR FOSSA: There is mild diffuse peripheral atrophy with prominence of the sylvian fissures, the cisterns and the sulci pattern without midline shift or mass effect. There is nonspecific periventricular and subcortical hypodensities, most probably chronic small vessel ischemia. There is no evidence of any hemorrhage, acute infarct, or abnormal fluid collection.] EXTRA-AXIAL SPACES AND MENINGES: There are no abnormal extra-axial fluid collections. CALVARIUM: Intact. SINUSES: Clear. OTHER: Carotid atherosclerotic calcifications noted. _______________     IMPRESSION: 1. No evidence for acute intracranial injury, infarct, hemorrhage, or mass effect. CT can be negative in acute setting. 2. Mild atrophy demonstrating nonspecific white matter hypodensities, likely chronic microvascular disease. Ct Abd Pelv Wo Cont    Result Date: 4/24/2019  EXAM: CT of the abdomen and pelvis INDICATION: [de-identified]year-old patient with diarrhea and heme positive stool COMPARISON: CT of the abdomen and pelvis dated 4/16/2019 TECHNIQUE: Axial CT imaging of the abdomen and pelvis was performed without oral or intravenous contrast. Multiplanar reformats were generated. One or more dose reduction techniques were used on this CT: automated exposure control, adjustment of the mAs and/or kVp according to patient size, and iterative reconstruction techniques. The specific techniques used on this CT exam have been documented in the patient's electronic medical record. Digital Imaging and Communications in Medicine (DICOM) format image data are available to nonaffiliated external healthcare facilities or entities on a secure, media free, reciprocally searchable basis with patient authorization for at least a 12-month period after this study. _______________ FINDINGS: LOWER CHEST: Lung bases are clear. No pleural effusion. Cardiac size upper limits of normal. Transvenous pacemaker wires demonstrated. LIVER, BILIARY: Unenhanced appearance of the liver demonstrates no focal abnormality. No biliary dilation. Gallbladder is unremarkable.  PANCREAS: Normal unenhanced appearance. SPLEEN: Normal. ADRENALS: Adrenal gland is normal. Low attenuating left adrenal gland nodule unchanged from prior exams, with features most suggesting a small adenoma. KIDNEYS/URETERS/BLADDER: No hydronephrosis. No urolithiasis. Medial upper pole right renal cyst. PELVIC ORGANS: Unremarkable. VASCULATURE: Diffuse aortobiiliac atherosclerotic calcification redemonstrated with aneurysmal dilatation of the infrarenal abdominal aorta measuring 3.3 cm. LYMPH NODES: There are scattered subcentimeter mesenteric and retroperitoneal lymph nodes demonstrated. Additional prominent subcentimeter short axis left external iliac lymph nodes noted. Left inguinal adenopathy unchanged from prior, with largest lymph node is estimated at approximately 1.7 cm in short axis diameter. GASTROINTESTINAL TRACT: There is a large burden of stool throughout the distal sigmoid colon and rectum. There is mild rectal wall thickening with trace perirectal fat stranding noted. Diverticulosis of the colon is noted without findings on this unenhanced exam to suggest acute diverticulitis. Normal appendix. Normal caliber loops of small bowel. No findings of bowel obstruction. Small hiatal hernia. BONES: No acute or aggressive osseous abnormalities identified. Multilevel lower thoracic and lumbar spondylosis along with lower lumbar predominant facet joint osteoarthrosis unchanged from prior. No acute osseous abnormality or suspicious osseous lesion. Included portions of a right hip arthroplasty demonstrate no acute abnormality. OTHER: None. _______________     IMPRESSION: 1. Large burden of formed stool throughout the distal sigmoid colon and rectum. Mild rectal wall thickening and subtle perirectal fat stranding may suggest a mild associated stercoral colitis. No bowel obstruction. No free intraperitoneal gas. 2. Diverticulosis of the colon without findings to suggest diverticulitis. 3. Normal appendix. 4. Small hiatal hernia.  5. 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 Ct Abd Pelv Wo Cont    Result Date: 4/16/2019  EXAM: CT of the abdomen and pelvis INDICATION: Abdominal pain, diarrhea, question of colitis COMPARISON: None. TECHNIQUE: Axial CT imaging of the abdomen and pelvis was performed without intravenous contrast. Multiplanar reformats were generated. One or more dose reduction techniques were used on this CT: automated exposure control, adjustment of the mAs and/or kVp according to patient size, and iterative reconstruction techniques. The specific techniques used on this CT exam have been documented in the patient's electronic medical record. Digital Imaging and Communications in Medicine (DICOM) format image data are available to nonaffiliated external healthcare facilities or entities on a secure, media free, reciprocally searchable basis with patient authorization for at least a 12-month period after this study. _______________ FINDINGS: LOWER CHEST: Unremarkable. Pacer wire seen in the right ventricle. Mitral annular constipation present. LIVER, BILIARY: Liver is normal. No biliary dilation. There is sludge in the gallbladder without evidence of acute cholecystitis. PANCREAS: Normal. SPLEEN: Normal. ADRENALS: Right adrenal is normal. There is a left adrenal nodule measuring 13 mm suggesting a probable adenoma. KIDNEYS: 14 mm cyst in the upper pole the right kidney. VASCULATURE: Moderate calcific atherosclerosis present. There is mild aneurysmal dilatation the infrarenal abdominal aorta measuring 3.1 cm. Katherene Means LYMPH NODES: There are bilateral enlarged groin lymph nodes measuring 15 mm on the left. GASTROINTESTINAL TRACT: No bowel dilation or wall thickening. There is fluid in the rectum suggesting gastroenteritis. This is nonspecific. Moderate amount of stool present in the colon. There is colonic diverticulosis without diverticulitis. Appendix is normal. No bowel obstruction seen. PELVIC ORGANS: Unremarkable. BONES: No acute or aggressive osseous abnormalities identified. There is osteopenia. Degenerative extra scoliosis lumbar spine present. Right hip arthroplasty present without loosening. Osteopenia present OTHER: None. _______________     IMPRESSION: Minimal fluid in the rectum possibly representing gastroenteritis otherwise no acute process. Mild aneurysmal dilatation of the abdominal aorta infrarenally Enlarged groin lymph nodes left greater than right    Xr Chest Port    Result Date: 4/24/2019  EXAM: XR CHEST PORT CLINICAL INDICATION/HISTORY: Shortness of breath -Additional: None  COMPARISON: Several prior chest radiographs, most recently May 22, 2018 TECHNIQUE: Frontal view of the chest _______________ FINDINGS: HEART AND MEDIASTINUM: Left subclavian approach pacemaker with lead tips in stable position. The cardiac size and mediastinal contours are stable. Pulmonary vascularity within normal limits. LUNGS AND PLEURAL SPACES: Patient is rotated on the provided projection foreshortening of the right hemithorax. No focal pneumonic consolidation. No pneumothorax or pleural effusion. BONY THORAX AND SOFT TISSUES: No acute osseous abnormality. Degenerative changes of the shoulder girdles are noted bilaterally _______________     IMPRESSION: Rotated projection of the chest without superimposed acute radiographic cardiopulmonary abnormality. Discharge Medications:     Current Discharge Medication List      START taking these medications    Details   tamsulosin (FLOMAX) 0.4 mg capsule Take 1 Cap by mouth daily. Qty: 30 Cap, Refills: 0         CONTINUE these medications which have CHANGED    Details   warfarin (COUMADIN) 6 mg tablet Take 1 Tab by mouth every evening. Qty: 30 Tab, Refills: 0         CONTINUE these medications which have NOT CHANGED    Details   metoprolol succinate (TOPROL-XL) 25 mg XL tablet Take 1 Tab by mouth daily.   Qty: 90 Tab, Refills: 2    Associated Diagnoses: Sick sinus syndrome ([facility] Cardiomyopathy, unspecified type ([facility] Paroxysmal atrial fibrillation (HCC)      gabapentin (NEURONTIN) 600 mg tablet One po in am , one at noon and 2 at hs  Qty: 120 Tab, Refills: 3    Associated Diagnoses: Neuropathy; Gait instability; Leg swelling      furosemide (LASIX) 40 mg tablet Take 1 Tab by mouth two (2) times a day. Qty: 60 Tab, Refills: 3    Associated Diagnoses: Leg swelling      metFORMIN (GLUCOPHAGE) 500 mg tablet Take 1 Tab by mouth two (2) times daily (with meals). Qty: 60 Tab, Refills: 3    Associated Diagnoses: DM type 2, goal HbA1c < 7% (HCC)      iron, carbonyl (FEOSOL) 45 mg tab Take 1 Tab by mouth daily. LYCOPENE PO 20 mg by Buccal route daily. beta-carotene,A,-vits C,E/mins (EYE HEALTH PO) Take 1 Tab by mouth daily. prasterone, dhea, (DHEA) 50 mg tab Take 50 mg by mouth daily. cholecalciferol (VITAMIN D3) 1,000 unit tablet Take 1,000 Units by mouth daily. acetaminophen (TYLENOL) 500 mg tablet Take 500 mg by mouth every six (6) hours as needed for Pain. SODIUM CHLORIDE (AFRIN SALINE NASAL MIST NA) 1 [place] by IntraNASal route two (2) times daily as needed (nasal congestion). cyanocobalamin 1,000 mcg tablet Take 1,000 mcg by mouth daily. diphenoxylate-atropine (LOMOTIL) 2.5-0.025 mg per tablet Take 1 Tab by mouth four (4) times daily as needed for Diarrhea. terbinafine HCl (LAMISIL) 250 mg tablet Take 1 Tab by mouth daily. Qty: 30 Tab, Refills: 3    Associated Diagnoses: Tinea unguium      potassium chloride (K-DUR, KLOR-CON) 20 mEq tablet Take 1 Tab by mouth two (2) times a day. Qty: 30 Tab, Refills: 3    Associated Diagnoses: Wound infection      albuterol (PROVENTIL VENTOLIN) 2.5 mg /3 mL (0.083 %) nebulizer solution 3 mL by Nebulization route every four (4) hours as needed for Wheezing. Qty: 1 Package, Refills: 1    Associated Diagnoses: Other emphysema ([facility]      mupirocin (BACTROBAN) 2 % ointment Apply  to affected area daily.   Qty: 22 g, Refills: 0    Associated Diagnoses: Pressure ulcer, unstageable, with eschar (HCC)      collagenase (SANTYL) 250 unit/gram ointment Apply  to affected area daily. Apply to left lwoer ext wound once daily  Qty: 90 g, Refills: 3    Associated Diagnoses: Wound of left lower extremity, initial encounter      miconazole (ZEASORB, MICONAZOLE,) 2 % topical powder Apply  to affected area two (2) times a day. Apply localy to the groin twice daily  Qty: 85 g, Refills: 3    Associated Diagnoses: Tinea cruris      raNITIdine (ZANTAC) 75 mg tablet Take 75 mg by mouth daily as needed. loratadine (CLARITIN) 10 mg tablet Take 1 Tab by mouth daily. Qty: 90 Tab, Refills: 1         STOP taking these medications       menthol/camphor (BIOFREEZE EX) Comments:   Reason for Stopping:         predniSONE (DELTASONE) 10 mg tablet Comments:   Reason for Stopping:         diphenhydrAMINE (BENADRYL) 50 mg capsule Comments:   Reason for Stopping:               Activity: PT/OT Eval and Treat    Diet: Diabetic Diet    Wound Care: Keep wound clean and dry, wound care eval    Follow-up:   Please follow up with your PCP within 7 days to discuss your recent hospitalization. Patient to arrange.   Wound care  PT/INR         Total time spent including time spent on final examination and discharge discussion, discharge documentation and records reviewed and medication reconciliation: > 30 minutes    Vaibhav Rich DO  Internal Medicine, Hospitalist  Pager: [phone]

## 2019-04-29 NOTE — PROGRESS NOTES
Transportation at Discharge: 4/29/19 Transport Company/Representative: Chao Wiseman / [employer] Phone number: [phone] Method of Transport: [employer] / Loralie Mealing Estimated pick-up time: 2:00P Destination: [address]  Insurance Info: [employer] Is [employer] Authorization: Not required Requesting Outcomes Manager: Lewanda Olszewski Violet Franc, Care- ext [phone]

## 2019-04-29 NOTE — PROGRESS NOTES
completed follow up visit with patient in room [phone] today. Patient says he is doing ok and had no concerns this morning. Chaplains will continue to follow and will provide pastoral care on an as needed/requested basis Reiseñor 3 Board Certified [employer]  
[phone]

## 2019-04-29 NOTE — PROGRESS NOTES
2041- Assumed care of pt. Pt resting in bed. AxOx4. No c/o pain. NAD. Call light within reach. Will continue to monitor. 2145- due meds administered. Pt running slight fever--prn tylenol given. BS- 253. 9 units of humalogs given per sliding scale. 4719- Incontinence care provided. Pt had large, loose BM. Linens, gown, and all padding changed. Sacrum, foot, and IVANNA leg wound dressings done. Pt tolerated well. No other needs at this time. Bedside shift change report given to Ale Ocampo (oncoming nurse) by Duyen Diaz RN (offgoing nurse). Report included the following information SBAR, Kardex, Intake/Output, MAR, Accordion and Recent Results.

## 2019-04-29 NOTE — PROGRESS NOTES
Pt accepted to [address] [place] [address] ins authorization has been obtained. [facility] medical transport set up for 1400. Met with pt & made him aware of above, agreeable to transfer, asked for [facility] to give to his son, provided. Pt's nurse made aware of above. Available as needed. Aditi Harper,RN,ext [phone]. Care Management Interventions PCP Verified by CM: Yes Last Visit to PCP: 11/25/18 Mode of Transport at Discharge: BLS Transition of Care Consult (CM Consult): SNF Partner SNF: No 
Reason Why Partner SNF Not Chosen: Bed availability Physical Therapy Consult: Yes Occupational Therapy Consult: Yes Current Support Network: Own Home(son lives with him) Confirm Follow Up Transport: Self(pt may need wheelchair or stretcher transport) Plan discussed with Pt/Family/Caregiver: Yes Freedom of Choice Offered: Yes Discharge Location Discharge Placement: Skilled nursing facility([facility])

## 2019-04-30 ENCOUNTER — PATIENT OUTREACH (OUTPATIENT)
Dept: FAMILY MEDICINE CLINIC | Age: 79
End: 2019-04-30

## 2019-04-30 PROCEDURE — 3331090001 HH PPS REVENUE CREDIT

## 2019-04-30 PROCEDURE — 3331090002 HH PPS REVENUE DEBIT

## 2019-04-30 NOTE — PROGRESS NOTES
Transition of Care Coordination/Hospital to Post Acute Facility: 
  
Date/Time:  2019 12:45 PM 
 
Patient was admitted to [facility] on 19 for treatment of colitis and UTI. Patient was discharged to [facility]  for continuation of care. Inpatient RRAT score: 18 Top Challenges reviewed Medications, therapies and length of stay Method of communication with care team :phone Nurse Navigator(NN) spoke with Saige to provide introduction to self and explanation of the Nurse Navigator Role. Verified name and  as patient identifiers. Discussed and reviewed  anticipated length of stay, anticipated needs at time of discharge, discharge summary, DME orders, medication reconciliation, wound care orders ACP:  
Does the patient have a current ACP (including DDNR):  yes Does the post acute facility have a copy of the patients ACP:  yes Medication(s):  
New Medications at Discharge: flomax Changed Medications at Discharge: coumadin Discontinued Medications at Discharge: biofreeze, benadryl, prednisone PCP/Specialist follow up: No future appointments. NN will follow up with  Kathryn Son for discharge plan. Opportunity to ask questions was provided. Contact information was provided for future reference or further questions. Will continue to monitor.

## 2019-05-01 PROCEDURE — 3331090002 HH PPS REVENUE DEBIT

## 2019-05-01 PROCEDURE — 3331090001 HH PPS REVENUE CREDIT

## 2019-05-02 PROCEDURE — 3331090002 HH PPS REVENUE DEBIT

## 2019-05-02 PROCEDURE — 3331090001 HH PPS REVENUE CREDIT

## 2019-05-03 PROCEDURE — 3331090001 HH PPS REVENUE CREDIT

## 2019-05-03 PROCEDURE — 3331090002 HH PPS REVENUE DEBIT

## 2019-05-04 PROCEDURE — 3331090001 HH PPS REVENUE CREDIT

## 2019-05-04 PROCEDURE — 3331090002 HH PPS REVENUE DEBIT

## 2019-05-05 PROCEDURE — 3331090001 HH PPS REVENUE CREDIT

## 2019-05-05 PROCEDURE — 3331090002 HH PPS REVENUE DEBIT

## 2019-05-06 ENCOUNTER — PATIENT OUTREACH (OUTPATIENT)
Dept: FAMILY MEDICINE CLINIC | Age: 79
End: 2019-05-06

## 2019-05-06 PROCEDURE — 3331090002 HH PPS REVENUE DEBIT

## 2019-05-06 PROCEDURE — 3331090001 HH PPS REVENUE CREDIT

## 2019-05-07 PROCEDURE — 3331090001 HH PPS REVENUE CREDIT

## 2019-05-07 PROCEDURE — 3331090002 HH PPS REVENUE DEBIT

## 2019-05-08 ENCOUNTER — HOME CARE VISIT (OUTPATIENT)
Dept: HOME HEALTH SERVICES | Facility: HOME HEALTH | Age: 79
End: 2019-05-08
Payer: MEDICARE

## 2019-05-08 PROCEDURE — 3331090001 HH PPS REVENUE CREDIT

## 2019-05-08 PROCEDURE — 3331090002 HH PPS REVENUE DEBIT

## 2019-05-08 RX ORDER — DOXYCYCLINE 100 MG/1
100 CAPSULE ORAL 2 TIMES DAILY
COMMUNITY
End: 2021-05-24

## 2019-05-08 RX ORDER — INSULIN LISPRO 100 [IU]/ML
INJECTION, SOLUTION INTRAVENOUS; SUBCUTANEOUS
COMMUNITY
End: 2021-05-24 | Stop reason: ALTCHOICE

## 2019-05-08 RX ORDER — GUAIFENESIN 100 MG/5ML
200 SOLUTION ORAL
COMMUNITY
End: 2021-05-24

## 2019-05-08 RX ORDER — OXYCODONE AND ACETAMINOPHEN 5; 325 MG/1; MG/1
TABLET ORAL
COMMUNITY
End: 2021-05-24

## 2019-05-08 RX ORDER — CEPHALEXIN 500 MG/1
500 CAPSULE ORAL 4 TIMES DAILY
COMMUNITY
End: 2021-05-24

## 2019-05-08 NOTE — PROGRESS NOTES
Transition of Care Coordination/Hospital to Post Acute Facility: 
  
Date/Time:  2019 1:24 PM 
 
Patient was admitted to THE [facility] on 19 for treatment of fever due to BLE wound infections and sacral wound. Patient was discharged 19 to [facility] for continuation of care. Inpatient RRAT score: 18 Top Challenges reviewed Wound care, PT/INR, sliding scale for blood sugars Method of communication with care team :phone Nurse Navigator(NN) spoke with Андрей Yuen RN to provide introduction to self and explanation of the Nurse Navigator Role. Verified name and  as patient identifiers. Discussed and reviewed  anticipated length of stay, discharge summary, follow up appointments, medication reconciliation, wound care orders ACP:  
Does the patient have a current ACP (including DDNR):  yes, ACP only Does the post acute facility have a copy of the patients ACP:  yes Medication(s):  
New Medications at Discharge: keflex, vibramycin, robitussin,humalog, Changed Medications at Discharge: gabapentin Discontinued Medications at Discharge: NA 
  
PCP/Specialist follow up: No future appointments. Opportunity to ask questions was provided. Contact information was provided for future reference or further questions. Will continue to monitor.

## 2019-05-09 PROCEDURE — 3331090002 HH PPS REVENUE DEBIT

## 2019-05-09 PROCEDURE — 3331090001 HH PPS REVENUE CREDIT

## 2019-05-10 PROCEDURE — 3331090001 HH PPS REVENUE CREDIT

## 2019-05-10 PROCEDURE — 3331090002 HH PPS REVENUE DEBIT

## 2019-05-11 PROCEDURE — 3331090002 HH PPS REVENUE DEBIT

## 2019-05-11 PROCEDURE — 3331090001 HH PPS REVENUE CREDIT

## 2019-05-12 PROCEDURE — 3331090001 HH PPS REVENUE CREDIT

## 2019-05-12 PROCEDURE — 3331090002 HH PPS REVENUE DEBIT

## 2019-05-13 PROCEDURE — 3331090002 HH PPS REVENUE DEBIT

## 2019-05-13 PROCEDURE — 3331090001 HH PPS REVENUE CREDIT

## 2019-05-14 ENCOUNTER — PATIENT OUTREACH (OUTPATIENT)
Dept: FAMILY MEDICINE CLINIC | Age: 79
End: 2019-05-14

## 2019-05-14 PROCEDURE — 3331090001 HH PPS REVENUE CREDIT

## 2019-05-14 PROCEDURE — 3331090002 HH PPS REVENUE DEBIT

## 2019-05-14 NOTE — PROGRESS NOTES
[address] [place] [address] for discharge plan for patient. Spoke with Efrain NELSON. She stated the current plan was to discharge 5/22/19 home with home health. Will continue to follow.

## 2019-05-15 PROCEDURE — 3331090001 HH PPS REVENUE CREDIT

## 2019-05-15 PROCEDURE — 3331090002 HH PPS REVENUE DEBIT

## 2019-05-16 PROCEDURE — 3331090001 HH PPS REVENUE CREDIT

## 2019-05-16 PROCEDURE — 3331090002 HH PPS REVENUE DEBIT

## 2019-05-17 PROCEDURE — 3331090001 HH PPS REVENUE CREDIT

## 2019-05-17 PROCEDURE — 3331090002 HH PPS REVENUE DEBIT

## 2019-05-18 PROCEDURE — 3331090001 HH PPS REVENUE CREDIT

## 2019-05-18 PROCEDURE — 3331090002 HH PPS REVENUE DEBIT

## 2019-05-19 PROCEDURE — 3331090001 HH PPS REVENUE CREDIT

## 2019-05-19 PROCEDURE — 3331090002 HH PPS REVENUE DEBIT

## 2019-05-20 ENCOUNTER — HOME CARE VISIT (OUTPATIENT)
Dept: HOME HEALTH SERVICES | Facility: HOME HEALTH | Age: 79
End: 2019-05-20
Payer: MEDICARE

## 2019-05-20 ENCOUNTER — DOCUMENTATION ONLY (OUTPATIENT)
Dept: FAMILY MEDICINE CLINIC | Age: 79
End: 2019-05-20

## 2019-05-20 NOTE — PROGRESS NOTES
DOCUMENTATION ONLY: [facility] medical supply order for the patient hospital bed and mattress have been completed and signed per Dr. An Campos. Documentation was faxed on 5/17/2019 and confirmation received was sent to central scanning.

## 2019-05-21 ENCOUNTER — PATIENT OUTREACH (OUTPATIENT)
Dept: FAMILY MEDICINE CLINIC | Age: 79
End: 2019-05-21

## 2019-05-21 NOTE — PROGRESS NOTES
Chart review completed. [facility] for discharge plan for patient. Talked with Efrain who said the hospital bed was delivered to the house and they just placed order for wheelchair. Patient is to be discharged 5/23/19. Patient lives with his son who is his caregiver. Will continue to follow.

## 2019-05-23 ENCOUNTER — TELEPHONE (OUTPATIENT)
Dept: FAMILY MEDICINE CLINIC | Age: 79
End: 2019-05-23

## 2019-05-23 NOTE — TELEPHONE ENCOUNTER
Brenda from [facility] was wanting to know if Dr. Meri Barry could put in orders for INR. The fax number is [phone] to fax over the order. Please advise.

## 2019-05-29 DIAGNOSIS — I48.0 PAROXYSMAL ATRIAL FIBRILLATION (HCC): Primary | ICD-10-CM

## 2019-05-29 NOTE — TELEPHONE ENCOUNTER
Order has been faxed to [facility] care and fax confirmation received was sent to central scanning. Encounter closed.

## 2019-05-30 ENCOUNTER — TELEPHONE (OUTPATIENT)
Dept: FAMILY MEDICINE CLINIC | Age: 79
End: 2019-05-30

## 2019-05-30 NOTE — TELEPHONE ENCOUNTER
Alex Franco from [facility] is asking if INR needs to be done today being that his nurse already came or could it be done tomorrow. Please contact her at [phone]. Please advise.

## 2019-05-30 NOTE — TELEPHONE ENCOUNTER
Coby Avalos from [facility] called in patients INR : 3.1 PT : 36.7    Patient is taking 6mg coumadin daily.

## 2019-05-30 NOTE — TELEPHONE ENCOUNTER
Attempted to [facility] back at provided number listed below. LVM for return phone call. Awaiting phone call back. Verbal order and read back per Nina Forte MD ok to check INR tomorrow. Will advise once phone call is returned.

## 2019-06-03 NOTE — TELEPHONE ENCOUNTER
Left voicemail to [facility] for call back. Called patient and told him directions to continue 6mg coumadin and recheck in 2 wks. Josee Sapp called back, gave him directions.      Mr Serina Alfaro and Josee Sapp both verbalized understanding

## 2019-06-05 PROCEDURE — A6253 ABSORPT DRG > 48 SQ IN W/O B: HCPCS

## 2019-06-10 ENCOUNTER — TELEPHONE (OUTPATIENT)
Dept: FAMILY MEDICINE CLINIC | Age: 79
End: 2019-06-10

## 2019-06-10 NOTE — TELEPHONE ENCOUNTER
Anthony Landon from [facility] called in and was wanting to know if an order could be put in for a  to be sent out to the house. Pt is very overweight and cannot leave the house without a wheelchair and needs help so she was wanting to know if that could be done. Please advise.

## 2019-06-11 ENCOUNTER — HOME HEALTH ADMISSION (OUTPATIENT)
Dept: HOME HEALTH SERVICES | Facility: HOME HEALTH | Age: 79
End: 2019-06-11

## 2019-06-11 DIAGNOSIS — G62.9 NEUROPATHY: ICD-10-CM

## 2019-06-11 DIAGNOSIS — R26.81 GAIT INSTABILITY: ICD-10-CM

## 2019-06-11 DIAGNOSIS — I73.9 CLAUDICATION (HCC): Primary | ICD-10-CM

## 2019-07-19 ENCOUNTER — TELEPHONE (OUTPATIENT)
Dept: FAMILY MEDICINE CLINIC | Age: 79
End: 2019-07-19

## 2019-07-19 NOTE — TELEPHONE ENCOUNTER
Received a call from Memo Heard,   INR 1.3 pt taking 5 mg daily. Per Nick Ho  Mon, Wed, Fri 6 mg starting today 07/19/2019 alternate 5 mg. Recheck in 1 week. Nurse spoke to Memo Heard, understanding verbalized. This encounter will be closed.

## 2019-07-26 ENCOUNTER — TELEPHONE (OUTPATIENT)
Dept: FAMILY MEDICINE CLINIC | Age: 79
End: 2019-07-26

## 2019-07-26 NOTE — TELEPHONE ENCOUNTER
Emi with Home care contacted the office and stated that she needed a verbal order for knee salt. Per Dr. Jacob Edwards, a verbal order was provided. Patient will also have his INR check on Monday @ 3pm. Verbalized understanding and Emi tolerated well. Encounter closed.

## 2019-07-29 ENCOUNTER — TELEPHONE (OUTPATIENT)
Dept: FAMILY MEDICINE CLINIC | Age: 79
End: 2019-07-29

## 2019-08-09 ENCOUNTER — TELEPHONE (OUTPATIENT)
Dept: FAMILY MEDICINE CLINIC | Age: 79
End: 2019-08-09

## 2019-08-09 DIAGNOSIS — M79.89 LEG SWELLING: ICD-10-CM

## 2019-08-09 DIAGNOSIS — R26.81 GAIT INSTABILITY: ICD-10-CM

## 2019-08-09 DIAGNOSIS — G62.9 NEUROPATHY: ICD-10-CM

## 2019-08-09 RX ORDER — GABAPENTIN 600 MG/1
TABLET ORAL
Qty: 120 TAB | Refills: 3 | Status: SHIPPED | OUTPATIENT
Start: 2019-08-09 | End: 2021-05-24 | Stop reason: SDUPTHER

## 2019-08-09 RX ORDER — WARFARIN SODIUM 5 MG/1
5 TABLET ORAL DAILY
Qty: 90 TAB | Refills: 3 | Status: SHIPPED | OUTPATIENT
Start: 2019-08-09 | End: 2021-05-24

## 2019-08-09 NOTE — TELEPHONE ENCOUNTER
[phone]     Pt is currently having loose stools, pt diagnosed  With C. Diff x 1 month. Homehealth is asking do provider want a stool culture collected? If so, please order labs. INR 2.1  Mon, Wed and Fri 6 mg  Thurs, Sat and Sun 5 mg  Continue current dosage recheck in 1 week. Pt is requesting refill on Gabapentin and Coumadin. Refill request sent.

## 2019-08-12 ENCOUNTER — DOCUMENTATION ONLY (OUTPATIENT)
Dept: FAMILY MEDICINE CLINIC | Age: 79
End: 2019-08-12

## 2019-08-12 DIAGNOSIS — R19.7 DIARRHEA OF PRESUMED INFECTIOUS ORIGIN: Primary | ICD-10-CM

## 2019-08-12 DIAGNOSIS — R19.7 DIARRHEA OF PRESUMED INFECTIOUS ORIGIN: ICD-10-CM

## 2019-08-12 NOTE — TELEPHONE ENCOUNTER
Nurse spoke to Juana Raines, pt has not tried OTC immodium. Pt wanted to rule out C. Diff per Nurse Juana Raines. Please advise.

## 2019-08-12 NOTE — PROGRESS NOTES
DOCUMENTATION ONLY: Prescription for the patient medication, gabapentin, has been sent to THE [facility] via fax @ [phone]. Fax confirmation was received and sent to central scanning.

## 2019-08-13 ENCOUNTER — TELEPHONE (OUTPATIENT)
Dept: FAMILY MEDICINE CLINIC | Age: 79
End: 2019-08-13

## 2019-08-13 NOTE — TELEPHONE ENCOUNTER
Griffin Oleary from [facility] stated pt's legs are swelling and ulcers continue to develop. She is requesting an order for four layer compression wrap on top of \"AG\" they are already applying.  Please assist.

## 2019-08-14 DIAGNOSIS — T14.8XXA WOUND INFECTION: Primary | ICD-10-CM

## 2019-08-14 DIAGNOSIS — L08.9 WOUND INFECTION: Primary | ICD-10-CM

## 2019-08-19 ENCOUNTER — TELEPHONE (OUTPATIENT)
Dept: FAMILY MEDICINE CLINIC | Age: 79
End: 2019-08-19

## 2019-08-19 ENCOUNTER — DOCUMENTATION ONLY (OUTPATIENT)
Dept: FAMILY MEDICINE CLINIC | Age: 79
End: 2019-08-19

## 2019-08-19 NOTE — PROGRESS NOTES
Myrtle Sanchez from [facility] called in and relayed PT INR to me.  I was unable to locate the nurses so I took the reading at about 3:45pm and relayed message to an available nurse at about 4:05pm.

## 2019-08-19 NOTE — TELEPHONE ENCOUNTER
Oralia with Home Health contacted the office and stated that the patient INR is:     INR 2.9     Patient is currently taking:   Mon, Wed, Fri - 6 mg of Coumadin  Thurs, Sat, Sun - 5 mg of Coumadin    Oralia request UA for patient for possible UTI. Oralia phone number is [phone]. Please advise.  Thank you

## 2019-08-21 NOTE — TELEPHONE ENCOUNTER
Contacted Oralia with [address] [place] [address] Avel Booth and per Dr. Kylah Maciel request, a verbal order was provided for the patient NANCY Daley Other verbalized understanding and tolerated well.

## 2019-08-23 NOTE — TELEPHONE ENCOUNTER
Ehsan Aguila from [facility] home care called in and was wanting to know if she could get an order for an antibiotic for cellulitis for the patients left leg. Please advise.

## 2019-09-05 NOTE — TELEPHONE ENCOUNTER
Carmencita Grijalva from [facility] is asking if results from UA was received from [facility] lab.  Please assist.

## 2019-09-12 ENCOUNTER — PATIENT OUTREACH (OUTPATIENT)
Dept: FAMILY MEDICINE CLINIC | Age: 79
End: 2019-09-12

## 2019-09-12 DIAGNOSIS — E11.9 DM TYPE 2, GOAL HBA1C < 7% (HCC): ICD-10-CM

## 2019-09-12 RX ORDER — METFORMIN HYDROCHLORIDE 500 MG/1
TABLET ORAL
Qty: 180 TAB | Refills: 3 | Status: SHIPPED | OUTPATIENT
Start: 2019-09-12 | End: 2021-05-24 | Stop reason: ALTCHOICE

## 2019-09-12 NOTE — PROGRESS NOTES
Chart review completed. Follow up call to patient. LM with NN contact information for any needs. Episode resolved.

## 2019-09-13 ENCOUNTER — TELEPHONE (OUTPATIENT)
Dept: FAMILY MEDICINE CLINIC | Age: 79
End: 2019-09-13

## 2019-09-13 NOTE — TELEPHONE ENCOUNTER
Kailyn Jones called for patient's INR 2.6     PT= 31.7    MWF 6mg coumadin, T,TH, SAT, SUN 5mg coumadin. Per Dr. Edwige Arroyo, continue same dosage.  Kailyn Jones verbalized understanding

## 2019-09-17 ENCOUNTER — TELEPHONE (OUTPATIENT)
Dept: FAMILY MEDICINE CLINIC | Age: 79
End: 2019-09-17

## 2019-09-18 DIAGNOSIS — N39.0 ESCHERICHIA COLI URINARY TRACT INFECTION: Primary | ICD-10-CM

## 2019-09-18 DIAGNOSIS — B96.20 ESCHERICHIA COLI URINARY TRACT INFECTION: Primary | ICD-10-CM

## 2019-09-18 RX ORDER — AMOXICILLIN AND CLAVULANATE POTASSIUM 875; 125 MG/1; MG/1
1 TABLET, FILM COATED ORAL 2 TIMES DAILY
Qty: 20 TAB | Refills: 0 | Status: SHIPPED | OUTPATIENT
Start: 2019-09-18 | End: 2019-09-28

## 2019-09-18 NOTE — TELEPHONE ENCOUNTER
Nurse spoke to patient, advised that Augmentin sent to pharmacy. Pt verbalized understanding, per patient at this time, he is unable to get out of the house due to being in a wheel chair. Nurse verbalized understanding, this encounter will be closed.

## 2019-09-20 NOTE — PROGRESS NOTES
Contacted AMINATA, wound care, and advised her that the antibiotic had been sent to the [facility] stated she rarely sees the patient and we should contact his nurse, Dawn Lopez @ [phone]. Pharmacy stated that the medication had been picked up. Order was received for additional supplies and order was given to Dr. Justine Aiken for completion. Awaiting paperwork.

## 2019-10-04 ENCOUNTER — TELEPHONE (OUTPATIENT)
Dept: FAMILY MEDICINE CLINIC | Age: 79
End: 2019-10-04

## 2019-10-04 NOTE — TELEPHONE ENCOUNTER
Home Health nurse called with PT/ INR.  2.5INR  30.3 PT   Patient taking 6mg tabs M-W-F  5 mg tabs T-T  Please advise

## 2019-10-09 ENCOUNTER — TELEPHONE (OUTPATIENT)
Dept: FAMILY MEDICINE CLINIC | Age: 79
End: 2019-10-09

## 2019-10-09 NOTE — TELEPHONE ENCOUNTER
Received call from nurse Joseph Skiff w/wound care,  She is treating wound. She states patients left leg is red and swollen. Patient is going to the ER at this time.

## 2019-10-21 ENCOUNTER — PATIENT OUTREACH (OUTPATIENT)
Dept: FAMILY MEDICINE CLINIC | Age: 79
End: 2019-10-21

## 2019-10-21 NOTE — PROGRESS NOTES
Follow-Up      Date/Time:  10/21/2019 1:46 PM    Patient was admitted to [facility] on 10/14/19 and discharged on 10/18/19 for Bilateral Lower legs diabetic Ulcer, Cellulitis. The physician discharge summary was available at the time of outreach. Patient discharged to a SNF Preferred Provider Network facility, [facility] ( [phone]). Patient will be included in weekly care coordination calls. CTN attempted to reach Patient's nurse at [facility] ( [phone]), unable to reach nursing.

## 2019-10-24 ENCOUNTER — PATIENT OUTREACH (OUTPATIENT)
Dept: CASE MANAGEMENT | Age: 79
End: 2019-10-24

## 2019-10-24 NOTE — PROGRESS NOTES
Community Care Team Documentation for Patient in [facility]     Patient discharged from [facility]  to [facility], on 10/18/19. Hospital Discharge diagnosis:       DM , Type II   Bilateral lower leg ulcers   Cellulitis    SNF Attending Provider:      Anticipated discharge date from SNF:  TBD    PCP : Kervin Laird MD    Nurse Navigator: [facility] Team rounds completed, updates provided by facility. Brief Summary of Care:    Patient receiving PT/OT. Pt is wheelchair bound - was this way PTA. Working on upper body strengthening. Dispo: Was living with son - has been w/c bound for > 1year. Goal is to walk again and return home. RRAT:  Medium Risk            13       Total Score        3 Has Seen PCP in Last 6 Months (Yes=3, No=0)    10 Charlson Comorbidity Score (Age + Comorbid Conditions)        Criteria that do not apply:    . Living with Significant Other. Assisted Living. LTAC. SNF. or   Rehab    Patient Length of Stay (>5 days = 3)    IP Visits Last 12 Months (1-3=4, 4=9, >4=11)    Pt.  Coverage (Medicare=5 , Medicaid, or Self-Pay=4)        Active Ambulatory Problems     Diagnosis Date Noted    Cardiomyopathy ([address] 12/03/2015    Asthma 12/03/2015    Peripheral neuropathy 12/03/2015    Other allergic rhinitis 12/03/2015    Nocturia 12/10/2015    Knee pain     Sick sinus syndrome (HCC) 02/25/2016    Monoclonal gammopathy 03/07/2016    Tinea corporis 03/07/2016    Actinic keratosis 03/07/2016    Idiopathic peripheral neuropathy 05/12/2016    Abnormal SPEP 10/19/2017    Stroke ([address] 02/07/2018    PAF (paroxysmal atrial fibrillation) ([address] 02/07/2018    Weakness of left upper extremity 02/07/2018    BPH (benign prostatic hyperplasia) 02/07/2018    Cellulitis 05/22/2018    Diabetes mellitus type 2, controlled ([address] 05/23/2018    Colitis 04/24/2019    UTI (urinary tract infection) 04/24/2019    Chronic venous stasis dermatitis of both lower extremities 04/24/2019     Resolved Ambulatory Problems     Diagnosis Date Noted    Type 2 diabetes mellitus without complication ([facility] 42/91/5513     Past Medical History:   Diagnosis Date    Herniated disc, cervical     Pacemaker 2011    SSS (sick sinus syndrome) ([facility]

## 2019-10-31 ENCOUNTER — PATIENT OUTREACH (OUTPATIENT)
Dept: CASE MANAGEMENT | Age: 79
End: 2019-10-31

## 2019-10-31 NOTE — PROGRESS NOTES
Community Care Team Documentation for Patient in [facility]     Patient discharged from [facility]  to [facility], on 10/18/19. Hospital Discharge diagnosis:       DM , Type II   Bilateral lower leg ulcers   Cellulitis    SNF Attending Provider:      Anticipated discharge date from SNF:  TBD    PCP : Sobia Whaley MD    Nurse Navigator: [facility] Team rounds completed, updates provided by facility. Brief Summary of Care:    Patient receiving PT/OT. Pt is wheelchair bound - was this way PTA. Working on upper body strengthening. Dispo: Was living with son - has been w/c bound for > 1year. Pt would like to walk again and return home if able. RRAT:  Medium Risk            13       Total Score        3 Has Seen PCP in Last 6 Months (Yes=3, No=0)    10 Charlson Comorbidity Score (Age + Comorbid Conditions)        Criteria that do not apply:    . Living with Significant Other. Assisted Living. LTAC. SNF. or   Rehab    Patient Length of Stay (>5 days = 3)    IP Visits Last 12 Months (1-3=4, 4=9, >4=11)    Pt.  Coverage (Medicare=5 , Medicaid, or Self-Pay=4)        Active Ambulatory Problems     Diagnosis Date Noted    Cardiomyopathy ([address] 12/03/2015    Asthma 12/03/2015    Peripheral neuropathy 12/03/2015    Other allergic rhinitis 12/03/2015    Nocturia 12/10/2015    Knee pain     Sick sinus syndrome (HCC) 02/25/2016    Monoclonal gammopathy 03/07/2016    Tinea corporis 03/07/2016    Actinic keratosis 03/07/2016    Idiopathic peripheral neuropathy 05/12/2016    Abnormal SPEP 10/19/2017    Stroke ([address] 02/07/2018    PAF (paroxysmal atrial fibrillation) ([address] 02/07/2018    Weakness of left upper extremity 02/07/2018    BPH (benign prostatic hyperplasia) 02/07/2018    Cellulitis 05/22/2018    Diabetes mellitus type 2, controlled ([address] 05/23/2018    Colitis 04/24/2019    UTI (urinary tract infection) 04/24/2019    Chronic venous stasis dermatitis of both lower extremities 04/24/2019     Resolved Ambulatory Problems     Diagnosis Date Noted    Type 2 diabetes mellitus without complication ([facility] 51/78/4120     Past Medical History:   Diagnosis Date    Herniated disc, cervical     Pacemaker 2011    SSS (sick sinus syndrome) ([facility]

## 2019-11-06 ENCOUNTER — HOME HEALTH ADMISSION (OUTPATIENT)
Dept: HOME HEALTH SERVICES | Facility: HOME HEALTH | Age: 79
End: 2019-11-06
Payer: MEDICARE

## 2019-11-07 ENCOUNTER — HOME CARE VISIT (OUTPATIENT)
Dept: SCHEDULING | Facility: HOME HEALTH | Age: 79
End: 2019-11-07
Payer: MEDICARE

## 2019-11-07 ENCOUNTER — PATIENT OUTREACH (OUTPATIENT)
Dept: CASE MANAGEMENT | Age: 79
End: 2019-11-07

## 2019-11-07 PROCEDURE — G0299 HHS/HOSPICE OF RN EA 15 MIN: HCPCS

## 2019-11-07 PROCEDURE — 3331090002 HH PPS REVENUE DEBIT

## 2019-11-07 PROCEDURE — 400013 HH SOC

## 2019-11-07 PROCEDURE — 3331090001 HH PPS REVENUE CREDIT

## 2019-11-07 NOTE — PROGRESS NOTES
Community Care Team Documentation for Patient in [facility]     Patient discharged from [facility]  to [facility], on 10/18/19. Hospital Discharge diagnosis:       DM , Type II   Bilateral lower leg ulcers   Cellulitis    SNF Attending Provider:      Anticipated discharge date from SNF:  11/7/19     PCP : Melanie Hayes MD    Nurse Navigator: Fiona Arceo Team rounds completed, updates provided by facility. Brief Summary of Care:    Patient receiving PT/OT. Pt is wheelchair bound - was this way PTA. Working on upper body strengthening. Dispo: Will be going home with son and [facility] W[facility]. has been w/c bound for > 1year. RRAT:  Medium Risk            13       Total Score        3 Has Seen PCP in Last 6 Months (Yes=3, No=0)    10 Charlson Comorbidity Score (Age + Comorbid Conditions)        Criteria that do not apply:    . Living with Significant Other. Assisted Living. LTAC. SNF. or   Rehab    Patient Length of Stay (>5 days = 3)    IP Visits Last 12 Months (1-3=4, 4=9, >4=11)    Pt.  Coverage (Medicare=5 , Medicaid, or Self-Pay=4)        Active Ambulatory Problems     Diagnosis Date Noted    Cardiomyopathy ([address] 12/03/2015    Asthma 12/03/2015    Peripheral neuropathy 12/03/2015    Other allergic rhinitis 12/03/2015    Nocturia 12/10/2015    Knee pain     Sick sinus syndrome (HCC) 02/25/2016    Monoclonal gammopathy 03/07/2016    Tinea corporis 03/07/2016    Actinic keratosis 03/07/2016    Idiopathic peripheral neuropathy 05/12/2016    Abnormal SPEP 10/19/2017    Stroke ([address] 02/07/2018    PAF (paroxysmal atrial fibrillation) ([address] 02/07/2018    Weakness of left upper extremity 02/07/2018    BPH (benign prostatic hyperplasia) 02/07/2018    Cellulitis 05/22/2018    Diabetes mellitus type 2, controlled ([address] 05/23/2018    Colitis 04/24/2019    UTI (urinary tract infection) 04/24/2019    Chronic venous stasis dermatitis of both lower extremities 04/24/2019     Resolved Ambulatory Problems     Diagnosis Date Noted    Type 2 diabetes mellitus without complication ([facility] 10/63/8284     Past Medical History:   Diagnosis Date    Herniated disc, cervical     Pacemaker 2011    SSS (sick sinus syndrome) ([facility]

## 2019-11-08 ENCOUNTER — PATIENT OUTREACH (OUTPATIENT)
Dept: CASE MANAGEMENT | Age: 79
End: 2019-11-08

## 2019-11-08 ENCOUNTER — HOME CARE VISIT (OUTPATIENT)
Dept: HOME HEALTH SERVICES | Facility: HOME HEALTH | Age: 79
End: 2019-11-08
Payer: MEDICARE

## 2019-11-08 PROCEDURE — 3331090002 HH PPS REVENUE DEBIT

## 2019-11-08 PROCEDURE — 3331090001 HH PPS REVENUE CREDIT

## 2019-11-08 NOTE — PROGRESS NOTES
This note will not be viewable in [address] Hospital Discharge Follow-Up      Date/Time:  19 4:08 PM    Patient was admitted to [facility] on 10/14/19 and discharged on 10/18/19 for Bilateral Lower legs diabetic Ulcer, Cellulitis. The physician discharge summary was available at the time of outreach. Top Challenges reviewed with the provider   Patient is wheelchair bound. Has 7 steps to enter home. No ramp. Patient states that he needs stretcher transport to see providers. If home health hasn't request for medical social worker. kindly please add medical social worker order  for community resources. Advance Care Planning:   Does patient have an Advance Directive:  reviewed and current        Method of communication with provider :chart routing    Inpatient RRAT score: unavailble  Was this a readmission? no   Patient stated reason for the readmission: n/a    Care Transition Nurse (CTN) contacted the patient by telephone to perform post hospital discharge assessment. Verified name and  with patient as identifiers. Provided introduction to self, and explanation of the CTN role. Patient stated \" I'm feeling fine. \"  Patient denied CP, SOB, difficulty breathing, fever and chills. Patient states that his bilateral leg wounds are better than before. Patient reported that per Home health, his wounds are healing nicely. Patient states that he has D/C summary from SNF and he showed it to home health yesterday 19. Patient states that Home health reviewed his medications. With him. Patient states that [facility] nurse also calls him. Patient is living with son. Patient is wheelchair bound. Patient can pivot to bedside commode or wheelchair from hospital bed. Patient states that he has 7 steps from his house to  The ground. Patient stated \" Until my insurance is changed,i wont be able to see any providers unless its emergency. \"   Patient states that his son is with him at home almost all the time. Patient states that he inquire about visiting physician but they don't accept [employer]. Patient became upset when NN talks about building ramp. Patient states that he has told home health everyone that it will be too short, wide etc?. As per Pt. He needs stretcher transport or needs someone who can help assists him to go down the ground from his house. CTN called and SPoke with [facility] ms. Yoni Luke. CTN asked if Home health request for , Ms. Yoni Luke states that she cant see if home health nurse has put request for SW . Patient reminded that there is a physician on call 24 hours a day / 7 days a week (M-F 5pm to 8am and from Friday 5pm until Monday 8a for the weekend) should the patient have questions or concerns. Patient reminded to call 911 if situation is emergent ( such as chest pain, shortness of breath, unstoppable bleeding, feeling of passing out,  worsening of symptoms)or patient feels the situation is emergent. Pt verbalizes understanding. Patient received hospital discharge instructions. CTN reviewed discharge instructions and red flags with patient who verbalized understanding. Patient given an opportunity to ask questions and does not have any further questions or concerns at this time. The patient agrees to contact the PCP office for questions related to their healthcare. CTN provided contact information for future reference. Disease Specific:   N/A    Patients top risk factors for readmission:  functional physical ability, medical condition, transportation    [address] orders at discharge:SN  [address]: [facility]  Date of initial visit: 11/7/19 per Pt. Durable Medical Equipment ordered at discharge: none per Pt.      Current Outpatient Medications   Medication Sig    metFORMIN (GLUCOPHAGE) 500 mg tablet TAKE 1 TABLET TWICE DAILY WITH MEALS    gabapentin (NEURONTIN) 600 mg tablet One po in am , one at noon and 2 at hs    warfarin (COUMADIN) 5 mg tablet Take 1 Tab by mouth daily.  oxyCODONE-acetaminophen (PERCOCET) 5-325 mg per tablet Take  by mouth every four (4) hours as needed for Pain.  cephALEXin (KEFLEX) 500 mg capsule Take 500 mg by mouth four (4) times daily.  doxycycline (VIBRAMYCIN) 100 mg capsule Take 100 mg by mouth two (2) times a day.  guaiFENesin (ROBITUSSIN) 100 mg/5 mL liquid Take 200 mg by mouth three (3) times daily as needed for Cough.  insulin lispro (HUMALOG) 100 unit/mL injection by SubCUTAneous route.  tamsulosin (FLOMAX) 0.4 mg capsule Take 1 Cap by mouth daily.  warfarin (COUMADIN) 6 mg tablet Take 1 Tab by mouth every evening.  diphenoxylate-atropine (LOMOTIL) 2.5-0.025 mg per tablet Take 1 Tab by mouth four (4) times daily as needed for Diarrhea.  metoprolol succinate (TOPROL-XL) 25 mg XL tablet Take 1 Tab by mouth daily.  furosemide (LASIX) 40 mg tablet Take 1 Tab by mouth two (2) times a day.  terbinafine HCl (LAMISIL) 250 mg tablet Take 1 Tab by mouth daily.  iron, carbonyl (FEOSOL) 45 mg tab Take 1 Tab by mouth daily.  LYCOPENE PO 20 mg by Buccal route daily.  potassium chloride (K-DUR, KLOR-CON) 20 mEq tablet Take 1 Tab by mouth two (2) times a day.  albuterol (PROVENTIL VENTOLIN) 2.5 mg /3 mL (0.083 %) nebulizer solution 3 mL by Nebulization route every four (4) hours as needed for Wheezing.  mupirocin (BACTROBAN) 2 % ointment Apply  to affected area daily.  beta-carotene,A,-vits C,E/mins (EYE HEALTH PO) Take 1 Tab by mouth daily.  collagenase (SANTYL) 250 unit/gram ointment Apply  to affected area daily. Apply to left lwoer ext wound once daily    miconazole (ZEASORB, MICONAZOLE,) 2 % topical powder Apply  to affected area two (2) times a day. Apply localy to the groin twice daily    raNITIdine (ZANTAC) 75 mg tablet Take 75 mg by mouth daily as needed.  prasterone, dhea, (DHEA) 50 mg tab Take 50 mg by mouth daily.     cholecalciferol (VITAMIN D3) 1,000 unit tablet Take 1,000 Units by mouth daily.  acetaminophen (TYLENOL) 500 mg tablet Take 500 mg by mouth every six (6) hours as needed for Pain.  SODIUM CHLORIDE (AFRIN SALINE NASAL MIST NA) 1 [facility] by IntraNASal route two (2) times daily as needed (nasal congestion).  cyanocobalamin 1,000 mcg tablet Take 1,000 mcg by mouth daily.  loratadine (CLARITIN) 10 mg tablet Take 1 Tab by mouth daily. (Patient taking differently: Take 1 Tab by mouth daily as needed for Allergies.)     No current facility-administered medications for this visit. [facility] follow up appointment(s):   Future Appointments   Date Time Provider Brad Chapman   11/12/2019 To Be Determined Jesus Nicolle, LPN [address] [facility]   11/14/2019 To Be Determined Jesus Nicolle, LPN [address] [facility]   11/19/2019 To Be Determined Jesus Nicolle, LPN [address] [facility]   11/21/2019 To Be Determined Jesus Nicolle, LPN [address] [facility]   11/26/2019 To Be Determined Jesus Nicolle, LPN [address] [facility]   11/28/2019 To Be Determined Jesus Nicolle, LPN [address] [facility]   12/3/2019 To Be Determined Jesus Nicolle, LPN [address] [facility]   12/5/2019 To Be Determined Jesus Nicolle, LPN [address] [facility]   12/10/2019 To Be Determined Jesus Nicolle, LPN [address] [facility]   12/12/2019 To Be Determined Jesus Nicolle, LPN [address] [facility]   12/17/2019 To Be Determined Jesus Nicolle, LPN [address] [facility]   12/19/2019 To Be Determined Jesus Nicolle, LPN [address] [facility]   12/24/2019 To Be Determined Jesus Valverde, LPN [address] [facility]   12/26/2019 To Be Determined Jesus Valverde, LPN [address] [facility]   1/1/2020 To Be Determined [place] RN Samira [address] [facility]      Non-[facility] follow up appointment(s): none noted at this time. [facility]:  n/a       Goals      Prevent complications post hospitalization. Patient will call PCP and NN for any questions and /or concerns.  Understands red flags post discharge.       Patient will go to the nearest emergency room for chest pain, shortness of breath, returning of symptoms that brought him to the emergency room and/or worsening of symptoms. Patient will contact PCP office for any questions or concerns related to healthcare.

## 2019-11-09 ENCOUNTER — HOME CARE VISIT (OUTPATIENT)
Dept: SCHEDULING | Facility: HOME HEALTH | Age: 79
End: 2019-11-09
Payer: MEDICARE

## 2019-11-09 VITALS
HEART RATE: 76 BPM | RESPIRATION RATE: 20 BRPM | TEMPERATURE: 98.7 F | DIASTOLIC BLOOD PRESSURE: 60 MMHG | SYSTOLIC BLOOD PRESSURE: 102 MMHG | OXYGEN SATURATION: 97 %

## 2019-11-09 PROCEDURE — G0300 HHS/HOSPICE OF LPN EA 15 MIN: HCPCS

## 2019-11-09 PROCEDURE — 3331090001 HH PPS REVENUE CREDIT

## 2019-11-09 PROCEDURE — 3331090002 HH PPS REVENUE DEBIT

## 2019-11-10 VITALS
RESPIRATION RATE: 16 BRPM | DIASTOLIC BLOOD PRESSURE: 62 MMHG | OXYGEN SATURATION: 98 % | HEART RATE: 64 BPM | TEMPERATURE: 97.8 F | SYSTOLIC BLOOD PRESSURE: 130 MMHG

## 2019-11-10 PROCEDURE — 3331090001 HH PPS REVENUE CREDIT

## 2019-11-10 PROCEDURE — 3331090002 HH PPS REVENUE DEBIT

## 2019-11-11 ENCOUNTER — HOME CARE VISIT (OUTPATIENT)
Dept: HOME HEALTH SERVICES | Facility: HOME HEALTH | Age: 79
End: 2019-11-11
Payer: MEDICARE

## 2019-11-11 PROCEDURE — 3331090002 HH PPS REVENUE DEBIT

## 2019-11-11 PROCEDURE — 3331090001 HH PPS REVENUE CREDIT

## 2019-11-12 ENCOUNTER — HOME CARE VISIT (OUTPATIENT)
Dept: SCHEDULING | Facility: HOME HEALTH | Age: 79
End: 2019-11-12
Payer: MEDICARE

## 2019-11-12 VITALS
SYSTOLIC BLOOD PRESSURE: 120 MMHG | HEART RATE: 68 BPM | OXYGEN SATURATION: 97 % | TEMPERATURE: 99.5 F | DIASTOLIC BLOOD PRESSURE: 66 MMHG | RESPIRATION RATE: 20 BRPM

## 2019-11-12 PROCEDURE — 3331090001 HH PPS REVENUE CREDIT

## 2019-11-12 PROCEDURE — G0300 HHS/HOSPICE OF LPN EA 15 MIN: HCPCS

## 2019-11-12 PROCEDURE — 3331090002 HH PPS REVENUE DEBIT

## 2019-11-13 ENCOUNTER — HOME CARE VISIT (OUTPATIENT)
Dept: HOME HEALTH SERVICES | Facility: HOME HEALTH | Age: 79
End: 2019-11-13
Payer: MEDICARE

## 2019-11-13 PROCEDURE — 3331090002 HH PPS REVENUE DEBIT

## 2019-11-13 PROCEDURE — 3331090001 HH PPS REVENUE CREDIT

## 2019-11-14 ENCOUNTER — TELEPHONE (OUTPATIENT)
Dept: FAMILY MEDICINE CLINIC | Age: 79
End: 2019-11-14

## 2019-11-14 ENCOUNTER — HOME CARE VISIT (OUTPATIENT)
Dept: SCHEDULING | Facility: HOME HEALTH | Age: 79
End: 2019-11-14
Payer: MEDICARE

## 2019-11-14 ENCOUNTER — PATIENT OUTREACH (OUTPATIENT)
Dept: CASE MANAGEMENT | Age: 79
End: 2019-11-14

## 2019-11-14 VITALS
DIASTOLIC BLOOD PRESSURE: 52 MMHG | TEMPERATURE: 100.7 F | SYSTOLIC BLOOD PRESSURE: 116 MMHG | HEART RATE: 72 BPM | OXYGEN SATURATION: 98 % | RESPIRATION RATE: 20 BRPM

## 2019-11-14 PROCEDURE — G0152 HHCP-SERV OF OT,EA 15 MIN: HCPCS

## 2019-11-14 PROCEDURE — 3331090002 HH PPS REVENUE DEBIT

## 2019-11-14 PROCEDURE — 3331090001 HH PPS REVENUE CREDIT

## 2019-11-14 PROCEDURE — G0300 HHS/HOSPICE OF LPN EA 15 MIN: HCPCS

## 2019-11-14 NOTE — TELEPHONE ENCOUNTER
Lisa from [address] stated she has not seen a proton order and pt. Is taking 5mg of Wafarin. Please fax order to [phone].

## 2019-11-14 NOTE — PROGRESS NOTES
CTN called Home Health to ask how patient is performing with PT. Informed Ms. Bay of [facility] about patient is wheelchair bound and Patient has steps to enter and exit home. Ms. Live Shell states that Patient declined PT yesterday and has an ppt with PT tomorrow. CTN office contact information given to Ms. Bay of [facility]. Emailed Ms. Gerard Jensen of [facility] inquiring assistance if Pt. insurance will help with stretcher transport.

## 2019-11-15 ENCOUNTER — HOME CARE VISIT (OUTPATIENT)
Dept: SCHEDULING | Facility: HOME HEALTH | Age: 79
End: 2019-11-15
Payer: MEDICARE

## 2019-11-15 ENCOUNTER — PATIENT OUTREACH (OUTPATIENT)
Dept: FAMILY MEDICINE CLINIC | Age: 79
End: 2019-11-15

## 2019-11-15 ENCOUNTER — PATIENT OUTREACH (OUTPATIENT)
Dept: CASE MANAGEMENT | Age: 79
End: 2019-11-15

## 2019-11-15 ENCOUNTER — TELEPHONE (OUTPATIENT)
Dept: FAMILY MEDICINE CLINIC | Age: 79
End: 2019-11-15

## 2019-11-15 VITALS
SYSTOLIC BLOOD PRESSURE: 120 MMHG | TEMPERATURE: 99.6 F | OXYGEN SATURATION: 91 % | DIASTOLIC BLOOD PRESSURE: 58 MMHG | HEART RATE: 76 BPM

## 2019-11-15 VITALS
SYSTOLIC BLOOD PRESSURE: 142 MMHG | TEMPERATURE: 101.4 F | OXYGEN SATURATION: 99 % | DIASTOLIC BLOOD PRESSURE: 64 MMHG | HEART RATE: 67 BPM

## 2019-11-15 PROCEDURE — 3331090001 HH PPS REVENUE CREDIT

## 2019-11-15 PROCEDURE — G0151 HHCP-SERV OF PT,EA 15 MIN: HCPCS

## 2019-11-15 PROCEDURE — 3331090002 HH PPS REVENUE DEBIT

## 2019-11-15 NOTE — PROGRESS NOTES
Hospital Discharge Follow-Up      Date/Time:  11/15/19 4:56 PM    Patient was admitted to [facility] on 10/14/19 and discharged on 10/18/19 for Bilateral Lower legs diabetic Ulcer, Cellulitis. The physician discharge summary was available at the time of outreach. Called Pt. For follow up, reached patient's on phone. Patient states that he is okay. Patient reported that the last time he checked his temp 98.4. Patient states that he just took another tylenol 15 minutes ago. No SOB, CP, Palpitation difficulty breathing and chills verbalized by patient at this time. Patient was able to talk to CTN in full sentences with having to pause to catch breath. Patient tone of voice is clear and no coughing noted while talking to patient. Informed Pt. That Home health will see him this weekend. Pt. States okay. Discussed red flags with Pt. Pt. Verbalized understanding. Patient reminded that there is a physician on call 24 hours a day / 7 days a week (M-F 5pm to 8am and from Friday 5pm until Monday 8a for the weekend) should the patient have questions or concerns. Patient reminded to call 911 if situation is emergent ( such as chest pain, shortness of breath, unstoppable bleeding, feeling of passing out,  worsening of symptoms)or patient feels the situation is emergent. Pt verbalizes understanding.

## 2019-11-15 NOTE — TELEPHONE ENCOUNTER
Petty Meraz (nurse navigator) from [place] sated that Physical Therapy called her and informed her that patient's temperature 100.5 and had congestion, 91% oxygen level today 11/15/19. Ms. Petty Meraz is asking for Dr. Catarina Kowalski advise regarding the patient's health. Please advise.

## 2019-11-15 NOTE — PROGRESS NOTES
Hospital Discharge Follow-Up      Date/Time:  11/15/19 1:24 PM    Patient was admitted to [facility] on 10/14/19 and discharged on 10/18/19 for Bilateral Lower legs diabetic Ulcer, Cellulitis. The physician discharge summary was available at the time of outreach. Received a call from Ms. Anastasia Mack PT and informed writer that Patient has congestion and running a temp of 100.1 to 100.5 , O2 sat of 91%, And BP of 142/64. Also, According to PT, Pt. Urine is tea colored. Patient has been drinking adequate amount of fluid since last night. Pt. Just urinated and per PT , Pt urine is way better now. Pt fever started yesterday as per Pt. Patient reported taking tylenol about an Hr ago. PT also informed CTN that Pt. Home is like a colonial home where the steps are very large and wide. Pt. Also inquire about wheelchair elevator but will cost Pt. $7,000. Patient is wheelchair bound and needs stretcher transport to Dr. Foster. CTN advised Pt. To drink adequate amount of fluid if theres no fluid restriction. CTN advised for PT to tell Pt. About red flags to ED such as worsening of symptoms, Chest Pain and shortness of breath. CTN Called and spoke with Ms. Bravo of [facility] office and  Informed of the above. CTN advised for Pt. To go to ED for evaluation if symptoms worsen since patient will require stretcher transportation to Dr. Molly Alonzo. Called Ms. Chad Guy PT back and asked about Pt. Legs. Chad Guy PT states that she cant visualized Pt. Legs as it is thickly wrapped. No drainage noted on Pt. Dressing  And Pt. Toes are the same colored as before per Chad Guy PT. Patient O2 sat went up to 98% after exertion. PT tempt is 101.6 and taken again 100.9. HR below 100 as per Ms. Princess Fraga and PT will request for [facility] Nurse to see patient this weekend for closely monitoring of Pt. Temp. PT to call Home health office to request for Home health nurse to see Pt. Sooner.      I appreciate assistance from Ms. Nemesio Hernández PT and Ulises Alonso of [address].

## 2019-11-15 NOTE — TELEPHONE ENCOUNTER
Nurse spoke to [employer] concerning patient. Patient is running a fever of 100.1 to 100.5., taking tylenol and Urine was tea color but last urination with PT, urine cleared. At this time, patient is unable to attend appointment due to needing stretcher transportation. Pt has a colonial style house, can pivot from chair to chair and wheelchair bound x 1 year. Homehealth, may call for a VBO for nurse visit over the weekend. Nurse will wait for call from Home health. At this time, information will be sent to provider as FYI and further advice.

## 2019-11-15 NOTE — PROGRESS NOTES
Ms. Judie Brasher informed CTN  that [facility] nurse will see patient tomorrow.

## 2019-11-16 ENCOUNTER — HOME CARE VISIT (OUTPATIENT)
Dept: SCHEDULING | Facility: HOME HEALTH | Age: 79
End: 2019-11-16
Payer: MEDICARE

## 2019-11-16 PROCEDURE — 3331090002 HH PPS REVENUE DEBIT

## 2019-11-16 PROCEDURE — 3331090001 HH PPS REVENUE CREDIT

## 2019-11-16 PROCEDURE — G0299 HHS/HOSPICE OF RN EA 15 MIN: HCPCS

## 2019-11-17 PROCEDURE — 3331090002 HH PPS REVENUE DEBIT

## 2019-11-17 PROCEDURE — 3331090001 HH PPS REVENUE CREDIT

## 2019-11-18 ENCOUNTER — HOME CARE VISIT (OUTPATIENT)
Dept: SCHEDULING | Facility: HOME HEALTH | Age: 79
End: 2019-11-18
Payer: MEDICARE

## 2019-11-18 ENCOUNTER — HOME CARE VISIT (OUTPATIENT)
Dept: HOME HEALTH SERVICES | Facility: HOME HEALTH | Age: 79
End: 2019-11-18
Payer: MEDICARE

## 2019-11-18 VITALS
OXYGEN SATURATION: 98 % | RESPIRATION RATE: 18 BRPM | TEMPERATURE: 98 F | HEART RATE: 68 BPM | DIASTOLIC BLOOD PRESSURE: 64 MMHG | SYSTOLIC BLOOD PRESSURE: 122 MMHG

## 2019-11-18 PROCEDURE — A6212 FOAM DRG <=16 SQ IN W/BORDER: HCPCS

## 2019-11-18 PROCEDURE — 3331090001 HH PPS REVENUE CREDIT

## 2019-11-18 PROCEDURE — 3331090002 HH PPS REVENUE DEBIT

## 2019-11-18 PROCEDURE — G0157 HHC PT ASSISTANT EA 15: HCPCS

## 2019-11-19 ENCOUNTER — HOME CARE VISIT (OUTPATIENT)
Dept: SCHEDULING | Facility: HOME HEALTH | Age: 79
End: 2019-11-19
Payer: MEDICARE

## 2019-11-19 ENCOUNTER — APPOINTMENT (OUTPATIENT)
Dept: SCHEDULING | Facility: HOME HEALTH | Age: 79
End: 2019-11-19
Payer: MEDICARE

## 2019-11-19 VITALS
DIASTOLIC BLOOD PRESSURE: 52 MMHG | HEART RATE: 81 BPM | TEMPERATURE: 96.9 F | OXYGEN SATURATION: 99 % | SYSTOLIC BLOOD PRESSURE: 124 MMHG

## 2019-11-19 VITALS
RESPIRATION RATE: 20 BRPM | DIASTOLIC BLOOD PRESSURE: 78 MMHG | HEART RATE: 76 BPM | TEMPERATURE: 99.1 F | SYSTOLIC BLOOD PRESSURE: 136 MMHG | OXYGEN SATURATION: 100 %

## 2019-11-19 VITALS
OXYGEN SATURATION: 100 % | TEMPERATURE: 99.1 F | HEART RATE: 87 BPM | SYSTOLIC BLOOD PRESSURE: 136 MMHG | DIASTOLIC BLOOD PRESSURE: 78 MMHG

## 2019-11-19 PROCEDURE — 3331090002 HH PPS REVENUE DEBIT

## 2019-11-19 PROCEDURE — G0300 HHS/HOSPICE OF LPN EA 15 MIN: HCPCS

## 2019-11-19 PROCEDURE — 3331090001 HH PPS REVENUE CREDIT

## 2019-11-19 PROCEDURE — G0158 HHC OT ASSISTANT EA 15: HCPCS

## 2019-11-19 NOTE — TELEPHONE ENCOUNTER
HH went into patient home. At this time, requesting for PT/INR checks, and per homehealth, left knee is very swollen and running temp of 100.7 at times. Requesting an order for UA C&S and wound culture of left leg wounds.

## 2019-11-20 ENCOUNTER — HOME CARE VISIT (OUTPATIENT)
Dept: SCHEDULING | Facility: HOME HEALTH | Age: 79
End: 2019-11-20
Payer: MEDICARE

## 2019-11-20 ENCOUNTER — HOME CARE VISIT (OUTPATIENT)
Dept: HOME HEALTH SERVICES | Facility: HOME HEALTH | Age: 79
End: 2019-11-20
Payer: MEDICARE

## 2019-11-20 ENCOUNTER — APPOINTMENT (OUTPATIENT)
Dept: SCHEDULING | Facility: HOME HEALTH | Age: 79
End: 2019-11-20
Payer: MEDICARE

## 2019-11-20 PROCEDURE — G0157 HHC PT ASSISTANT EA 15: HCPCS

## 2019-11-20 PROCEDURE — 3331090002 HH PPS REVENUE DEBIT

## 2019-11-20 PROCEDURE — 3331090001 HH PPS REVENUE CREDIT

## 2019-11-20 NOTE — TELEPHONE ENCOUNTER
Returned call to [address] MATHEW patient needs to go to the ER for treatment. Asked her to call me back.

## 2019-11-21 ENCOUNTER — HOME CARE VISIT (OUTPATIENT)
Dept: SCHEDULING | Facility: HOME HEALTH | Age: 79
End: 2019-11-21
Payer: MEDICARE

## 2019-11-21 ENCOUNTER — APPOINTMENT (OUTPATIENT)
Dept: SCHEDULING | Facility: HOME HEALTH | Age: 79
End: 2019-11-21
Payer: MEDICARE

## 2019-11-21 VITALS
TEMPERATURE: 98.7 F | OXYGEN SATURATION: 98 % | HEART RATE: 76 BPM | RESPIRATION RATE: 20 BRPM | SYSTOLIC BLOOD PRESSURE: 130 MMHG | DIASTOLIC BLOOD PRESSURE: 68 MMHG

## 2019-11-21 VITALS
TEMPERATURE: 98.1 F | DIASTOLIC BLOOD PRESSURE: 62 MMHG | HEART RATE: 72 BPM | SYSTOLIC BLOOD PRESSURE: 130 MMHG | OXYGEN SATURATION: 98 %

## 2019-11-21 PROCEDURE — G0153 HHCP-SVS OF S/L PATH,EA 15MN: HCPCS

## 2019-11-21 PROCEDURE — 3331090001 HH PPS REVENUE CREDIT

## 2019-11-21 PROCEDURE — G0300 HHS/HOSPICE OF LPN EA 15 MIN: HCPCS

## 2019-11-21 PROCEDURE — 3331090002 HH PPS REVENUE DEBIT

## 2019-11-22 ENCOUNTER — PATIENT OUTREACH (OUTPATIENT)
Dept: CASE MANAGEMENT | Age: 79
End: 2019-11-22

## 2019-11-22 VITALS
HEART RATE: 68 BPM | SYSTOLIC BLOOD PRESSURE: 122 MMHG | OXYGEN SATURATION: 98 % | DIASTOLIC BLOOD PRESSURE: 64 MMHG | TEMPERATURE: 98.9 F

## 2019-11-22 PROCEDURE — 3331090001 HH PPS REVENUE CREDIT

## 2019-11-22 PROCEDURE — 3331090002 HH PPS REVENUE DEBIT

## 2019-11-23 DIAGNOSIS — I48.0 PAF (PAROXYSMAL ATRIAL FIBRILLATION) (HCC): Primary | ICD-10-CM

## 2019-11-23 PROCEDURE — 3331090002 HH PPS REVENUE DEBIT

## 2019-11-23 PROCEDURE — 3331090001 HH PPS REVENUE CREDIT

## 2019-11-24 PROCEDURE — 3331090001 HH PPS REVENUE CREDIT

## 2019-11-24 PROCEDURE — 3331090002 HH PPS REVENUE DEBIT

## 2019-11-25 ENCOUNTER — HOME CARE VISIT (OUTPATIENT)
Dept: HOME HEALTH SERVICES | Facility: HOME HEALTH | Age: 79
End: 2019-11-25
Payer: MEDICARE

## 2019-11-25 ENCOUNTER — APPOINTMENT (OUTPATIENT)
Dept: SCHEDULING | Facility: HOME HEALTH | Age: 79
End: 2019-11-25
Payer: MEDICARE

## 2019-11-25 PROCEDURE — 3331090001 HH PPS REVENUE CREDIT

## 2019-11-25 PROCEDURE — 3331090002 HH PPS REVENUE DEBIT

## 2019-11-26 ENCOUNTER — APPOINTMENT (OUTPATIENT)
Dept: SCHEDULING | Facility: HOME HEALTH | Age: 79
End: 2019-11-26
Payer: MEDICARE

## 2019-11-26 PROCEDURE — 3331090001 HH PPS REVENUE CREDIT

## 2019-11-26 PROCEDURE — 3331090002 HH PPS REVENUE DEBIT

## 2019-11-27 PROCEDURE — 3331090002 HH PPS REVENUE DEBIT

## 2019-11-27 PROCEDURE — 3331090001 HH PPS REVENUE CREDIT

## 2019-11-28 ENCOUNTER — APPOINTMENT (OUTPATIENT)
Dept: SCHEDULING | Facility: HOME HEALTH | Age: 79
End: 2019-11-28
Payer: MEDICARE

## 2019-11-28 PROCEDURE — 3331090002 HH PPS REVENUE DEBIT

## 2019-11-28 PROCEDURE — 3331090001 HH PPS REVENUE CREDIT

## 2019-11-29 PROCEDURE — 3331090001 HH PPS REVENUE CREDIT

## 2019-11-29 PROCEDURE — 3331090002 HH PPS REVENUE DEBIT

## 2019-11-30 PROCEDURE — 3331090001 HH PPS REVENUE CREDIT

## 2019-11-30 PROCEDURE — 3331090002 HH PPS REVENUE DEBIT

## 2019-12-01 PROCEDURE — 3331090001 HH PPS REVENUE CREDIT

## 2019-12-01 PROCEDURE — 3331090002 HH PPS REVENUE DEBIT

## 2019-12-02 PROCEDURE — 3331090002 HH PPS REVENUE DEBIT

## 2019-12-02 PROCEDURE — 3331090001 HH PPS REVENUE CREDIT

## 2019-12-03 ENCOUNTER — APPOINTMENT (OUTPATIENT)
Dept: SCHEDULING | Facility: HOME HEALTH | Age: 79
End: 2019-12-03
Payer: MEDICARE

## 2019-12-03 PROCEDURE — 3331090001 HH PPS REVENUE CREDIT

## 2019-12-03 PROCEDURE — 3331090002 HH PPS REVENUE DEBIT

## 2019-12-04 PROCEDURE — 3331090001 HH PPS REVENUE CREDIT

## 2019-12-04 PROCEDURE — 3331090002 HH PPS REVENUE DEBIT

## 2019-12-05 ENCOUNTER — APPOINTMENT (OUTPATIENT)
Dept: SCHEDULING | Facility: HOME HEALTH | Age: 79
End: 2019-12-05
Payer: MEDICARE

## 2019-12-05 PROCEDURE — 3331090001 HH PPS REVENUE CREDIT

## 2019-12-05 PROCEDURE — 3331090002 HH PPS REVENUE DEBIT

## 2019-12-06 PROCEDURE — 3331090001 HH PPS REVENUE CREDIT

## 2019-12-06 PROCEDURE — 3331090002 HH PPS REVENUE DEBIT

## 2019-12-07 PROCEDURE — 3331090002 HH PPS REVENUE DEBIT

## 2019-12-07 PROCEDURE — 3331090001 HH PPS REVENUE CREDIT

## 2019-12-08 PROCEDURE — 3331090002 HH PPS REVENUE DEBIT

## 2019-12-08 PROCEDURE — 3331090001 HH PPS REVENUE CREDIT

## 2019-12-09 PROCEDURE — 3331090001 HH PPS REVENUE CREDIT

## 2019-12-09 PROCEDURE — 3331090002 HH PPS REVENUE DEBIT

## 2019-12-10 ENCOUNTER — APPOINTMENT (OUTPATIENT)
Dept: SCHEDULING | Facility: HOME HEALTH | Age: 79
End: 2019-12-10
Payer: MEDICARE

## 2019-12-10 PROCEDURE — 3331090002 HH PPS REVENUE DEBIT

## 2019-12-10 PROCEDURE — 3331090001 HH PPS REVENUE CREDIT

## 2019-12-11 PROCEDURE — 3331090001 HH PPS REVENUE CREDIT

## 2019-12-11 PROCEDURE — 3331090002 HH PPS REVENUE DEBIT

## 2019-12-12 ENCOUNTER — APPOINTMENT (OUTPATIENT)
Dept: SCHEDULING | Facility: HOME HEALTH | Age: 79
End: 2019-12-12
Payer: MEDICARE

## 2019-12-12 PROCEDURE — 3331090002 HH PPS REVENUE DEBIT

## 2019-12-12 PROCEDURE — 3331090001 HH PPS REVENUE CREDIT

## 2019-12-13 ENCOUNTER — TELEPHONE (OUTPATIENT)
Dept: FAMILY MEDICINE CLINIC | Age: 79
End: 2019-12-13

## 2019-12-13 PROCEDURE — 3331090001 HH PPS REVENUE CREDIT

## 2019-12-13 PROCEDURE — 3331090002 HH PPS REVENUE DEBIT

## 2019-12-13 NOTE — TELEPHONE ENCOUNTER
Mary Dunlap  from home health at [facility] in and was wanting to know if Dr. Marilin Pitt would be willing to put in orders for a home health to be put in as he was just discharged from the hospital with cellulitis and she was wanting to make sure that the patient had someone there at his home to help him take care of his wounds due to the cellulitis. I did mention that he hasn't been seen since January and she stated she knows but the only way he can leave the house is on a stretcher. She asked for a call back at [phone]. Please advise.

## 2019-12-14 PROCEDURE — 3331090002 HH PPS REVENUE DEBIT

## 2019-12-14 PROCEDURE — 3331090001 HH PPS REVENUE CREDIT

## 2019-12-15 PROCEDURE — 3331090001 HH PPS REVENUE CREDIT

## 2019-12-15 PROCEDURE — 3331090002 HH PPS REVENUE DEBIT

## 2019-12-16 PROCEDURE — 3331090002 HH PPS REVENUE DEBIT

## 2019-12-16 PROCEDURE — 3331090001 HH PPS REVENUE CREDIT

## 2019-12-17 ENCOUNTER — APPOINTMENT (OUTPATIENT)
Dept: SCHEDULING | Facility: HOME HEALTH | Age: 79
End: 2019-12-17
Payer: MEDICARE

## 2019-12-17 PROCEDURE — 3331090001 HH PPS REVENUE CREDIT

## 2019-12-17 PROCEDURE — 3331090002 HH PPS REVENUE DEBIT

## 2019-12-18 PROCEDURE — 3331090001 HH PPS REVENUE CREDIT

## 2019-12-18 PROCEDURE — 3331090002 HH PPS REVENUE DEBIT

## 2019-12-19 ENCOUNTER — APPOINTMENT (OUTPATIENT)
Dept: SCHEDULING | Facility: HOME HEALTH | Age: 79
End: 2019-12-19
Payer: MEDICARE

## 2019-12-19 PROCEDURE — 3331090002 HH PPS REVENUE DEBIT

## 2019-12-19 PROCEDURE — 3331090001 HH PPS REVENUE CREDIT

## 2019-12-20 PROCEDURE — 3331090002 HH PPS REVENUE DEBIT

## 2019-12-20 PROCEDURE — 3331090001 HH PPS REVENUE CREDIT

## 2019-12-21 PROCEDURE — 3331090001 HH PPS REVENUE CREDIT

## 2019-12-21 PROCEDURE — 3331090002 HH PPS REVENUE DEBIT

## 2019-12-22 ENCOUNTER — HOME CARE VISIT (OUTPATIENT)
Dept: HOME HEALTH SERVICES | Facility: HOME HEALTH | Age: 79
End: 2019-12-22
Payer: MEDICARE

## 2019-12-22 ENCOUNTER — HOME CARE VISIT (OUTPATIENT)
Dept: SCHEDULING | Facility: HOME HEALTH | Age: 79
End: 2019-12-22
Payer: MEDICARE

## 2019-12-22 PROCEDURE — 3331090002 HH PPS REVENUE DEBIT

## 2019-12-22 PROCEDURE — 3331090001 HH PPS REVENUE CREDIT

## 2019-12-22 PROCEDURE — G0299 HHS/HOSPICE OF RN EA 15 MIN: HCPCS

## 2019-12-23 ENCOUNTER — APPOINTMENT (OUTPATIENT)
Dept: SCHEDULING | Facility: HOME HEALTH | Age: 79
End: 2019-12-23
Payer: MEDICARE

## 2019-12-23 VITALS
TEMPERATURE: 99.2 F | OXYGEN SATURATION: 98 % | HEART RATE: 58 BPM | SYSTOLIC BLOOD PRESSURE: 140 MMHG | DIASTOLIC BLOOD PRESSURE: 70 MMHG | RESPIRATION RATE: 16 BRPM

## 2019-12-23 PROCEDURE — 3331090002 HH PPS REVENUE DEBIT

## 2019-12-23 PROCEDURE — A4649 SURGICAL SUPPLIES: HCPCS

## 2019-12-23 PROCEDURE — 3331090001 HH PPS REVENUE CREDIT

## 2019-12-23 PROCEDURE — A6260 WOUND CLEANSER ANY TYPE/SIZE: HCPCS

## 2019-12-24 ENCOUNTER — APPOINTMENT (OUTPATIENT)
Dept: SCHEDULING | Facility: HOME HEALTH | Age: 79
End: 2019-12-24
Payer: MEDICARE

## 2019-12-24 ENCOUNTER — HOME CARE VISIT (OUTPATIENT)
Dept: HOME HEALTH SERVICES | Facility: HOME HEALTH | Age: 79
End: 2019-12-24
Payer: MEDICARE

## 2019-12-24 ENCOUNTER — HOME CARE VISIT (OUTPATIENT)
Dept: SCHEDULING | Facility: HOME HEALTH | Age: 79
End: 2019-12-24
Payer: MEDICARE

## 2019-12-24 VITALS
TEMPERATURE: 98.9 F | SYSTOLIC BLOOD PRESSURE: 128 MMHG | HEART RATE: 92 BPM | OXYGEN SATURATION: 98 % | DIASTOLIC BLOOD PRESSURE: 60 MMHG

## 2019-12-24 PROCEDURE — 3331090001 HH PPS REVENUE CREDIT

## 2019-12-24 PROCEDURE — G0152 HHCP-SERV OF OT,EA 15 MIN: HCPCS

## 2019-12-24 PROCEDURE — 3331090002 HH PPS REVENUE DEBIT

## 2019-12-25 ENCOUNTER — HOME CARE VISIT (OUTPATIENT)
Dept: SCHEDULING | Facility: HOME HEALTH | Age: 79
End: 2019-12-25
Payer: MEDICARE

## 2019-12-25 PROCEDURE — 3331090002 HH PPS REVENUE DEBIT

## 2019-12-25 PROCEDURE — 3331090001 HH PPS REVENUE CREDIT

## 2019-12-25 PROCEDURE — G0299 HHS/HOSPICE OF RN EA 15 MIN: HCPCS

## 2019-12-26 ENCOUNTER — APPOINTMENT (OUTPATIENT)
Dept: SCHEDULING | Facility: HOME HEALTH | Age: 79
End: 2019-12-26
Payer: MEDICARE

## 2019-12-26 ENCOUNTER — HOME CARE VISIT (OUTPATIENT)
Dept: SCHEDULING | Facility: HOME HEALTH | Age: 79
End: 2019-12-26
Payer: MEDICARE

## 2019-12-26 VITALS
HEART RATE: 73 BPM | OXYGEN SATURATION: 97 % | DIASTOLIC BLOOD PRESSURE: 81 MMHG | RESPIRATION RATE: 18 BRPM | TEMPERATURE: 98.7 F | SYSTOLIC BLOOD PRESSURE: 141 MMHG

## 2019-12-26 PROCEDURE — 3331090002 HH PPS REVENUE DEBIT

## 2019-12-26 PROCEDURE — G0151 HHCP-SERV OF PT,EA 15 MIN: HCPCS

## 2019-12-26 PROCEDURE — 3331090001 HH PPS REVENUE CREDIT

## 2019-12-27 ENCOUNTER — APPOINTMENT (OUTPATIENT)
Dept: SCHEDULING | Facility: HOME HEALTH | Age: 79
End: 2019-12-27
Payer: MEDICARE

## 2019-12-27 ENCOUNTER — HOME CARE VISIT (OUTPATIENT)
Dept: SCHEDULING | Facility: HOME HEALTH | Age: 79
End: 2019-12-27
Payer: MEDICARE

## 2019-12-27 ENCOUNTER — HOME CARE VISIT (OUTPATIENT)
Dept: HOME HEALTH SERVICES | Facility: HOME HEALTH | Age: 79
End: 2019-12-27
Payer: MEDICARE

## 2019-12-27 PROCEDURE — 3331090002 HH PPS REVENUE DEBIT

## 2019-12-27 PROCEDURE — 3331090001 HH PPS REVENUE CREDIT

## 2019-12-28 ENCOUNTER — HOME CARE VISIT (OUTPATIENT)
Dept: SCHEDULING | Facility: HOME HEALTH | Age: 79
End: 2019-12-28
Payer: MEDICARE

## 2019-12-28 VITALS
RESPIRATION RATE: 20 BRPM | OXYGEN SATURATION: 96 % | TEMPERATURE: 98.3 F | DIASTOLIC BLOOD PRESSURE: 70 MMHG | HEART RATE: 76 BPM | SYSTOLIC BLOOD PRESSURE: 122 MMHG

## 2019-12-28 PROCEDURE — 3331090001 HH PPS REVENUE CREDIT

## 2019-12-28 PROCEDURE — 3331090002 HH PPS REVENUE DEBIT

## 2019-12-28 PROCEDURE — G0300 HHS/HOSPICE OF LPN EA 15 MIN: HCPCS

## 2019-12-29 PROCEDURE — 3331090002 HH PPS REVENUE DEBIT

## 2019-12-29 PROCEDURE — 3331090001 HH PPS REVENUE CREDIT

## 2019-12-30 ENCOUNTER — APPOINTMENT (OUTPATIENT)
Dept: SCHEDULING | Facility: HOME HEALTH | Age: 79
End: 2019-12-30
Payer: MEDICARE

## 2019-12-30 ENCOUNTER — HOME CARE VISIT (OUTPATIENT)
Dept: SCHEDULING | Facility: HOME HEALTH | Age: 79
End: 2019-12-30
Payer: MEDICARE

## 2019-12-30 VITALS
OXYGEN SATURATION: 98 % | RESPIRATION RATE: 20 BRPM | TEMPERATURE: 100 F | HEART RATE: 99 BPM | SYSTOLIC BLOOD PRESSURE: 152 MMHG | DIASTOLIC BLOOD PRESSURE: 72 MMHG

## 2019-12-30 PROCEDURE — G0299 HHS/HOSPICE OF RN EA 15 MIN: HCPCS

## 2019-12-30 PROCEDURE — 3331090002 HH PPS REVENUE DEBIT

## 2019-12-30 PROCEDURE — 3331090001 HH PPS REVENUE CREDIT

## 2019-12-30 PROCEDURE — 3331090003 HH PPS REVENUE ADJ

## 2019-12-31 PROCEDURE — 3331090002 HH PPS REVENUE DEBIT

## 2019-12-31 PROCEDURE — 3331090001 HH PPS REVENUE CREDIT

## 2020-01-01 ENCOUNTER — APPOINTMENT (OUTPATIENT)
Dept: SCHEDULING | Facility: HOME HEALTH | Age: 80
End: 2020-01-01
Payer: MEDICARE

## 2020-01-01 ENCOUNTER — HOME CARE VISIT (OUTPATIENT)
Dept: SCHEDULING | Facility: HOME HEALTH | Age: 80
End: 2020-01-01
Payer: MEDICARE

## 2020-01-01 PROCEDURE — 400013 HH SOC

## 2020-01-01 PROCEDURE — 3331090002 HH PPS REVENUE DEBIT

## 2020-01-01 PROCEDURE — G0299 HHS/HOSPICE OF RN EA 15 MIN: HCPCS

## 2020-01-01 PROCEDURE — 3331090001 HH PPS REVENUE CREDIT

## 2020-01-02 VITALS
RESPIRATION RATE: 18 BRPM | DIASTOLIC BLOOD PRESSURE: 58 MMHG | SYSTOLIC BLOOD PRESSURE: 136 MMHG | TEMPERATURE: 98.2 F | HEART RATE: 74 BPM | OXYGEN SATURATION: 98 %

## 2020-01-02 PROCEDURE — 3331090001 HH PPS REVENUE CREDIT

## 2020-01-02 PROCEDURE — 3331090002 HH PPS REVENUE DEBIT

## 2020-01-02 PROCEDURE — A6212 FOAM DRG <=16 SQ IN W/BORDER: HCPCS

## 2020-01-03 ENCOUNTER — APPOINTMENT (OUTPATIENT)
Dept: SCHEDULING | Facility: HOME HEALTH | Age: 80
End: 2020-01-03
Payer: MEDICARE

## 2020-01-03 ENCOUNTER — HOME CARE VISIT (OUTPATIENT)
Dept: SCHEDULING | Facility: HOME HEALTH | Age: 80
End: 2020-01-03
Payer: MEDICARE

## 2020-01-03 VITALS
RESPIRATION RATE: 20 BRPM | OXYGEN SATURATION: 97 % | TEMPERATURE: 97.7 F | DIASTOLIC BLOOD PRESSURE: 70 MMHG | HEART RATE: 77 BPM | SYSTOLIC BLOOD PRESSURE: 134 MMHG

## 2020-01-03 PROCEDURE — 3331090001 HH PPS REVENUE CREDIT

## 2020-01-03 PROCEDURE — 3331090002 HH PPS REVENUE DEBIT

## 2020-01-03 PROCEDURE — G0299 HHS/HOSPICE OF RN EA 15 MIN: HCPCS

## 2020-01-04 PROCEDURE — 3331090002 HH PPS REVENUE DEBIT

## 2020-01-04 PROCEDURE — 3331090001 HH PPS REVENUE CREDIT

## 2020-01-05 PROCEDURE — 3331090002 HH PPS REVENUE DEBIT

## 2020-01-05 PROCEDURE — 3331090001 HH PPS REVENUE CREDIT

## 2020-01-05 PROCEDURE — 3331090003 HH PPS REVENUE ADJ

## 2020-01-06 ENCOUNTER — APPOINTMENT (OUTPATIENT)
Dept: SCHEDULING | Facility: HOME HEALTH | Age: 80
End: 2020-01-06
Payer: MEDICARE

## 2020-01-07 ENCOUNTER — HOME CARE VISIT (OUTPATIENT)
Dept: SCHEDULING | Facility: HOME HEALTH | Age: 80
End: 2020-01-07
Payer: MEDICARE

## 2020-01-07 ENCOUNTER — TELEPHONE (OUTPATIENT)
Dept: FAMILY MEDICINE CLINIC | Age: 80
End: 2020-01-07

## 2020-01-07 VITALS
TEMPERATURE: 99.2 F | HEART RATE: 72 BPM | OXYGEN SATURATION: 97 % | SYSTOLIC BLOOD PRESSURE: 138 MMHG | RESPIRATION RATE: 20 BRPM | DIASTOLIC BLOOD PRESSURE: 80 MMHG

## 2020-01-07 PROCEDURE — G0300 HHS/HOSPICE OF LPN EA 15 MIN: HCPCS

## 2020-01-07 NOTE — TELEPHONE ENCOUNTER
Called patient to follow up on the call by [facility] from [facility]; Dr. Pranay Agarwal recommends they have him be seen in ED or Urgent Care if he will not be coming in for his appointment today. Message left on vm for him to contact the office at his earliest convenience

## 2020-01-07 NOTE — TELEPHONE ENCOUNTER
Adeline Davis home health care nurse called, patient ran out of warfarin so he decided to take coumadin 5mg instead. He has been uriniating brownish color. Please advise, thank you.

## 2020-01-08 ENCOUNTER — APPOINTMENT (OUTPATIENT)
Dept: SCHEDULING | Facility: HOME HEALTH | Age: 80
End: 2020-01-08
Payer: MEDICARE

## 2020-01-08 ENCOUNTER — DOCUMENTATION ONLY (OUTPATIENT)
Dept: FAMILY MEDICINE CLINIC | Age: 80
End: 2020-01-08

## 2020-01-08 NOTE — PROGRESS NOTES
Patient did not arrive to their scheduled appointment on 01/07/2020. No show letter #2 sent on 01/08/20. Thank you.

## 2020-01-09 ENCOUNTER — HOME CARE VISIT (OUTPATIENT)
Dept: SCHEDULING | Facility: HOME HEALTH | Age: 80
End: 2020-01-09
Payer: MEDICARE

## 2020-01-09 VITALS
RESPIRATION RATE: 20 BRPM | TEMPERATURE: 99.3 F | HEART RATE: 76 BPM | SYSTOLIC BLOOD PRESSURE: 130 MMHG | DIASTOLIC BLOOD PRESSURE: 70 MMHG | OXYGEN SATURATION: 97 %

## 2020-01-09 PROCEDURE — G0300 HHS/HOSPICE OF LPN EA 15 MIN: HCPCS

## 2020-01-09 NOTE — TELEPHONE ENCOUNTER
On 1/7/202 Miss Andrea Hull from [facility] called in stating that Mr. Díaz's wound was draining a yellow brown color with a foul odor and that he had stopped his eliquis because he couldn't afford it due to it costing $500 dollars and put himself back on coumadin  5mg tab daily. Nurse suggests that pt be put on plavix 75mg and that it is $30 for a 30 day supply. She would also like to know if Dr. Rosaura Kramer is following the patient. Would like a phone call back at [phone] from either the nurse or the doctor. Please advise.

## 2020-01-10 ENCOUNTER — APPOINTMENT (OUTPATIENT)
Dept: SCHEDULING | Facility: HOME HEALTH | Age: 80
End: 2020-01-10
Payer: MEDICARE

## 2020-01-10 NOTE — TELEPHONE ENCOUNTER
Christina Rowell returned call to the office and message was relayed she stated that patient said he does not have transportation hence his No shows, she stated that she will need to investigate the patient's home situation to verify the reason.

## 2020-01-10 NOTE — TELEPHONE ENCOUNTER
Return call to [facility] from [facility] [phone] to let her know that per Dr. Larry Giordano  He will not. Patient was a No Show for his appointment to [address] and he will remove himself as his PCP until he does so with either Dr. Larry Giordano or another provider. Since he is leaving, he suggest he [address] with someone else for Continuity purposes.  Message left on  for Lisa to contact office at her convenience

## 2020-01-11 ENCOUNTER — HOME CARE VISIT (OUTPATIENT)
Dept: SCHEDULING | Facility: HOME HEALTH | Age: 80
End: 2020-01-11
Payer: MEDICARE

## 2020-01-11 PROCEDURE — G0299 HHS/HOSPICE OF RN EA 15 MIN: HCPCS

## 2020-01-13 ENCOUNTER — APPOINTMENT (OUTPATIENT)
Dept: SCHEDULING | Facility: HOME HEALTH | Age: 80
End: 2020-01-13
Payer: MEDICARE

## 2020-01-13 VITALS
RESPIRATION RATE: 18 BRPM | TEMPERATURE: 98.7 F | DIASTOLIC BLOOD PRESSURE: 62 MMHG | SYSTOLIC BLOOD PRESSURE: 130 MMHG | OXYGEN SATURATION: 98 % | HEART RATE: 76 BPM

## 2020-01-14 ENCOUNTER — HOME CARE VISIT (OUTPATIENT)
Dept: SCHEDULING | Facility: HOME HEALTH | Age: 80
End: 2020-01-14
Payer: MEDICARE

## 2020-01-14 VITALS
HEART RATE: 64 BPM | TEMPERATURE: 99.3 F | SYSTOLIC BLOOD PRESSURE: 126 MMHG | OXYGEN SATURATION: 97 % | DIASTOLIC BLOOD PRESSURE: 62 MMHG | RESPIRATION RATE: 20 BRPM

## 2020-01-14 PROCEDURE — G0300 HHS/HOSPICE OF LPN EA 15 MIN: HCPCS

## 2020-01-16 ENCOUNTER — HOME CARE VISIT (OUTPATIENT)
Dept: SCHEDULING | Facility: HOME HEALTH | Age: 80
End: 2020-01-16
Payer: MEDICARE

## 2020-01-16 VITALS
TEMPERATURE: 99.8 F | SYSTOLIC BLOOD PRESSURE: 118 MMHG | RESPIRATION RATE: 20 BRPM | HEART RATE: 72 BPM | OXYGEN SATURATION: 97 % | DIASTOLIC BLOOD PRESSURE: 52 MMHG

## 2020-01-16 PROCEDURE — G0300 HHS/HOSPICE OF LPN EA 15 MIN: HCPCS

## 2020-01-17 ENCOUNTER — APPOINTMENT (OUTPATIENT)
Dept: SCHEDULING | Facility: HOME HEALTH | Age: 80
End: 2020-01-17
Payer: MEDICARE

## 2020-01-18 ENCOUNTER — HOME CARE VISIT (OUTPATIENT)
Dept: SCHEDULING | Facility: HOME HEALTH | Age: 80
End: 2020-01-18
Payer: MEDICARE

## 2020-01-18 PROCEDURE — G0299 HHS/HOSPICE OF RN EA 15 MIN: HCPCS

## 2020-01-20 ENCOUNTER — APPOINTMENT (OUTPATIENT)
Dept: SCHEDULING | Facility: HOME HEALTH | Age: 80
End: 2020-01-20
Payer: MEDICARE

## 2020-01-20 VITALS
TEMPERATURE: 98.2 F | DIASTOLIC BLOOD PRESSURE: 68 MMHG | HEART RATE: 78 BPM | RESPIRATION RATE: 16 BRPM | SYSTOLIC BLOOD PRESSURE: 122 MMHG | OXYGEN SATURATION: 97 %

## 2020-01-21 ENCOUNTER — HOME CARE VISIT (OUTPATIENT)
Dept: SCHEDULING | Facility: HOME HEALTH | Age: 80
End: 2020-01-21
Payer: MEDICARE

## 2020-01-21 VITALS
OXYGEN SATURATION: 99 % | HEART RATE: 64 BPM | TEMPERATURE: 99 F | SYSTOLIC BLOOD PRESSURE: 128 MMHG | RESPIRATION RATE: 20 BRPM | DIASTOLIC BLOOD PRESSURE: 62 MMHG

## 2020-01-21 PROCEDURE — G0300 HHS/HOSPICE OF LPN EA 15 MIN: HCPCS

## 2020-01-23 ENCOUNTER — HOME CARE VISIT (OUTPATIENT)
Dept: SCHEDULING | Facility: HOME HEALTH | Age: 80
End: 2020-01-23
Payer: MEDICARE

## 2020-01-23 VITALS
OXYGEN SATURATION: 96 % | RESPIRATION RATE: 20 BRPM | TEMPERATURE: 99.4 F | SYSTOLIC BLOOD PRESSURE: 120 MMHG | DIASTOLIC BLOOD PRESSURE: 72 MMHG | HEART RATE: 68 BPM

## 2020-01-23 PROCEDURE — G0300 HHS/HOSPICE OF LPN EA 15 MIN: HCPCS

## 2020-01-24 ENCOUNTER — APPOINTMENT (OUTPATIENT)
Dept: SCHEDULING | Facility: HOME HEALTH | Age: 80
End: 2020-01-24
Payer: MEDICARE

## 2020-01-25 ENCOUNTER — HOME CARE VISIT (OUTPATIENT)
Dept: SCHEDULING | Facility: HOME HEALTH | Age: 80
End: 2020-01-25
Payer: MEDICARE

## 2020-01-25 PROCEDURE — G0299 HHS/HOSPICE OF RN EA 15 MIN: HCPCS

## 2020-01-27 ENCOUNTER — APPOINTMENT (OUTPATIENT)
Dept: SCHEDULING | Facility: HOME HEALTH | Age: 80
End: 2020-01-27
Payer: MEDICARE

## 2020-01-27 VITALS
RESPIRATION RATE: 16 BRPM | DIASTOLIC BLOOD PRESSURE: 70 MMHG | HEART RATE: 89 BPM | TEMPERATURE: 99.9 F | OXYGEN SATURATION: 98 % | SYSTOLIC BLOOD PRESSURE: 110 MMHG

## 2020-01-30 ENCOUNTER — HOME CARE VISIT (OUTPATIENT)
Dept: SCHEDULING | Facility: HOME HEALTH | Age: 80
End: 2020-01-30
Payer: MEDICARE

## 2020-01-30 ENCOUNTER — HOME CARE VISIT (OUTPATIENT)
Dept: HOME HEALTH SERVICES | Facility: HOME HEALTH | Age: 80
End: 2020-01-30
Payer: MEDICARE

## 2020-01-30 VITALS
OXYGEN SATURATION: 99 % | RESPIRATION RATE: 20 BRPM | SYSTOLIC BLOOD PRESSURE: 126 MMHG | HEART RATE: 76 BPM | TEMPERATURE: 99.2 F | DIASTOLIC BLOOD PRESSURE: 62 MMHG

## 2020-01-30 PROCEDURE — G0300 HHS/HOSPICE OF LPN EA 15 MIN: HCPCS

## 2020-01-31 ENCOUNTER — APPOINTMENT (OUTPATIENT)
Dept: SCHEDULING | Facility: HOME HEALTH | Age: 80
End: 2020-01-31
Payer: MEDICARE

## 2020-02-03 ENCOUNTER — APPOINTMENT (OUTPATIENT)
Dept: SCHEDULING | Facility: HOME HEALTH | Age: 80
End: 2020-02-03
Payer: MEDICARE

## 2020-02-06 ENCOUNTER — HOME CARE VISIT (OUTPATIENT)
Dept: SCHEDULING | Facility: HOME HEALTH | Age: 80
End: 2020-02-06

## 2020-02-06 PROCEDURE — 400013 HH SOC

## 2020-02-06 PROCEDURE — G0299 HHS/HOSPICE OF RN EA 15 MIN: HCPCS

## 2020-02-07 ENCOUNTER — APPOINTMENT (OUTPATIENT)
Dept: SCHEDULING | Facility: HOME HEALTH | Age: 80
End: 2020-02-07
Payer: MEDICARE

## 2020-02-07 VITALS
HEART RATE: 78 BPM | TEMPERATURE: 98.8 F | RESPIRATION RATE: 18 BRPM | DIASTOLIC BLOOD PRESSURE: 70 MMHG | SYSTOLIC BLOOD PRESSURE: 140 MMHG | OXYGEN SATURATION: 98 %

## 2020-02-13 ENCOUNTER — HOME CARE VISIT (OUTPATIENT)
Dept: SCHEDULING | Facility: HOME HEALTH | Age: 80
End: 2020-02-13
Payer: MEDICARE

## 2020-02-13 VITALS
RESPIRATION RATE: 20 BRPM | OXYGEN SATURATION: 97 % | TEMPERATURE: 98.1 F | DIASTOLIC BLOOD PRESSURE: 60 MMHG | HEART RATE: 72 BPM | SYSTOLIC BLOOD PRESSURE: 120 MMHG

## 2020-02-13 PROCEDURE — G0300 HHS/HOSPICE OF LPN EA 15 MIN: HCPCS

## 2020-02-20 ENCOUNTER — HOME CARE VISIT (OUTPATIENT)
Dept: SCHEDULING | Facility: HOME HEALTH | Age: 80
End: 2020-02-20
Payer: MEDICARE

## 2020-02-20 PROCEDURE — G0299 HHS/HOSPICE OF RN EA 15 MIN: HCPCS

## 2020-02-22 VITALS
RESPIRATION RATE: 14 BRPM | DIASTOLIC BLOOD PRESSURE: 65 MMHG | OXYGEN SATURATION: 98 % | SYSTOLIC BLOOD PRESSURE: 122 MMHG | TEMPERATURE: 99.3 F | HEART RATE: 71 BPM

## 2020-02-28 ENCOUNTER — HOME CARE VISIT (OUTPATIENT)
Dept: SCHEDULING | Facility: HOME HEALTH | Age: 80
End: 2020-02-28
Payer: MEDICARE

## 2020-02-28 VITALS
DIASTOLIC BLOOD PRESSURE: 62 MMHG | HEART RATE: 76 BPM | TEMPERATURE: 97.9 F | SYSTOLIC BLOOD PRESSURE: 144 MMHG | OXYGEN SATURATION: 98 % | RESPIRATION RATE: 18 BRPM

## 2020-02-28 PROCEDURE — G0299 HHS/HOSPICE OF RN EA 15 MIN: HCPCS

## 2021-03-03 NOTE — TELEPHONE ENCOUNTER
Contacted patient and advised him on the INR results and to remain with the current dose. Patient verbalized understanding with read back and tolerated well. Encounter closed.
Continue with current dose.
Oralia with Home Health contacted the office and stated that the patient INR is:    INR 1.9. Patient is currently taking:   Mon, Wed, Fri - 6 mg of Coumadin  Thurs, Sat, Sun - 5 mg of Coumadin    Benji Godwin stated to contact the patient with new directions since her phone was \"dead. \" Please advise. Thank you.
B/L LE 4/5 grossly rated

## 2021-04-23 ENCOUNTER — PATIENT OUTREACH (OUTPATIENT)
Dept: CASE MANAGEMENT | Age: 81
End: 2021-04-23

## 2021-04-23 NOTE — PROGRESS NOTES
Complex Case Management      Date/Time:  2021 4:17 PM    Method of communication with patient:phone    [address] ([facility]) contacted the patient by telephone to perform Ambulatory Care Coordination. Verified name and  (PHI) with patient as identifiers. Provided introduction to self, and explanation of the Ambulatory Care Manager's role. Patient states he was admitted to [address] [place] [address] at discharge from [facility] on 21. [facility] will make no further contact with patient until he returns home.

## 2021-04-29 ENCOUNTER — PATIENT OUTREACH (OUTPATIENT)
Dept: CASE MANAGEMENT | Age: 81
End: 2021-04-29

## 2021-04-29 ENCOUNTER — HOME HEALTH ADMISSION (OUTPATIENT)
Dept: HOME HEALTH SERVICES | Facility: HOME HEALTH | Age: 81
End: 2021-04-29
Payer: MEDICARE

## 2021-04-29 NOTE — PROGRESS NOTES
Post Acute Facility Update     *The information contained in this note was received during a weekly care coordination call with the post acute facility*    This patient was discharged from [facility] to [address] [place] [address] ([facility])   on 4/20/21. Hospital Discharge Diagnosis per Dr. 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 Family not returning calls. Facility Social Work Update: Family was considering home w/ hospice prior to last admission. Unsure what the family wants now as they are not returning call. Son, Virgie Leon,  is main contact for family.

## 2021-05-01 ENCOUNTER — HOME CARE VISIT (OUTPATIENT)
Dept: SCHEDULING | Facility: HOME HEALTH | Age: 81
End: 2021-05-01

## 2021-05-01 ENCOUNTER — HOME CARE VISIT (OUTPATIENT)
Dept: HOME HEALTH SERVICES | Facility: HOME HEALTH | Age: 81
End: 2021-05-01

## 2021-05-01 PROCEDURE — 400018 HH-NO PAY CLAIM PROCEDURE

## 2021-05-01 NOTE — CASE COMMUNICATION
I came to patients home to do soc on 5/1/21. When I arrived patient was vomiting, I was told by patient and son he has been vomiting for >24 hours. Patient vomited 3 times while I was there. Patient had diarrhea yesterday but took imodium. BS was 195, VS b/p 132/70, h/r 65, rr 16 sats 100% (on room air) temp 98.5 Patient unable to eat or drink any fluid since yesterday morning. Voided once while I was there and urine dark ryan and < 100cc. 911 called Patient went to ED.

## 2021-05-03 NOTE — TELEPHONE ENCOUNTER
Referral placed. This encounter will be closed. Double O-Z Plasty Text: The defect edges were debeveled with a #15 scalpel blade.  Given the location of the defect, shape of the defect and the proximity to free margins a Double O-Z plasty (double transposition flap) was deemed most appropriate.  Using a sterile surgical marker, the appropriate transposition flaps were drawn incorporating the defect and placing the expected incisions within the relaxed skin tension lines where possible. The area thus outlined was incised deep to adipose tissue with a #15 scalpel blade.  The skin margins were undermined to an appropriate distance in all directions utilizing iris scissors.  Hemostasis was achieved with electrocautery.  The flaps were then transposed into place, one clockwise and the other counterclockwise, and anchored with interrupted buried subcutaneous sutures.

## 2021-05-06 ENCOUNTER — PATIENT OUTREACH (OUTPATIENT)
Dept: CASE MANAGEMENT | Age: 81
End: 2021-05-06

## 2021-05-06 NOTE — PROGRESS NOTES
5/6/2021 2:14 PM  Transition of care outreached postponed for 14 days due to patients discharge to inpatient skilled nursing facility. ([facility]) Will follow.

## 2021-05-20 ENCOUNTER — PATIENT OUTREACH (OUTPATIENT)
Dept: CASE MANAGEMENT | Age: 81
End: 2021-05-20

## 2021-05-20 NOTE — PROGRESS NOTES
Care Transitions Initial Call    Call within 2 business days of discharge: Yes     Patient: Grayson Parker Patient : 1940 MRN: [id number]    Last Discharge [address]       Complaint Diagnosis Description Type Department Provider    19 Fall; Other Colitis . .. ED to Hosp-Admission (Discharged) (ADMIT) [id number] Bere Locke, DO; [place]. .. Was this an external facility discharge? Yes, 2021 to 2021 Discharge Facility: [facility] and then to SNF- discharged 2021    Challenges to be reviewed by the provider   Additional needs identified to be addressed with provider no  none         Method of communication with provider : none    Discussed COVID-19 related testing which was not done at this time. Test results were not done. Patient informed of results, if available? n/a     Advance Care Planning:   Does patient have an Advance Directive: decision makers updated     Inpatient Readmission Risk score: No data recorded  Was this a readmission? no   Patient stated reason for the admission: small bowel obstruction    Patients top risk factors for readmission: functional physical ability, medical condition-small bowel obstruction and support system   Interventions to address risk factors: Scheduled appointment with [facility]CP-[facility] Transition Nurse (CTN) contacted the patient by telephone to perform post hospital discharge assessment. Verified name and  with patient as identifiers. Provided introduction to self, and explanation of the CTN role. CTN reviewed discharge instructions, medical action plan and red flags with patient who verbalized understanding. Were discharge instructions available to patient? no. Reviewed appropriate site of care based on symptoms and resources available to patient including: PCP, When to call 911 and [address].  Patient given an opportunity to ask questions and does not have any further questions or concerns at this time. The patient agrees to contact the PCP office for questions related to their healthcare. Medication reconciliation was performed with patient, who verbalizes understanding of administration of home medications. Advised obtaining a 90-day supply of all daily and as-needed medications. Referral to Pharm D needed: no     Home Health/Outpatient orders at discharge: [address]: n/a  Date of initial visit: [facility] ordered at discharge: None  [address]: [facility] received: n/a    Covid Risk Education    Educated patient about risk for severe COVID-19 due to risk factors according to CDC guidelines. CTN reviewed discharge instructions, medical action plan and red flag symptoms patient who verbalized understanding. Discussed COVID vaccination status no. Education provided on COVID-19 vaccination as appropriate. Discussed exposure protocols and quarantine with CDC Guidelines. Patient was given an opportunity to verbalize any questions and concerns and agrees to contact CTN or health care provider for questions related to their healthcare. Was patient discharged with a pulse oximeter? no Discussed and confirmed pulse oximeter discharge instructions and when to notify provider or seek emergency care. For patients discharged due to monoclonal antibody infusion or pulse ox at discharge; information provided for remote patient monitoring, and agrees to enroll for follow up no    Patient's preferred e-mail: deferred   Patient's preferred phone number: deferred  Based on remote monitoring alert triggers, patient will be contacted by nurse care manager for worsening symptoms. Discussed follow-up appointments.  If no appointment was previously scheduled, appointment scheduling offered: yes Is follow up appointment scheduled within 7 days of discharge? not scheduled yet   [facility] follow up appointment(s): No future appointments. Non-[facility] follow up appointment(s): n/a    Plan for follow-up call in 7-10 days based on severity of symptoms and risk factors. Plan for next call: medication management-ensure patient has all meds and is taking appropriately  CTN provided contact information for future needs. Goals Addressed                 This Visit's Progress     Prevent complications post hospitalization. 1. CTN will monitor X 4 weeks    2. Ensure provider appt is scheduled within 7 days post-discharge 5/20/2021 Patient just discharged from SNF yesterday. Will get virtual appt. Will also place on [employer] list.     3. Confirm patient attended post-discharge provider apt    4. Complete post-visit call to confirm attendance and update care needs  5/20/2021 Patient stated that he is feeling pretty good. He does have a little bit of constipation- encouraged him to increase his fiber and water. 5. Review/educate common or potential \"red flags\" of condition worsening 5/20/2021 Reviewed signs/symptoms of small bowel obstruction such as pain in abdomen, pain while defecating, bloating, constipation, nausea or vomiting to name a few. 6. Evaluate adherence to medications and priority barriers to resolve  5/20/2021 Patient stated that he has all his meds and is taking as directed. 7. Instruct on adherence to medications as ordered and assess for therapeutic response and side-effects   5/20/2021 Patient is aware of why he takes meds and knows when to call physician for issues. 8. Discuss and evaluate ADL performance. Provide recommendations on energy conservation, particularly related to transition home from an inpatient admission. 5/20/2021 Patient stated that he uses a wheelchair to get around. He stated that he gets around pretty well.

## 2021-05-20 NOTE — PROGRESS NOTES
5/20/2021 10:57 AM    CTN attempted to call patient for hospital follow up. He was discharged from [facility] 5/19/2021. Message left introducing myself, the purpose of the call and giving my contact information. Requested that patient call back at his earliest convenience.

## 2021-05-24 ENCOUNTER — VIRTUAL VISIT (OUTPATIENT)
Dept: FAMILY MEDICINE CLINIC | Age: 81
End: 2021-05-24
Payer: MEDICARE

## 2021-05-24 DIAGNOSIS — G62.9 NEUROPATHY: ICD-10-CM

## 2021-05-24 DIAGNOSIS — G60.9 IDIOPATHIC PERIPHERAL NEUROPATHY: ICD-10-CM

## 2021-05-24 DIAGNOSIS — I48.0 PAROXYSMAL ATRIAL FIBRILLATION (HCC): ICD-10-CM

## 2021-05-24 DIAGNOSIS — I42.9 CARDIOMYOPATHY, UNSPECIFIED TYPE (HCC): ICD-10-CM

## 2021-05-24 DIAGNOSIS — L08.9 WOUND INFECTION: ICD-10-CM

## 2021-05-24 DIAGNOSIS — E11.22 CONTROLLED TYPE 2 DIABETES MELLITUS WITH STAGE 1 CHRONIC KIDNEY DISEASE, WITHOUT LONG-TERM CURRENT USE OF INSULIN (HCC): ICD-10-CM

## 2021-05-24 DIAGNOSIS — I49.5 SICK SINUS SYNDROME (HCC): ICD-10-CM

## 2021-05-24 DIAGNOSIS — N18.1 CONTROLLED TYPE 2 DIABETES MELLITUS WITH STAGE 1 CHRONIC KIDNEY DISEASE, WITHOUT LONG-TERM CURRENT USE OF INSULIN (HCC): ICD-10-CM

## 2021-05-24 DIAGNOSIS — I48.0 PAF (PAROXYSMAL ATRIAL FIBRILLATION) (HCC): ICD-10-CM

## 2021-05-24 DIAGNOSIS — Z00.00 MEDICARE ANNUAL WELLNESS VISIT, SUBSEQUENT: Primary | ICD-10-CM

## 2021-05-24 DIAGNOSIS — T14.8XXA WOUND INFECTION: ICD-10-CM

## 2021-05-24 DIAGNOSIS — R26.81 GAIT INSTABILITY: ICD-10-CM

## 2021-05-24 DIAGNOSIS — M79.89 LEG SWELLING: ICD-10-CM

## 2021-05-24 DIAGNOSIS — J43.8 OTHER EMPHYSEMA (HCC): ICD-10-CM

## 2021-05-24 PROCEDURE — 99496 TRANSJ CARE MGMT HIGH F2F 7D: CPT | Performed by: FAMILY MEDICINE

## 2021-05-24 PROCEDURE — 1101F PT FALLS ASSESS-DOCD LE1/YR: CPT | Performed by: FAMILY MEDICINE

## 2021-05-24 PROCEDURE — G8427 DOCREV CUR MEDS BY ELIG CLIN: HCPCS | Performed by: FAMILY MEDICINE

## 2021-05-24 PROCEDURE — G8536 NO DOC ELDER MAL SCRN: HCPCS | Performed by: FAMILY MEDICINE

## 2021-05-24 PROCEDURE — G0439 PPPS, SUBSEQ VISIT: HCPCS | Performed by: FAMILY MEDICINE

## 2021-05-24 PROCEDURE — G8432 DEP SCR NOT DOC, RNG: HCPCS | Performed by: FAMILY MEDICINE

## 2021-05-24 PROCEDURE — G8421 BMI NOT CALCULATED: HCPCS | Performed by: FAMILY MEDICINE

## 2021-05-24 RX ORDER — GABAPENTIN 600 MG/1
TABLET ORAL
Qty: 120 TABLET | Refills: 5 | Status: SHIPPED | OUTPATIENT
Start: 2021-05-24

## 2021-05-24 RX ORDER — METOPROLOL SUCCINATE 25 MG/1
25 TABLET, EXTENDED RELEASE ORAL DAILY
Qty: 90 TABLET | Refills: 2 | Status: SHIPPED | OUTPATIENT
Start: 2021-05-24

## 2021-05-24 RX ORDER — POTASSIUM CHLORIDE 20 MEQ/1
20 TABLET, EXTENDED RELEASE ORAL DAILY
Qty: 90 TABLET | Refills: 3 | Status: SHIPPED | OUTPATIENT
Start: 2021-05-24

## 2021-05-24 RX ORDER — ACETAMINOPHEN 500 MG
500 TABLET ORAL
Qty: 40 TABLET | Refills: 12 | Status: SHIPPED | OUTPATIENT
Start: 2021-05-24

## 2021-05-24 RX ORDER — FUROSEMIDE 20 MG/1
20 TABLET ORAL DAILY
Qty: 90 TABLET | Refills: 3 | Status: SHIPPED | OUTPATIENT
Start: 2021-05-24

## 2021-05-24 NOTE — PROGRESS NOTES
Trang Armstrong presents today for   Chief Complaint   Patient presents with    Diabetes       Is someone accompanying this pt? na    Is the patient using any DME equipment during OV? na    Depression Screening:  3 most recent PHQ Screens 11/14/2018   Little interest or pleasure in doing things Not at all   Feeling down, depressed, irritable, or hopeless Not at all   Total Score PHQ 2 0       Learning Assessment:  Learning Assessment 10/31/2017   PRIMARY LEARNER Patient   HIGHEST LEVEL OF EDUCATION - PRIMARY LEARNER  2 YEARS [place] PRIMARY LEARNER PHYSICAL   CO-LEARNER CAREGIVER No   PRIMARY LANGUAGE ENGLISH    NEED No   LEARNER PREFERENCE PRIMARY LISTENING   LEARNING SPECIAL TOPICS no   ANSWERED BY self   RELATIONSHIP SELF       Abuse Screening:  Abuse Screening Questionnaire 10/31/2017   Do you ever feel afraid of your partner? N   Are you in a relationship with someone who physically or mentally threatens you? N   Is it safe for you to go home? Y       Fall Risk  Fall Risk Assessment, last 12 mths 11/14/2018   Able to walk? No   Fall in past 12 months? -   Number of falls in past 12 months -   Fall with injury? -       Health Maintenance reviewed and discussed and ordered per Provider. Health Maintenance Due   Topic Date Due    Foot Exam Q1  Never done    Eye Exam Retinal or Dilated  Never done    COVID-19 Vaccine (1) Never done    Shingrix Vaccine Age 50> (1 of 2) Never done    MICROALBUMIN Q1  07/13/2018    Medicare Yearly Exam  09/08/2018   . Coordination of Care:  1. Have you been to the ER, urgent care clinic since your last visit? Hospitalized since your last visit? Yes, 5/1/21, SNG, bowel obstruction    2. Have you seen or consulted any other health care providers outside of the [address] [place] [address] [place] since your last visit? Include any pap smears or colon screening.  no      Last  Checked na  Last UDS Checked na  Last Pain contract signed: [facility] follow up

## 2021-05-24 NOTE — PROGRESS NOTES
(AWV) The Medicare Annual Wellness Exam PROGRESS NOTE    This is a Medicare Annual Wellness Exam (AWV)     I have reviewed the patient's medical history in detail and updated the computerized patient record. Huey Meza is a 78 y.o.  male and presents for an annual wellness exam   Huey Meza, who was evaluated through a synchronous (real-time) audio-video encounter, and/or his healthcare decision maker, is aware that it is a billable service, with coverage as determined by his insurance carrier. He provided verbal consent to proceed: Yes, and patient identification was verified. This visit was conducted pursuant to the emergency declaration under the [facility], [address] [place] [address] authority and the [employer] and [employer] General Act. A caregiver was present when appropriate. Ability to conduct physical exam was limited. The patient was located in a state where the provider was credentialed to provide care.      --Andrea Davis MD on 5/24/2021 at 2:31 PM    ROS   General: negative for - chills, fever, weight change; +fatigue  Psych: negative for - anxiety, depression, irritability or mood swings  ENT: negative for - headaches, hearing change, nasal congestion, oral lesions, sneezing or sore throat  Heme/ Lymph: negative for - bleeding problems, bruising, pallor or swollen lymph nodes  Endo: negative for - hot flashes, polydipsia/polyuria or temperature intolerance  Resp: negative for - cough, shortness of breath or wheezing  CV: negative for - chest pain, edema or palpitations  GI: negative for - abdominal pain, change in bowel habits, constipation, diarrhea or nausea/vomiting  : negative for - dysuria, hematuria, incontinence, pelvic pain or vulvar/vaginal symptoms  MSK: negative for - joint pain, joint swelling or muscle pain  Neuro: negative for - confusion, headaches, seizures or weakness  Derm: negative for - dry skin, hair changes, rash or skin lesion changes          All other systems reviewed and are negative. History     Past Medical History:   Diagnosis Date    Asthma     BPH (benign prostatic hyperplasia)     Herniated disc, cervical     Knee pain     Pacemaker 2011    [employer]    PAF (paroxysmal atrial fibrillation) ([facility])     During Pacer interogation     SSS (sick sinus syndrome) ([facility])     S/P Dual chamber [employer] Pacemaker      Past Surgical History:   Procedure Laterality Date    HX HERNIA REPAIR Bilateral 1989    HX HIP REPLACEMENT  2006    right    HX KNEE REPLACEMENT Bilateral 1992/1993/2006/2008/2011/2012/2013    HX PACEMAKER  2011     Current Outpatient Medications   Medication Sig Dispense Refill    L.acid/B.bifidum/B.animal/FOS (PROBIOTIC COMPLEX PO) Take 1 Tab by mouth daily.  VITAMIN B COMPLEX PO Take 1 Tab by mouth daily.  fluticasone propionate (FLONASE) 50 mcg/actuation nasal spray 2 Sprays by Both Nostrils route daily.  gabapentin (NEURONTIN) 600 mg tablet Take 600 mg by mouth. one am, one noon , 2 nite      metFORMIN (GLUCOPHAGE) 500 mg tablet TAKE 1 TABLET TWICE DAILY WITH MEALS 180 Tab 3    gabapentin (NEURONTIN) 600 mg tablet One po in am , one at noon and 2 at hs 120 Tab 3    warfarin (COUMADIN) 5 mg tablet Take 1 Tab by mouth daily. 90 Tab 3    oxyCODONE-acetaminophen (PERCOCET) 5-325 mg per tablet Take  by mouth every four (4) hours as needed for Pain.  cephALEXin (KEFLEX) 500 mg capsule Take 500 mg by mouth four (4) times daily.  doxycycline (VIBRAMYCIN) 100 mg capsule Take 100 mg by mouth two (2) times a day.  guaiFENesin (ROBITUSSIN) 100 mg/5 mL liquid Take 200 mg by mouth three (3) times daily as needed for Cough.  insulin lispro (HUMALOG) 100 unit/mL injection by SubCUTAneous route.  tamsulosin (FLOMAX) 0.4 mg capsule Take 1 Cap by mouth daily. 30 Cap 0    warfarin (COUMADIN) 6 mg tablet Take 1 Tab by mouth every evening.  30 Tab 0    diphenoxylate-atropine (LOMOTIL) 2.5-0.025 mg per tablet Take 1 Tab by mouth four (4) times daily as needed for Diarrhea.  metoprolol succinate (TOPROL-XL) 25 mg XL tablet Take 1 Tab by mouth daily. 90 Tab 2    furosemide (LASIX) 40 mg tablet Take 1 Tab by mouth two (2) times a day. (Patient taking differently: Take 40 mg by mouth daily.) 60 Tab 3    terbinafine HCl (LAMISIL) 250 mg tablet Take 1 Tab by mouth daily. 30 Tab 3    potassium chloride (K-DUR, KLOR-CON) 20 mEq tablet Take 1 Tab by mouth two (2) times a day. 30 Tab 3    albuterol (PROVENTIL VENTOLIN) 2.5 mg /3 mL (0.083 %) nebulizer solution 3 mL by Nebulization route every four (4) hours as needed for Wheezing. 1 Package 1    mupirocin (BACTROBAN) 2 % ointment Apply  to affected area daily. 22 g 0    collagenase (SANTYL) 250 unit/gram ointment Apply  to affected area daily. Apply to left lwoer ext wound once daily 90 g 3    miconazole (ZEASORB, MICONAZOLE,) 2 % topical powder Apply  to affected area two (2) times a day. Apply localy to the groin twice daily (Patient taking differently: Apply  to affected area two (2) times a day. Apply thin layer localy to the groin twice daily) 85 g 3    raNITIdine (ZANTAC) 75 mg tablet Take 75 mg by mouth daily as needed for Other (indigestion).  cholecalciferol (VITAMIN D3) 1,000 unit tablet Take 1,000 Units by mouth daily.  acetaminophen (TYLENOL) 500 mg tablet Take 500 mg by mouth every six (6) hours as needed for Pain.  SODIUM CHLORIDE (AFRIN SALINE NASAL MIST NA) 1 Leonidas by IntraNASal route two (2) times daily as needed (nasal congestion).  loratadine (CLARITIN) 10 mg tablet Take 1 Tab by mouth daily.  (Patient taking differently: Take 1 Tab by mouth daily as needed for Allergies.) 90 Tab 1     Allergies   Allergen Reactions    Iodinated Contrast Media Hives    Raisin Flavor      raisins/rash    Sulfa (Sulfonamide Antibiotics) Hives and Rash    Blueberry Rash     No family history on file. Social History     Tobacco Use    Smoking status: Current Some Day Smoker     Types: Cigars    Smokeless tobacco: Never Used    Tobacco comment: rare cigar smoker   Substance Use Topics    Alcohol use: Yes     Alcohol/week: 0.0 standard drinks     Comment: rarely drinks     Patient Active Problem List   Diagnosis Code    Cardiomyopathy ([facility] I42.9    Asthma J45.909    Peripheral neuropathy G62.9    Other allergic rhinitis J30.89    Nocturia R35.1    Knee pain M25.569    Sick sinus syndrome ([facility]) I49.5    Monoclonal gammopathy D47.2    Tinea corporis B35.4    Actinic keratosis L57.0    Idiopathic peripheral neuropathy G60.9    Abnormal SPEP R77.8    Stroke ([facility]) I63.9    PAF (paroxysmal atrial fibrillation) ([facility]) I48.0    Weakness of left upper extremity R29.898    BPH (benign prostatic hyperplasia) N40.0    Cellulitis L03.90    Diabetes mellitus type 2, controlled ([facility]) E11.9    Colitis K52.9    UTI (urinary tract infection) N39.0    Chronic venous stasis dermatitis of both lower extremities I87.2       Health Maintenance History  Immunizations reviewed, dtap up to date , pneumovax up to date, flu up to date, zoster due  Colonoscopy: up to date,   Eye exam: due  Covid:     Depression Risk Factor Screening:      Patient Health Questionnaire (PHQ-2)   Over the last 2 weeks, how often have you been bothered by any of the following problems? · Little interest or pleasure in doing things? · Not at all. [0]  · Feeling down, depressed, or hopeless? · Not at all. [0]    Total Score: 0/6  PHQ-2 Assessment Scoring:   A score of 2 or more requires further screening with the PHQ-9    Alcohol Risk Factor Screening:     Men: On any occasion during the past 3 months, have you had more than 4 drinks containing alcohol? np  Do you average more than 14 drinks per week? no    Functional Ability and Level of Safety:     Hearing Loss    Hearing is good.     Activities of Daily Living Partial assistance. Requires assistance with: ambulation, bathing and hygiene, toileting and dressing    Fall Risk   Secondary diagnoses (15 pts)  Ambulatory aid furniture (30 pts)  Impaired (20 pts)  Score: 65    Abuse Screen   Patient is not abused    Examination   Physical Examination  There were no vitals filed for this visit. There is no height or weight on file to calculate BMI. Evaluation of Cognitive Function:  Mood/affect:good mood with appropriate affect  Appearance:well kempt  Family member/caregiver input: n/a    alert, well appearing, and in no distress, oriented to person, place, and time and obese    Patient Care Team:  Alfredo Burrows MD as PCP - General (Family Medicine)  Alfredo Burrows MD as PCP - [facility]  Americo Padilla MD (Cardiology)  Maryse Gamboa RN as Ambulatory Care Manager  Margo Crowder RN as Care Transitions Nurse    End-of-life planning  Advanced Directive in the case than an injury or illness causes the patient to be unable to make health care decisions    Health Care Directive or Living Will: yes    Advice/Referrals/Counselling/Plan:   Education and counseling provided:  End-of-Life planning (with patient's consent)  Medical nutrition therapy for individuals with diabetes or renal disease  covid vaccine  Include in education list (weight loss, physical activity, smoking cessation, fall prevention, and nutrition)    ICD-10-CM ICD-9-CM    1. Medicare annual wellness visit, subsequent  Z00.00 V70.0 [id number] [employer]   2. Neuropathy  G62.9 355.9 gabapentin (NEURONTIN) 600 mg tablet   3. Gait instability  R26.81 781.2    4. Leg swelling  M79.89 729.81 furosemide (Lasix) 20 mg tablet   5. Other emphysema ([facility]  J43.8 492.8    6. Cardiomyopathy, unspecified type (HCC)  I42.9 425.4 metoprolol succinate (TOPROL-XL) 25 mg XL tablet   7.  Controlled type 2 diabetes mellitus with stage 1 chronic kidney disease, without long-term current use of insulin ([facility]  E11.22 250.40 SITagliptin (JANUVIA) 50 mg tablet    N18.1 585.1    8. PAF (paroxysmal atrial fibrillation) ([facility])  I48.0 427.31    9. Sick sinus syndrome ([facility])  I49.5 427.81 metoprolol succinate (TOPROL-XL) 25 mg XL tablet   10. Paroxysmal atrial fibrillation (HCC)  I48.0 427.31 metoprolol succinate (TOPROL-XL) 25 mg XL tablet      apixaban (ELIQUIS) 5 mg tablet   11. Wound infection  T14. 8XXA 958. 3 potassium chloride (K-DUR, KLOR-CON) 20 mEq tablet    L08.9     12. Idiopathic peripheral neuropathy  G60.9 356.9 acetaminophen (TYLENOL) 500 mg tablet   . Brief written plan, checklist    I have discussed the diagnosis with the patient and the intended plan as seen in the above orders. The patient has received an after-visit summary and questions were answered concerning future plans. I have discussed medication side effects and warnings with the patient as well. I have reviewed the plan of care with the patient, accepted their input and they are in agreement with the treatment goals. ____________________________________________________________    Problem Assessment    for treatment of   Chief Complaint   Patient presents with    Diabetes   5/01/2021 admitted to [facility] with small bowel obstruction  5/04/2021 discharged to rehab facility  5/19/2021 discharged to home from rehab with home health  5/20/2021 contacted by nurse navigator for transition of care    SUBJECTIVE    Mr. Fiona Mckinnon was admitted to hospital for small bowel obstruction 3 weeks ago. He was in the hospital a couple of weeks before for concern for osteomyelitis due to intractable leg pain. He has chronic lower extremity venous stasis with ulcerations, as well as diastolic heart failure, sick sinus syndrome s/p PPM placement, paroxysmal atrial fibrillation taking only aspirin due to his difficulty tracking INR while taking warfarin.   He also has T2DM, and monoclonal gammopathy and presented for worsening left lower extremity pain. He was recently admitted for worsening lower extremity ulcers (3/25 to 4/1), during which time imaging studies strongly raised concern for osteomyelitis in his left fibula. Infectious Diseases and Orthopaedic Surgery were consulted and it was determined that it would be very challenging to manage his condition with antibiotic therapy alone. He was discharged to a skilled nursing facility with plans to continue local wound care alone. Out of concern for his intractable pain, he was sent to [facility]. He has thought about it strongly and has decided he would want the above-knee-amputation. At his admission on 5/1/2021 he was found to have incarcerated direct left inguinal hernia containing small bowel and preperitoneal fat. Bowel viable after reduction. Medium Bard mesh placed pre-peritoneally  He was sent to skilled nursing facility for rehab; he is now at home and his son is caring for him    Cardiovascular Review:  The patient has diabetes, CHF, Atrial Fibrillation, obesity and sick sinus syndrome. Diet and Lifestyle: not attempting to follow a low fat, low cholesterol diet, generally follows a low sodium diet, follows a diabetic diet regularly, sedentary, nonsmoker  Home BP Monitoring: is well controlled at home, ranging 110's/60's. Pertinent ROS: taking medications as instructed, no medication side effects noted, no TIA's, no chest pain on exertion, no dyspnea on exertion, noting swelling of ankles. Mental status - normal mood, behavior, speech, dress, motor activity, and thought processes  Chest - normal work of breathing  Neurological - abnormal extraocular movement; other cranial nerves grossly intact    LABS     TESTS  Echocardiogram - EF 60%  Assessment/Plan:    1. Neuropathy  Pain management  - gabapentin (NEURONTIN) 600 mg tablet; one am, one noon , 2 nite  Dispense: 120 Tablet; Refill: 5    2. Gait instability  Wheelchair bound; requesting referral to ortho    3.  Leg swelling  Restart furosemide at lower dose; review of echocardiogram shows normal EF from 1 month ago  - furosemide (Lasix) 20 mg tablet; Take 1 Tablet by mouth daily. Dispense: 90 Tablet; Refill: 3    4. Other emphysema ([address]  Continue inhaled therapy    5. Cardiomyopathy, unspecified type ([address]  Rate control continued  - metoprolol succinate (TOPROL-XL) 25 mg XL tablet; Take 1 Tablet by mouth daily. Dispense: 90 Tablet; Refill: 2    6. Controlled type 2 diabetes mellitus with stage 1 chronic kidney disease, without long-term current use of insulin ([facility])  Goal hgb a1c <7  - SITagliptin (JANUVIA) 50 mg tablet; Take 1 Tablet by mouth daily. Dispense: 90 Tablet; Refill: 3    7. PAF (paroxysmal atrial fibrillation) ([facility])  Anticoagulation ordered    8. Medicare annual wellness visit, subsequent  Reviewed preventive recommendations  - [facility]    9. Sick sinus syndrome (HCC)  F/u with cardiology  - metoprolol succinate (TOPROL-XL) 25 mg XL tablet; Take 1 Tablet by mouth daily. Dispense: 90 Tablet; Refill: 2    10. Paroxysmal atrial fibrillation (HCC)  Warfarin discontinued due to sporadic monitoring; start anticoagulation  - metoprolol succinate (TOPROL-XL) 25 mg XL tablet; Take 1 Tablet by mouth daily. Dispense: 90 Tablet; Refill: 2  - apixaban (ELIQUIS) 5 mg tablet; Take 1 Tablet by mouth two (2) times a day. Dispense: 60 Tablet; Refill: 5    11. Wound infection  Replace potassium  - potassium chloride (K-DUR, KLOR-CON) 20 mEq tablet; Take 1 Tablet by mouth daily. Dispense: 90 Tablet; Refill: 3    12. Idiopathic peripheral neuropathy    - acetaminophen (TYLENOL) 500 mg tablet; Take 1 Tablet by mouth every six (6) hours as needed for Pain. Dispense: 40 Tablet;  Refill: 12    Lab review: labs reviewed, I note that liver functions abnormal

## 2021-05-27 ENCOUNTER — PATIENT OUTREACH (OUTPATIENT)
Dept: CASE MANAGEMENT | Age: 81
End: 2021-05-27

## 2021-05-27 NOTE — PROGRESS NOTES
5/27/2021 11:33 AM    CTN attempted to call patient for routine follow up. Patient was not available at the time of the call. Message left introducing myself, the purpose of the call and giving my contact information. Requested that patient call back at his earliest convenience. Will follow.

## 2021-05-28 ENCOUNTER — PATIENT OUTREACH (OUTPATIENT)
Dept: CASE MANAGEMENT | Age: 81
End: 2021-05-28

## 2021-05-28 NOTE — PROGRESS NOTES
Care Transitions Follow Up Call    Challenges to be reviewed by the provider   Additional needs identified to be addressed with provider no  none           Method of communication with provider : none    Care Transition Nurse (CTN) contacted the patient by telephone to follow up after admission on 2021. Verified name and  with patient as identifiers. Addressed changes since last contact: none  Follow up appointment completed? yes   Was follow up appointment scheduled within 7 days of discharge? yes     Advance Care Planning:   Does patient have an Advance Directive: decision makers updated      CTN reviewed discharge instructions, medical action plan and red flags with patient and discussed any barriers to care and/or understanding of plan of care after discharge. Discussed appropriate site of care based on symptoms and resources available to patient including: PCP, Home Health and [address]. The patient agrees to contact the PCP office for questions related to their healthcare. Patients top risk factors for readmission: functional physical ability, medical condition-small bowel obstruction and support system   Interventions to address risk factors: Education of patient/family/caregiver/guardian to support self-management-ensure that patient has everything he needs at home to care for himeself. [address] follow up appointment(s): No future appointments. Non-[facility] follow up appointment(s): n/a    CTN provided contact information for future needs. Plan for follow-up call in 7-10 days based on severity of symptoms and risk factors. Plan for next call: medication management-ensure that patient has all meds and is taking as directed. Goals Addressed                 This Visit's Progress     Prevent complications post hospitalization. 1. CTN will monitor X 4 weeks    2. Ensure provider appt is scheduled within 7 days post-discharge 2021 Patient just discharged from SNF yesterday. Will get virtual appt. Will also place on [facility] list.     3. Confirm patient attended post-discharge provider apt    4. Complete post-visit call to confirm attendance and update care needs  5/20/2021 Patient stated that he is feeling pretty good. He does have a little bit of constipation- encouraged him to increase his fiber and water. 5/28/2021 Patient stated that he is doing well. No care needs at present. 5. Review/educate common or potential \"red flags\" of condition worsening 5/20/2021 Reviewed signs/symptoms of small bowel obstruction such as pain in abdomen, pain while defecating, bloating, constipation, nausea or vomiting to name a few. 6. Evaluate adherence to medications and priority barriers to resolve  5/20/2021 Patient stated that he has all his meds and is taking as directed. 5/28/2021 Patient has all meds and is taking as instructed. 7. Instruct on adherence to medications as ordered and assess for therapeutic response and side-effects   5/20/2021 Patient is aware of why he takes meds and knows when to call physician for issues. 5/28/2021 No concerns about meds expressed by patient. 8. Discuss and evaluate ADL performance. Provide recommendations on energy conservation, particularly related to transition home from an inpatient admission. 5/20/2021 Patient stated that he uses a wheelchair to get around. He stated that he gets around pretty well. 5/28/2021 Patient has started with [facility]. He rests as needed between activities.

## 2021-06-02 DIAGNOSIS — T14.8XXA WOUND INFECTION: Primary | ICD-10-CM

## 2021-06-02 DIAGNOSIS — L08.9 WOUND INFECTION: Primary | ICD-10-CM

## 2021-06-02 RX ORDER — DOXYCYCLINE 100 MG/1
100 CAPSULE ORAL 2 TIMES DAILY
Qty: 20 CAPSULE | Refills: 0 | Status: SHIPPED | OUTPATIENT
Start: 2021-06-02 | End: 2021-06-12

## 2021-06-03 ENCOUNTER — PATIENT OUTREACH (OUTPATIENT)
Dept: CASE MANAGEMENT | Age: 81
End: 2021-06-03

## 2021-06-03 NOTE — PROGRESS NOTES
Care Transitions Follow Up Call    Challenges to be reviewed by the provider   Additional needs identified to be addressed with provider: no  none           Method of communication with provider : none    Care Transition Nurse (CTN) contacted the patient by telephone to follow up after admission on 6/3/2021. Verified name and  with patient as identifiers. Addressed changes since last contact: none  Follow up appointment completed? yes. Was follow up appointment scheduled within 7 days of discharge? yes. Was within 7 days of D/C from rehab    Advance Care Planning:   Does patient have an Advance Directive: decision makers updated. CTN reviewed discharge instructions, medical action plan and red flags with patient and discussed any barriers to care and/or understanding of plan of care after discharge. Discussed appropriate site of care based on symptoms and resources available to patient including: PCP, Home Health and [address] [place] [address] Road. The patient agrees to contact the PCP office for questions related to their healthcare. Patients top risk factors for readmission: medical condition-hernia- surgery   Interventions to address risk factors: Scheduled appointment with PCP-as needed    REHABILITATION [facility] follow up appointment(s): No future appointments. Non-[facility] follow up appointment(s): n/a    CTN provided contact information for future needs. Plan for follow-up call in 1-2 days based on severity of symptoms and risk factors. Plan for next call: symptom management-ensure that he has no symptoms and is able to do all activities. Goals Addressed                 This Visit's Progress     Prevent complications post hospitalization. 1. CTN will monitor X 4 weeks    2. Ensure provider appt is scheduled within 7 days post-discharge 2021 Patient just discharged from SNF yesterday. Will get virtual appt.  Will also place on [facility] list.     3. Confirm patient attended post-discharge provider apt 6/3/2021 Patient had virtual visit 5/24/2021    4. Complete post-visit call to confirm attendance and update care needs  5/20/2021 Patient stated that he is feeling pretty good. He does have a little bit of constipation- encouraged him to increase his fiber and water. 5/28/2021 Patient stated that he is doing well. No care needs at present. 6/3/2021 Patient doing well. Getting better each day. No care needs noted    5. Review/educate common or potential \"red flags\" of condition worsening 5/20/2021 Reviewed signs/symptoms of small bowel obstruction such as pain in abdomen, pain while defecating, bloating, constipation, nausea or vomiting to name a few. 6. Evaluate adherence to medications and priority barriers to resolve  5/20/2021 Patient stated that he has all his meds and is taking as directed. 5/28/2021 Patient has all meds and is taking as instructed. 6/3/2021 Patient stated that he has all meds and is taking each as prescribed. 7. Instruct on adherence to medications as ordered and assess for therapeutic response and side-effects   5/20/2021 Patient is aware of why he takes meds and knows when to call physician for issues. 5/28/2021 No concerns about meds expressed by patient. 6/3/2021 Patient stated that he has no concerns about his meds at present. 8. Discuss and evaluate ADL performance. Provide recommendations on energy conservation, particularly related to transition home from an inpatient admission. 5/20/2021 Patient stated that he uses a wheelchair to get around. He stated that he gets around pretty well. 5/28/2021 Patient has started with [facility]. He rests as needed between activities. 6/3/2021 Patient stated that he is still having [facility]. He stated that he is getting back to all his activities without difficulty. He rests as needed.

## 2021-06-06 ENCOUNTER — PATIENT OUTREACH (OUTPATIENT)
Dept: CASE MANAGEMENT | Age: 81
End: 2021-06-06

## 2021-06-06 NOTE — PROGRESS NOTES
Patient has graduated from the Transitions of Care Coordination  program on 6/6/2021. Patient's symptoms are stable at this time. Patient/family has the ability to self-manage. Care management goals have been completed at this time. No further care transitions nurse follow up scheduled. Goals Addressed                 This Visit's Progress     COMPLETED: Prevent complications post hospitalization. 1. CTN will monitor X 4 weeks    2. Ensure provider appt is scheduled within 7 days post-discharge 5/20/2021 Patient just discharged from SNF yesterday. Will get virtual appt. Will also place on [facility] list.     3. Confirm patient attended post-discharge provider apt 6/3/2021 Patient had virtual visit 5/24/2021    4. Complete post-visit call to confirm attendance and update care needs  5/20/2021 Patient stated that he is feeling pretty good. He does have a little bit of constipation- encouraged him to increase his fiber and water. 5/28/2021 Patient stated that he is doing well. No care needs at present. 6/3/2021 Patient doing well. Getting better each day. No care needs noted 6/6/2021 Patient continues to do well. No needs reported     5. Review/educate common or potential \"red flags\" of condition worsening 5/20/2021 Reviewed signs/symptoms of small bowel obstruction such as pain in abdomen, pain while defecating, bloating, constipation, nausea or vomiting to name a few. 6. Evaluate adherence to medications and priority barriers to resolve  5/20/2021 Patient stated that he has all his meds and is taking as directed. 5/28/2021 Patient has all meds and is taking as instructed. 6/3/2021 Patient stated that he has all meds and is taking each as prescribed. 6/6/2021 Patient has all meds and is taking as he was told.       7. Instruct on adherence to medications as ordered and assess for therapeutic response and side-effects   5/20/2021 Patient is aware of why he takes meds and knows when to call physician for issues. 5/28/2021 No concerns about meds expressed by patient. 6/3/2021 Patient stated that he has no concerns about his meds at present. 6/6/2021 Patiet has no concerns about meds. 8. Discuss and evaluate ADL performance. Provide recommendations on energy conservation, particularly related to transition home from an inpatient admission. 5/20/2021 Patient stated that he uses a wheelchair to get around. He stated that he gets around pretty well. 5/28/2021 Patient has started with [facility]. He rests as needed between activities. 6/3/2021 Patient stated that he is still having [facility]. He stated that he is getting back to all his activities without difficulty. He rests as needed. 6/6/2021 Patient continues to do well. No complaints. Patient has care transitions nurse contact information for any further questions, concerns, or needs. Patient's upcoming visits:  No future appointments.

## 2021-08-02 ENCOUNTER — TELEPHONE (OUTPATIENT)
Dept: FAMILY MEDICINE CLINIC | Age: 81
End: 2021-08-02

## 2021-08-03 DIAGNOSIS — S81.802S NON-HEALING WOUND OF LOWER EXTREMITY, LEFT, SEQUELA: ICD-10-CM

## 2021-08-03 DIAGNOSIS — S81.801S NON-HEALING WOUND OF LOWER EXTREMITY, RIGHT, SEQUELA: Primary | ICD-10-CM

## 2021-08-03 NOTE — TELEPHONE ENCOUNTER
Received call from answering service with regard to patient. Ms. Casandra Hurst from [facility] home health was calling to inform that the wound management treatment that the patient was receiving did not seem to be working. She states that there was a large amount of yellow/green drainage. Also there is localized pain above wound at knee level. Pt is wheelchair bound. Would like pt to have an appointment to discuss what care can be provided.

## 2021-08-04 ENCOUNTER — PATIENT OUTREACH (OUTPATIENT)
Dept: CASE MANAGEMENT | Age: 81
End: 2021-08-04

## 2021-08-04 NOTE — PROGRESS NOTES
Date/Time:  2021 2:11 PM    Method of communication with patient:phone    [address] [place] [address] ([facility]) contacted the patient by telephone to perform Ambulatory Care Coordination. Verified name and  (PHI) with patient as identifiers. Provided introduction to self, and explanation of the Ambulatory Care Manager's role. Reviewed most recent clinic visit w/ patient who verbalized understanding. Patient given an opportunity to ask questions. Top Challenges reviewed with the patient   1. Spoke with patient - Patient states doing well at present   2. Chief complaint of discomfort in lt leg secondary to non-healing wound  3. Unable to find current A1c either at [facility] or [facility] lab reports  4. HH from [facility] doiong visits and changing dressing  5. Patient enrolled in Complex Case Management effective 2021 and will be followed per [facility] protocol. Initial questions answered with subsequent encounters planned. 6. appointment scheduled (virtual) with Dr. Sascha Lopes for 21 @ [phone] - patient aware  7. Further questions answered and patient has [facility] contact information  8. Preliminary review of medications done during this encounter - will do full med rec on next encounter  9. Will continue to follow patient per [facility] protocal     The patient agrees to contact the PCP office or the [address] [place] [address] for questions related to their healthcare. Provided contact information for future reference. Disease Specific:   N/A    Home Health Active: Yes    DME Active: Yes    Barriers to care? depression, financial, lack of knowledge about disease, medication management, transportation    Advance Care Planning:   Does patient have an Advance Directive:  reviewed and current     Medication(s):   Medication reconciliation was not performed with patient, who verbalizes understanding of administration of home medications. There were no barriers to obtaining medications identified at this time.          Current Outpatient Medications   Medication Sig    gabapentin (NEURONTIN) 600 mg tablet one am, one noon , 2 nite    SITagliptin (JANUVIA) 50 mg tablet Take 1 Tablet by mouth daily.  metoprolol succinate (TOPROL-XL) 25 mg XL tablet Take 1 Tablet by mouth daily.  furosemide (Lasix) 20 mg tablet Take 1 Tablet by mouth daily.  potassium chloride (K-DUR, KLOR-CON) 20 mEq tablet Take 1 Tablet by mouth daily.  apixaban (ELIQUIS) 5 mg tablet Take 1 Tablet by mouth two (2) times a day.  acetaminophen (TYLENOL) 500 mg tablet Take 1 Tablet by mouth every six (6) hours as needed for Pain.    L.acid/B.bifidum/B.animal/FOS (PROBIOTIC COMPLEX PO) Take 1 Tab by mouth daily.  VITAMIN B COMPLEX PO Take 1 Tab by mouth daily.  diphenoxylate-atropine (LOMOTIL) 2.5-0.025 mg per tablet Take 1 Tab by mouth four (4) times daily as needed for Diarrhea.  albuterol (PROVENTIL VENTOLIN) 2.5 mg /3 mL (0.083 %) nebulizer solution 3 mL by Nebulization route every four (4) hours as needed for Wheezing.  cholecalciferol (VITAMIN D3) 1,000 unit tablet Take 1,000 Units by mouth daily. No current facility-administered medications for this visit.        [facility] [place](s):   Future Appointments   Date Time Provider Brad Chapman   8/12/2021  3:45 PM Deni Brunson MD [facility]          Goals Addressed                 This Visit's Progress     Attend follow up appointments on schedule   On track     Prepare patients and caregivers for end of life decisions (ie. need for hospice, pain management, symptom relief, advance directives etc.)   On track     Take all medications as ordered   On track

## 2021-08-12 ENCOUNTER — VIRTUAL VISIT (OUTPATIENT)
Dept: FAMILY MEDICINE CLINIC | Age: 81
End: 2021-08-12

## 2021-08-12 DIAGNOSIS — G62.9 NEUROPATHY: ICD-10-CM

## 2021-08-12 DIAGNOSIS — M79.89 LEG SWELLING: Primary | ICD-10-CM

## 2021-08-12 DIAGNOSIS — S81.801D NON-HEALING WOUND OF LOWER EXTREMITY, RIGHT, SUBSEQUENT ENCOUNTER: ICD-10-CM

## 2021-08-12 NOTE — PROGRESS NOTES
Glenna Hutchinson presents today for   Chief Complaint   Patient presents with    Wound Infection       Is someone accompanying this pt? na    Is the patient using any DME equipment during OV? na    Depression Screening:  3 most recent PHQ Screens 11/14/2018   Little interest or pleasure in doing things Not at all   Feeling down, depressed, irritable, or hopeless Not at all   Total Score PHQ 2 0       Learning Assessment:  Learning Assessment 10/31/2017   PRIMARY LEARNER Patient   HIGHEST LEVEL OF EDUCATION - PRIMARY LEARNER  2 YEARS [place] PRIMARY LEARNER PHYSICAL   CO-LEARNER CAREGIVER No   PRIMARY LANGUAGE ENGLISH    NEED No   LEARNER PREFERENCE PRIMARY LISTENING   LEARNING SPECIAL TOPICS no   ANSWERED BY self   RELATIONSHIP SELF       Abuse Screening:  Abuse Screening Questionnaire 10/31/2017   Do you ever feel afraid of your partner? N   Are you in a relationship with someone who physically or mentally threatens you? N   Is it safe for you to go home? Y       Fall Risk  Fall Risk Assessment, last 12 mths 11/14/2018   Able to walk? No   Fall in past 12 months? -   Number of falls in past 12 months -   Fall with injury? -       Health Maintenance reviewed and discussed and ordered per Provider. Health Maintenance Due   Topic Date Due    Foot Exam Q1  Never done    Eye Exam Retinal or Dilated  Never done    COVID-19 Vaccine (1) Never done    Shingrix Vaccine Age 50> (1 of 2) Never done    MICROALBUMIN Q1  07/13/2018   . Coordination of Care:  1. Have you been to the ER, urgent care clinic since your last visit? Hospitalized since your last visit? Yes, 7/8/21, [facility] ER, leg pain    2. Have you seen or consulted any other health care providers outside of the [address] [place] [address] since your last visit? Include any pap smears or colon screening.  no      Last  Checked na  Last UDS Checked na  Last Pain contract signed: na    Patients concerns today:  Wound check left leg

## 2021-08-13 ENCOUNTER — VIRTUAL VISIT (OUTPATIENT)
Dept: FAMILY MEDICINE CLINIC | Age: 81
End: 2021-08-13
Payer: MEDICARE

## 2021-08-13 ENCOUNTER — PATIENT OUTREACH (OUTPATIENT)
Dept: CASE MANAGEMENT | Age: 81
End: 2021-08-13

## 2021-08-13 DIAGNOSIS — G62.9 NEUROPATHY: ICD-10-CM

## 2021-08-13 DIAGNOSIS — M79.89 LEG SWELLING: ICD-10-CM

## 2021-08-13 DIAGNOSIS — S81.801D NON-HEALING WOUND OF LOWER EXTREMITY, RIGHT, SUBSEQUENT ENCOUNTER: Primary | ICD-10-CM

## 2021-08-13 DIAGNOSIS — I48.0 PAF (PAROXYSMAL ATRIAL FIBRILLATION) (HCC): ICD-10-CM

## 2021-08-13 PROCEDURE — 99442 PR PHYS/QHP TELEPHONE EVALUATION 11-20 MIN: CPT | Performed by: FAMILY MEDICINE

## 2021-08-13 NOTE — PROGRESS NOTES
Kirill Newton is a 78 y.o.  male and presents with    Chief Complaint   Patient presents with    Leg Pain     left leg    Wound Check      Kirill Newton, who was evaluated through a synchronous (real-time) audio only encounter, and/or his healthcare decision maker, is aware that it is a billable service, with coverage as determined by his insurance carrier. He provided verbal consent to proceed: Yes, and patient identification was verified. This visit was conducted pursuant to the emergency declaration under the [address] [place] [address] and the [employer] Act. A caregiver was present when appropriate. Ability to conduct physical exam was limited. The patient was located in a state where the provider was credentialed to provide care. Duration of call 11 minutes  --Sandra Lundy MD on 8/13/2021 at 11:19 AM    Subjective:  He is changing wound on leg; he has yellow discharge; he is having wound care nurse coming 3 days a week; he reports that his leg looks better. ROS     All other systems reviewed and are negative. Objective: There were no vitals filed for this visit. Phone call only    LABS     TESTS      Assessment/Plan:    1. Non-healing wound of lower extremity, right, subsequent encounter  Vascular surgery and elevation    2. Leg swelling      3. Neuropathy  Pain management continued    4. PAF (paroxysmal atrial fibrillation) (HCC)  Continue current medications      Lab review: no lab studies available for review at time of visit      I have discussed the diagnosis with the patient and the intended plan as seen in the above orders. I have discussed medication side effects and warnings with the patient as well. I have reviewed the plan of care with the patient, accepted their input and they are in agreement with the treatment goals.

## 2021-08-13 NOTE — PROGRESS NOTES
Date/Time:  2021        Method of communication with patient:phone    Ambulator Care Manager [facility]) contacted the patient by telephone to perform Ambulatory Care Coordination. Verified name and  (PHI) with patient as identifiers. Provided introduction to self, and explanation of the Ambulatory Care Manager's role. Reviewed most recent clinic visit w/ patient who verbalized understanding. Patient given an opportunity to ask questions. Top Challenges reviewed with the patient   1. Spoke with patient - Patient states doing well at present   2. Chief complaint of discomfort in lt leg secondary to non-healing wound  3. patient had virtual appointment yesterday but was not able to complete call - did not speak with MD. Call placed to PCP office and visit rescheduled for today at 1045. Will reevaluate at next encounter  4. HH from [facility] visits and changing dressing  5. Further questions answered and patient has [facility] contact information  6. Preliminary review of medications done during this encounter - will do full med rec on next encounter  7. Will continue to follow patient per [facility] protocal     The patient agrees to contact the PCP office or the [address] [place] [address] for questions related to their healthcare. Provided contact information for future reference. Disease Specific:   N/A    Home Health Active: Yes    DME Active: Yes    Barriers to care? depression, financial, lack of knowledge about disease, medication management, transportation    Advance Care Planning:   Does patient have an Advance Directive:  reviewed and current     Medication(s):   Medication reconciliation was not performed with patient, who verbalizes understanding of administration of home medications. There were no barriers to obtaining medications identified at this time.          Current Outpatient Medications   Medication Sig    gabapentin (NEURONTIN) 600 mg tablet one am, one noon , 2 nite    SITagliptin (JANUVIA) 50 mg tablet Take 1 Tablet by mouth daily.  metoprolol succinate (TOPROL-XL) 25 mg XL tablet Take 1 Tablet by mouth daily.  furosemide (Lasix) 20 mg tablet Take 1 Tablet by mouth daily.  potassium chloride (K-DUR, KLOR-CON) 20 mEq tablet Take 1 Tablet by mouth daily.  apixaban (ELIQUIS) 5 mg tablet Take 1 Tablet by mouth two (2) times a day.  acetaminophen (TYLENOL) 500 mg tablet Take 1 Tablet by mouth every six (6) hours as needed for Pain.    L.acid/B.bifidum/B.animal/FOS (PROBIOTIC COMPLEX PO) Take 1 Tab by mouth daily.  VITAMIN B COMPLEX PO Take 1 Tab by mouth daily.  diphenoxylate-atropine (LOMOTIL) 2.5-0.025 mg per tablet Take 1 Tab by mouth four (4) times daily as needed for Diarrhea.  albuterol (PROVENTIL VENTOLIN) 2.5 mg /3 mL (0.083 %) nebulizer solution 3 mL by Nebulization route every four (4) hours as needed for Wheezing.  cholecalciferol (VITAMIN D3) 1,000 unit tablet Take 1,000 Units by mouth daily. No current facility-administered medications for this visit.        BS follow up appointment(s):   Future Appointments   Date Time Provider Brad Chapman   8/13/2021 10:45 AM Nora Bermudez MD [facility]          Goals Addressed                 This Visit's Progress     Attend follow up appointments on schedule   On track     Prepare patients and caregivers for end of life decisions (ie. need for hospice, pain management, symptom relief, advance directives etc.)   On track     Take all medications as ordered   On track

## 2021-08-13 NOTE — PROGRESS NOTES
Tana Horan presents today for   Chief Complaint   Patient presents with    Leg Pain     left leg    Wound Check       Is someone accompanying this pt? na    Is the patient using any DME equipment during OV? na    Depression Screening:  3 most recent PHQ Screens 11/14/2018   Little interest or pleasure in doing things Not at all   Feeling down, depressed, irritable, or hopeless Not at all   Total Score PHQ 2 0       Learning Assessment:  Learning Assessment 10/31/2017   PRIMARY LEARNER Patient   HIGHEST LEVEL OF EDUCATION - PRIMARY LEARNER  2 YEARS Lindy PRIMARY LEARNER PHYSICAL   CO-LEARNER CAREGIVER No   PRIMARY LANGUAGE ENGLISH    NEED No   LEARNER PREFERENCE PRIMARY LISTENING   LEARNING SPECIAL TOPICS no   ANSWERED BY self   RELATIONSHIP SELF       Abuse Screening:  Abuse Screening Questionnaire 10/31/2017   Do you ever feel afraid of your partner? N   Are you in a relationship with someone who physically or mentally threatens you? N   Is it safe for you to go home? Y       Fall Risk  Fall Risk Assessment, last 12 mths 11/14/2018   Able to walk? No   Fall in past 12 months? -   Number of falls in past 12 months -   Fall with injury? -       Health Maintenance reviewed and discussed and ordered per Provider. Health Maintenance Due   Topic Date Due    Foot Exam Q1  Never done    Eye Exam Retinal or Dilated  Never done    COVID-19 Vaccine (1) Never done    Shingrix Vaccine Age 50> (1 of 2) Never done    MICROALBUMIN Q1  07/13/2018   . Coordination of Care:  1. Have you been to the ER, urgent care clinic since your last visit? Hospitalized since your last visit? Yes, 7/8/21, [facility] ER, leg ulcer    2. Have you seen or consulted any other health care providers outside of the [address] [place] [address] since your last visit? Include any pap smears or colon screening.  no      Last  Checked na  Last UDS Checked na  Last Pain contract signed: na    Patients concerns today: Left leg ulcer

## 2021-08-20 ENCOUNTER — PATIENT OUTREACH (OUTPATIENT)
Dept: CASE MANAGEMENT | Age: 81
End: 2021-08-20

## 2021-08-20 NOTE — PROGRESS NOTES
Date/Time:  2021        Method of communication with patient:phone    Ambulator Care Manager [facility]) contacted the patient by telephone to perform Ambulatory Care Coordination. Verified name and  (PHI) with patient as identifiers. Provided introduction to self, and explanation of the Ambulatory Care Manager's role. Reviewed most recent clinic visit w/ patient who verbalized understanding. Patient given an opportunity to ask questions. Top Challenges reviewed with the patient   1. Spoke with patient - Patient states doing well at present   2. Chief complaint of discomfort in lt leg secondary to non-healing wound  3. patient completed virtual appointment 21. Visit note reviewed. 4. Further questions answered and patient has [facility] contact information  5. Medication reconciliation done at this encounter with patient. Shows understanding of medication therapy  6. Will continue to follow patient per AC protocal     The patient agrees to contact the PCP office or the [address] [place] [address] for questions related to their healthcare. Provided contact information for future reference. Disease Specific:   N/A    Home Health Active: Yes    DME Active: Yes    Barriers to care? depression, financial, lack of knowledge about disease, medication management, transportation    Advance Care Planning:   Does patient have an Advance Directive:  reviewed and current     Medication(s):   Medication reconciliation was performed with patient, who verbalizes understanding of administration of home medications. There were no barriers to obtaining medications identified at this time. Current Outpatient Medications   Medication Sig    gabapentin (NEURONTIN) 600 mg tablet one am, one noon , 2 nite    SITagliptin (JANUVIA) 50 mg tablet Take 1 Tablet by mouth daily.  metoprolol succinate (TOPROL-XL) 25 mg XL tablet Take 1 Tablet by mouth daily.  furosemide (Lasix) 20 mg tablet Take 1 Tablet by mouth daily.  potassium chloride (K-DUR, KLOR-CON) 20 mEq tablet Take 1 Tablet by mouth daily.  apixaban (ELIQUIS) 5 mg tablet Take 1 Tablet by mouth two (2) times a day.  acetaminophen (TYLENOL) 500 mg tablet Take 1 Tablet by mouth every six (6) hours as needed for Pain.    L.acid/B.bifidum/B.animal/FOS (PROBIOTIC COMPLEX PO) Take 1 Tab by mouth daily.  VITAMIN B COMPLEX PO Take 1 Tab by mouth daily.  diphenoxylate-atropine (LOMOTIL) 2.5-0.025 mg per tablet Take 1 Tab by mouth four (4) times daily as needed for Diarrhea.  albuterol (PROVENTIL VENTOLIN) 2.5 mg /3 mL (0.083 %) nebulizer solution 3 mL by Nebulization route every four (4) hours as needed for Wheezing.  cholecalciferol (VITAMIN D3) 1,000 unit tablet Take 1,000 Units by mouth daily. No current facility-administered medications for this visit. [facility] follow up appointment(s):   No future appointments.        Goals Addressed                 This Visit's Progress     Attend follow up appointments on schedule   On track     Prepare patients and caregivers for end of life decisions (ie. need for hospice, pain management, symptom relief, advance directives etc.)   On track     Take all medications as ordered   On track

## 2021-08-26 ENCOUNTER — PATIENT OUTREACH (OUTPATIENT)
Dept: CASE MANAGEMENT | Age: 81
End: 2021-08-26

## 2021-08-26 NOTE — PROGRESS NOTES
Date/Time:  2021        Method of communication with patient:phone    Ambulator Care Manager [facility]) contacted the patient by telephone to perform Ambulatory Care Coordination. Verified name and  (PHI) with patient as identifiers. Provided introduction to self, and explanation of the Ambulatory Care Manager's role. Reviewed most recent clinic visit w/ patient who verbalized understanding. Patient given an opportunity to ask questions. Top Challenges reviewed with the patient   1. Spoke with patient - Patient states doing well at present   2. Chief complaint of discomfort in lt leg secondary to non-healing wound - states that drainage has decreased from last week. [facility]. 3. Further questions answered and patient has [facility] contact information  4. Medication reconciliation review done at this encounter with patient. Shows understanding of medication therapy  5. Will continue to follow patient per [facility] protocal     The patient agrees to contact the PCP office or the [address] [place] [address] for questions related to their healthcare. Provided contact information for future reference. Disease Specific:   N/A    Home Health Active: Yes    DME Active: Yes    Barriers to care? depression, financial, lack of knowledge about disease, medication management, transportation    Advance Care Planning:   Does patient have an Advance Directive:  reviewed and current     Medication(s):   Medication reconciliation was performed with patient, who verbalizes understanding of administration of home medications. There were no barriers to obtaining medications identified at this time. Current Outpatient Medications   Medication Sig    gabapentin (NEURONTIN) 600 mg tablet one am, one noon , 2 nite    SITagliptin (JANUVIA) 50 mg tablet Take 1 Tablet by mouth daily.  metoprolol succinate (TOPROL-XL) 25 mg XL tablet Take 1 Tablet by mouth daily.     furosemide (Lasix) 20 mg tablet Take 1 Tablet by mouth daily.  potassium chloride (K-DUR, KLOR-CON) 20 mEq tablet Take 1 Tablet by mouth daily.  apixaban (ELIQUIS) 5 mg tablet Take 1 Tablet by mouth two (2) times a day.  acetaminophen (TYLENOL) 500 mg tablet Take 1 Tablet by mouth every six (6) hours as needed for Pain.    L.acid/B.bifidum/B.animal/FOS (PROBIOTIC COMPLEX PO) Take 1 Tab by mouth daily.  VITAMIN B COMPLEX PO Take 1 Tab by mouth daily.  diphenoxylate-atropine (LOMOTIL) 2.5-0.025 mg per tablet Take 1 Tab by mouth four (4) times daily as needed for Diarrhea.  albuterol (PROVENTIL VENTOLIN) 2.5 mg /3 mL (0.083 %) nebulizer solution 3 mL by Nebulization route every four (4) hours as needed for Wheezing.  cholecalciferol (VITAMIN D3) 1,000 unit tablet Take 1,000 Units by mouth daily. No current facility-administered medications for this visit. [facility] [place](s):   No future appointments.        Goals Addressed                 This Visit's Progress     Attend follow up appointments on schedule   On track     Prepare patients and caregivers for end of life decisions (ie. need for hospice, pain management, symptom relief, advance directives etc.)   On track     Take all medications as ordered   On track

## 2021-09-09 ENCOUNTER — PATIENT OUTREACH (OUTPATIENT)
Dept: CASE MANAGEMENT | Age: 81
End: 2021-09-09

## 2021-09-09 NOTE — PROGRESS NOTES
Date/Time:  2021        Method of communication with patient:phone    Ambulator Care Manager [facility]) contacted the patient by telephone to perform Ambulatory Care Coordination. Verified name and  (PHI) with patient as identifiers. Provided introduction to self, and explanation of the Ambulatory Care Manager's role. Reviewed most recent clinic visit w/ patient who verbalized understanding. Patient given an opportunity to ask questions. Top Challenges reviewed with the patient   1. Spoke with patient - Patient states doing well at present   2. Chief complaint of discomfort in lt leg secondary to non-healing wound - Continues to have [facility] care for wound. 3. Appointment established for pacemaker check and cardilogy visit as listed below. 4. Further / follow up appointments listed below  5. Further questions answered and patient has [facility] contact information  6. Medication reconciliation review done at this encounter with patient. Shows understanding of medication therapy  7. Will continue to follow patient per [facility] protocal     The patient agrees to contact the PCP office or the [address] [place] [address] for questions related to their healthcare. Provided contact information for future reference. Disease Specific:   N/A    Home Health Active: Yes    DME Active: Yes    Barriers to care? depression, financial, lack of knowledge about disease, medication management, transportation    Advance Care Planning:   Does patient have an Advance Directive:  reviewed and current     Medication(s):   Medication reconciliation was performed with patient, who verbalizes understanding of administration of home medications. There were no barriers to obtaining medications identified at this time. Current Outpatient Medications   Medication Sig    gabapentin (NEURONTIN) 600 mg tablet one am, one noon , 2 nite    SITagliptin (JANUVIA) 50 mg tablet Take 1 Tablet by mouth daily.     metoprolol succinate (TOPROL-XL) 25 mg XL tablet Take 1 Tablet by mouth daily.  furosemide (Lasix) 20 mg tablet Take 1 Tablet by mouth daily.  potassium chloride (K-DUR, KLOR-CON) 20 mEq tablet Take 1 Tablet by mouth daily.  apixaban (ELIQUIS) 5 mg tablet Take 1 Tablet by mouth two (2) times a day.  acetaminophen (TYLENOL) 500 mg tablet Take 1 Tablet by mouth every six (6) hours as needed for Pain.    L.acid/B.bifidum/B.animal/FOS (PROBIOTIC COMPLEX PO) Take 1 Tab by mouth daily.  VITAMIN B COMPLEX PO Take 1 Tab by mouth daily.  diphenoxylate-atropine (LOMOTIL) 2.5-0.025 mg per tablet Take 1 Tab by mouth four (4) times daily as needed for Diarrhea.  albuterol (PROVENTIL VENTOLIN) 2.5 mg /3 mL (0.083 %) nebulizer solution 3 mL by Nebulization route every four (4) hours as needed for Wheezing.  cholecalciferol (VITAMIN D3) 1,000 unit tablet Take 1,000 Units by mouth daily. No current facility-administered medications for this visit.        [facility] follow up appointment(s):   Future Appointments   Date Time Provider Brad Chapman   10/19/2021  9:15 AM CSI, PACER NORF [facility] BS AMB   10/19/2021  9:45 AM Katarzyna Rothman MD [facility] BS AMB          Goals Addressed                 This Visit's Progress     Attend follow up appointments on schedule   On track     Prepare patients and caregivers for end of life decisions (ie. need for hospice, pain management, symptom relief, advance directives etc.)   On track     Take all medications as ordered   On track

## 2021-09-13 ENCOUNTER — TELEPHONE (OUTPATIENT)
Dept: FAMILY MEDICINE CLINIC | Age: 81
End: 2021-09-13

## 2021-09-13 NOTE — TELEPHONE ENCOUNTER
Clementina  called very concerned about Pt's foot/calf. It is swollen, puss draining and very infected. Drainage is now green in color with a terrible odor. The hole is very large. The Podiatrist is also concerned. Clementina  would like a call back to discuss ASAP.  Please assist.

## 2021-09-13 NOTE — TELEPHONE ENCOUNTER
Returned call to Xochilt Retana, I told her he needs to go to the ER ASAP, she agreed. The patient doesn't want to go to the ER, Xochilt Retana is calling an ambulance.

## 2021-09-22 ENCOUNTER — PATIENT OUTREACH (OUTPATIENT)
Dept: CASE MANAGEMENT | Age: 81
End: 2021-09-22

## 2021-09-22 NOTE — PROGRESS NOTES
Date/Time:  2021        Method of communication with patient:phone    [facility]) contacted the patient by telephone to perform Ambulatory Care Coordination. Verified name and  (PHI) with patient as identifiers. Provided introduction to self, and explanation of the Ambulatory Care Manager's role. Reviewed most recent clinic visit w/ patient who verbalized understanding. Patient given an opportunity to ask questions. Top Challenges reviewed with the patient   1. Spoke with patient - Patient states not doing well at present   2. 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 Receiving Physical Therapy in hospital  3. Episode of care resolved due to present in patient status. The patient agrees to contact the PCP office or the [facility] for questions related to their healthcare. Provided contact information for future reference. Disease Specific:   N/A    Home Health Active: Yes    DME Active: Yes    Barriers to care? depression, financial, lack of knowledge about disease, medication management, transportation    Advance Care Planning:   Does patient have an Advance Directive:  reviewed and current     Medication(s):   Medication reconciliation was performed with patient, who verbalizes understanding of administration of home medications. There were no barriers to obtaining medications identified at this time. Current Outpatient Medications   Medication Sig    gabapentin (NEURONTIN) 600 mg tablet one am, one noon , 2 nite    SITagliptin (JANUVIA) 50 mg tablet Take 1 Tablet by mouth daily.  metoprolol succinate (TOPROL-XL) 25 mg XL tablet Take 1 Tablet by mouth daily.  furosemide (Lasix) 20 mg tablet Take 1 Tablet by mouth daily.  potassium chloride (K-DUR, KLOR-CON) 20 mEq tablet Take 1 Tablet by mouth daily.  apixaban (ELIQUIS) 5 mg tablet Take 1 Tablet by mouth two (2) times a day.     acetaminophen (TYLENOL) 500 mg tablet Take 1 Tablet by mouth every six (6) hours as needed for Pain.    L.acid/B.bifidum/B.animal/FOS (PROBIOTIC COMPLEX PO) Take 1 Tab by mouth daily.  VITAMIN B COMPLEX PO Take 1 Tab by mouth daily.  diphenoxylate-atropine (LOMOTIL) 2.5-0.025 mg per tablet Take 1 Tab by mouth four (4) times daily as needed for Diarrhea.  albuterol (PROVENTIL VENTOLIN) 2.5 mg /3 mL (0.083 %) nebulizer solution 3 mL by Nebulization route every four (4) hours as needed for Wheezing.  cholecalciferol (VITAMIN D3) 1,000 unit tablet Take 1,000 Units by mouth daily. No current facility-administered medications for this visit.        [facility] follow up appointment(s):   Future Appointments   Date Time Provider Brad Chapman   10/19/2021  9:15 AM CSI, PACER NORF [facility] BS AMB   10/19/2021  9:45 AM Javi Brody MD [facility] BS AMB          Goals Addressed    None

## 2021-09-27 ENCOUNTER — PATIENT OUTREACH (OUTPATIENT)
Dept: CASE MANAGEMENT | Age: 81
End: 2021-09-27

## 2021-09-27 NOTE — PROGRESS NOTES
Transition of care outreach postponed for 14 days due to patient's discharge to SNF.     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

## 2021-10-11 ENCOUNTER — PATIENT OUTREACH (OUTPATIENT)
Dept: CASE MANAGEMENT | Age: 81
End: 2021-10-11

## 2021-10-11 NOTE — PROGRESS NOTES
CodyPepe  for SNF follow up. Provided 2 patient identifiers. Notified patient readmitted to hospital.  [facility] EMR reviewed. [facility] [place] [facility]. Will monitor for d/c.

## 2021-10-21 ENCOUNTER — PATIENT OUTREACH (OUTPATIENT)
Dept: CASE MANAGEMENT | Age: 81
End: 2021-10-21

## 2021-10-21 NOTE — PROGRESS NOTES
Transition of care outreach postponed for 14 days due to patient's discharge to SNF.       [facility] 10/4-10/20 Acute respiratory failure with hypoxia due to COVID vs acute diastolic CHF    [facility] [place] [facility]

## 2021-11-04 ENCOUNTER — PATIENT OUTREACH (OUTPATIENT)
Dept: CASE MANAGEMENT | Age: 81
End: 2021-11-04

## 2021-11-04 NOTE — PROGRESS NOTES
[facility]. Provided 2 patient identifiers. Discharge date 10/23/21. Review of [facility] indicated patient was admitted 10/23/21-10/26/21. Patient  on 10/26/21 @ 6:36 AM. Episode of care resolved.

## 2022-01-01 NOTE — PROGRESS NOTES
0715-  Bedside and Verbal shift change report given to Araceli Rodriguez RN (oncoming nurse) by Karoline Beaver RN (offgoing nurse). Report included the following information SBAR, Kardex, Intake/Output, MAR and Recent Results. 1300-  Report called to [facility] at [address] [facility]. Patient's planned pickup time is 1400. 1

## 2024-02-19 NOTE — DISCHARGE INSTRUCTIONS
Patient Education        Diarrhea: Care Instructions  Your Care Instructions    Diarrhea is loose, watery stools (bowel movements). The exact cause is often hard to find. Sometimes diarrhea is your body's way of getting rid of what caused an upset stomach. Viruses, food poisoning, and many medicines can cause diarrhea. Some people get diarrhea in response to emotional stress, anxiety, or certain foods. Almost everyone has diarrhea now and then. It usually isn't serious, and your stools will return to normal soon. The important thing to do is replace the fluids you have lost, so you can prevent dehydration. The doctor has checked you carefully, but problems can develop later. If you notice any problems or new symptoms, get medical treatment right away. Follow-up care is a key part of your treatment and safety. Be sure to make and go to all appointments, and call your doctor if you are having problems. It's also a good idea to know your test results and keep a list of the medicines you take. How can you care for yourself at home? · Watch for signs of dehydration, which means your body has lost too much water. Dehydration is a serious condition and should be treated right away. Signs of dehydration are:  ? Increasing thirst and dry eyes and mouth. ? Feeling faint or lightheaded. ? Darker urine, and a smaller amount of urine than normal.  · To prevent dehydration, drink plenty of fluids, enough so that your urine is light yellow or clear like water. Choose water and other caffeine-free clear liquids until you feel better. If you have kidney, heart, or liver disease and have to limit fluids, talk with your doctor before you increase the amount of fluids you drink. · Begin eating small amounts of mild foods the next day, if you feel like it. ? Try yogurt that has live cultures of Lactobacillus. (Check the label.)  ?  Avoid spicy foods, fruits, alcohol, and caffeine until 48 hours after all symptoms are gone.  ? Avoid chewing gum that contains sorbitol. ? Avoid dairy products (except for yogurt with Lactobacillus) while you have diarrhea and for 3 days after symptoms are gone. · The doctor may recommend that you take over-the-counter medicine, such as loperamide (Imodium), if you still have diarrhea after 6 hours. Read and follow all instructions on the label. Do not use this medicine if you have bloody diarrhea, a high fever, or other signs of serious illness. Call your doctor if you think you are having a problem with your medicine. When should you call for help? Call 911 anytime you think you may need emergency care. For example, call if:    · You passed out (lost consciousness).     · Your stools are maroon or very bloody.    Call your doctor now or seek immediate medical care if:    · You are dizzy or lightheaded, or you feel like you may faint.     · Your stools are black and look like tar, or they have streaks of blood.     · You have new or worse belly pain.     · You have symptoms of dehydration, such as:  ? Dry eyes and a dry mouth. ? Passing only a little dark urine. ? Feeling thirstier than usual.     · You have a new or higher fever.    Watch closely for changes in your health, and be sure to contact your doctor if:    · Your diarrhea is getting worse.     · You see pus in the diarrhea.     · You are not getting better after 2 days (48 hours). Where can you learn more? Go to http://tristan-shaye.info/. Enter U013 in the search box to learn more about \"Diarrhea: Care Instructions. \"  Current as of: September 23, 2018  Content Version: 11.9  © 0707-6316 [employer]. Care instructions adapted under license by [employer] (which disclaims liability or warranty for this information).  If you have questions about a medical condition or this instruction, always ask your healthcare professional. [place] [address] any warranty or liability for your use of this information. Bactrim Counseling:  I discussed with the patient the risks of sulfa antibiotics including but not limited to GI upset, allergic reaction, drug rash, diarrhea, dizziness, photosensitivity, and yeast infections.  Rarely, more serious reactions can occur including but not limited to aplastic anemia, agranulocytosis, methemoglobinemia, blood dyscrasias, liver or kidney failure, lung infiltrates or desquamative/blistering drug rashes. Sarecycline Counseling: Patient advised regarding possible photosensitivity and discoloration of the teeth, skin, lips, tongue and gums.  Patient instructed to avoid sunlight, if possible.  When exposed to sunlight, patients should wear protective clothing, sunglasses, and sunscreen.  The patient was instructed to call the office immediately if the following severe adverse effects occur:  hearing changes, easy bruising/bleeding, severe headache, or vision changes.  The patient verbalized understanding of the proper use and possible adverse effects of sarecycline.  All of the patient's questions and concerns were addressed. Topical Retinoid counseling:  Patient advised to apply a pea-sized amount only at bedtime and wait 30 minutes after washing their face before applying.  If too drying, patient may add a non-comedogenic moisturizer. The patient verbalized understanding of the proper use and possible adverse effects of retinoids.  All of the patient's questions and concerns were addressed. Topical Sulfur Applications Pregnancy And Lactation Text: This medication is Pregnancy Category C and has an unknown safety profile during pregnancy. It is unknown if this topical medication is excreted in breast milk. Tetracycline Pregnancy And Lactation Text: This medication is Pregnancy Category D and not consider safe during pregnancy. It is also excreted in breast milk. Birth Control Pills Counseling: Birth Control Pill Counseling: I discussed with the patient the potential side effects of OCPs including but not limited to increased risk of stroke, heart attack, thrombophlebitis, deep venous thrombosis, hepatic adenomas, breast changes, GI upset, headaches, and depression.  The patient verbalized understanding of the proper use and possible adverse effects of OCPs. All of the patient's questions and concerns were addressed. Erythromycin Pregnancy And Lactation Text: This medication is Pregnancy Category B and is considered safe during pregnancy. It is also excreted in breast milk. Minocycline Counseling: Patient advised regarding possible photosensitivity and discoloration of the teeth, skin, lips, tongue and gums.  Patient instructed to avoid sunlight, if possible.  When exposed to sunlight, patients should wear protective clothing, sunglasses, and sunscreen.  The patient was instructed to call the office immediately if the following severe adverse effects occur:  hearing changes, easy bruising/bleeding, severe headache, or vision changes.  The patient verbalized understanding of the proper use and possible adverse effects of minocycline.  All of the patient's questions and concerns were addressed. Tazorac Counseling:  Patient advised that medication is irritating and drying.  Patient may need to apply sparingly and wash off after an hour before eventually leaving it on overnight.  The patient verbalized understanding of the proper use and possible adverse effects of tazorac.  All of the patient's questions and concerns were addressed. Winlevi Pregnancy And Lactation Text: This medication is considered safe during pregnancy and breastfeeding. Aklief Pregnancy And Lactation Text: It is unknown if this medication is safe to use during pregnancy.  It is unknown if this medication is excreted in breast milk.  Breastfeeding women should use the topical cream on the smallest area of the skin for the shortest time needed while breastfeeding.  Do not apply to nipple and areola. Doxycycline Pregnancy And Lactation Text: This medication is Pregnancy Category D and not consider safe during pregnancy. It is also excreted in breast milk but is considered safe for shorter treatment courses. Topical Clindamycin Pregnancy And Lactation Text: This medication is Pregnancy Category B and is considered safe during pregnancy. It is unknown if it is excreted in breast milk. Detail Level: Zone Tazorac Pregnancy And Lactation Text: This medication is not safe during pregnancy. It is unknown if this medication is excreted in breast milk. Azelaic Acid Counseling: Patient counseled that medicine may cause skin irritation and to avoid applying near the eyes.  In the event of skin irritation, the patient was advised to reduce the amount of the drug applied or use it less frequently.   The patient verbalized understanding of the proper use and possible adverse effects of azelaic acid.  All of the patient's questions and concerns were addressed. High Dose Vitamin A Counseling: Side effects reviewed, pt to contact office should one occur. Birth Control Pills Pregnancy And Lactation Text: This medication should be avoided if pregnant and for the first 30 days post-partum. Dapsone Pregnancy And Lactation Text: This medication is Pregnancy Category C and is not considered safe during pregnancy or breast feeding. Azithromycin Counseling:  I discussed with the patient the risks of azithromycin including but not limited to GI upset, allergic reaction, drug rash, diarrhea, and yeast infections. Benzoyl Peroxide Counseling: Patient counseled that medicine may cause skin irritation and bleach clothing.  In the event of skin irritation, the patient was advised to reduce the amount of the drug applied or use it less frequently.   The patient verbalized understanding of the proper use and possible adverse effects of benzoyl peroxide.  All of the patient's questions and concerns were addressed. Isotretinoin Counseling: Patient should get monthly blood tests, not donate blood, not drive at night if vision affected, not share medication, and not undergo elective surgery for 6 months after tx completed. Side effects reviewed, pt to contact office should one occur. Spironolactone Pregnancy And Lactation Text: This medication can cause feminization of the male fetus and should be avoided during pregnancy. The active metabolite is also found in breast milk. Aklief counseling:  Patient advised to apply a pea-sized amount only at bedtime and wait 30 minutes after washing their face before applying.  If too drying, patient may add a non-comedogenic moisturizer.  The most commonly reported side effects including irritation, redness, scaling, dryness, stinging, burning, itching, and increased risk of sunburn.  The patient verbalized understanding of the proper use and possible adverse effects of retinoids.  All of the patient's questions and concerns were addressed. Erythromycin Counseling:  I discussed with the patient the risks of erythromycin including but not limited to GI upset, allergic reaction, drug rash, diarrhea, increase in liver enzymes, and yeast infections. Azithromycin Pregnancy And Lactation Text: This medication is considered safe during pregnancy and is also secreted in breast milk. Doxycycline Counseling:  Patient counseled regarding possible photosensitivity and increased risk for sunburn.  Patient instructed to avoid sunlight, if possible.  When exposed to sunlight, patients should wear protective clothing, sunglasses, and sunscreen.  The patient was instructed to call the office immediately if the following severe adverse effects occur:  hearing changes, easy bruising/bleeding, severe headache, or vision changes.  The patient verbalized understanding of the proper use and possible adverse effects of doxycycline.  All of the patient's questions and concerns were addressed. Bactrim Pregnancy And Lactation Text: This medication is Pregnancy Category D and is known to cause fetal risk.  It is also excreted in breast milk. High Dose Vitamin A Pregnancy And Lactation Text: High dose vitamin A therapy is contraindicated during pregnancy and breast feeding. Topical Retinoid Pregnancy And Lactation Text: This medication is Pregnancy Category C. It is unknown if this medication is excreted in breast milk. Winlevi Counseling:  I discussed with the patient the risks of topical clascoterone including but not limited to erythema, scaling, itching, and stinging. Patient voiced their understanding. Spironolactone Counseling: Patient advised regarding risks of diarrhea, abdominal pain, hyperkalemia, birth defects (for female patients), liver toxicity and renal toxicity. The patient may need blood work to monitor liver and kidney function and potassium levels while on therapy. The patient verbalized understanding of the proper use and possible adverse effects of spironolactone.  All of the patient's questions and concerns were addressed. Include Pregnancy/Lactation Warning?: No Dapsone Counseling: I discussed with the patient the risks of dapsone including but not limited to hemolytic anemia, agranulocytosis, rashes, methemoglobinemia, kidney failure, peripheral neuropathy, headaches, GI upset, and liver toxicity.  Patients who start dapsone require monitoring including baseline LFTs and weekly CBCs for the first month, then every month thereafter.  The patient verbalized understanding of the proper use and possible adverse effects of dapsone.  All of the patient's questions and concerns were addressed. Benzoyl Peroxide Pregnancy And Lactation Text: This medication is Pregnancy Category C. It is unknown if benzoyl peroxide is excreted in breast milk. Topical Sulfur Applications Counseling: Topical Sulfur Counseling: Patient counseled that this medication may cause skin irritation or allergic reactions.  In the event of skin irritation, the patient was advised to reduce the amount of the drug applied or use it less frequently.   The patient verbalized understanding of the proper use and possible adverse effects of topical sulfur application.  All of the patient's questions and concerns were addressed. Tetracycline Counseling: Patient counseled regarding possible photosensitivity and increased risk for sunburn.  Patient instructed to avoid sunlight, if possible.  When exposed to sunlight, patients should wear protective clothing, sunglasses, and sunscreen.  The patient was instructed to call the office immediately if the following severe adverse effects occur:  hearing changes, easy bruising/bleeding, severe headache, or vision changes.  The patient verbalized understanding of the proper use and possible adverse effects of tetracycline.  All of the patient's questions and concerns were addressed. Patient understands to avoid pregnancy while on therapy due to potential birth defects. Topical Clindamycin Counseling: Patient counseled that this medication may cause skin irritation or allergic reactions.  In the event of skin irritation, the patient was advised to reduce the amount of the drug applied or use it less frequently.   The patient verbalized understanding of the proper use and possible adverse effects of clindamycin.  All of the patient's questions and concerns were addressed. Azelaic Acid Pregnancy And Lactation Text: This medication is considered safe during pregnancy and breast feeding. Isotretinoin Pregnancy And Lactation Text: This medication is Pregnancy Category X and is considered extremely dangerous during pregnancy. It is unknown if it is excreted in breast milk. Detail Level: Detailed
